# Patient Record
Sex: FEMALE | Race: WHITE | Employment: UNEMPLOYED | URBAN - METROPOLITAN AREA
[De-identification: names, ages, dates, MRNs, and addresses within clinical notes are randomized per-mention and may not be internally consistent; named-entity substitution may affect disease eponyms.]

---

## 2017-01-23 PROBLEM — M81.0 OSTEOPOROSIS: Status: ACTIVE | Noted: 2017-01-23

## 2017-02-15 ENCOUNTER — HOSPITAL ENCOUNTER (OUTPATIENT)
Dept: GENERAL RADIOLOGY | Age: 57
Discharge: HOME | End: 2017-02-15
Payer: COMMERCIAL

## 2017-02-15 DIAGNOSIS — S12.100A: ICD-10-CM

## 2017-02-15 PROCEDURE — 72040 X-RAY EXAM NECK SPINE 2-3 VW: CPT

## 2017-02-22 ENCOUNTER — HOSPITAL ENCOUNTER (OUTPATIENT)
Dept: GENERAL RADIOLOGY | Age: 57
Discharge: HOME | End: 2017-02-22
Payer: COMMERCIAL

## 2017-02-22 DIAGNOSIS — S12.110A ODONTOID FRACTURE (HCC): ICD-10-CM

## 2017-02-22 PROCEDURE — 72040 X-RAY EXAM NECK SPINE 2-3 VW: CPT

## 2017-03-15 ENCOUNTER — HOSPITAL ENCOUNTER (OUTPATIENT)
Dept: GENERAL RADIOLOGY | Age: 57
Discharge: HOME | End: 2017-03-15
Payer: COMMERCIAL

## 2017-03-15 DIAGNOSIS — R52 PAIN: ICD-10-CM

## 2017-03-15 PROCEDURE — 72040 X-RAY EXAM NECK SPINE 2-3 VW: CPT

## 2017-04-02 ENCOUNTER — APPOINTMENT (OUTPATIENT)
Dept: CT IMAGING | Age: 57
End: 2017-04-02
Attending: EMERGENCY MEDICINE
Payer: COMMERCIAL

## 2017-04-02 ENCOUNTER — HOSPITAL ENCOUNTER (EMERGENCY)
Age: 57
Discharge: HOME | End: 2017-04-02
Attending: EMERGENCY MEDICINE
Payer: COMMERCIAL

## 2017-04-02 VITALS
HEART RATE: 78 BPM | SYSTOLIC BLOOD PRESSURE: 105 MMHG | HEIGHT: 61 IN | TEMPERATURE: 97.7 F | RESPIRATION RATE: 20 BRPM | WEIGHT: 125 LBS | DIASTOLIC BLOOD PRESSURE: 57 MMHG | OXYGEN SATURATION: 96 % | BODY MASS INDEX: 23.6 KG/M2

## 2017-04-02 DIAGNOSIS — M54.50 ACUTE LEFT-SIDED LOW BACK PAIN WITHOUT SCIATICA: ICD-10-CM

## 2017-04-02 DIAGNOSIS — M25.552 PAIN OF LEFT HIP JOINT: Primary | ICD-10-CM

## 2017-04-02 DIAGNOSIS — M54.2 NECK PAIN: ICD-10-CM

## 2017-04-02 PROCEDURE — 73700 CT LOWER EXTREMITY W/O DYE: CPT

## 2017-04-02 PROCEDURE — 74011250637 HC RX REV CODE- 250/637: Performed by: EMERGENCY MEDICINE

## 2017-04-02 PROCEDURE — 99284 EMERGENCY DEPT VISIT MOD MDM: CPT | Performed by: EMERGENCY MEDICINE

## 2017-04-02 RX ORDER — DIAZEPAM 5 MG/1
5 TABLET ORAL
Qty: 15 TAB | Refills: 0 | Status: SHIPPED | OUTPATIENT
Start: 2017-04-02 | End: 2017-06-02

## 2017-04-02 RX ORDER — DIAZEPAM 5 MG/1
5 TABLET ORAL
Status: COMPLETED | OUTPATIENT
Start: 2017-04-02 | End: 2017-04-02

## 2017-04-02 RX ORDER — OXYCODONE AND ACETAMINOPHEN 10; 325 MG/1; MG/1
1 TABLET ORAL
Qty: 10 TAB | Refills: 0 | Status: SHIPPED | OUTPATIENT
Start: 2017-04-02 | End: 2017-06-02

## 2017-04-02 RX ORDER — OXYCODONE AND ACETAMINOPHEN 10; 325 MG/1; MG/1
1 TABLET ORAL
Status: COMPLETED | OUTPATIENT
Start: 2017-04-02 | End: 2017-04-02

## 2017-04-02 RX ADMIN — DIAZEPAM 5 MG: 5 TABLET ORAL at 13:09

## 2017-04-02 RX ADMIN — OXYCODONE HYDROCHLORIDE AND ACETAMINOPHEN 1 TABLET: 10; 325 TABLET ORAL at 13:09

## 2017-04-02 NOTE — ED PROVIDER NOTES
Patient is a 64 y.o. female presenting with fall. The history is provided by the spouse and the patient. Fall   The accident occurred more than 2 days ago. The fall occurred while walking. She fell from a height of ground level. She landed on hard floor. There was no blood loss. The point of impact was the left hip (lower back). The pain is present in the left hip (lower back). The pain is at a severity of 10/10. She was ambulatory at the scene. There was no entrapment after the fall. There was no drug use involved in the accident. There was no alcohol use involved in the accident. Pertinent negatives include no visual change, no fever, no numbness, no abdominal pain, no bowel incontinence, no nausea, no vomiting, no hematuria, no headaches, no extremity weakness, no hearing loss, no loss of consciousness, no tingling and no laceration. The risk factors include none. The symptoms are aggravated by standing and ambulation. She has tried rest (pain meds) for the symptoms. The treatment provided no relief. It is unknown when the patient last had a tetanus shot.         Past Medical History:   Diagnosis Date    3-part fracture of surgical neck of left humerus 10/27/2016    ADHD (attention deficit hyperactivity disorder)     Arthritis     Carpal tunnel syndrome 11/11/2015    Closed fracture of anatomical neck of humerus 6/16/2015    Degeneration of lumbar or lumbosacral intervertebral disc 2/27/2015    Depressive disorder, not elsewhere classified 2/27/2015    Dermatophytosis of the body 2/27/2015    Essential hypertension, benign 2/27/2015    GERD (gastroesophageal reflux disease) 2/27/2015    Heart murmur     pt reports since birth; echo dated 9- states trace aortic and mitral regurg    History of MRSA infection     Hypopotassemia 2/27/2015    Insomnia, unspecified 2/27/2015    Kidney stone     Migraine, unspecified, without mention of intractable migraine without mention of status migrainosus 2/27/2015    Odontoid fracture with type II morphology ([facility] [address]     Osteoporosis 1/23/2017    Other closed fractures of upper end of humerus 6/16/2015    Polycythemia, secondary 2/27/2015    Traumatic tear of left rotator cuff 10/27/2016    Type 2 superior 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 shoulder 10/27/2016    Unspecified viral hepatitis C without hepatic coma 02/27/2015    pt reports hx only; normal LFTs noted labs June, 2016       Past Surgical History:   Procedure Laterality Date    HX BLADDER SUSPENSION  2006    bladder tack    HX CARPAL TUNNEL RELEASE Right 2001    then left    HX LITHOTRIPSY  2005    HX ORTHOPAEDIC      Slap repair right    HX ORTHOPAEDIC  2011    hip repair, left    HX ORTHOPAEDIC Left     Hand surgery         Family History:   Problem Relation Age of Onset    Lung Disease Mother     Hypertension Mother     Heart Disease Mother     Stroke Mother     COPD Mother     Cancer Father     Heart Attack Father     Diabetes Other     Osteoporosis Other     Other Other      Arthritis    Malignant Hyperthermia Neg Hx     Pseudocholinesterase Deficiency Neg Hx     Delayed Awakening Neg Hx     Post-op Nausea/Vomiting Neg Hx     Emergence Delirium Neg Hx     Post-op Cognitive Dysfunction Neg Hx        Social History     Social History    Marital status:      Spouse name: N/A    Number of children: N/A    Years of education: N/A     Occupational History    housekeeping Self Employed     Social History Main Topics    Smoking status: Current Every Day Smoker     Packs/day: 1.00     Years: 10.00     Types: Cigarettes    Smokeless tobacco: Never Used    Alcohol use No    Drug use: No    Sexual activity: Not on file     Other Topics Concern    Exercise No     Social History Narrative         ALLERGIES: Benadryl [diphenhydramine hcl]; Dilaudid [hydromorphone]; Lortab [hydrocodone-acetaminophen];  Morphine; and Neurontin [gabapentin]    Review of Systems   Constitutional: Negative for chills and fever. Gastrointestinal: Negative for abdominal pain, bowel incontinence, nausea and vomiting. Genitourinary: Negative for hematuria. Musculoskeletal: Positive for arthralgias, back pain, neck pain (patient's currently in c-collar for dens fracture) and neck stiffness. Negative for extremity weakness and gait problem. Neurological: Negative for tingling, loss of consciousness, numbness and headaches. All other systems reviewed and are negative. Vitals:    04/02/17 1153   BP: 124/78   Pulse: 99   Resp: 18   Temp: 97.7 °F (36.5 °C)   SpO2: 99%   Weight: 56.7 kg (125 lb)   Height: 5' 1\" (1.549 m)            Physical Exam   Constitutional: She is oriented to person, place, and time. She appears well-developed and well-nourished. She appears distressed. HENT:   Head: Normocephalic and atraumatic. Right Ear: Tympanic membrane and external ear normal.   Left Ear: Tympanic membrane and external ear normal.   Mouth/Throat: Oropharynx is clear and moist.   Eyes: Conjunctivae and EOM are normal. Pupils are equal, round, and reactive to light. Neck: Normal range of motion. Neck supple. No tracheal deviation present. Cardiovascular: Normal rate, regular rhythm, normal heart sounds and intact distal pulses. Exam reveals no gallop and no friction rub. No murmur heard. Pulmonary/Chest: Effort normal and breath sounds normal. No respiratory distress. She has no wheezes. Abdominal: Soft. Bowel sounds are normal. She exhibits no distension and no mass. There is no hepatosplenomegaly. There is no tenderness. There is no rebound and no guarding. Genitourinary: Rectal exam shows anal tone normal.   Musculoskeletal: She exhibits no edema. Lumbar back: She exhibits decreased range of motion, tenderness and pain. She exhibits no edema, no deformity, no laceration, no spasm and normal pulse.         Back:    Lymphadenopathy: She has no cervical adenopathy. Neurological: She is alert and oriented to person, place, and time. She has normal strength. She displays normal reflexes. No cranial nerve deficit or sensory deficit. Coordination normal.   Skin: Skin is warm and dry. No laceration and no rash noted. She is not diaphoretic. No erythema. Psychiatric: She has a normal mood and affect. Nursing note and vitals reviewed. MDM  Number of Diagnoses or Management Options  Acute left-sided low back pain without sciatica: new and requires workup  Pain of left hip joint: new and requires workup     Amount and/or Complexity of Data Reviewed  Tests in the radiology section of CPT®: ordered and reviewed  Obtain history from someone other than the patient: yes  Review and summarize past medical records: yes    Risk of Complications, Morbidity, and/or Mortality  Presenting problems: high  Diagnostic procedures: high  Management options: moderate    Patient Progress  Patient progress: improved    ED Course       Procedures    The patient was observed in the ED. Results Reviewed:    CT HIP LT WO CONT   Final Result   IMPRESSION: No fracture identified. If there is persistent clinical concern for an occult nondisplaced fracture   consider followup MRI. I discussed the results of all labs, procedures, radiographs, and treatments with the patient and available family. Treatment plan is agreed upon and the patient is ready for discharge. All voiced understanding of the discharge plan and medication instructions or changes as appropriate. Questions about treatment in the ED were answered. All were encouraged to return should symptoms worsen or new problems develop.

## 2017-04-02 NOTE — ED TRIAGE NOTES
Reports has C2 fracture. States fell on Wednesday and has increasing back pain and had x-ray done on Friday of hip and back. States ordered MRI of pelvis. States has not had the MRI yet but the MD told them to come to ED for possible emergent MRI.

## 2017-04-02 NOTE — DISCHARGE INSTRUCTIONS
Learning About Relief for Back Pain  What is back tension and strain? Back strain happens when you overstretch, or pull, a muscle in your back. You may hurt your back in an accident or when you exercise or lift something. Most back pain will get better with rest and time. You can take care of yourself at home to help your back heal.  What can you do first to relieve back pain? When you first feel back pain, try these steps:  · Walk. Take a short walk (10 to 20 minutes) on a level surface (no slopes, hills, or stairs) every 2 to 3 hours. Walk only distances you can manage without pain, especially leg pain. · Relax. Find a comfortable position for rest. Some people are comfortable on the floor or a medium-firm bed with a small pillow under their head and another under their knees. Some people prefer to lie on their side with a pillow between their knees. Don't stay in one position for too long. · Try heat or ice. Try using a heating pad on a low or medium setting, or take a warm shower, for 15 to 20 minutes every 2 to 3 hours. Or you can buy single-use heat wraps that last up to 8 hours. You can also try an ice pack for 10 to 15 minutes every 2 to 3 hours. You can use an ice pack or a bag of frozen vegetables wrapped in a thin towel. There is not strong evidence that either heat or ice will help, but you can try them to see if they help. You may also want to try switching between heat and cold. · Take pain medicine exactly as directed. ¨ If the doctor gave you a prescription medicine for pain, take it as prescribed. ¨ If you are not taking a prescription pain medicine, ask your doctor if you can take an over-the-counter medicine. What else can you do? · Stretch and exercise. Exercises that increase flexibility may relieve your pain and make it easier for your muscles to keep your spine in a good, neutral position. And don't forget to keep walking. · Do self-massage.  You can use self-massage to unwind after work or school or to energize yourself in the morning. You can easily massage your feet, hands, or neck. Self-massage works best if you are in comfortable clothes and are sitting or lying in a comfortable position. Use oil or lotion to massage bare skin. · Reduce stress. Back pain can lead to a vicious Gambell: Distress about the pain tenses the muscles in your back, which in turn causes more pain. Learn how to relax your mind and your muscles to lower your stress. Where can you learn more? Go to http://catracho-ricardo.info/. Enter Z831 in the search box to learn more about \"Learning About Relief for Back Pain. \"  Current as of: May 23, 2016  Content Version: 11.2  © 6653-9175 [employer]. Care instructions adapted under license by [employer] (which disclaims liability or warranty for this information). If you have questions about a medical condition or this instruction, always ask your healthcare professional. [place] [address] any warranty or liability for your use of this information. Back Pain, Emergency or Urgent Symptoms: Care Instructions  Your Care Instructions  Many people have back pain at one time or another. In most cases, pain gets better with self-care that includes over-the-counter pain medicine, ice, heat, and exercises. Unless you have symptoms of a severe injury or heart attack, you may be able to give yourself a few days before you call a doctor. But some back problems are very serious. Do not ignore symptoms that need to be checked right away. Follow-up care is a key part of your treatment and safety. Be sure to make and go to all appointments, and call your doctor if you are having problems. It's also a good idea to know your test results and keep a list of the medicines you take. How can you care for yourself at home? · Sit or lie in positions that are most comfortable and that reduce your pain.  Try one of these positions when you lie down:  ¨ Lie on your back with your knees bent and supported by large pillows. ¨ Lie on the floor with your legs on the seat of a sofa or chair. Alejandra Eladia on your side with your knees and hips bent and a pillow between your legs. ¨ Lie on your stomach if it does not make pain worse. · Do not sit up in bed, and avoid soft couches and twisted positions. Bed rest can help relieve pain at first, but it delays healing. Avoid bed rest after the first day. · Change positions every 30 minutes. If you must sit for long periods of time, take breaks from sitting. Get up and walk around, or lie flat. · Try using a heating pad on a low or medium setting, for 15 to 20 minutes every 2 or 3 hours. Try a warm shower in place of one session with the heating pad. You can also buy single-use heat wraps that last up to 8 hours. You can also try ice or cold packs on your back for 10 to 20 minutes at a time, several times a day. (Put a thin cloth between the ice pack and your skin.) This reduces pain and makes it easier to be active and exercise. · Take pain medicines exactly as directed. ¨ If the doctor gave you a prescription medicine for pain, take it as prescribed. ¨ If you are not taking a prescription pain medicine, ask your doctor if you can take an over-the-counter medicine. When should you call for help? Call 911 anytime you think you may need emergency care. For example, call if:  · You are unable to move a leg at all. · You have back pain with severe belly pain. · You have symptoms of a heart attack. These may include:  ¨ Chest pain or pressure, or a strange feeling in the chest.  ¨ Sweating. ¨ Shortness of breath. ¨ Nausea or vomiting. ¨ Pain, pressure, or a strange feeling in the back, neck, jaw, or upper belly or in one or both shoulders or arms. ¨ Lightheadedness or sudden weakness. ¨ A fast or irregular heartbeat.   After you call 911, the  may tell you to chew 1 adult-strength or 2 to 4 low-dose aspirin. Wait for an ambulance. Do not try to drive yourself. Call your doctor now or seek immediate medical care if:  · You have new or worse symptoms in your arms, legs, chest, belly, or buttocks. Symptoms may include:  ¨ Numbness or tingling. ¨ Weakness. ¨ Pain. · You lose bladder or bowel control. · You have back pain and:  ¨ You have injured your back while lifting or doing some other activity. Call if the pain is severe, has not gone away after 1 or 2 days, and you cannot do your normal daily activities. ¨ You have had a back injury before that needed treatment. ¨ Your pain has lasted longer than 4 weeks. ¨ You have had weight loss you cannot explain. ¨ You are age 48 or older. ¨ You have cancer now or have had it before. Watch closely for changes in your health, and be sure to contact your doctor if you are not getting better as expected. Where can you learn more? Go to http://catracho-ricardo.info/. Enter O336 in the search box to learn more about \"Back Pain, Emergency or Urgent Symptoms: Care Instructions. \"  Current as of: May 27, 2016  Content Version: 11.2  © 5752-5834 [employer]. Care instructions adapted under license by [employer] (which disclaims liability or warranty for this information). If you have questions about a medical condition or this instruction, always ask your healthcare professional. [place] [address] any warranty or liability for your use of this information. Hip Pain: Care Instructions  Your Care Instructions  Hip pain may be caused by many things, including overuse, a fall, or a twisting movement. Another cause of hip pain is arthritis. Your pain may increase when you stand up, walk, or squat. The pain may come and go or may be constant. Home treatment can help relieve hip pain, swelling, and stiffness. If your pain is ongoing, you may need more tests and treatment.   Follow-up care is a key part of your treatment and safety. Be sure to make and go to all appointments, and call your doctor if you are having problems. Its also a good idea to know your test results and keep a list of the medicines you take. How can you care for yourself at home? · Take pain medicines exactly as directed. ¨ If the doctor gave you a prescription medicine for pain, take it as prescribed. ¨ If you are not taking a prescription pain medicine, ask your doctor if you can take an over-the-counter medicine. · Rest and protect your hip. Take a break from any activity, including standing or walking, that may cause pain. · Put ice or a cold pack against your hip for 10 to 20 minutes at a time. Try to do this every 1 to 2 hours for the next 3 days (when you are awake) or until the swelling goes down. Put a thin cloth between the ice and your skin. · Sleep on your healthy side with a pillow between your knees, or sleep on your back with pillows under your knees. · If there is no swelling, you can put moist heat, a heating pad, or a warm cloth on your hip. Do gentle stretching exercises to help keep your hip flexible. · Learn how to prevent falls. Have your vision and hearing checked regularly. Wear slippers or shoes with a nonskid sole. · Stay at a healthy weight. · Wear comfortable shoes. When should you call for help? Call 911 anytime you think you may need emergency care. For example, call if:  · You have sudden chest pain and shortness of breath, or you cough up blood. · You are not able to stand or walk or bear weight. · Your buttocks, legs, or feet feel numb or tingly. · Your leg or foot is cool or pale or changes color. · You have severe pain. Call your doctor now or seek immediate medical care if:  · You have signs of infection, such as:  ¨ Increased pain, swelling, warmth, or redness in the hip area. ¨ Red streaks leading from the hip area. ¨ Pus draining from the hip area. ¨ A fever.   · You have signs of a blood clot, such as:  ¨ Pain in your calf, back of the knee, thigh, or groin. ¨ Redness and swelling in your leg or groin. · You are not able to bend, straighten, or move your leg normally. · You have trouble urinating or having bowel movements. Watch closely for changes in your health, and be sure to contact your doctor if:  · You do not get better as expected. Where can you learn more? Go to http://catracho-ricardo.info/. Enter Y056 in the search box to learn more about \"Hip Pain: Care Instructions. \"  Current as of: May 27, 2016  Content Version: 11.2  © 1530-2225 [employer]. Care instructions adapted under license by [employer] (which disclaims liability or warranty for this information). If you have questions about a medical condition or this instruction, always ask your healthcare professional. [address] any warranty or liability for your use of this information.

## 2017-05-18 ENCOUNTER — HOSPITAL ENCOUNTER (OUTPATIENT)
Dept: CT IMAGING | Age: 57
Discharge: HOME | End: 2017-05-18
Attending: HOSPITALIST
Payer: COMMERCIAL

## 2017-05-18 DIAGNOSIS — S12.100A C2 CERVICAL FRACTURE (HCC): ICD-10-CM

## 2017-05-18 PROCEDURE — 72125 CT NECK SPINE W/O DYE: CPT

## 2017-07-03 PROBLEM — J18.9 BILATERAL PNEUMONIA: Status: ACTIVE | Noted: 2017-07-03

## 2017-07-06 ENCOUNTER — HOSPITAL ENCOUNTER (INPATIENT)
Age: 57
LOS: 2 days | Discharge: HOME | DRG: 190 | End: 2017-07-08
Attending: INTERNAL MEDICINE | Admitting: INTERNAL MEDICINE
Payer: COMMERCIAL

## 2017-07-06 ENCOUNTER — APPOINTMENT (OUTPATIENT)
Dept: GENERAL RADIOLOGY | Age: 57
DRG: 190 | End: 2017-07-06
Attending: PHYSICIAN ASSISTANT
Payer: COMMERCIAL

## 2017-07-06 DIAGNOSIS — Z87.891 HISTORY OF TOBACCO ABUSE: ICD-10-CM

## 2017-07-06 DIAGNOSIS — R07.9 CHEST PAIN, UNSPECIFIED TYPE: ICD-10-CM

## 2017-07-06 DIAGNOSIS — R91.8 BILATERAL PULMONARY INFILTRATES ON CHEST X-RAY: ICD-10-CM

## 2017-07-06 DIAGNOSIS — J18.9 PNEUMONIA OF BOTH LUNGS DUE TO INFECTIOUS ORGANISM, UNSPECIFIED PART OF LUNG: ICD-10-CM

## 2017-07-06 DIAGNOSIS — J18.9 CAP (COMMUNITY ACQUIRED PNEUMONIA): ICD-10-CM

## 2017-07-06 DIAGNOSIS — J96.01 ACUTE RESPIRATORY FAILURE WITH HYPOXIA (HCC): ICD-10-CM

## 2017-07-06 PROBLEM — I10 HYPERTENSION: Status: ACTIVE | Noted: 2017-07-06

## 2017-07-06 PROBLEM — R00.1 SINUS BRADYCARDIA: Status: ACTIVE | Noted: 2017-07-06

## 2017-07-06 PROBLEM — M54.9 BACK PAIN: Status: ACTIVE | Noted: 2017-07-06

## 2017-07-06 PROBLEM — J81.1 PULMONARY EDEMA: Status: ACTIVE | Noted: 2017-07-06

## 2017-07-06 LAB
ATRIAL RATE: 44 BPM
BNP SERPL-MCNC: 311 PG/ML
CALCULATED P AXIS, ECG09: 44 DEGREES
CALCULATED R AXIS, ECG10: 55 DEGREES
CALCULATED T AXIS, ECG11: 61 DEGREES
CRP SERPL-MCNC: 2.2 MG/DL (ref 0–0.9)
DIAGNOSIS, 93000: NORMAL
ERYTHROCYTE [SEDIMENTATION RATE] IN BLOOD: 87 MM/HR (ref 0–30)
P-R INTERVAL, ECG05: 182 MS
PROCALCITONIN SERPL-MCNC: <0.1 NG/ML
Q-T INTERVAL, ECG07: 496 MS
QRS DURATION, ECG06: 90 MS
QTC CALCULATION (BEZET), ECG08: 424 MS
TROPONIN I SERPL-MCNC: <0.02 NG/ML (ref 0.02–0.05)
VENTRICULAR RATE, ECG03: 44 BPM

## 2017-07-06 PROCEDURE — 74011250636 HC RX REV CODE- 250/636: Performed by: INTERNAL MEDICINE

## 2017-07-06 PROCEDURE — 85652 RBC SED RATE AUTOMATED: CPT | Performed by: PHYSICIAN ASSISTANT

## 2017-07-06 PROCEDURE — 74011250636 HC RX REV CODE- 250/636: Performed by: PHYSICIAN ASSISTANT

## 2017-07-06 PROCEDURE — 65660000000 HC RM CCU STEPDOWN

## 2017-07-06 PROCEDURE — 99254 IP/OBS CNSLTJ NEW/EST MOD 60: CPT | Performed by: INTERNAL MEDICINE

## 2017-07-06 PROCEDURE — 36415 COLL VENOUS BLD VENIPUNCTURE: CPT | Performed by: PHYSICIAN ASSISTANT

## 2017-07-06 PROCEDURE — 74011250637 HC RX REV CODE- 250/637: Performed by: PHYSICIAN ASSISTANT

## 2017-07-06 PROCEDURE — 93306 TTE W/DOPPLER COMPLETE: CPT

## 2017-07-06 PROCEDURE — 84145 PROCALCITONIN (PCT): CPT | Performed by: PHYSICIAN ASSISTANT

## 2017-07-06 PROCEDURE — 86140 C-REACTIVE PROTEIN: CPT | Performed by: PHYSICIAN ASSISTANT

## 2017-07-06 PROCEDURE — 74011000258 HC RX REV CODE- 258: Performed by: PHYSICIAN ASSISTANT

## 2017-07-06 PROCEDURE — 74011250637 HC RX REV CODE- 250/637: Performed by: INTERNAL MEDICINE

## 2017-07-06 PROCEDURE — 83880 ASSAY OF NATRIURETIC PEPTIDE: CPT | Performed by: PHYSICIAN ASSISTANT

## 2017-07-06 PROCEDURE — 71020 XR CHEST PA LAT: CPT

## 2017-07-06 PROCEDURE — 84484 ASSAY OF TROPONIN QUANT: CPT | Performed by: PHYSICIAN ASSISTANT

## 2017-07-06 PROCEDURE — 93005 ELECTROCARDIOGRAM TRACING: CPT | Performed by: INTERNAL MEDICINE

## 2017-07-06 RX ORDER — HYDROCODONE BITARTRATE AND HOMATROPINE METHYLBROMIDE ORAL SOLUTION 5; 1.5 MG/5ML; MG/5ML
5 LIQUID ORAL
Status: DISCONTINUED | OUTPATIENT
Start: 2017-07-06 | End: 2017-07-06 | Stop reason: CLARIF

## 2017-07-06 RX ORDER — PANTOPRAZOLE SODIUM 40 MG/1
40 TABLET, DELAYED RELEASE ORAL
Status: DISCONTINUED | OUTPATIENT
Start: 2017-07-07 | End: 2017-07-08 | Stop reason: HOSPADM

## 2017-07-06 RX ORDER — NITROGLYCERIN 0.4 MG/1
0.4 TABLET SUBLINGUAL
Status: DISCONTINUED | OUTPATIENT
Start: 2017-07-06 | End: 2017-07-08 | Stop reason: HOSPADM

## 2017-07-06 RX ORDER — ESCITALOPRAM OXALATE 10 MG/1
20 TABLET ORAL DAILY
Status: DISCONTINUED | OUTPATIENT
Start: 2017-07-07 | End: 2017-07-08 | Stop reason: HOSPADM

## 2017-07-06 RX ORDER — FUROSEMIDE 10 MG/ML
40 INJECTION INTRAMUSCULAR; INTRAVENOUS 2 TIMES DAILY
Status: DISCONTINUED | OUTPATIENT
Start: 2017-07-06 | End: 2017-07-07

## 2017-07-06 RX ORDER — METHOCARBAMOL 750 MG/1
750 TABLET, FILM COATED ORAL
Status: DISCONTINUED | OUTPATIENT
Start: 2017-07-06 | End: 2017-07-08 | Stop reason: HOSPADM

## 2017-07-06 RX ORDER — BUDESONIDE 0.5 MG/2ML
500 INHALANT ORAL
Status: DISCONTINUED | OUTPATIENT
Start: 2017-07-06 | End: 2017-07-08 | Stop reason: HOSPADM

## 2017-07-06 RX ORDER — GABAPENTIN 400 MG/1
800 CAPSULE ORAL 3 TIMES DAILY
Status: DISCONTINUED | OUTPATIENT
Start: 2017-07-06 | End: 2017-07-08 | Stop reason: HOSPADM

## 2017-07-06 RX ORDER — TEMAZEPAM 15 MG/1
30 CAPSULE ORAL
Status: DISCONTINUED | OUTPATIENT
Start: 2017-07-06 | End: 2017-07-08 | Stop reason: HOSPADM

## 2017-07-06 RX ORDER — ALBUTEROL SULFATE 0.83 MG/ML
2.5 SOLUTION RESPIRATORY (INHALATION)
Status: DISCONTINUED | OUTPATIENT
Start: 2017-07-06 | End: 2017-07-07

## 2017-07-06 RX ORDER — PREDNISONE 20 MG/1
20 TABLET ORAL 2 TIMES DAILY WITH MEALS
Status: DISCONTINUED | OUTPATIENT
Start: 2017-07-06 | End: 2017-07-06

## 2017-07-06 RX ORDER — GUAIFENESIN 100 MG/5ML
81 LIQUID (ML) ORAL DAILY
Status: DISCONTINUED | OUTPATIENT
Start: 2017-07-07 | End: 2017-07-08 | Stop reason: HOSPADM

## 2017-07-06 RX ORDER — ACETAMINOPHEN 325 MG/1
650 TABLET ORAL
Status: DISCONTINUED | OUTPATIENT
Start: 2017-07-06 | End: 2017-07-08 | Stop reason: HOSPADM

## 2017-07-06 RX ORDER — CARISOPRODOL 350 MG/1
350 TABLET ORAL
Status: DISCONTINUED | OUTPATIENT
Start: 2017-07-06 | End: 2017-07-08 | Stop reason: HOSPADM

## 2017-07-06 RX ORDER — PROMETHAZINE HYDROCHLORIDE 25 MG/1
25 TABLET ORAL
Status: DISCONTINUED | OUTPATIENT
Start: 2017-07-06 | End: 2017-07-08 | Stop reason: HOSPADM

## 2017-07-06 RX ORDER — SODIUM CHLORIDE 0.9 % (FLUSH) 0.9 %
5-10 SYRINGE (ML) INJECTION AS NEEDED
Status: DISCONTINUED | OUTPATIENT
Start: 2017-07-06 | End: 2017-07-08 | Stop reason: HOSPADM

## 2017-07-06 RX ORDER — ONDANSETRON 2 MG/ML
4 INJECTION INTRAMUSCULAR; INTRAVENOUS
Status: DISCONTINUED | OUTPATIENT
Start: 2017-07-06 | End: 2017-07-08 | Stop reason: HOSPADM

## 2017-07-06 RX ORDER — GABAPENTIN 800 MG/1
TABLET ORAL
COMMUNITY
End: 2018-10-22 | Stop reason: ALTCHOICE

## 2017-07-06 RX ORDER — HYDROCODONE BITARTRATE AND HOMATROPINE METHYLBROMIDE 1.5; 5 MG/5ML; MG/5ML
5 SYRUP ORAL
Status: DISCONTINUED | OUTPATIENT
Start: 2017-07-06 | End: 2017-07-08 | Stop reason: HOSPADM

## 2017-07-06 RX ADMIN — CEFTRIAXONE 1 G: 1 INJECTION, POWDER, FOR SOLUTION INTRAMUSCULAR; INTRAVENOUS at 19:08

## 2017-07-06 RX ADMIN — FUROSEMIDE 40 MG: 10 INJECTION, SOLUTION INTRAMUSCULAR; INTRAVENOUS at 17:53

## 2017-07-06 RX ADMIN — NITROGLYCERIN 1 INCH: 20 OINTMENT TOPICAL at 17:53

## 2017-07-06 RX ADMIN — AZITHROMYCIN MONOHYDRATE 500 MG: 500 INJECTION, POWDER, LYOPHILIZED, FOR SOLUTION INTRAVENOUS at 18:12

## 2017-07-06 RX ADMIN — TEMAZEPAM 30 MG: 15 CAPSULE ORAL at 22:28

## 2017-07-06 RX ADMIN — GABAPENTIN 800 MG: 400 CAPSULE ORAL at 20:43

## 2017-07-06 RX ADMIN — GABAPENTIN 800 MG: 400 CAPSULE ORAL at 17:52

## 2017-07-06 RX ADMIN — METHYLPREDNISOLONE SODIUM SUCCINATE 40 MG: 40 INJECTION, POWDER, FOR SOLUTION INTRAMUSCULAR; INTRAVENOUS at 20:43

## 2017-07-06 RX ADMIN — HYDROCODONE BITARTRATE AND HOMATROPINE METHYLBROMIDE 5 ML: 5; 1.5 SOLUTION ORAL at 20:43

## 2017-07-06 RX ADMIN — CARISOPRODOL 350 MG: 350 TABLET ORAL at 22:28

## 2017-07-06 RX ADMIN — NITROGLYCERIN 1 INCH: 20 OINTMENT TOPICAL at 23:04

## 2017-07-06 RX ADMIN — ONDANSETRON 4 MG: 2 INJECTION INTRAMUSCULAR; INTRAVENOUS at 19:08

## 2017-07-06 NOTE — PROGRESS NOTES
Bedside and Verbal shift change report given to Jason Wong RN (oncoming nurse) by self [facility] ). Report included the following information SBAR, Kardex, MAR and Recent Results.

## 2017-07-06 NOTE — PROGRESS NOTES
Spoke with Genie in pharmacy regarding zithromax that was suppose to be sent. She stated it would be sent now. Will continue to monitor.

## 2017-07-06 NOTE — PROGRESS NOTES
Spoke with [place] again regarding zithromax that has not been sent yet. Will continue to monitor.

## 2017-07-06 NOTE — IP AVS SNAPSHOT
[address] [id number] 
[phone] Patient: Modesto Castro MRN: [id number] You are allergic to the following Allergen Reactions Benadryl (Diphenhydramine Hcl) Other (comments)  
 hyperactivity Dilaudid (Hydromorphone) Other (comments) Hyperactivity Lortab (Hydrocodone-Acetaminophen) Other (comments) Hyperactivity Morphine Unknown (comments) Neurontin (Gabapentin) Nausea Only Takes this from pain   
  
Recent Documentation Weight BMI OB Status Smoking Status 59.4 kg 24.75 kg/m2 Postmenopausal Former Smoker Emergency Contacts Name Discharge Info Relation Home Work Mobile Aquilino Marroquin DISCHARGE CAREGIVER [3] Spouse [3] [phone] About your hospitalization You were admitted on:  July 6, 2017 You last received care in the:  [facility] 3 CLINICAL OBSERVATION You were discharged on:  July 8, 2017 Unit phone number:  [phone] Why you were hospitalized Your primary diagnosis was:  Chest Pain Your diagnoses also included:  Back Pain, History Of Tobacco Abuse, Pulmonary Edema, Hypertension, Rll Pneumonia, Sinus Bradycardia, Bilateral Pulmonary Infiltrates On Chest X-Ray, Acute Respiratory Failure With Hypoxia (Hcc), Cap (Community Acquired Pneumonia), Copd Exacerbation (Hcc) Providers Seen During Your Hospitalizations Provider Role Specialty Primary office phone Ravinder Catherine MD Attending Provider Cardiology [phone] Your Primary Care Physician (PCP) Primary Care Physician Office Phone Office Fax Joanne Reeves [address] [facility] Follow-up Information Follow up With Details Comments Contact Info Nadege Gilbert MD Schedule an appointment as soon as possible for a visit in 1 week  [address] [facility] [address] [phone] [facility]  The office will call you with a follow up appointment [address] [facility] [address] Dalila Esposito [phone] 
[phone] Your Appointments Monday July 17, 2017 10:30 AM EDT SPINE PREHAB with [facility] ASSESSMENT RM 02 [facility] Pre Assessment [facility] OR PRE ASSESSMENT) [address] [facility] [address] [facility]  
[phone] Tuesday July 25, 2017 SPINE POSTERIOR CERVICAL FUSION with Amanda Childress MD  
[facility] SURGERY ([address] [place] [address]) [address] [facility]  
[phone] Current Discharge Medication List  
  
START taking these medications Dose & Instructions Dispensing Information Comments Morning Noon Evening Bedtime  
 ipratropium-albuterol  mcg/actuation inhaler Commonly known as:  Poly Johnson Your last dose was: As needed Dose:  1 Puff Take 1 Puff by inhalation every six (6) hours as needed for Wheezing or Shortness of Breath. Quantity:  1 Inhaler Refills:  2  
     
   
   
   
  
 predniSONE 10 mg tablet Commonly known as:  Graydon Kenney Your last dose was:  Per MD instructions Take 4 tablets daily for 3 days then, take 3 tablets daily for 3 days then, take 2 tablets daily for 3 days then, take 1 tablet daily for 3 days then. Quantity:  30 Tab Refills:  0 CONTINUE these medications which have CHANGED Dose & Instructions Dispensing Information Comments Morning Noon Evening Bedtime  
 azithromycin 500 mg Tab Commonly known as:  Lebron Baker What changed:  See the new instructions. Your last dose was: This AM  
   
 Dose:  500 mg Take 1 Tab by mouth daily for 2 days. Quantity:  2 Tab Refills:  0 HYDROcodone-homatropine 5-1.5 mg/5 mL syrup Commonly known as:  HYCODAN What changed:   
- when to take this 
- reasons to take this Your last dose was: As needed Dose:  5 mL Take 5 mL by mouth three (3) times daily as needed for up to 7 days. Max Daily Amount: 15 mL. Quantity:  120 mL Refills:  0 CONTINUE these medications which have NOT CHANGED Dose & Instructions Dispensing Information Comments Morning Noon Evening Bedtime  
 amLODIPine-benazepril 5-40 mg per capsule Commonly known as:  Brandi Alexis Your last dose was: This AM  
   
 TAKE 1 CAPSULE BY MOUTH EVERY DAY Quantity:  30 Cap Refills:  11 CALCIUM PHOSPHATE PO Your last dose was:  Per your home schedule Take  by mouth. Refills:  0  
     
   
   
   
  
 carisoprodol 350 mg tablet Commonly known as:  SOMA Your last dose was: As needed at bedtime Dose:  350 mg Take 1 Tab by mouth every eight (8) hours as needed for Muscle Spasm(s). Max Daily Amount: 1,050 mg.  
 Quantity:  60 Tab Refills:  0  
     
   
   
   
  
 DENAVIR 1 % topical cream  
Generic drug:  penciclovir Your last dose was: As needed APPLY TOPICALLY TO THE AFFECTED AREA EVERY 2 HOURS Quantity:  5 g Refills:  PRN  
     
   
   
   
  
 escitalopram oxalate 20 mg tablet Commonly known as:  Alma Ying Your last dose was: This AM  
   
 TAKE 1 TABLET BY MOUTH EVERY DAY Quantity:  30 Tab Refills:  11  
     
   
   
   
  
 methocarbamol 750 mg tablet Commonly known as:  ROBAXIN Your last dose was: As needed TAKE 2 TABLETS BY MOUTH FOUR TIMES DAILY AS NEEDED Quantity:  250 Tab Refills:  1 NEURONTIN 800 mg tablet Generic drug:  gabapentin Your last dose was: This AM  
   
 Take  by mouth three (3) times daily. Refills:  0 PriLOSEC 20 mg capsule Generic drug:  omeprazole Your last dose was: This AM  
   
 Dose:  20 mg Take 20 mg by mouth daily. Patient instructed to take morning of surgery per anesthesia guidelines Refills:  0 PROLIA 60 mg/mL injection Generic drug:  denosumab Your last dose was:  Per your home schedule Dose:  60 mg  
60 mg by SubCUTAneous route. Refills:  0  
     
   
   
   
  
 promethazine 25 mg tablet Commonly known as:  PHENERGAN Your last dose was: As needed 1/2-1 po tid prn nausea Quantity:  30 Tab Refills:  0  
     
   
   
   
  
 temazepam 30 mg capsule Commonly known as:  RESTORIL Your last dose was: At bedtime TAKE 1 CAPSULE BY MOUTH EVERY DAY AT BEDTIME Quantity:  30 Cap Refills:  3 VITAMIN D3 1,000 unit tablet Generic drug:  cholecalciferol Your last dose was: Your home schedule Take  by mouth daily. Refills:  0 STOP taking these medications   
 ibuprofen 200 mg tablet Commonly known as:  MOTRIN Where to Get Your Medications Information on where to get these meds will be given to you by the nurse or doctor. ! Ask your nurse or doctor about these medications  
  azithromycin 500 mg Tab HYDROcodone-homatropine 5-1.5 mg/5 mL syrup  
 ipratropium-albuterol  mcg/actuation inhaler  
 predniSONE 10 mg tablet Discharge Instructions Pneumonia: Care Instructions Your Care Instructions Pneumonia is an infection of the lungs. Most cases are caused by infections from bacteria or viruses. Pneumonia may be mild or very severe. If it is caused by bacteria, you will be treated with antibiotics. It may take a few weeks to a few months to recover fully from pneumonia, depending on how sick you were and whether your overall health is good. Follow-up care is a key part of your treatment and safety. Be sure to make and go to all appointments, and call your doctor if you are having problems. Its also a good idea to know your test results and keep a list of the medicines you take. How can you care for yourself at home? · Take your antibiotics exactly as directed. Do not stop taking the medicine just because you are feeling better. You need to take the full course of antibiotics. · Take your medicines exactly as prescribed. Call your doctor if you think you are having a problem with your medicine. · Get plenty of rest and sleep. You may feel weak and tired for a while, but your energy level will improve with time. · To prevent dehydration, drink plenty of fluids, enough so that your urine is light yellow or clear like water. Choose water and other caffeine-free clear liquids until you feel better. If you have kidney, heart, or liver disease and have to limit fluids, talk with your doctor before you increase the amount of fluids you drink. · Take care of your cough so you can rest. A cough that brings up mucus from your lungs is common with pneumonia. It is one way your body gets rid of the infection. But if coughing keeps you from resting or causes severe fatigue and chest-wall pain, talk to your doctor. He or she may suggest that you take a medicine to reduce the cough. · Use a vaporizer or humidifier to add moisture to your bedroom. Follow the directions for cleaning the machine. · Do not smoke or allow others to smoke around you. Smoke will make your cough last longer. If you need help quitting, talk to your doctor about stop-smoking programs and medicines. These can increase your chances of quitting for good. · Take an over-the-counter pain medicine, such as acetaminophen (Tylenol), ibuprofen (Advil, Motrin), or naproxen (Aleve). Read and follow all instructions on the label. · Do not take two or more pain medicines at the same time unless the doctor told you to. Many pain medicines have acetaminophen, which is Tylenol. Too much acetaminophen (Tylenol) can be harmful.  
· If you were given a spirometer to measure how well your lungs are working, use it as instructed. This can help your doctor tell how your recovery is going. · To prevent pneumonia in the future, talk to your doctor about getting a flu vaccine (once a year) and a pneumococcal vaccine (one time only for most people). When should you call for help? Call 911 anytime you think you may need emergency care. For example, call if: 
· You have severe trouble breathing. Call your doctor now or seek immediate medical care if: 
· You cough up dark brown or bloody mucus (sputum). · You have new or worse trouble breathing. · You are dizzy or lightheaded, or you feel like you may faint. Watch closely for changes in your health, and be sure to contact your doctor if: 
· You have a new or higher fever. · You are coughing more deeply or more often. · You are not getting better after 2 days (48 hours). · You do not get better as expected. Where can you learn more? Go to http://catracho-ricardo.info/. Enter [id number] in the search box to learn more about \"Pneumonia: Care Instructions. \" Current as of: March 25, 2017 Content Version: 11.3 © 9374-0108 [employer]. Care instructions adapted under license by [employer] (which disclaims liability or warranty for this information). If you have questions about a medical condition or this instruction, always ask your healthcare professional. [address] [address] any warranty or liability for your use of this information. Discharge Orders None [facility] Announcement We are excited to announce that we are making your provider's discharge notes available to you in GetMeMedia. You will see these notes when they are completed and signed by the physician that discharged you from your recent hospital stay.   If you have any questions or concerns about any information you see in GetMeMedia, please call the [facility] Department where you were seen or reach out to your Primary Care Provider for more information about your plan of care. Introducing [facility]! Slick Estevez introduces [employer] patient portal. Now you can access parts of your medical record, email your doctor's office, and request medication refills online. 1. In your internet browser, go to [email or web address] 2. Click on the First Time User? Click Here link in the Sign In box. You will see the New Member Sign Up page. 3. Enter your [employer]visor Access Code exactly as it appears below. You will not need to use this code after youve completed the sign-up process. If you do not sign up before the expiration date, you must request a new code. · [employer] Access Code: [email or web address] Expires: 7/17/2017  9:51 PM 
 
4. Enter the last four digits of your Social Security Number (xxxx) and Date of Birth (mm/dd/yyyy) as indicated and click Submit. You will be taken to the next sign-up page. 5. Create a [employer]visor ID. This will be your [employer]visor login ID and cannot be changed, so think of one that is secure and easy to remember. 6. Create a [employer] password. You can change your password at any time. 7. Enter your Password Reset Question and Answer. This can be used at a later time if you forget your password. 8. Enter your e-mail address. You will receive e-mail notification when new information is available in [address] 9. Click Sign Up. You can now view and download portions of your medical record. 10. Click the Download Summary menu link to download a portable copy of your medical information. If you have questions, please visit the Frequently Asked Questions section of the [employer]visor website. Remember, [employer]visor is NOT to be used for urgent needs. For medical emergencies, dial 911. Now available from your iPhone and Android! General Information Please provide this summary of care documentation to your next provider. Patient Signature:  ____________________________________________________________ Date:  ____________________________________________________________  
  
Gearldine Moulds Provider Signature:  ____________________________________________________________ Date:  ____________________________________________________________

## 2017-07-06 NOTE — PROGRESS NOTES
TRANSFER - IN REPORT:    Verbal report received from Tari Arguello RN on [facility] being received from [facility] for routine progression of care      Report consisted of patients Situation, Background, Assessment and Recommendations(SBAR). Information from the following report(s) SBAR, Kardex, ED Summary and MAR was reviewed with the receiving nurse. Opportunity for questions and clarification was provided. Assessment completed upon patients arrival to unit and care assumed. Telemetry monitor applied. VS taken. Pt denies any SOB. Bed low and locked. Side rails x 2. Call light within reach. Pt verbalizes understanding to call for assistance.

## 2017-07-06 NOTE — PROGRESS NOTES
Problem: Falls - Risk of  Goal: *Absence of falls  Outcome: Progressing Towards Goal  Patient Alert, oriented x3,  proper use of call light, pages for assistance before getting OOB. Nonskid socks on feet prior to getting OOB. Staff compliant with keeping bed in low, locked position; with both left and right upper side rails raised/ elevated. Bedside table and personal items within reach.

## 2017-07-06 NOTE — CONSULTS
CONSULT NOTE    Rene Bear    7/6/2017    Date of Admission:  7/6/2017    The patient's chart is reviewed and the patient is discussed with the staff. Subjective:     Patient is a 62 y.o.  female seen and evaluated at the request of Dr. Robert Lucas. Pt has a history tobacco abuse smoking >1ppd, chronic pain, depression, and arthritis. Pt sprayed round up around her house on 6/26/17. A few days later she developed nausea, vomiting, and diarrhea. Then she developed a cough and wheezing. She contacted her PCP's office but couldn't be seen. She went to  and was diagnosed with PNA. She was given an injection of antibiotics and sent home with a prescription for abx. She didn't improve and went to see Dr. Nestor Pelletier on 7/3/17. She was admitted to [facility]. She developed bradycardia and chest pain. She had a CTA chest which was negative for PE but did reveal GGO, pulmonary edema, and small bilateral pleural effusions. We were consulted to assist with evaluation and treatment. Pt has a history of smoking > 1ppd x 16 + years. She has not smoked in 1 week. She is not on inhalers or O2 at home. She denies prior history of asthma or COPD. She reports that her mother had COPD and her father had lung cancer. She reports her cough is dry. Her cough has improved with steroids and antibiotics. She denies fever or chills. She has had sweats from coughing fits.        Review of Systems  Constitutional: positive for sweats and fatigue  Eyes: negative  Ears, nose, mouth, throat, and face: negative  Respiratory: positive for cough, wheezing or dyspnea on exertion  Cardiovascular: negative  Gastrointestinal: positive for nausea and diarrhea  Genitourinary:negative  Hematologic/lymphatic: negative  Musculoskeletal:positive for arthralgias, stiff joints, neck pain and back pain  Neurological: negative  Behavioral/Psych: positive for anxiety and depression  Endocrine: negative  Allergic/Immunologic: negative    Patient Active Problem List   Diagnosis Code    Migraine, unspecified, without mention of intractable migraine without mention of status migrainosus G43.909    Unspecified viral hepatitis C without hepatic coma B19.20    Polycythemia, secondary D75.1    Degeneration of lumbar or lumbosacral intervertebral disc M51.37    Essential hypertension, benign I10    GERD (gastroesophageal reflux disease) K21.9    Insomnia, unspecified G47.00    Depressive disorder, not elsewhere classified F32.9    Hypopotassemia E87.6    Heart murmur R01.1    ADHD (attention deficit hyperactivity disorder) F90.9    Osteoarthritis of left acromioclavicular joint M19.012    Type 2 superior 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 shoulder S43.432A    Osteoporosis M81.0    Arthritis M19.90    Bilateral pneumonia J18.9    Back pain M54.9    History of tobacco abuse Z87.891    Chest pain R07.9    Pulmonary edema J81.1    Hypertension I10    RLL pneumonia J18.1    Sinus bradycardia R00.1       [employer] company none. Prior to Admission Medications   Prescriptions Last Dose Informant Patient Reported? Taking? CALCIUM PHOSPHATE PO   Yes No   Sig: Take  by mouth. DENAVIR 1 % topical cream   No No   Sig: APPLY TOPICALLY TO THE AFFECTED AREA EVERY 2 HOURS   HYDROcodone-homatropine (HYCODAN) 5-1.5 mg/5 mL syrup   Yes No   Sig: Take 5 mL by mouth. amLODIPine-benazepril (LOTREL) 5-40 mg per capsule   No No   Sig: TAKE 1 CAPSULE BY MOUTH EVERY DAY   azithromycin (ZITHROMAX) 250 mg tablet   Yes No   Si tabs day one then 1 tab day 2-5   carisoprodol (SOMA) 350 mg tablet   No No   Sig: Take 1 Tab by mouth every eight (8) hours as needed for Muscle Spasm(s). Max Daily Amount: 1,050 mg.   cholecalciferol (VITAMIN D3) 1,000 unit tablet   Yes No   Sig: Take  by mouth daily. denosumab (PROLIA) 60 mg/mL injection   Yes No   Si mg by SubCUTAneous route. escitalopram oxalate (LEXAPRO) 20 mg tablet   No No   Sig: TAKE 1 TABLET BY MOUTH EVERY DAY   gabapentin (NEURONTIN) 800 mg tablet   Yes Yes   Sig: Take  by mouth three (3) times daily. ibuprofen (MOTRIN) 200 mg tablet   Yes No   Sig: Take 600 mg by mouth every six (6) hours as needed for Pain. Hold 5 days prior to surgery   methocarbamol (ROBAXIN) 750 mg tablet   No No   Sig: TAKE 2 TABLETS BY MOUTH FOUR TIMES DAILY AS NEEDED   omeprazole (PRILOSEC) 20 mg capsule   Yes No   Sig: Take 20 mg by mouth daily.  Patient instructed to take morning of surgery per anesthesia guidelines   promethazine (PHENERGAN) 25 mg tablet   No No   Si/2-1 po tid prn nausea   temazepam (RESTORIL) 30 mg capsule   No No   Sig: TAKE 1 CAPSULE BY MOUTH EVERY DAY AT BEDTIME      Facility-Administered Medications: None       Past Medical History:   Diagnosis Date    3-part fracture of surgical neck of left humerus 10/27/2016    ADHD (attention deficit hyperactivity disorder)     Arthritis     Carpal tunnel syndrome 2015    Closed fracture of anatomical neck of humerus 2015    Degeneration of lumbar or lumbosacral intervertebral disc 2015    Depressive disorder, not elsewhere classified 2015    Dermatophytosis of the body 2015    Essential hypertension, benign 2015    GERD (gastroesophageal reflux disease) 2015    Heart murmur     pt reports since birth; echo dated 2006 states trace aortic and mitral regurg    History of MRSA infection     Hypopotassemia 2015    Insomnia, unspecified 2015    Kidney stone     Migraine, unspecified, without mention of intractable migraine without mention of status migrainosus 2015    Odontoid fracture with type II morphology ([facility] [address]     Osteoporosis 2017    Other closed fractures of upper end of humerus 2015    Polycythemia, secondary 2015    Traumatic tear of left rotator cuff 10/27/2016    Type 2 superior 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 shoulder 10/27/2016    Unspecified viral hepatitis C without hepatic coma 02/27/2015    pt reports hx only; normal LFTs noted labs June, 2016     Past Surgical History:   Procedure Laterality Date    HX BLADDER SUSPENSION  2006    bladder tack    HX CARPAL TUNNEL RELEASE Right 2001    then left    HX LITHOTRIPSY  2005    HX ORTHOPAEDIC      Slap repair right    HX ORTHOPAEDIC  2011    hip repair, left    HX ORTHOPAEDIC Left     Hand surgery     Social History     Social History    Marital status:      Spouse name: N/A    Number of children: N/A    Years of education: N/A     Occupational History    housekeeping Self Employed     Social History Main Topics    Smoking status: Former Smoker     Packs/day: 1.00     Years: 10.00     Types: Cigarettes    Smokeless tobacco: Never Used    Alcohol use No    Drug use: No    Sexual activity: Not on file     Other Topics Concern    Exercise No     Social History Narrative     Family History   Problem Relation Age of Onset    Lung Disease Mother     Hypertension Mother     Heart Disease Mother     Stroke Mother     COPD Mother     Cancer Father     Heart Attack Father     Diabetes Other     Osteoporosis Other     Other Other      Arthritis    Malignant Hyperthermia Neg Hx     Pseudocholinesterase Deficiency Neg Hx     Delayed Awakening Neg Hx     Post-op Nausea/Vomiting Neg Hx     Emergence Delirium Neg Hx     Post-op Cognitive Dysfunction Neg Hx      Allergies   Allergen Reactions    Benadryl [Diphenhydramine Hcl] Other (comments)     hyperactivity    Dilaudid [Hydromorphone] Other (comments)     Hyperactivity    Lortab [Hydrocodone-Acetaminophen] Other (comments)     Hyperactivity    Morphine Unknown (comments)    Neurontin [Gabapentin] Nausea Only     Takes this from pain        Current Facility-Administered Medications   Medication Dose Route Frequency    carisoprodol (SOMA) tablet 350 mg  350 mg Oral Q6H PRN    [START ON 7/7/2017] escitalopram oxalate (LEXAPRO) tablet 20 mg  20 mg Oral DAILY    gabapentin (NEURONTIN) capsule 800 mg  800 mg Oral TID    HYDROcodone-homatropine (HYCODAN) 5-1.5 mg/5 mL syrup 5 mL  5 mL Oral Q4H PRN    methocarbamol (ROBAXIN) tablet 750 mg  750 mg Oral Q6H PRN    [START ON 7/7/2017] pantoprazole (PROTONIX) tablet 40 mg  40 mg Oral ACB    promethazine (PHENERGAN) tablet 25 mg  25 mg Oral Q4H PRN    temazepam (RESTORIL) capsule 30 mg  30 mg Oral QHS PRN    sodium chloride (NS) flush 5-10 mL  5-10 mL IntraVENous PRN    [START ON 7/7/2017] aspirin chewable tablet 81 mg  81 mg Oral DAILY    nitroglycerin (NITROBID) 2 % ointment 1 Inch  1 Inch Topical Q6H    nitroglycerin (NITROSTAT) tablet 0.4 mg  0.4 mg SubLINGual Q5MIN PRN    acetaminophen (TYLENOL) tablet 650 mg  650 mg Oral Q4H PRN    ondansetron (ZOFRAN) injection 4 mg  4 mg IntraVENous Q4H PRN    azithromycin (ZITHROMAX) 500 mg in 0.9% sodium chloride (MBP/ADV) 250 mL  500 mg IntraVENous Q24H    predniSONE (DELTASONE) tablet 20 mg  20 mg Oral BID WITH MEALS    furosemide (LASIX) injection 40 mg  40 mg IntraVENous BID    [START ON 7/7/2017] amLODIPine/benazepril (LOTREL) 5/40 mg   Oral DAILY         Objective:     Vitals:    07/06/17 1229 07/06/17 1234 07/06/17 1239   BP: 161/70 144/63 124/73   Pulse: (!) 43     Resp: 18     Temp: 98.3 °F (36.8 °C)     SpO2: 96%     Weight: 126 lb 14.4 oz (57.6 kg)         PHYSICAL EXAM     Constitutional:  the patient is well developed and in no acute distress, on 2L O2  EENMT:  Sclera clear, pupils equal, oral mucosa moist  Respiratory: crackles bilaterally, no wheezing  Cardiovascular:  RRR without M,G,R  Gastrointestinal: soft and non-tender; with positive bowel sounds. Musculoskeletal: warm without cyanosis. There is no lower leg edema.   Skin:  no jaundice or rashes   Neurologic: no gross neuro deficits     Psychiatric:  alert and oriented x 3    CXR:  7/6/17:             Chest CT:     No results for input(s): WBC, HGB, HCT, PLT, INR, HGBEXT, HCTEXT, PLTEXT in the last 72 hours. No lab exists for component: INREXT  No results for input(s): NA, K, CL, GLU, CO2, BUN, CREA, MG, PHOS, CA, TROIQ, ALB, TBIL, TBILI, GPT, ALT, SGOT, BNPP in the last 72 hours. No lab exists for component: TROIP  No results for input(s): PH, PCO2, PO2, HCO3 in the last 72 hours. No results for input(s): LCAD, LAC in the last 72 hours. Assessment:  (Medical Decision Making)     Hospital Problems  Date Reviewed: 7/6/2017          Codes Class Noted POA    Back pain-chronic issue ICD-10-CM: M54.9  ICD-9-CM: 724.5  7/6/2017 Yes        History of tobacco abuse-smoking cessation stressed ICD-10-CM: Z87.891  ICD-9-CM: V15.82  7/6/2017 Yes        * (Principal)Chest pain-CTA is negative ICD-10-CM: R07.9  ICD-9-CM: 786.50  7/6/2017 Yes        Pulmonary edema-on lasix ICD-10-CM: J81.1  ICD-9-CM: 528  7/6/2017 Yes        Hypertension-controlled ICD-10-CM: I10  ICD-9-CM: 401.9  7/6/2017 Yes        RLL pneumonia-on zithromax, resume rocephin ICD-10-CM: J18.1  ICD-9-CM: 666  7/6/2017 Yes        Sinus bradycardia-resolved ICD-10-CM: R00.1  ICD-9-CM: 427.89  7/6/2017 Yes        Bilateral pulmonary infiltrates on chest x-ray-check crp/sed rate/procalcitonin, continue abx ICD-10-CM: R91.8  ICD-9-CM: 793.19  7/6/2017 Unknown        Acute respiratory failure with hypoxia (HCC)-wean O2 as tolerated ICD-10-CM: J96.01  ICD-9-CM: 518.81  7/6/2017 Unknown              Plan:  (Medical Decision Making)     --wean O2 as tolerated  --continue zmax/rocephin  --add pulmicort/albuterol nebs  --solumedrol  --continue lasix    More than 50% of the time documented was spent in face-to-face contact with the patient and in the care of the patient on the floor/unit where the patient is located. Thank you very much for this referral.  We appreciate the opportunity to participate in this patient's care. Will follow along with above stated plan. GAVIN Perez  Lungs:  Some crackles in bases  Heart:  RRR with no Murmur/Rubs/Gallops    Additional Comments:  rx for CAP- appears multifocal  On ct    I have spoken with and examined the patient. I agree with the above assessment and plan as documented.     Logan Aviles MD

## 2017-07-06 NOTE — H&P
[facility] CARDIOLOGY History &Physical                 Primary Cardiologist: None    Primary Care Physician: Dr Kim Dodson    Admitting Physician: Dr Mihir Vargas:     Patient is a 62 y.o. female who presents with chest pain. She developed a cough and SOB last Friday, was seen at , started on antibiotics for pneumonia, f/u with her PCP on Monday 7-3 for continued SOB and cough and admitted to [facility] and started on solu medrol, zithromax and ceftriaxone. CXR showed RLL pneumonia. Her SOB and cough improved slightly. She has had chest soreness from coughing so much. However on 7-6 while at rest she began having different L chest pain in her L breast radiating to her LUE. Pain was severe, difficult to characterize, 8-9/10, did not improve w nitro paste and nebulizer. Pain continued through the night though it did ease off last evening. This morning pain was mild, d-dimer elevated CT chest showed B effusions, interstitial edema w ground glass appearance c/w CHF. She has a h/o heart murmur but no CAD, CHF, or arrhythmia. Troponin negative x3, EKG shows SB w rate 44 without ST/T wave changes. WBC 12.6, hgb 11.1, , K 3.8, BUN 13, cr .4.  /73. She was having CP on arrival to [address] [place] [address], mild, which resolved after nitro x 2.         Past Medical History:   Diagnosis Date    3-part fracture of surgical neck of left humerus 10/27/2016    ADHD (attention deficit hyperactivity disorder)     Arthritis     Carpal tunnel syndrome 11/11/2015    Closed fracture of anatomical neck of humerus 6/16/2015    Degeneration of lumbar or lumbosacral intervertebral disc 2/27/2015    Depressive disorder, not elsewhere classified 2/27/2015    Dermatophytosis of the body 2/27/2015    Essential hypertension, benign 2/27/2015    GERD (gastroesophageal reflux disease) 2/27/2015    Heart murmur     pt reports since birth; echo dated 9- states trace aortic and mitral regurg    History of MRSA infection     Hypopotassemia 2/27/2015    Insomnia, unspecified 2/27/2015    Kidney stone     Migraine, unspecified, without mention of intractable migraine without mention of status migrainosus 2/27/2015    Odontoid fracture with type II morphology ([facility] [address]     Osteoporosis 1/23/2017    Other closed fractures of upper end of humerus 6/16/2015    Polycythemia, secondary 2/27/2015    Traumatic tear of left rotator cuff 10/27/2016    Type 2 superior 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 shoulder 10/27/2016    Unspecified viral hepatitis C without hepatic coma 02/27/2015    pt reports hx only; normal LFTs noted labs June, 2016      Past Surgical History:   Procedure Laterality Date    HX BLADDER SUSPENSION  2006    bladder tack    HX CARPAL TUNNEL RELEASE Right 2001    then left    HX LITHOTRIPSY  2005    HX ORTHOPAEDIC      Slap repair right    HX ORTHOPAEDIC  2011    hip repair, left    HX ORTHOPAEDIC Left     Hand surgery      Allergies   Allergen Reactions    Benadryl [Diphenhydramine Hcl] Other (comments)     hyperactivity    Dilaudid [Hydromorphone] Other (comments)     Hyperactivity    Lortab [Hydrocodone-Acetaminophen] Other (comments)     Hyperactivity    Morphine Unknown (comments)    Neurontin [Gabapentin] Nausea Only     Takes this from pain      Social History   Substance Use Topics    Smoking status: Former Smoker     Packs/day: 1.00     Years: 10.00     Types: Cigarettes    Smokeless tobacco: Never Used    Alcohol use No      FH:   Family History   Problem Relation Age of Onset    Lung Disease Mother     Hypertension Mother     Heart Disease Mother     Stroke Mother     COPD Mother     Cancer Father     Heart Attack Father     Diabetes Other     Osteoporosis Other     Other Other      Arthritis    Malignant Hyperthermia Neg Hx     Pseudocholinesterase Deficiency Neg Hx     Delayed Awakening Neg Hx     Post-op Nausea/Vomiting Neg Hx     Emergence Delirium Neg Hx     Post-op Cognitive Dysfunction Neg Hx         Review of Systems  General: no weight loss, weakness, fever or chills  Skin: no rashes, lumps, or other skin changes  HEENT: no headache, dizziness  Neck: no swollen glands, goiter, pain or stiffness  Respiratory: + cough, sputum, no hemoptysis, + dyspnea, wheezing  Cardiovascular: + as per HPI  Gastrointestinal: no reflux, constipation, diarrhea, liver problems, GI bleeding  Urinary: no frequency, urgency , hematuria, burning/pain with urination, recent flank pain, polyuria, nocturia, or difficulty urinating  Peripheral Vascular: no claudication, leg cramps, prior DVTs, swelling of calves, legs, or feet, color change, or swelling with redness or tenderness  Musculoskeletal: + Back and neck pain, pending cervical surgery with Dr Madison Cartagena   Psychiatric: + depression   Neurological: no sensory or motor loss, seizures, syncope, tremors, numbness, tingling, no changes in mood, attention, or speech, no changes in orientation, memory, insight, or judgment. Hematologic: no anemia, easy bruising or bleeding  Endocrine: no thyroid problems, heat or cold intolerance, excessive sweating, polyuria, polydipsia, no diabetes.         Objective:       Visit Vitals    /73    Pulse (!) 43    Temp 98.3 °F (36.8 °C)    Resp 18    SpO2 96%               Physical Exam:  General: Well Developed, Well Nourished, No Acute Distress  HEENT: pupils equal and round, no abnormalities noted  Neck: supple, no JVD, no carotid bruits  Heart: S1S2 with RRR with 2/6 murmur   Lungs: Coarse, frequent cough, few crackles at bases   Abd: soft, nontender, nondistended, with good bowel sounds  Ext: warm, no edema, calves supple/nontender, pulses 2+ bilaterally  Skin: warm and dry  Psychiatric: Normal mood and affect  Neurologic: Alert and oriented X 3      ECG: SB w rate 44 without ST/T wave changes    Data Review:    See HPI      Assessment/Plan: Chest pain- Atypical with negative troponin x3, no ischemic EKG changes. Admit, cont ASA, nitro, ACE-I, check lipids in AM and assess response to diuresis. May need nuclear stress test at some point. Back pain - Cont home meds. History of tobacco abuse - Quit 7-1-17    Pulmonary edema - Check echo, IV lasix, monitor I&O, daily weight. Hypertension- Cont lotrel. RLL pneumonia- Cont azithromycin, hycodan, and prednisone. Albuterol prn. Check procalcitonin, sed rate- will consult pulmonary to eval for cardiac vs non cardiogenic edema and to recommend prednisone dose- was on prednisone 20 mg BID at [facility] so will continue that dose for now. Will give lasix 40 mg IV. Stefanie Sharp PA-C  7/6/2017  1:17 PM             [facility] CARDIOLOGY     7/6/2017     5:52 PM    I have personally seen and examined Rene Mccann with [facility] ALONSO. I agree and confirm findings in history, physical exam, and assessment/plan as outlined above with following pertinent additions/exceptions:   Patient presents as a transfer from [facility] with bilateral infiltrates, chest pain and cough. She notes last week she was using [place] in her yard. 2 days later she developed a cough with audible wheezing. She denies any fever or chills. She went to an acute care facility and was diagnosed with a bacterial pneumonia. She was started on antibiotics without improvement in her symptoms. She was then seen on Monday by her primary care physician and admitted to [facility].  She was started on antibiotics and steroids. With this treatment her cough has improved. She has developed discomfort in her chest radiating into her left upper extremity. It is persistent with negative cardiac biomarkers. Given her discomfort a d-dimer was checked and was elevated. CT scan showed bilateral interstitial edema and concerns of CHF or raised. Based on this she was transferred to our service.   She is also had episodes of sinus bradycardia. She notes her heart rate is typically in the 70-80's. She's had heart rates in the low 40s according to my conversation with her physician at the outside hospital.  PE:  CV: Bradycardic rate with regular rhythm. L: CTA bilaterally ASS/PLAN:  As above. Suspect primary pulmonary process. Will consult pulmonary. Check echo. Atypical chest pain. Check enzymes.   If negative would treat pulmonary issues and plan outpatient stress test.       Blaine Grace MD

## 2017-07-06 NOTE — PROGRESS NOTES
Skin assessment completed with secondary RN. Skin intact. Sacrum and heels intact with no breakdown noted.

## 2017-07-07 PROBLEM — J44.1 COPD EXACERBATION (HCC): Status: ACTIVE | Noted: 2017-07-07

## 2017-07-07 LAB
ANION GAP BLD CALC-SCNC: 11 MMOL/L (ref 7–16)
BASOPHILS # BLD AUTO: 0 K/UL (ref 0–0.2)
BASOPHILS # BLD: 0 % (ref 0–2)
BUN SERPL-MCNC: 17 MG/DL (ref 6–23)
CALCIUM SERPL-MCNC: 8 MG/DL (ref 8.3–10.4)
CHLORIDE SERPL-SCNC: 110 MMOL/L (ref 98–107)
CHOLEST SERPL-MCNC: 124 MG/DL
CO2 SERPL-SCNC: 24 MMOL/L (ref 21–32)
CREAT SERPL-MCNC: 0.64 MG/DL (ref 0.6–1)
DIFFERENTIAL METHOD BLD: ABNORMAL
EOSINOPHIL # BLD: 0 K/UL (ref 0–0.8)
EOSINOPHIL NFR BLD: 0 % (ref 0.5–7.8)
ERYTHROCYTE [DISTWIDTH] IN BLOOD BY AUTOMATED COUNT: 14.1 % (ref 11.9–14.6)
GLUCOSE SERPL-MCNC: 220 MG/DL (ref 65–100)
HCT VFR BLD AUTO: 33.6 % (ref 35.8–46.3)
HDLC SERPL-MCNC: 38 MG/DL (ref 40–60)
HDLC SERPL: 3.3 {RATIO}
HGB BLD-MCNC: 11.6 G/DL (ref 11.7–15.4)
IMM GRANULOCYTES # BLD: 0.1 K/UL (ref 0–0.5)
IMM GRANULOCYTES NFR BLD AUTO: 1.5 % (ref 0–5)
LDLC SERPL CALC-MCNC: 59.8 MG/DL
LIPID PROFILE,FLP: ABNORMAL
LYMPHOCYTES # BLD AUTO: 13 % (ref 13–44)
LYMPHOCYTES # BLD: 1.2 K/UL (ref 0.5–4.6)
MAGNESIUM SERPL-MCNC: 1.9 MG/DL (ref 1.8–2.4)
MCH RBC QN AUTO: 31.5 PG (ref 26.1–32.9)
MCHC RBC AUTO-ENTMCNC: 34.5 G/DL (ref 31.4–35)
MCV RBC AUTO: 91.3 FL (ref 79.6–97.8)
MONOCYTES # BLD: 0.3 K/UL (ref 0.1–1.3)
MONOCYTES NFR BLD AUTO: 4 % (ref 4–12)
NEUTS SEG # BLD: 7.5 K/UL (ref 1.7–8.2)
NEUTS SEG NFR BLD AUTO: 82 % (ref 43–78)
PLATELET # BLD AUTO: 296 K/UL (ref 150–450)
PMV BLD AUTO: 9.7 FL (ref 10.8–14.1)
POTASSIUM SERPL-SCNC: 3.7 MMOL/L (ref 3.5–5.1)
RBC # BLD AUTO: 3.68 M/UL (ref 4.05–5.25)
SODIUM SERPL-SCNC: 145 MMOL/L (ref 136–145)
TRIGL SERPL-MCNC: 131 MG/DL (ref 35–150)
VLDLC SERPL CALC-MCNC: 26.2 MG/DL (ref 6–23)
WBC # BLD AUTO: 9.2 K/UL (ref 4.3–11.1)

## 2017-07-07 PROCEDURE — 80061 LIPID PANEL: CPT | Performed by: PHYSICIAN ASSISTANT

## 2017-07-07 PROCEDURE — 94640 AIRWAY INHALATION TREATMENT: CPT

## 2017-07-07 PROCEDURE — 74011250636 HC RX REV CODE- 250/636: Performed by: PHYSICIAN ASSISTANT

## 2017-07-07 PROCEDURE — 77010033678 HC OXYGEN DAILY

## 2017-07-07 PROCEDURE — 80048 BASIC METABOLIC PNL TOTAL CA: CPT | Performed by: PHYSICIAN ASSISTANT

## 2017-07-07 PROCEDURE — 74011250637 HC RX REV CODE- 250/637: Performed by: INTERNAL MEDICINE

## 2017-07-07 PROCEDURE — 99232 SBSQ HOSP IP/OBS MODERATE 35: CPT | Performed by: INTERNAL MEDICINE

## 2017-07-07 PROCEDURE — 94760 N-INVAS EAR/PLS OXIMETRY 1: CPT

## 2017-07-07 PROCEDURE — 74011000250 HC RX REV CODE- 250: Performed by: INTERNAL MEDICINE

## 2017-07-07 PROCEDURE — 74011000258 HC RX REV CODE- 258: Performed by: PHYSICIAN ASSISTANT

## 2017-07-07 PROCEDURE — 85025 COMPLETE CBC W/AUTO DIFF WBC: CPT | Performed by: PHYSICIAN ASSISTANT

## 2017-07-07 PROCEDURE — 74011250637 HC RX REV CODE- 250/637: Performed by: PHYSICIAN ASSISTANT

## 2017-07-07 PROCEDURE — 83735 ASSAY OF MAGNESIUM: CPT | Performed by: PHYSICIAN ASSISTANT

## 2017-07-07 PROCEDURE — 74011250636 HC RX REV CODE- 250/636: Performed by: INTERNAL MEDICINE

## 2017-07-07 PROCEDURE — 65660000000 HC RM CCU STEPDOWN

## 2017-07-07 PROCEDURE — 36415 COLL VENOUS BLD VENIPUNCTURE: CPT | Performed by: PHYSICIAN ASSISTANT

## 2017-07-07 RX ORDER — LEVALBUTEROL INHALATION SOLUTION 0.63 MG/3ML
0.63 SOLUTION RESPIRATORY (INHALATION)
Status: DISCONTINUED | OUTPATIENT
Start: 2017-07-07 | End: 2017-07-08 | Stop reason: HOSPADM

## 2017-07-07 RX ADMIN — CARISOPRODOL 350 MG: 350 TABLET ORAL at 10:04

## 2017-07-07 RX ADMIN — TEMAZEPAM 30 MG: 15 CAPSULE ORAL at 21:17

## 2017-07-07 RX ADMIN — LEVALBUTEROL HYDROCHLORIDE 0.63 MG: 0.63 SOLUTION RESPIRATORY (INHALATION) at 20:50

## 2017-07-07 RX ADMIN — HYDROCODONE BITARTRATE AND HOMATROPINE METHYLBROMIDE 5 ML: 5; 1.5 SOLUTION ORAL at 21:17

## 2017-07-07 RX ADMIN — HYDROCODONE BITARTRATE AND HOMATROPINE METHYLBROMIDE 5 ML: 5; 1.5 SOLUTION ORAL at 10:04

## 2017-07-07 RX ADMIN — ESCITALOPRAM OXALATE 20 MG: 10 TABLET ORAL at 08:46

## 2017-07-07 RX ADMIN — PROMETHAZINE HYDROCHLORIDE 12.5 MG: 25 TABLET ORAL at 17:02

## 2017-07-07 RX ADMIN — BENAZEPRIL HYDROCHLORIDE: 10 TABLET, FILM COATED ORAL at 08:46

## 2017-07-07 RX ADMIN — NITROGLYCERIN 1 INCH: 20 OINTMENT TOPICAL at 05:13

## 2017-07-07 RX ADMIN — CARISOPRODOL 350 MG: 350 TABLET ORAL at 21:17

## 2017-07-07 RX ADMIN — AZITHROMYCIN MONOHYDRATE 500 MG: 500 INJECTION, POWDER, LYOPHILIZED, FOR SOLUTION INTRAVENOUS at 15:57

## 2017-07-07 RX ADMIN — ASPIRIN 81 MG 81 MG: 81 TABLET ORAL at 08:46

## 2017-07-07 RX ADMIN — CEFTRIAXONE 1 G: 1 INJECTION, POWDER, FOR SOLUTION INTRAMUSCULAR; INTRAVENOUS at 18:33

## 2017-07-07 RX ADMIN — LEVALBUTEROL HYDROCHLORIDE 0.63 MG: 0.63 SOLUTION RESPIRATORY (INHALATION) at 14:52

## 2017-07-07 RX ADMIN — PANTOPRAZOLE SODIUM 40 MG: 40 TABLET, DELAYED RELEASE ORAL at 08:46

## 2017-07-07 RX ADMIN — METHYLPREDNISOLONE SODIUM SUCCINATE 40 MG: 40 INJECTION, POWDER, FOR SOLUTION INTRAMUSCULAR; INTRAVENOUS at 08:46

## 2017-07-07 RX ADMIN — ACETAMINOPHEN 650 MG: 325 TABLET ORAL at 08:50

## 2017-07-07 RX ADMIN — Medication 5 ML: at 05:13

## 2017-07-07 RX ADMIN — GABAPENTIN 800 MG: 400 CAPSULE ORAL at 08:46

## 2017-07-07 RX ADMIN — Medication 5 ML: at 05:15

## 2017-07-07 RX ADMIN — METHYLPREDNISOLONE SODIUM SUCCINATE 40 MG: 40 INJECTION, POWDER, FOR SOLUTION INTRAMUSCULAR; INTRAVENOUS at 21:17

## 2017-07-07 RX ADMIN — GABAPENTIN 800 MG: 400 CAPSULE ORAL at 21:17

## 2017-07-07 RX ADMIN — GABAPENTIN 800 MG: 400 CAPSULE ORAL at 15:57

## 2017-07-07 RX ADMIN — ACETAMINOPHEN 650 MG: 325 TABLET ORAL at 16:05

## 2017-07-07 NOTE — PROGRESS NOTES
Bedside and Verbal shift change report received from [facility]. Report included the following information SBAR, Kardex, Procedure Summary, Intake/Output, MAR, Recent Results and Cardiac Rhythm SB/NSR.

## 2017-07-07 NOTE — PROGRESS NOTES
Bedside and Verbal shift change report given to Antonia Erwin RN (oncoming nurse) by self Paulette Childers nurse). Report included the following information SBAR, Kardex, MAR and Recent Results.

## 2017-07-07 NOTE — PROGRESS NOTES
Bedside and Verbal shift change report given to self (oncoming nurse) by Keena Aldrich RN (offgoing nurse). Report included the following information SBAR, Kardex, MAR and Recent Results.

## 2017-07-07 NOTE — PROGRESS NOTES
Vicky Motta  Admission Date: 7/6/2017             Daily Progress Note: 7/7/2017    The patient's chart is reviewed and the patient is discussed with the staff. Subjective:     63 y/o female transferred from [facility] with cap- multifocal on ct yesterday.   Smokes 1.5 ppd    Current Facility-Administered Medications   Medication Dose Route Frequency    levalbuterol (XOPENEX) nebulizer soln 0.63 mg/3 mL  0.63 mg Nebulization Q6H RT    carisoprodol (SOMA) tablet 350 mg  350 mg Oral Q6H PRN    escitalopram oxalate (LEXAPRO) tablet 20 mg  20 mg Oral DAILY    gabapentin (NEURONTIN) capsule 800 mg  800 mg Oral TID    methocarbamol (ROBAXIN) tablet 750 mg  750 mg Oral Q6H PRN    pantoprazole (PROTONIX) tablet 40 mg  40 mg Oral ACB    promethazine (PHENERGAN) tablet 25 mg  25 mg Oral Q4H PRN    temazepam (RESTORIL) capsule 30 mg  30 mg Oral QHS PRN    sodium chloride (NS) flush 5-10 mL  5-10 mL IntraVENous PRN    aspirin chewable tablet 81 mg  81 mg Oral DAILY    nitroglycerin (NITROBID) 2 % ointment 1 Inch  1 Inch Topical Q6H    nitroglycerin (NITROSTAT) tablet 0.4 mg  0.4 mg SubLINGual Q5MIN PRN    acetaminophen (TYLENOL) tablet 650 mg  650 mg Oral Q4H PRN    ondansetron (ZOFRAN) injection 4 mg  4 mg IntraVENous Q4H PRN    azithromycin (ZITHROMAX) 500 mg in 0.9% sodium chloride (MBP/ADV) 250 mL  500 mg IntraVENous Q24H    amLODIPine/benazepril (LOTREL) 5/40 mg   Oral DAILY    HYDROcodone-homatropine (HYCODAN) 5-1.5 mg/5 mL (5 mL) syrup 5 mL  5 mL Oral Q4H PRN    budesonide (PULMICORT) 500 mcg/2 ml nebulizer suspension  500 mcg Nebulization BID RT    methylPREDNISolone (PF) (SOLU-MEDROL) injection 40 mg  40 mg IntraVENous Q12H    cefTRIAXone (ROCEPHIN) 1 g in 0.9% sodium chloride (MBP/ADV) 50 mL MBP  1 g IntraVENous Q24H       Review of Systems    Constitutional: negative for fever, chills, sweats  Cardiovascular: negative for chest pain, palpitations, syncope, edema  Gastrointestinal:  negative for dysphagia, reflux, vomiting, diarrhea, abdominal pain, or melena  Neurologic:  negative for focal weakness, numbness, headache    Objective:     Vitals:    07/06/17 2148 07/07/17 0121 07/07/17 0612 07/07/17 0925   BP: 148/75 116/60 132/57 145/60   Pulse: (!) 45 (!) 54 (!) 40 (!) 44   Resp: 18 18 18 17   Temp: 98.4 °F (36.9 °C) 98.1 °F (36.7 °C) 98.6 °F (37 °C) 98.1 °F (36.7 °C)   SpO2: 98% 95% 97% 97%   Weight:   126 lb 1.6 oz (57.2 kg)      Intake and Output:   07/05 1901 - 07/07 0700  In: 720 [P.O.:720]  Out: 1100 [Urine:1100]       Physical Exam:   Constitution:  the patient is well developed and in no acute distress  EENMT:  Sclera clear, pupils equal, oral mucosa moist  Respiratory: basilar crackles  Cardiovascular:  RRR without M,G,R  Gastrointestinal: soft and non-tender; with positive bowel sounds. Musculoskeletal: warm without cyanosis. There is no lower leg edema. Skin:  no jaundice or rashes, no wounds   Neurologic: no gross neuro deficits     Psychiatric:  alert and oriented x 3    CXR:       LAB  No results for input(s): GLUCPOC in the last 72 hours. No lab exists for component: GLPOC   Recent Labs      07/07/17   0400   WBC  9.2   HGB  11.6*   HCT  33.6*   PLT  296     Recent Labs      07/07/17   0400  07/06/17   1645   NA  145   --    K  3.7   --    CL  110*   --    CO2  24   --    GLU  220*   --    BUN  17   --    CREA  0.64   --    MG  1.9   --    CA  8.0*   --    TROIQ   --   <0.02*     No results for input(s): PH, PCO2, PO2, HCO3 in the last 72 hours. No results for input(s): LCAD, LAC in the last 72 hours.       Assessment:  (Medical Decision Making)     Hospital Problems  Date Reviewed: 7/6/2017          Codes Class Noted POA    Back pain ICD-10-CM: M54.9  ICD-9-CM: 724.5  7/6/2017 Yes        History of tobacco abuse ICD-10-CM: Z87.891  ICD-9-CM: V15.82  7/6/2017 Yes        * (Principal)Chest pain ICD-10-CM: R07.9  ICD-9-CM: 786.50  7/6/2017 Yes Pulmonary edema ICD-10-CM: J81.1  ICD-9-CM: 069  7/6/2017 Yes    Not thought to have chf    Hypertension ICD-10-CM: I10  ICD-9-CM: 401.9  7/6/2017 Yes        Sinus bradycardia ICD-10-CM: R00.1  ICD-9-CM: 427.89  7/6/2017 Yes        Bilateral pulmonary infiltrates on chest x-ray ICD-10-CM: R91.8  ICD-9-CM: 793.19  7/6/2017 Unknown        Acute respiratory failure with hypoxia [facility]) ICD-10-CM: J96.01  ICD-9-CM: 518.81  7/6/2017 Unknown    On nc    CAP (community acquired pneumonia) ICD-10-CM: J18.9  ICD-9-CM: 124  7/6/2017 Unknown    multifocal          Plan:  (Medical Decision Making)   1   Smoking cessation  2   Iv antibx- rocephine and zithromax day 5 if started Monday  3   Iv steroids and bronchodilators  4   cxr tomorrow  --    More than 50% of the time documented was spent in face-to-face contact with the patient and in the care of the patient on the floor/unit where the patient is located.     Eva Souza MD

## 2017-07-07 NOTE — PROGRESS NOTES
7/7/2017 7:37 AM    Admit Date: 7/6/2017    Admit Diagnosis: chf;Chest pain      Subjective:    Patient with CAP. Echo is normal. There was thought of possible chf but she does not have this.  She does have a sinus steph that is likely vagal with her pneumonia    Objective:      Visit Vitals    /57 (BP 1 Location: Left arm, BP Patient Position: Head of bed elevated (Comment degrees))  Comment (BP Patient Position): 20 degrees    Pulse (!) 40    Temp 98.6 °F (37 °C)    Resp 18    Wt 57.2 kg (126 lb 1.6 oz)    SpO2 97%    BMI 23.83 kg/m2       ROS:  General ROS: negative for - chills  Hematological and Lymphatic ROS: negative for - blood clots or jaundice  Respiratory ROS: no cough, shortness of breath, or wheezing  Cardiovascular ROS: no chest pain or dyspnea on exertion  Gastrointestinal ROS: no abdominal pain, change in bowel habits, or black or bloody stools  Neurological ROS: no TIA or stroke symptoms    Physical Exam:    Physical Examination: General appearance - alert, well appearing, and in no distress  Mental status - alert, oriented to person, place, and time  Eyes - pupils equal and reactive, extraocular eye movements intact  Neck/lymph - supple, no significant adenopathy  Chest/CV - clear to auscultation, no wheezes, rales or rhonchi, symmetric air entry  Heart - normal rate, regular rhythm, normal S1, S2, no murmurs, rubs, clicks or gallops  Abdomen/GI - soft, nontender, nondistended, no masses or organomegaly  Musculoskeletal - no joint tenderness, deformity or swelling  Extremities - peripheral pulses normal, no pedal edema, no clubbing or cyanosis  Skin - normal coloration and turgor, no rashes, no suspicious skin lesions noted    Current Facility-Administered Medications   Medication Dose Route Frequency    carisoprodol (SOMA) tablet 350 mg  350 mg Oral Q6H PRN    escitalopram oxalate (LEXAPRO) tablet 20 mg  20 mg Oral DAILY    gabapentin (NEURONTIN) capsule 800 mg  800 mg Oral TID  methocarbamol (ROBAXIN) tablet 750 mg  750 mg Oral Q6H PRN    pantoprazole (PROTONIX) tablet 40 mg  40 mg Oral ACB    promethazine (PHENERGAN) tablet 25 mg  25 mg Oral Q4H PRN    temazepam (RESTORIL) capsule 30 mg  30 mg Oral QHS PRN    sodium chloride (NS) flush 5-10 mL  5-10 mL IntraVENous PRN    aspirin chewable tablet 81 mg  81 mg Oral DAILY    nitroglycerin (NITROBID) 2 % ointment 1 Inch  1 Inch Topical Q6H    nitroglycerin (NITROSTAT) tablet 0.4 mg  0.4 mg SubLINGual Q5MIN PRN    acetaminophen (TYLENOL) tablet 650 mg  650 mg Oral Q4H PRN    ondansetron (ZOFRAN) injection 4 mg  4 mg IntraVENous Q4H PRN    azithromycin (ZITHROMAX) 500 mg in 0.9% sodium chloride (MBP/ADV) 250 mL  500 mg IntraVENous Q24H    furosemide (LASIX) injection 40 mg  40 mg IntraVENous BID    amLODIPine/benazepril (LOTREL) 5/40 mg   Oral DAILY    HYDROcodone-homatropine (HYCODAN) 5-1.5 mg/5 mL (5 mL) syrup 5 mL  5 mL Oral Q4H PRN    budesonide (PULMICORT) 500 mcg/2 ml nebulizer suspension  500 mcg Nebulization BID RT    albuterol (PROVENTIL VENTOLIN) nebulizer solution 2.5 mg  2.5 mg Nebulization Q6H RT    methylPREDNISolone (PF) (SOLU-MEDROL) injection 40 mg  40 mg IntraVENous Q12H    cefTRIAXone (ROCEPHIN) 1 g in 0.9% sodium chloride (MBP/ADV) 50 mL MBP  1 g IntraVENous Q24H       Data Review:   @LABRCNT(Na,K,BUN,CREA,WBC,HGB,HCT,PLT,INR,TRP,TCHOL*,Triglyceride*,LDL*,LDLCPOC HDL*,HDL])@    TELEMETRY: SB    Assessment/Plan:     Principal Problem:    Chest pain (7/6/2017) non cardiac    Active Problems:    Back pain (7/6/2017)      History of tobacco abuse (7/6/2017)      Pulmonary edema (7/6/2017)      Hypertension (7/6/2017) The current medical regimen is effective;  continue present plan and medications. Sinus bradycardia (7/6/2017) monitor.  prob no intervention needed      Bilateral pulmonary infiltrates on chest x-ray (7/6/2017)      Acute respiratory failure with hypoxia ([facility] [address] (7/6/2017)      CAP (community acquired pneumonia) (7/6/2017)The current medical regimen is effective;  continue present plan and medications.             Didier Garcia MD

## 2017-07-08 ENCOUNTER — APPOINTMENT (OUTPATIENT)
Dept: GENERAL RADIOLOGY | Age: 57
DRG: 190 | End: 2017-07-08
Attending: INTERNAL MEDICINE
Payer: COMMERCIAL

## 2017-07-08 VITALS
DIASTOLIC BLOOD PRESSURE: 80 MMHG | BODY MASS INDEX: 24.75 KG/M2 | RESPIRATION RATE: 15 BRPM | HEART RATE: 41 BPM | TEMPERATURE: 96.6 F | OXYGEN SATURATION: 100 % | WEIGHT: 131 LBS | SYSTOLIC BLOOD PRESSURE: 161 MMHG

## 2017-07-08 LAB
ANION GAP BLD CALC-SCNC: 10 MMOL/L (ref 7–16)
BUN SERPL-MCNC: 14 MG/DL (ref 6–23)
CALCIUM SERPL-MCNC: 8.8 MG/DL (ref 8.3–10.4)
CHLORIDE SERPL-SCNC: 108 MMOL/L (ref 98–107)
CO2 SERPL-SCNC: 27 MMOL/L (ref 21–32)
CREAT SERPL-MCNC: 0.63 MG/DL (ref 0.6–1)
GLUCOSE SERPL-MCNC: 163 MG/DL (ref 65–100)
MAGNESIUM SERPL-MCNC: 1.8 MG/DL (ref 1.8–2.4)
POTASSIUM SERPL-SCNC: 3.6 MMOL/L (ref 3.5–5.1)
SODIUM SERPL-SCNC: 145 MMOL/L (ref 136–145)

## 2017-07-08 PROCEDURE — 74011250637 HC RX REV CODE- 250/637: Performed by: PHYSICIAN ASSISTANT

## 2017-07-08 PROCEDURE — 99232 SBSQ HOSP IP/OBS MODERATE 35: CPT | Performed by: INTERNAL MEDICINE

## 2017-07-08 PROCEDURE — 94640 AIRWAY INHALATION TREATMENT: CPT

## 2017-07-08 PROCEDURE — 71020 XR CHEST PA LAT: CPT

## 2017-07-08 PROCEDURE — 36415 COLL VENOUS BLD VENIPUNCTURE: CPT | Performed by: PHYSICIAN ASSISTANT

## 2017-07-08 PROCEDURE — 80048 BASIC METABOLIC PNL TOTAL CA: CPT | Performed by: PHYSICIAN ASSISTANT

## 2017-07-08 PROCEDURE — 74011250636 HC RX REV CODE- 250/636: Performed by: INTERNAL MEDICINE

## 2017-07-08 PROCEDURE — 74011250637 HC RX REV CODE- 250/637: Performed by: INTERNAL MEDICINE

## 2017-07-08 PROCEDURE — 74011000250 HC RX REV CODE- 250: Performed by: INTERNAL MEDICINE

## 2017-07-08 PROCEDURE — 83735 ASSAY OF MAGNESIUM: CPT | Performed by: PHYSICIAN ASSISTANT

## 2017-07-08 RX ORDER — HYDROCODONE BITARTRATE AND HOMATROPINE METHYLBROMIDE ORAL SOLUTION 5; 1.5 MG/5ML; MG/5ML
5 LIQUID ORAL
Qty: 120 ML | Refills: 0 | Status: SHIPPED | OUTPATIENT
Start: 2017-07-08 | End: 2017-07-15

## 2017-07-08 RX ORDER — AZITHROMYCIN 500 MG/1
500 TABLET, FILM COATED ORAL DAILY
Qty: 2 TAB | Refills: 0 | Status: SHIPPED | OUTPATIENT
Start: 2017-07-08 | End: 2017-07-10

## 2017-07-08 RX ORDER — PREDNISONE 10 MG/1
TABLET ORAL
Qty: 30 TAB | Refills: 0 | Status: SHIPPED | OUTPATIENT
Start: 2017-07-08 | End: 2017-07-17 | Stop reason: CLARIF

## 2017-07-08 RX ADMIN — CARISOPRODOL 350 MG: 350 TABLET ORAL at 11:38

## 2017-07-08 RX ADMIN — ASPIRIN 81 MG 81 MG: 81 TABLET ORAL at 08:43

## 2017-07-08 RX ADMIN — GABAPENTIN 800 MG: 400 CAPSULE ORAL at 08:42

## 2017-07-08 RX ADMIN — LEVALBUTEROL HYDROCHLORIDE 0.63 MG: 0.63 SOLUTION RESPIRATORY (INHALATION) at 08:59

## 2017-07-08 RX ADMIN — ESCITALOPRAM OXALATE 20 MG: 10 TABLET ORAL at 08:43

## 2017-07-08 RX ADMIN — Medication 10 ML: at 06:01

## 2017-07-08 RX ADMIN — METHYLPREDNISOLONE SODIUM SUCCINATE 40 MG: 40 INJECTION, POWDER, FOR SOLUTION INTRAMUSCULAR; INTRAVENOUS at 08:42

## 2017-07-08 RX ADMIN — PANTOPRAZOLE SODIUM 40 MG: 40 TABLET, DELAYED RELEASE ORAL at 06:01

## 2017-07-08 RX ADMIN — BENAZEPRIL HYDROCHLORIDE: 10 TABLET, FILM COATED ORAL at 08:42

## 2017-07-08 RX ADMIN — HYDROCODONE BITARTRATE AND HOMATROPINE METHYLBROMIDE 5 ML: 5; 1.5 SOLUTION ORAL at 10:28

## 2017-07-08 NOTE — PROGRESS NOTES
[facility] CARDIOLOGY PROGRESS NOTE           7/8/2017 9:32 AM    Admit Date: 7/6/2017      Subjective:   Feels better and wants to go home. ROS:  GEN:  No fever or chills  Cardiovascular:  As noted above:no CP or palpitations. Pulmonary:  As noted above:SOB improved. She is still coughing. Neuro:  No new focal motor or sensory loss    Objective:      Vitals:    07/07/17 2051 07/07/17 2201 07/08/17 0131 07/08/17 0616   BP:  139/81 147/69 161/71   Pulse:  60 (!) 42 (!) 44   Resp:  16 16 16   Temp:  98.4 °F (36.9 °C) 97.4 °F (36.3 °C) 98.9 °F (37.2 °C)   SpO2: 97% 96% 97% 94%   Weight:    59.4 kg (131 lb)       Physical Exam:  General-no distress  Neck- supple, no JVD  CV- regular rate and rhythm no MRG  Lung- coarse bs bilaterally  Abd- soft, nontender, nondistended  Ext- no edema bilaterally. Skin- warm and dry  Psychiatric:  Normal mood and affect. Neurologic:  Alert and oriented X 3      Data Review:   Recent Labs      07/08/17   0432  07/07/17   0400   NA  145  145   K  3.6  3.7   MG  1.8  1.9   BUN  14  17   CREA  0.63  0.64   GLU  163*  220*   WBC   --   9.2   HGB   --   11.6*   HCT   --   33.6*   PLT   --   296   TRIGL   --   131   HDL   --   38*       TELEMETRY:  Sinus bradycardia    Assessment/Plan:     Principal Problem:    Chest pain (7/6/2017): Noncardiac chest pain has resolved. Active Problems:    Back pain (7/6/2017)      History of tobacco abuse (7/6/2017):Encouraged to stop. Hypertension (7/6/2017):stable on Lotrel      Sinus bradycardia (7/6/2017)      Bilateral pulmonary infiltrates on chest x-ray (7/6/2017):c/w CAP. Patient is on iv steroids and antibiotic therapy. Treatment per pulmonary medicine. Acute respiratory failure with hypoxia (HCC) (7/6/2017):Remains on O2 ,steroids,and antibiotic therapy. Tobacco cessation is strongly recommended. CAP (community acquired pneumonia) (7/6/2017):per pulmonary medicine. Discharge when ok with Pulmonary Medicine. COPD exacerbation ([facility] (7/7/2017):per pulmonary medicine.                 Shakir Torres MD  7/8/2017 9:32 AM

## 2017-07-08 NOTE — PROGRESS NOTES
Patient's heart rate dropping as low as 39, sinus steph. RN checked on patient. Patient is sleeping soundly with respirations noted. Patient has been asymptomatic with bradycardic rhythm. Will closely monitor.

## 2017-07-08 NOTE — PROGRESS NOTES
Brandon Wiseman  Admission Date: 7/6/2017             Daily Progress Note: 7/8/2017    The patient's chart is reviewed and the patient is discussed with the staff. Subjective:     61 y/o female transferred from [facility] with cap- multifocal on ct yesterday.   Smokes 1.5 ppd    Current Facility-Administered Medications   Medication Dose Route Frequency    levalbuterol (XOPENEX) nebulizer soln 0.63 mg/3 mL  0.63 mg Nebulization Q6H RT    carisoprodol (SOMA) tablet 350 mg  350 mg Oral Q6H PRN    escitalopram oxalate (LEXAPRO) tablet 20 mg  20 mg Oral DAILY    gabapentin (NEURONTIN) capsule 800 mg  800 mg Oral TID    methocarbamol (ROBAXIN) tablet 750 mg  750 mg Oral Q6H PRN    pantoprazole (PROTONIX) tablet 40 mg  40 mg Oral ACB    promethazine (PHENERGAN) tablet 25 mg  25 mg Oral Q4H PRN    temazepam (RESTORIL) capsule 30 mg  30 mg Oral QHS PRN    sodium chloride (NS) flush 5-10 mL  5-10 mL IntraVENous PRN    aspirin chewable tablet 81 mg  81 mg Oral DAILY    nitroglycerin (NITROBID) 2 % ointment 1 Inch  1 Inch Topical Q6H    nitroglycerin (NITROSTAT) tablet 0.4 mg  0.4 mg SubLINGual Q5MIN PRN    acetaminophen (TYLENOL) tablet 650 mg  650 mg Oral Q4H PRN    ondansetron (ZOFRAN) injection 4 mg  4 mg IntraVENous Q4H PRN    azithromycin (ZITHROMAX) 500 mg in 0.9% sodium chloride (MBP/ADV) 250 mL  500 mg IntraVENous Q24H    amLODIPine/benazepril (LOTREL) 5/40 mg   Oral DAILY    HYDROcodone-homatropine (HYCODAN) 5-1.5 mg/5 mL (5 mL) syrup 5 mL  5 mL Oral Q4H PRN    budesonide (PULMICORT) 500 mcg/2 ml nebulizer suspension  500 mcg Nebulization BID RT    methylPREDNISolone (PF) (SOLU-MEDROL) injection 40 mg  40 mg IntraVENous Q12H    cefTRIAXone (ROCEPHIN) 1 g in 0.9% sodium chloride (MBP/ADV) 50 mL MBP  1 g IntraVENous Q24H       Review of Systems    Constitutional: negative for fever, chills, sweats  Cardiovascular: negative for chest pain, palpitations, syncope, edema  Gastrointestinal:  negative for dysphagia, reflux, vomiting, diarrhea, abdominal pain, or melena  Neurologic:  negative for focal weakness, numbness, headache    Objective:     Vitals:    07/07/17 2201 07/08/17 0131 07/08/17 0616 07/08/17 0900   BP: 139/81 147/69 161/71 161/80   Pulse: 60 (!) 42 (!) 44 (!) 41   Resp: 16 16 16 15   Temp: 98.4 °F (36.9 °C) 97.4 °F (36.3 °C) 98.9 °F (37.2 °C) 96.6 °F (35.9 °C)   SpO2: 96% 97% 94% 100%   Weight:   131 lb (59.4 kg)      Intake and Output:   07/06 1901 - 07/08 0700  In: 960 [P.O.:360; I.V.:600]  Out: 700 [Urine:700]       Physical Exam:   Constitution:  the patient is well developed and in no acute distress  EENMT:  Sclera clear, pupils equal, oral mucosa moist  Respiratory: basilar crackles  Cardiovascular:  RRR without M,G,R  Gastrointestinal: soft and non-tender; with positive bowel sounds. Musculoskeletal: warm without cyanosis. There is no lower leg edema. Skin:  no jaundice or rashes, no wounds   Neurologic: no gross neuro deficits     Psychiatric:  alert and oriented x 3    CXR:       Improved. LAB  No results for input(s): GLUCPOC in the last 72 hours. No lab exists for component: [place]   Recent Labs      07/07/17   0400   WBC  9.2   HGB  11.6*   HCT  33.6*   PLT  296     Recent Labs      07/08/17   0432  07/07/17   0400  07/06/17   1645   NA  145  145   --    K  3.6  3.7   --    CL  108*  110*   --    CO2  27  24   --    GLU  163*  220*   --    BUN  14  17   --    CREA  0.63  0.64   --    MG  1.8  1.9   --    CA  8.8  8.0*   --    TROIQ   --    --   <0.02*     SUMMARY:    -  Left ventricle: Systolic function was normal. Ejection fraction was  estimated in the range of 65 % to 70 %. There were no regional wall motion  abnormalities. -  Mitral valve: There was mild regurgitation. -  Tricuspid valve: There was mild to moderate regurgitation.     Assessment:  (Medical Decision Making)     Patient Active Problem List   Diagnosis Code    Migraine, unspecified, without mention of intractable migraine without mention of status migrainosus G43.909    Unspecified viral hepatitis C without hepatic coma B19.20    Polycythemia, secondary D75.1    Degeneration of lumbar or lumbosacral intervertebral disc M51.37    Essential hypertension, benign I10    GERD (gastroesophageal reflux disease) K21.9    Insomnia, unspecified G47.00    Depressive disorder, not elsewhere classified F32.9    Hypopotassemia E87.6    Heart murmur R01.1    ADHD (attention deficit hyperactivity disorder) F90.9    Osteoarthritis of left acromioclavicular joint M19.012    Type 2 superior 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 shoulder S43.432A    Osteoporosis M81.0    Arthritis M19.90    Bilateral pneumonia J18.9    Back pain M54.9    History of tobacco abuse Z87.891    Chest pain R07.9    Pulmonary edema J81.1    Hypertension I10    Sinus bradycardia R00.1    Bilateral pulmonary infiltrates on chest x-ray R91.8    Acute respiratory failure with hypoxia (HCC) J96.01    CAP (community acquired pneumonia) J18.9    COPD exacerbation ([address] J44.1         Plan:  (Medical Decision Making)     Hospital Problems  Date Reviewed: 7/6/2017          Codes Class Noted POA    History of tobacco abuse ICD-10-CM: Z87.891  ICD-9-CM: V15.82  7/6/2017 Yes    Likely COPD- cessation advised    * (Principal)Chest pain ICD-10-CM: R07.9  ICD-9-CM: 786.50  7/6/2017 Yes        Pulmonary edema ICD-10-CM: J81.1  ICD-9-CM: 132  7/6/2017 Yes    Not thought to have chf    Hypertension ICD-10-CM: I10  ICD-9-CM: 401.9  7/6/2017 Yes        Sinus bradycardia ICD-10-CM: R00.1  ICD-9-CM: 427.89  7/6/2017 Yes        Bilateral pulmonary infiltrates on chest x-ray ICD-10-CM: R91.8  ICD-9-CM: 793.19  7/6/2017 Unknown    Improving:  IV antibx- rocephine and zithromax day 6/8- l switch to PO zithromax 500 mg PO every day for 2 more days IV steroids and bronchodilators(solumedrol/Pulmicort/Xopenex)  Prescribe combivent respimat with discharge use 1 inh q 6h PRN  Prescribe prednisone 40 mg po q d for 2 days and decrease by 10 mg every other day till over  Advise not to smoke  Follow up with [facility] in 6 weeks with PA and Lat CXR and CPFT with DLCO at time of visit. OK to go home from Pulmonary stand point. Acute respiratory failure with hypoxia [facility]) ICD-10-CM: J96.01  ICD-9-CM: 518.81  7/6/2017 Unknown    On RA    CAP (community acquired pneumonia) ICD-10-CM: J18.9  ICD-9-CM: 433  7/6/2017 Unknown    multifocal   See above             More than 50% of the time documented was spent in face-to-face contact with the patient and in the care of the patient on the floor/unit where the patient is located.     Agusto Dior MD

## 2017-07-08 NOTE — PROGRESS NOTES
Discharge instructions reviewed with patient. Prescriptions given for see below and med info sheets provided for all new medications. Opportunity for questions provided. Patient voiced understanding of all discharge instructions. IVs removed and monitor off at discharge. START taking these medications     Details   ipratropium-albuterol (COMBIVENT RESPIMAT)  mcg/actuation inhaler Take 1 Puff by inhalation every six (6) hours as needed for Wheezing or Shortness of Breath. Qty: 1 Inhaler, Refills: 2       predniSONE (DELTASONE) 10 mg tablet Take 4 tablets daily for 3 days then, take 3 tablets daily for 3 days then, take 2 tablets daily for 3 days then, take 1 tablet daily for 3 days then. Qty: 30 Tab, Refills: 0                 CONTINUE these medications which have CHANGED     Details   HYDROcodone-homatropine (HYCODAN) 5-1.5 mg/5 mL syrup Take 5 mL by mouth three (3) times daily as needed for up to 7 days. Max Daily Amount: 15 mL.   Qty: 120 mL, Refills: 0       azithromycin (ZITHROMAX) 500 mg tab

## 2017-07-08 NOTE — PROGRESS NOTES
Verbal bedside report given to [facility], oncoming RN. Patient's situation, background, assessment and recommendations provided. Opportunity for questions provided. Oncoming RN assumed care of patient.

## 2017-07-08 NOTE — DISCHARGE SUMMARY
[facility] Cardiology Discharge Summary     Patient ID:  Ibeth Castro  [id number]  62 y.o.  1960    Admit date: 7/6/2017    Discharge date:  7/8/2017    Admitting Physician: Rachel Javed MD     Discharge Physician: GAVIN Porras/Dr. Caitie Caldwell    Admission Diagnoses: chf  Chest pain    Discharge Diagnoses:   Patient Active Problem List    Diagnosis Date Noted    COPD exacerbation ([facility] 07/07/2017    Back pain 07/06/2017    History of tobacco abuse 07/06/2017    Chest pain 07/06/2017    Pulmonary edema 07/06/2017    Hypertension 07/06/2017    Sinus bradycardia 07/06/2017    Bilateral pulmonary infiltrates on chest x-ray 07/06/2017    Acute respiratory failure with hypoxia ([facility] [address] 07/06/2017    CAP (community acquired pneumonia) 07/06/2017    Bilateral pneumonia 07/03/2017    Arthritis     Osteoporosis 01/23/2017    Osteoarthritis of left acromioclavicular joint 10/27/2016    Type 2 superior 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 shoulder 10/27/2016    Heart murmur     ADHD (attention deficit hyperactivity disorder)     Migraine, unspecified, without mention of intractable migraine without mention of status migrainosus 02/27/2015    Unspecified viral hepatitis C without hepatic coma 02/27/2015    Polycythemia, secondary 02/27/2015    Degeneration of lumbar or lumbosacral intervertebral disc 02/27/2015    Essential hypertension, benign 02/27/2015    GERD (gastroesophageal reflux disease) 02/27/2015    Insomnia, unspecified 02/27/2015    Depressive disorder, not elsewhere classified 02/27/2015    Hypopotassemia 02/27/2015       Cardiology Procedures this admission:  EchoCardiogram  Consults: Pulmonary/Intensive care    Hospital Course: Patient presented to the emergency department of [facility] with complaints of chest pain, continued dyspnea and cough. She was recently started on antibiotics for pneumonia at [address] [place] [address].  At [facility], she was started on Solumedrol, Zithromax and Rocephin. D-dimer was elevated. Chest CT showed bilateral effusions and interstitial edema. Her troponin was negative X 3. She was bradycardic with HR in the 40s. She was transferred to [facility] for further cardiac evaluation and treatment. She described her chest pain as soreness. She was given IV lasix. Pulmonary followed patient for continued antibiotics and steroids. Cardiac enzymes remained negative. She improved with treatment for pneumonia. She continued to have bradycardia with heart rate down to 38 at times. She was asymptomatic with no dizziness or lightheadedness. Heart rate was typically in the 40s-50s. The morning of 7/8/17, the patient was feeling much better. The patient was seen by cardiology and pulmonology services and was determined stable and ready for discharge home. The patient was instructed on the importance of medication compliance and outpatient follow up. The patient is instructed to follow up with PCP in 1 week. She will follow up with [facility] in approximately 6 weeks with repeat chest xray and PFTs. She is discharged on steroid taper, continued antibiotics and Combivent respimat. Smoking cessation was strongly encouraged. Her chest pain was felt atypical and due to her pneumonia. If she has recurrent chest pain, a nuclear stress test will be considered. She will follow up with [address] [place] [address] [address] [facility] as needed. DISPOSITION: The patient is being discharged home in stable condition on a low saturated fat, low cholesterol and low salt diet. The patient is instructed to advance activities as tolerated to the limit of fatigue or shortness of breath. The patient is instructed to call the office or return to the ER for immediate evaluation for any severe shortness of breath, chest pain, prolonged palpitations, near syncope or syncope.     Discharge Exam:   Visit Vitals    /80 (BP 1 Location: Left arm, BP Patient Position: At rest)    Pulse (!) 41    Temp 96.6 °F (35.9 °C)    Resp 15    Wt 59.4 kg (131 lb)    SpO2 100%  Comment: pt receiving breathing treatment    BMI 24.75 kg/m2       Patient has been seen by Dr. Shubham Noriega: see his progress note for exam details. Recent Results (from the past 24 hour(s))   METABOLIC PANEL, BASIC    Collection Time: 07/08/17  4:32 AM   Result Value Ref Range    Sodium 145 136 - 145 mmol/L    Potassium 3.6 3.5 - 5.1 mmol/L    Chloride 108 (H) 98 - 107 mmol/L    CO2 27 21 - 32 mmol/L    Anion gap 10 7 - 16 mmol/L    Glucose 163 (H) 65 - 100 mg/dL    BUN 14 6 - 23 MG/DL    Creatinine 0.63 0.6 - 1.0 MG/DL    GFR est AA >60 >60 ml/min/1.73m2    GFR est non-AA >60 >60 ml/min/1.73m2    Calcium 8.8 8.3 - 10.4 MG/DL   MAGNESIUM    Collection Time: 07/08/17  4:32 AM   Result Value Ref Range    Magnesium 1.8 1.8 - 2.4 mg/dL         Patient Instructions:   Current Discharge Medication List      START taking these medications    Details   ipratropium-albuterol (COMBIVENT RESPIMAT)  mcg/actuation inhaler Take 1 Puff by inhalation every six (6) hours as needed for Wheezing or Shortness of Breath. Qty: 1 Inhaler, Refills: 2      predniSONE (DELTASONE) 10 mg tablet Take 4 tablets daily for 3 days then, take 3 tablets daily for 3 days then, take 2 tablets daily for 3 days then, take 1 tablet daily for 3 days then. Qty: 30 Tab, Refills: 0         CONTINUE these medications which have CHANGED    Details   HYDROcodone-homatropine (HYCODAN) 5-1.5 mg/5 mL syrup Take 5 mL by mouth three (3) times daily as needed for up to 7 days. Max Daily Amount: 15 mL. Qty: 120 mL, Refills: 0      azithromycin (ZITHROMAX) 500 mg tab Take 1 Tab by mouth daily for 2 days. Qty: 2 Tab, Refills: 0         CONTINUE these medications which have NOT CHANGED    Details   gabapentin (NEURONTIN) 800 mg tablet Take  by mouth three (3) times daily.       DENAVIR 1 % topical cream APPLY TOPICALLY TO THE AFFECTED AREA EVERY 2 HOURS  Qty: 5 g, Refills: PRN      denosumab (PROLIA) 60 mg/mL injection 60 mg by SubCUTAneous route. methocarbamol (ROBAXIN) 750 mg tablet TAKE 2 TABLETS BY MOUTH FOUR TIMES DAILY AS NEEDED  Qty: 250 Tab, Refills: 1      carisoprodol (SOMA) 350 mg tablet Take 1 Tab by mouth every eight (8) hours as needed for Muscle Spasm(s). Max Daily Amount: 1,050 mg.  Qty: 60 Tab, Refills: 0      CALCIUM PHOSPHATE PO Take  by mouth. cholecalciferol (VITAMIN D3) 1,000 unit tablet Take  by mouth daily. temazepam (RESTORIL) 30 mg capsule TAKE 1 CAPSULE BY MOUTH EVERY DAY AT BEDTIME  Qty: 30 Cap, Refills: 3      escitalopram oxalate (LEXAPRO) 20 mg tablet TAKE 1 TABLET BY MOUTH EVERY DAY  Qty: 30 Tab, Refills: 11      amLODIPine-benazepril (LOTREL) 5-40 mg per capsule TAKE 1 CAPSULE BY MOUTH EVERY DAY  Qty: 30 Cap, Refills: 11      promethazine (PHENERGAN) 25 mg tablet 1/2-1 po tid prn nausea  Qty: 30 Tab, Refills: 0      omeprazole (PRILOSEC) 20 mg capsule Take 20 mg by mouth daily.  Patient instructed to take morning of surgery per anesthesia guidelines         STOP taking these medications       ibuprofen (MOTRIN) 200 mg tablet Comments:   Reason for Stopping:                 Signed:  Moraima Okeefe PA-C  7/8/2017  11:01 AM

## 2017-07-08 NOTE — DISCHARGE INSTRUCTIONS
Pneumonia: Care Instructions  Your Care Instructions    Pneumonia is an infection of the lungs. Most cases are caused by infections from bacteria or viruses. Pneumonia may be mild or very severe. If it is caused by bacteria, you will be treated with antibiotics. It may take a few weeks to a few months to recover fully from pneumonia, depending on how sick you were and whether your overall health is good. Follow-up care is a key part of your treatment and safety. Be sure to make and go to all appointments, and call your doctor if you are having problems. Its also a good idea to know your test results and keep a list of the medicines you take. How can you care for yourself at home? · Take your antibiotics exactly as directed. Do not stop taking the medicine just because you are feeling better. You need to take the full course of antibiotics. · Take your medicines exactly as prescribed. Call your doctor if you think you are having a problem with your medicine. · Get plenty of rest and sleep. You may feel weak and tired for a while, but your energy level will improve with time. · To prevent dehydration, drink plenty of fluids, enough so that your urine is light yellow or clear like water. Choose water and other caffeine-free clear liquids until you feel better. If you have kidney, heart, or liver disease and have to limit fluids, talk with your doctor before you increase the amount of fluids you drink. · Take care of your cough so you can rest. A cough that brings up mucus from your lungs is common with pneumonia. It is one way your body gets rid of the infection. But if coughing keeps you from resting or causes severe fatigue and chest-wall pain, talk to your doctor. He or she may suggest that you take a medicine to reduce the cough. · Use a vaporizer or humidifier to add moisture to your bedroom. Follow the directions for cleaning the machine. · Do not smoke or allow others to smoke around you.  Smoke will make your cough last longer. If you need help quitting, talk to your doctor about stop-smoking programs and medicines. These can increase your chances of quitting for good. · Take an over-the-counter pain medicine, such as acetaminophen (Tylenol), ibuprofen (Advil, Motrin), or naproxen (Aleve). Read and follow all instructions on the label. · Do not take two or more pain medicines at the same time unless the doctor told you to. Many pain medicines have acetaminophen, which is Tylenol. Too much acetaminophen (Tylenol) can be harmful. · If you were given a spirometer to measure how well your lungs are working, use it as instructed. This can help your doctor tell how your recovery is going. · To prevent pneumonia in the future, talk to your doctor about getting a flu vaccine (once a year) and a pneumococcal vaccine (one time only for most people). When should you call for help? Call 911 anytime you think you may need emergency care. For example, call if:  · You have severe trouble breathing. Call your doctor now or seek immediate medical care if:  · You cough up dark brown or bloody mucus (sputum). · You have new or worse trouble breathing. · You are dizzy or lightheaded, or you feel like you may faint. Watch closely for changes in your health, and be sure to contact your doctor if:  · You have a new or higher fever. · You are coughing more deeply or more often. · You are not getting better after 2 days (48 hours). · You do not get better as expected. Where can you learn more? Go to http://catracho-ricardo.info/. Enter [id number] in the search box to learn more about \"Pneumonia: Care Instructions. \"  Current as of: March 25, 2017  Content Version: 11.3  © 6529-9058 [employer]. Care instructions adapted under license by [employer] (which disclaims liability or warranty for this information).  If you have questions about a medical condition or this instruction, always ask your healthcare professional. [place] [address] any warranty or liability for your use of this information.

## 2017-07-17 ENCOUNTER — HOSPITAL ENCOUNTER (OUTPATIENT)
Dept: SURGERY | Age: 57
Discharge: HOME | End: 2017-07-17
Payer: COMMERCIAL

## 2017-07-17 VITALS
BODY MASS INDEX: 24.04 KG/M2 | TEMPERATURE: 98.3 F | OXYGEN SATURATION: 96 % | SYSTOLIC BLOOD PRESSURE: 113 MMHG | HEART RATE: 106 BPM | HEIGHT: 61 IN | RESPIRATION RATE: 18 BRPM | DIASTOLIC BLOOD PRESSURE: 69 MMHG | WEIGHT: 127.31 LBS

## 2017-07-17 LAB
ANION GAP BLD CALC-SCNC: 6 MMOL/L (ref 7–16)
APPEARANCE UR: ABNORMAL
BACTERIA SPEC CULT: NORMAL
BASOPHILS # BLD AUTO: 0.1 K/UL (ref 0–0.2)
BASOPHILS # BLD: 1 % (ref 0–2)
BILIRUB UR QL: NEGATIVE
BUN SERPL-MCNC: 11 MG/DL (ref 6–23)
CALCIUM SERPL-MCNC: 9.7 MG/DL (ref 8.3–10.4)
CHLORIDE SERPL-SCNC: 106 MMOL/L (ref 98–107)
CO2 SERPL-SCNC: 31 MMOL/L (ref 21–32)
COLOR UR: YELLOW
CREAT SERPL-MCNC: 0.62 MG/DL (ref 0.6–1)
DIFFERENTIAL METHOD BLD: ABNORMAL
EOSINOPHIL # BLD: 0.1 K/UL (ref 0–0.8)
EOSINOPHIL NFR BLD: 1 % (ref 0.5–7.8)
ERYTHROCYTE [DISTWIDTH] IN BLOOD BY AUTOMATED COUNT: 14.4 % (ref 11.9–14.6)
GLUCOSE SERPL-MCNC: 117 MG/DL (ref 65–100)
GLUCOSE UR STRIP.AUTO-MCNC: NEGATIVE MG/DL
HCT VFR BLD AUTO: 39.2 % (ref 35.8–46.3)
HGB BLD-MCNC: 13.1 G/DL (ref 11.7–15.4)
HGB UR QL STRIP: NEGATIVE
IMM GRANULOCYTES # BLD: 0.1 K/UL (ref 0–0.5)
IMM GRANULOCYTES NFR BLD AUTO: 1.7 % (ref 0–5)
KETONES UR QL STRIP.AUTO: ABNORMAL MG/DL
LEUKOCYTE ESTERASE UR QL STRIP.AUTO: NEGATIVE
LYMPHOCYTES # BLD AUTO: 40 % (ref 13–44)
LYMPHOCYTES # BLD: 2.8 K/UL (ref 0.5–4.6)
MCH RBC QN AUTO: 31.1 PG (ref 26.1–32.9)
MCHC RBC AUTO-ENTMCNC: 33.4 G/DL (ref 31.4–35)
MCV RBC AUTO: 93.1 FL (ref 79.6–97.8)
MONOCYTES # BLD: 0.7 K/UL (ref 0.1–1.3)
MONOCYTES NFR BLD AUTO: 9 % (ref 4–12)
NEUTS SEG # BLD: 3.3 K/UL (ref 1.7–8.2)
NEUTS SEG NFR BLD AUTO: 47 % (ref 43–78)
NITRITE UR QL STRIP.AUTO: NEGATIVE
PH UR STRIP: 5.5 [PH] (ref 5–9)
PLATELET # BLD AUTO: 473 K/UL (ref 150–450)
PMV BLD AUTO: 8.9 FL (ref 10.8–14.1)
POTASSIUM SERPL-SCNC: 3.6 MMOL/L (ref 3.5–5.1)
PROT UR STRIP-MCNC: NEGATIVE MG/DL
RBC # BLD AUTO: 4.21 M/UL (ref 4.05–5.25)
SERVICE CMNT-IMP: NORMAL
SODIUM SERPL-SCNC: 143 MMOL/L (ref 136–145)
SP GR UR REFRACTOMETRY: 1.03 (ref 1–1.02)
UROBILINOGEN UR QL STRIP.AUTO: 0.2 EU/DL (ref 0.2–1)
WBC # BLD AUTO: 7.1 K/UL (ref 4.3–11.1)

## 2017-07-17 PROCEDURE — 80048 BASIC METABOLIC PNL TOTAL CA: CPT | Performed by: NEUROLOGICAL SURGERY

## 2017-07-17 PROCEDURE — 81003 URINALYSIS AUTO W/O SCOPE: CPT | Performed by: NEUROLOGICAL SURGERY

## 2017-07-17 PROCEDURE — 87641 MR-STAPH DNA AMP PROBE: CPT | Performed by: NEUROLOGICAL SURGERY

## 2017-07-17 PROCEDURE — 77030027138 HC INCENT SPIROMETER -A

## 2017-07-17 PROCEDURE — 85025 COMPLETE CBC W/AUTO DIFF WBC: CPT | Performed by: NEUROLOGICAL SURGERY

## 2017-07-17 RX ORDER — CHOLECALCIFEROL TAB 125 MCG (5000 UNIT) 125 MCG
5000 TAB ORAL DAILY
COMMUNITY
End: 2017-12-11

## 2017-07-17 RX ORDER — DEXTROAMPHETAMINE SACCHARATE, AMPHETAMINE ASPARTATE, DEXTROAMPHETAMINE SULFATE AND AMPHETAMINE SULFATE 7.5; 7.5; 7.5; 7.5 MG/1; MG/1; MG/1; MG/1
30 TABLET ORAL DAILY
COMMUNITY
End: 2019-02-06 | Stop reason: ALTCHOICE

## 2017-07-17 NOTE — PERIOP NOTES
Patient verified name, , and surgery as listed in [facility]. TYPE  CASE:111  Orders per surgeonwerereceived  Labs per surgeon: cbc w/ diff,bmp ua collected and results are in connect care, Type & Screen to be done on day of surgery  Labs per anesthesia protocol: no additional  EKG  :  EKG from 2017 placed on chart ,echo and last cardiac note from 2017 also placed on chart  MRSA/MSSA:collected and sent to lab  Pt instructed that they will be notified of positive results and will use mupirocin ointment as directed. Patient provided with handouts including guide to surgery , transfusions, pain management and hand hygiene for the family and community. Pt verbalizes understanding of all pre-op instructions . Instructed that family must be present in building at all times. Hibiclens and instructions given per hospital policy. Instructed patient to continue  previous medications as prescribed prior to surgery and  to take the following medications the day of surgery according to anesthesia guidelines : adderall,lexapro, omeprazole, phenergan if needed, lotrel and gabapentin       Continue all previous medications unless otherwise directed. Original medication prescription bottles  Were not  visualized during patient appointment. Instructed patient to hold  the following medications prior to surgery: vitamin D and calcium      Patient verbalized understanding of all instructions and provided all medical/health information to the best of their ability. Road to Recovery Spine surgery patient guide given. Instructed on incentive spirometry with return demonstration. Long handled prehab sponge given with instructions for use. Patient viewed spine prehab video.

## 2017-07-24 ENCOUNTER — ANESTHESIA EVENT (OUTPATIENT)
Dept: SURGERY | Age: 57
DRG: 473 | End: 2017-07-24
Payer: COMMERCIAL

## 2017-07-24 RX ORDER — HYDROMORPHONE HYDROCHLORIDE 2 MG/ML
0.5 INJECTION, SOLUTION INTRAMUSCULAR; INTRAVENOUS; SUBCUTANEOUS
Status: CANCELLED | OUTPATIENT
Start: 2017-07-24

## 2017-07-24 RX ORDER — HYDROCODONE BITARTRATE AND ACETAMINOPHEN 5; 325 MG/1; MG/1
2 TABLET ORAL AS NEEDED
Status: CANCELLED | OUTPATIENT
Start: 2017-07-24

## 2017-07-24 RX ORDER — OXYCODONE HYDROCHLORIDE 5 MG/1
5 TABLET ORAL
Status: CANCELLED | OUTPATIENT
Start: 2017-07-24

## 2017-07-24 RX ORDER — SODIUM CHLORIDE, SODIUM LACTATE, POTASSIUM CHLORIDE, CALCIUM CHLORIDE 600; 310; 30; 20 MG/100ML; MG/100ML; MG/100ML; MG/100ML
75 INJECTION, SOLUTION INTRAVENOUS CONTINUOUS
Status: CANCELLED | OUTPATIENT
Start: 2017-07-24

## 2017-07-25 ENCOUNTER — APPOINTMENT (OUTPATIENT)
Dept: GENERAL RADIOLOGY | Age: 57
DRG: 473 | End: 2017-07-25
Attending: NEUROLOGICAL SURGERY
Payer: COMMERCIAL

## 2017-07-25 ENCOUNTER — HOSPITAL ENCOUNTER (INPATIENT)
Age: 57
LOS: 3 days | Discharge: HOME | DRG: 473 | End: 2017-07-28
Attending: NEUROLOGICAL SURGERY | Admitting: NEUROLOGICAL SURGERY
Payer: COMMERCIAL

## 2017-07-25 ENCOUNTER — ANESTHESIA (OUTPATIENT)
Dept: SURGERY | Age: 57
DRG: 473 | End: 2017-07-25
Payer: COMMERCIAL

## 2017-07-25 LAB
ABO + RH BLD: NORMAL
BLOOD GROUP ANTIBODIES SERPL: NORMAL
SPECIMEN EXP DATE BLD: NORMAL

## 2017-07-25 PROCEDURE — 74011250636 HC RX REV CODE- 250/636

## 2017-07-25 PROCEDURE — 77030034849: Performed by: NEUROLOGICAL SURGERY

## 2017-07-25 PROCEDURE — 74011250636 HC RX REV CODE- 250/636: Performed by: ANESTHESIOLOGY

## 2017-07-25 PROCEDURE — 77030011640 HC PAD GRND REM COVD -A: Performed by: NEUROLOGICAL SURGERY

## 2017-07-25 PROCEDURE — 74011000250 HC RX REV CODE- 250: Performed by: NEUROLOGICAL SURGERY

## 2017-07-25 PROCEDURE — 74011000250 HC RX REV CODE- 250

## 2017-07-25 PROCEDURE — 77030008703 HC TU ET UNCUF COVD -A: Performed by: ANESTHESIOLOGY

## 2017-07-25 PROCEDURE — 0RG1071 FUSION OF CERVICAL VERTEBRAL JOINT WITH AUTOLOGOUS TISSUE SUBSTITUTE, POSTERIOR APPROACH, POSTERIOR COLUMN, OPEN APPROACH: ICD-10-PCS | Performed by: NEUROLOGICAL SURGERY

## 2017-07-25 PROCEDURE — 74011250637 HC RX REV CODE- 250/637: Performed by: ANESTHESIOLOGY

## 2017-07-25 PROCEDURE — C1713 ANCHOR/SCREW BN/BN,TIS/BN: HCPCS | Performed by: NEUROLOGICAL SURGERY

## 2017-07-25 PROCEDURE — 77030008468 HC STPLR SKN VISIS TELE -A: Performed by: NEUROLOGICAL SURGERY

## 2017-07-25 PROCEDURE — 74011000272 HC RX REV CODE- 272: Performed by: NEUROLOGICAL SURGERY

## 2017-07-25 PROCEDURE — 77030008477 HC STYL SATN SLP COVD -A: Performed by: ANESTHESIOLOGY

## 2017-07-25 PROCEDURE — 77030034475 HC MISC IMPL SPN: Performed by: NEUROLOGICAL SURGERY

## 2017-07-25 PROCEDURE — 77030030722 HC PIN SKULL MAYFLD INLC -B: Performed by: NEUROLOGICAL SURGERY

## 2017-07-25 PROCEDURE — 77030014007 HC SPNG HEMSTAT J&J -B: Performed by: NEUROLOGICAL SURGERY

## 2017-07-25 PROCEDURE — 77030019908 HC STETH ESOPH SIMS -A: Performed by: ANESTHESIOLOGY

## 2017-07-25 PROCEDURE — 77030003029 HC SUT VCRL J&J -B: Performed by: NEUROLOGICAL SURGERY

## 2017-07-25 PROCEDURE — 77030004391 HC BUR FLUT MEDT -C: Performed by: NEUROLOGICAL SURGERY

## 2017-07-25 PROCEDURE — 77030003028 HC SUT VCRL J&J -A: Performed by: NEUROLOGICAL SURGERY

## 2017-07-25 PROCEDURE — 74011250636 HC RX REV CODE- 250/636: Performed by: NEUROLOGICAL SURGERY

## 2017-07-25 PROCEDURE — 76210000006 HC OR PH I REC 0.5 TO 1 HR: Performed by: NEUROLOGICAL SURGERY

## 2017-07-25 PROCEDURE — 86900 BLOOD TYPING SEROLOGIC ABO: CPT | Performed by: ANESTHESIOLOGY

## 2017-07-25 PROCEDURE — 77030032490 HC SLV COMPR SCD KNE COVD -B: Performed by: NEUROLOGICAL SURGERY

## 2017-07-25 PROCEDURE — 77030003666 HC NDL SPINAL BD -A: Performed by: NEUROLOGICAL SURGERY

## 2017-07-25 PROCEDURE — 77030020407 HC IV BLD WRMR ST 3M -A: Performed by: ANESTHESIOLOGY

## 2017-07-25 PROCEDURE — 76010000173 HC OR TIME 3 TO 3.5 HR INTENSV-TIER 1: Performed by: NEUROLOGICAL SURGERY

## 2017-07-25 PROCEDURE — 76060000037 HC ANESTHESIA 3 TO 3.5 HR: Performed by: NEUROLOGICAL SURGERY

## 2017-07-25 PROCEDURE — 77030016570 HC BLNKT BAIR HGGR 3M -B: Performed by: ANESTHESIOLOGY

## 2017-07-25 PROCEDURE — 65270000029 HC RM PRIVATE

## 2017-07-25 PROCEDURE — 77030018836 HC SOL IRR NACL ICUM -A: Performed by: NEUROLOGICAL SURGERY

## 2017-07-25 DEVICE — CABLE 826-213  SGL CBL WINTGRL CRMP TI
Type: IMPLANTABLE DEVICE | Site: SPINE CERVICAL | Status: FUNCTIONAL
Brand: ATLAS® CABLE SYSTEM

## 2017-07-25 DEVICE — SET SCREW 6950315 M6 SET SCREW
Type: IMPLANTABLE DEVICE | Site: SPINE CERVICAL | Status: FUNCTIONAL
Brand: VERTEX® RECONSTRUCTION SYSTEM

## 2017-07-25 RX ORDER — SODIUM CHLORIDE, SODIUM LACTATE, POTASSIUM CHLORIDE, CALCIUM CHLORIDE 600; 310; 30; 20 MG/100ML; MG/100ML; MG/100ML; MG/100ML
75 INJECTION, SOLUTION INTRAVENOUS CONTINUOUS
Status: DISCONTINUED | OUTPATIENT
Start: 2017-07-25 | End: 2017-07-26 | Stop reason: HOSPADM

## 2017-07-25 RX ORDER — MIDAZOLAM HYDROCHLORIDE 1 MG/ML
2 INJECTION, SOLUTION INTRAMUSCULAR; INTRAVENOUS
Status: DISCONTINUED | OUTPATIENT
Start: 2017-07-25 | End: 2017-07-26 | Stop reason: HOSPADM

## 2017-07-25 RX ORDER — LIDOCAINE HYDROCHLORIDE 10 MG/ML
0.1 INJECTION INFILTRATION; PERINEURAL AS NEEDED
Status: DISCONTINUED | OUTPATIENT
Start: 2017-07-25 | End: 2017-07-26 | Stop reason: HOSPADM

## 2017-07-25 RX ORDER — DEXAMETHASONE SODIUM PHOSPHATE 4 MG/ML
INJECTION, SOLUTION INTRA-ARTICULAR; INTRALESIONAL; INTRAMUSCULAR; INTRAVENOUS; SOFT TISSUE AS NEEDED
Status: DISCONTINUED | OUTPATIENT
Start: 2017-07-25 | End: 2017-07-26 | Stop reason: HOSPADM

## 2017-07-25 RX ORDER — LIDOCAINE HYDROCHLORIDE AND EPINEPHRINE 10; 10 MG/ML; UG/ML
INJECTION, SOLUTION INFILTRATION; PERINEURAL AS NEEDED
Status: DISCONTINUED | OUTPATIENT
Start: 2017-07-25 | End: 2017-07-26 | Stop reason: HOSPADM

## 2017-07-25 RX ORDER — ACETAMINOPHEN 10 MG/ML
INJECTION, SOLUTION INTRAVENOUS AS NEEDED
Status: DISCONTINUED | OUTPATIENT
Start: 2017-07-25 | End: 2017-07-26 | Stop reason: HOSPADM

## 2017-07-25 RX ORDER — LIDOCAINE HYDROCHLORIDE 20 MG/ML
INJECTION, SOLUTION EPIDURAL; INFILTRATION; INTRACAUDAL; PERINEURAL AS NEEDED
Status: DISCONTINUED | OUTPATIENT
Start: 2017-07-25 | End: 2017-07-26 | Stop reason: HOSPADM

## 2017-07-25 RX ORDER — PROPOFOL 10 MG/ML
INJECTION, EMULSION INTRAVENOUS AS NEEDED
Status: DISCONTINUED | OUTPATIENT
Start: 2017-07-25 | End: 2017-07-26 | Stop reason: HOSPADM

## 2017-07-25 RX ORDER — ROCURONIUM BROMIDE 10 MG/ML
INJECTION, SOLUTION INTRAVENOUS AS NEEDED
Status: DISCONTINUED | OUTPATIENT
Start: 2017-07-25 | End: 2017-07-26 | Stop reason: HOSPADM

## 2017-07-25 RX ORDER — ONDANSETRON 2 MG/ML
INJECTION INTRAMUSCULAR; INTRAVENOUS AS NEEDED
Status: DISCONTINUED | OUTPATIENT
Start: 2017-07-25 | End: 2017-07-26 | Stop reason: HOSPADM

## 2017-07-25 RX ORDER — FENTANYL CITRATE 50 UG/ML
100 INJECTION, SOLUTION INTRAMUSCULAR; INTRAVENOUS ONCE
Status: COMPLETED | OUTPATIENT
Start: 2017-07-25 | End: 2017-07-25

## 2017-07-25 RX ORDER — FAMOTIDINE 20 MG/1
20 TABLET, FILM COATED ORAL ONCE
Status: COMPLETED | OUTPATIENT
Start: 2017-07-25 | End: 2017-07-25

## 2017-07-25 RX ORDER — FENTANYL CITRATE 50 UG/ML
INJECTION, SOLUTION INTRAMUSCULAR; INTRAVENOUS AS NEEDED
Status: DISCONTINUED | OUTPATIENT
Start: 2017-07-25 | End: 2017-07-26 | Stop reason: HOSPADM

## 2017-07-25 RX ORDER — CEFAZOLIN SODIUM IN 0.9 % NACL 2 G/50 ML
2 INTRAVENOUS SOLUTION, PIGGYBACK (ML) INTRAVENOUS ONCE
Status: COMPLETED | OUTPATIENT
Start: 2017-07-25 | End: 2017-07-25

## 2017-07-25 RX ORDER — MIDAZOLAM HYDROCHLORIDE 1 MG/ML
5 INJECTION, SOLUTION INTRAMUSCULAR; INTRAVENOUS ONCE
Status: DISCONTINUED | OUTPATIENT
Start: 2017-07-25 | End: 2017-07-26 | Stop reason: HOSPADM

## 2017-07-25 RX ADMIN — MIDAZOLAM HYDROCHLORIDE 2 MG: 1 INJECTION, SOLUTION INTRAMUSCULAR; INTRAVENOUS at 18:18

## 2017-07-25 RX ADMIN — FENTANYL CITRATE 50 MCG: 50 INJECTION, SOLUTION INTRAMUSCULAR; INTRAVENOUS at 19:23

## 2017-07-25 RX ADMIN — FENTANYL CITRATE 50 MCG: 50 INJECTION, SOLUTION INTRAMUSCULAR; INTRAVENOUS at 22:00

## 2017-07-25 RX ADMIN — LIDOCAINE HYDROCHLORIDE 60 MG: 20 INJECTION, SOLUTION EPIDURAL; INFILTRATION; INTRACAUDAL; PERINEURAL at 21:04

## 2017-07-25 RX ADMIN — ACETAMINOPHEN 1000 MG: 10 INJECTION, SOLUTION INTRAVENOUS at 23:42

## 2017-07-25 RX ADMIN — SODIUM CHLORIDE, SODIUM LACTATE, POTASSIUM CHLORIDE, AND CALCIUM CHLORIDE: 600; 310; 30; 20 INJECTION, SOLUTION INTRAVENOUS at 22:45

## 2017-07-25 RX ADMIN — FAMOTIDINE 20 MG: 20 TABLET ORAL at 15:51

## 2017-07-25 RX ADMIN — ROCURONIUM BROMIDE 10 MG: 10 INJECTION, SOLUTION INTRAVENOUS at 22:28

## 2017-07-25 RX ADMIN — FENTANYL CITRATE 100 MCG: 50 INJECTION, SOLUTION INTRAMUSCULAR; INTRAVENOUS at 21:04

## 2017-07-25 RX ADMIN — CEFAZOLIN 2 G: 1 INJECTION, POWDER, FOR SOLUTION INTRAMUSCULAR; INTRAVENOUS; PARENTERAL at 21:40

## 2017-07-25 RX ADMIN — FENTANYL CITRATE 50 MCG: 50 INJECTION, SOLUTION INTRAMUSCULAR; INTRAVENOUS at 22:23

## 2017-07-25 RX ADMIN — SODIUM CHLORIDE, SODIUM LACTATE, POTASSIUM CHLORIDE, AND CALCIUM CHLORIDE 75 ML/HR: 600; 310; 30; 20 INJECTION, SOLUTION INTRAVENOUS at 15:51

## 2017-07-25 RX ADMIN — PROPOFOL 200 MG: 10 INJECTION, EMULSION INTRAVENOUS at 21:04

## 2017-07-25 RX ADMIN — ONDANSETRON 4 MG: 2 INJECTION INTRAMUSCULAR; INTRAVENOUS at 23:13

## 2017-07-25 RX ADMIN — ROCURONIUM BROMIDE 10 MG: 10 INJECTION, SOLUTION INTRAVENOUS at 21:57

## 2017-07-25 RX ADMIN — DEXAMETHASONE SODIUM PHOSPHATE 4 MG: 4 INJECTION, SOLUTION INTRA-ARTICULAR; INTRALESIONAL; INTRAMUSCULAR; INTRAVENOUS; SOFT TISSUE at 22:05

## 2017-07-25 RX ADMIN — ROCURONIUM BROMIDE 50 MG: 10 INJECTION, SOLUTION INTRAVENOUS at 21:04

## 2017-07-25 RX ADMIN — ROCURONIUM BROMIDE 10 MG: 10 INJECTION, SOLUTION INTRAVENOUS at 22:52

## 2017-07-25 NOTE — ANESTHESIA PREPROCEDURE EVALUATION
Anesthetic History   No history of anesthetic complications            Review of Systems / Medical History  Patient summary reviewed and pertinent labs reviewed    Pulmonary          Smoker         Neuro/Psych         Headaches and psychiatric history     Cardiovascular    Hypertension: well controlled              Exercise tolerance: >4 METS     GI/Hepatic/Renal     GERD: well controlled  Hepatitis: type C  Renal disease: stones  Liver disease     Endo/Other        Arthritis     Other Findings              Physical Exam    Airway  Mallampati: II  TM Distance: 4 - 6 cm  Neck ROM: normal range of motion   Mouth opening: Normal     Cardiovascular  Regular rate and rhythm,  S1 and S2 normal,  no murmur, click, rub, or gallop             Dental  No notable dental hx       Pulmonary  Breath sounds clear to auscultation               Abdominal  GI exam deferred       Other Findings            Anesthetic Plan    ASA: 3  Anesthesia type: general - interscalene block          Induction: Intravenous  Anesthetic plan and risks discussed with: Patient and Spouse

## 2017-07-25 NOTE — IP AVS SNAPSHOT
[facility] 
 
 
 [address] [place] [address] 
[phone] Patient: Tiffanie Tidwell MRN: [id number] ZTC:[birth year] You are allergic to the following Allergen Reactions Benadryl (Diphenhydramine Hcl) Other (comments)  
 hyperactivity Demerol (Meperidine) Other (comments) Hyperactivity Dilaudid (Hydromorphone) Other (comments) Hyperactivity Lortab (Hydrocodone-Acetaminophen) Other (comments) Hyperactivity Morphine Other (comments) Hallucinations Recent Documentation Height Weight BMI OB Status Smoking Status 1.549 m 56.7 kg 23.62 kg/m2 Postmenopausal Former Smoker Emergency Contacts Name Discharge Info Relation Home Work Mobile Aquilino Marroquin DISCHARGE CAREGIVER [3] Spouse [3] [phone] About your hospitalization You were admitted on:  July 25, 2017 You last received care in the:  [facility] 2 CV STEPDOWN You were discharged on:  July 28, 2017 Unit phone number:  [phone] Why you were hospitalized 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 
[phone] Kailee Courtney MD On 8/3/2017 1 week wound check Thursday, August 3 @ 9:30 [place] [address] [facility] 
 [facility] [address] 
[phone] Your Appointments Thursday August 03, 2017  9:30 AM EDT  
WOUND CHECK with [facility] NURSE  
[facility] ([facility]) [address] [address] [place], [place] [address] [facility]  
[phone] Thursday August 17, 2017  3:20 PM EDT  
(Arrive by 2:20 PM) HOSPITAL with Mykel Ignacio MD  
[facility] ([facility]) [address]  
[phone] Current Discharge Medication List  
  
START taking these medications Dose & Instructions Dispensing Information Comments Morning Noon Evening Bedtime  
 oxyCODONE-acetaminophen  mg per tablet Commonly known as:  PERCOCET 10 Your last dose was:  9:00  
   
 1-2 tabs po q4-6h prn pain Quantity:  120 Tab Refills:  0  
     
   
   
   
  
 tamsulosin 0.4 mg capsule Commonly known as:  FLOMAX Your next dose is:  Tomorrow Dose:  0.4 mg Take 1 Cap by mouth daily. Quantity:  14 Cap Refills:  0 CONTINUE these medications which have CHANGED Dose & Instructions Dispensing Information Comments Morning Noon Evening Bedtime  
 carisoprodol 350 mg tablet Commonly known as:  SOMA What changed:  when to take this Your last dose was:  9:00 Dose:  350 mg Take 1 Tab by mouth every eight (8) hours as needed for Muscle Spasm(s). Max Daily Amount: 1,050 mg.  
 Quantity:  60 Tab Refills:  0  
     
   
   
   
  
 methocarbamol 750 mg tablet Commonly known as:  ROBAXIN What changed:   
- when to take this 
- additional instructions TAKE 2 TABLETS BY MOUTH FOUR TIMES DAILY AS NEEDED Quantity:  250 Tab Refills:  1 CONTINUE these medications which have NOT CHANGED Dose & Instructions Dispensing Information Comments Morning Noon Evening Bedtime ADDERALL 30 mg tablet Generic drug:  dextroamphetamine-amphetamine Dose:  30 mg Take 30 mg by mouth dailyIndications: ATTENTION-DEFICIT HYPERACTIVITY DISORDER. Refills:  0  
     
   
   
   
  
 amLODIPine-benazepril 5-40 mg per capsule Commonly known as:  LOTREL  
   
 TAKE 1 CAPSULE BY MOUTH EVERY DAY Quantity:  30 Cap Refills:  11  
     
   
   
   
  
 calcium-cholecalciferol (d3) 600-125 mg-unit Tab Take  by mouth. Refills:  0  
     
   
   
   
  
 cholecalciferol (VITAMIN D3) 5,000 unit Tab tablet Commonly known as:  VITAMIN D3 Dose:  5000 Units Take 5,000 Units by mouth daily. Refills:  0  
     
   
   
   
  
 DENAVIR 1 % topical cream  
Generic drug:  penciclovir APPLY TOPICALLY TO THE AFFECTED AREA EVERY 2 HOURS Quantity:  5 g Refills:  PRN  
     
   
   
   
  
 escitalopram oxalate 20 mg tablet Commonly known as:  Joshua Schwartz Your next dose is:  Tomorrow TAKE 1 TABLET BY MOUTH EVERY DAY Quantity:  30 Tab Refills:  11  
     
  
   
   
   
  
 NEURONTIN 800 mg tablet Generic drug:  gabapentin Your next dose is: Today Take  by mouth three (3) times daily. Refills:  0 PriLOSEC 20 mg capsule Generic drug:  omeprazole Dose:  20 mg Take 20 mg by mouth daily. Patient instructed to take morning of surgery per anesthesia guidelines Refills:  0 PROLIA 60 mg/mL injection Generic drug:  denosumab Dose:  60 mg  
60 mg by SubCUTAneous route. Twice a year Refills:  0  
     
   
   
   
  
 promethazine 25 mg tablet Commonly known as:  PHENERGAN  
   
 1/2-1 po tid prn nausea Quantity:  30 Tab Refills:  0  
     
   
   
   
  
 temazepam 30 mg capsule Commonly known as:  RESTORIL Your next dose is: Tonight TAKE 1 CAPSULE BY MOUTH EVERY DAY AT BEDTIME Quantity:  30 Cap Refills:  3 Where to Get Your Medications Information on where to get these meds will be given to you by the nurse or doctor. ! Ask your nurse or doctor about these medications  
  oxyCODONE-acetaminophen  mg per tablet  
 tamsulosin 0.4 mg capsule Discharge Instructions DISCHARGE SUMMARY from Nurse The following personal items are in your possession at time of discharge: 
 
Dental Appliances: None (crowns) Visual Aid: Glasses Home Medications: None Jewelry: None Clothing: Shirt, Footwear, Shorts, Undergarments, Socks Other Valuables: Cell Phone PATIENT INSTRUCTIONS: 
 
 
F-face looks uneven A-arms unable to move or move unevenly S-speech slurred or non-existent T-time-call 911 as soon as signs and symptoms begin-DO NOT go Back to bed or wait to see if you get better-TIME IS BRAIN. Warning Signs of HEART ATTACK Call 911 if you have these symptoms: 
? Chest discomfort. Most heart attacks involve discomfort in the center of the chest that lasts more than a few minutes, or that goes away and comes back. It can feel like uncomfortable pressure, squeezing, fullness, or pain. ? Discomfort in other areas of the upper body. Symptoms can include pain or discomfort in one or both arms, the back, neck, jaw, or stomach. ? Shortness of breath with or without chest discomfort. ? Other signs may include breaking out in a cold sweat, nausea, or lightheadedness. Don't wait more than five minutes to call [address]! Fast action can save your life. Calling 911 is almost always the fastest way to get lifesaving treatment. Emergency Medical Services staff can begin treatment when they arrive  up to an hour sooner than if someone gets to the hospital by car. The discharge information has been reviewed with the patient and spouse. The patient and spouse verbalized understanding. Discharge medications reviewed with the patient and spouse and appropriate educational materials and side effects teaching were provided. Discharge Instructions Attachments/References HALO BRACE: POST-OP (ENGLISH) CERVICAL SPINAL FUSION: POST-OP (ENGLISH) NECK FRACTURE (ENGLISH) TAMSULOSIN (BY MOUTH) (ENGLISH) MEFS - OXYCODONE/ACETAMINOPHEN (PERCOCET, ROXICET) - (BY MOUTH) (ENGLISH) OXYCODONE/ACETAMINOPHEN (BY MOUTH) (ENGLISH) MEFS - GABAPENTIN ENACARBIL (HORIZANT) - (BY MOUTH) (ENGLISH) Discharge Orders None [facility] [facility] [place] Announcement We are excited to announce that we are making your provider's discharge notes available to you in [employer]. You will see these notes when they are completed and signed by the physician that discharged you from your recent hospital stay. If you have any questions or concerns about any information you see in [employer], please call the Health Information Department where you were seen or reach out to your Primary Care Provider for more information about your plan of care. Introducing [address]! Sonya Galicia introduces [employer] patient portal. Now you can access parts of your medical record, email your doctor's office, and request medication refills online. 1. In your internet browser, go to [email or web address] 2. Click on the First Time User? Click Here link in the Sign In box. You will see the New Member Sign Up page. 3. Enter your [employer] Access Code exactly as it appears below. You will not need to use this code after youve completed the sign-up process. If you do not sign up before the expiration date, you must request a new code. · [employer] Access Code: [email or web address] Expires: 10/20/2017  9:00 AM 
 
4. Enter the last four digits of your Social Security Number (xxxx) and Date of Birth (mm/dd/yyyy) as indicated and click Submit. You will be taken to the next sign-up page. 5. Create a [employer] ID. This will be your [employer] login ID and cannot be changed, so think of one that is secure and easy to remember. 6. Create a [employer] password. You can change your password at any time. 7. Enter your Password Reset Question and Answer. This can be used at a later time if you forget your password. 8. Enter your e-mail address. You will receive e-mail notification when new information is available in [address] 9. Click Sign Up. You can now view and download portions of your medical record. 10. Click the Download Summary menu link to download a portable copy of your medical information. If you have questions, please visit the Frequently Asked Questions section of the [employer] website. Remember, [employer] is NOT to be used for urgent needs. For medical emergencies, dial 911. Now available from your iPhone and Android! General Information Please provide this summary of care documentation to your next provider. Patient Signature:  ____________________________________________________________ Date:  ____________________________________________________________  
  
[facility] Provider Signature:  ____________________________________________________________ Date:  ____________________________________________________________ More Information Halo Brace: What to Expect at Home Your Recovery Your halo brace is keeping your neck and spine from moving while your spine heals. You will wear the halo brace all the time. The ring (or halo) around your head is held in place by four screws (or pins) in your skull. The ring is attached by four bars to a stiff, lightweight vest that fits around your chest. This keeps your neck and spine moving as one with your body so the spinal injury can heal. Most people wear a halo for 6 to 12 weeks.  
You can expect your neck and back to feel stiff or sore at first. This should improve as you get used to wearing the halo. You may have some pain at the pin sites in the first few days. It may be hard to sit or  one position for very long, and you may need help getting into different positions. Your doctor may advise you to work with a physical therapist during your recovery. You will need to learn how to lift, twist, and bend so that you do not put too much strain on your neck and back. During the weeks you wear a halo, you will see your doctor at regular appointments. He or she will check the position of the halo brace and tighten the pins or vest as needed. X-rays may be taken at these appointments to make sure your neck and spine are in line. This care sheet gives you a general idea about how long it will take for you to recover. But each person recovers at a different pace. Follow the steps below to get better as quickly as possible. How can you care for yourself at home? Activity · Rest when you feel tired. Getting enough sleep will help you recover. · Try to walk each day. Start by walking a little more than you did the day before. Bit by bit, increase the amount you walk. Walking is a gentle exercise and helps prevent pneumonia and constipation. Walking may also decrease your muscle soreness. · If advised by your doctor, you may need to avoid lifting anything that would cause too much strain on your neck. This may include a child, heavy grocery bags and milk containers, a heavy briefcase or backpack, cat litter or dog food bags, or a vacuum . · You will not be able to do any strenuous activities until your spine heals. · You will not be able to drive while you are wearing the halo brace. · Avoid riding in a car for long periods of time. If you must ride in a car for a long distance, stop often to get out and walk. You may need to learn how to safely get in and out of cars.  
· Try to change your position about every 30 minutes while sitting or standing. This will help decrease your neck and back pain while you are healing. You will need to roll your body and head as a unit when moving from a lying position to a sitting or standing position. You may need help walking until you get used to the weight of the brace. · Your time off from work depends on how quickly you feel better and on the type of work you do. If you work in an office, you likely can go back to work sooner than if you have a job where you are very active. Talk with your doctor about your work needs. · You can have sex as soon as you feel able or as your doctor tells you. Your doctor will tell you which positions to avoid so you do not put stress on your neck or back or cause pain. Diet · You can eat your normal diet. If your stomach is upset, try bland, low-fat foods like plain rice, broiled chicken, toast, and yogurt. · Drink plenty of fluids (unless your doctor tells you not to). · You may notice that your bowel movements are not regular right after your surgery. This is common. Try to avoid constipation and straining with bowel movements. You may want to take a fiber supplement every day. If you have not had a bowel movement after a couple of days, take a mild laxative. Medicines · Take pain medicines exactly as directed. ¨ If the doctor gave you a prescription medicine for pain, take it as prescribed. ¨ If you are not taking a prescription pain medicine, ask your doctor if you can take an over-the-counter medicine. · If your doctor prescribed antibiotics, take them as directed. Do not stop taking them just because you feel better. You need to take the full course of antibiotics. · If you think your pain medicine is making you sick to your stomach: 
¨ Take your medicine after meals (unless your doctor has told you not to). ¨ Ask your doctor for a different pain medicine. Pin site care · Gently rinse the pin sites daily with warm, soapy water and pat them dry. Make sure you understand how to care for your halo brace and the pin sites before you leave the hospital. 
· Keep the pin sites clean and dry. You may cover them with a gauze bandage if they weep. Change the bandages every day. · Your vest will also need to be checked to make sure you do not have any skin problems under it. Make sure you know how to care for your vest before you leave the hospital. 
Exercise · Do exercises as instructed by your doctor. · Your doctor may recommend that you work with a physical therapist to improve the strength and flexibility of your neck and back. Follow-up care is a key part of your treatment and safety. Be sure to make and go to all appointments, and call your doctor if you are having problems. It's also a good idea to know your test results and keep a list of the medicines you take. When should you call for help? Call 911 anytime you think you may need emergency care. For example, call if: 
· You passed out (lost consciousness). · You have sudden chest pain and shortness of breath, or you cough up blood. · You are unable to move an arm or a leg at all. Call your doctor now or seek immediate medical care if: 
· You have pain that does not get better after you take pain medicine. · You have a headache that does not get better after you take medicine for it. · You have new or worse symptoms in your arms, legs, chest, belly, or buttocks. Symptoms may include: ¨ Numbness or tingling. ¨ Weakness. ¨ Pain. · You lose bladder or bowel control. · Your pins feel like they are loose. · You have blood or fluid draining from the pin sites. · You have signs of infection, such as: 
¨ Increased pain, swelling, warmth, or redness. ¨ Red streaks leading from the pin sites. ¨ Pus draining from the pin sites. ¨ A fever. Watch closely for changes in your health, and be sure to contact your doctor if: 
· You are not getting better as expected. Where can you learn more? Go to http://catracho-ricardo.info/. Enter S835 in the search box to learn more about \"Halo Brace: What to Expect at Home. \" Current as of: October 14, 2016 Content Version: 11.3 © 4688-4779 [employer]. Care instructions adapted under license by [employer] for [employer] (which disclaims liability or warranty for this information). If you have questions about a medical condition or this instruction, always ask your healthcare professional. [address] any warranty or liability for your use of this information. Cervical Spinal Fusion: What to Expect at [facility] Your Recovery After surgery, you can expect your neck to feel stiff and sore. This should improve in the weeks after surgery. You may have trouble sitting or standing in one position for very long and may need pain medicine in the weeks after your surgery. You may need to wear a neck brace for a while. It may take 4 to 6 weeks to get back to your usual activities, but it may depend on what kind of surgery you had. Your doctor may advise you to work with a physical therapist to strengthen the muscles around your neck and back. This care sheet gives you a general idea about how long it will take for you to recover. But each person recovers at a different pace. Follow the steps below to get better as quickly as possible. How can you care for yourself at home? Activity · Rest when you feel tired. Getting enough sleep will help you recover. · Try to walk each day. Start by walking a little more than you did the day before. Bit by bit, increase the amount you walk. Walking boosts blood flow and helps prevent pneumonia and constipation. Walking may also decrease your muscle soreness after surgery. · Follow your doctor's directions about not lifting anything that would strain your neck and back.  This may include a child, heavy grocery bags and milk containers, a heavy briefcase or backpack, cat litter or dog food bags, or a vacuum . · Avoid strenuous activities, such as bicycle riding, jogging, weightlifting, or aerobic exercise, until your doctor says it is okay. · Do not drive for 2 to 4 weeks after your surgery or until your doctor says it is okay. · Avoid taking long car trips for 2 to 4 weeks after surgery. Your neck may become tired and painful from sitting too long in one position. · You will probably need to take 4 to 6 weeks off from work. It depends on the type of work you do and how you feel. · You may have sex as soon as you feel able, but avoid positions that put stress on your neck or cause pain. Diet · You can eat your normal diet. If your stomach is upset, try bland, low-fat foods like plain rice, broiled chicken, toast, and yogurt. · Drink plenty of fluids. If you have kidney, heart, or liver disease and have to limit fluids, talk with your doctor before you increase the amount of fluids you drink. · You may notice that your bowel movements are not regular right after your surgery. This is common. Try to avoid constipation and straining with bowel movements. You may want to take a fiber supplement every day. If you have not had a bowel movement after a couple of days, ask your doctor about taking a mild laxative. Medicines · Your doctor will tell you if and when you can restart your medicines. He or she will also give you instructions about taking any new medicines. · If you take blood thinners, such as warfarin (Coumadin), clopidogrel (Plavix), or aspirin, be sure to talk to your doctor. He or she will tell you if and when to start taking those medicines again. Make sure that you understand exactly what your doctor wants you to do. · Be safe with medicines. Take pain medicines exactly as directed. ¨ If the doctor gave you a prescription medicine for pain, take it as prescribed. ¨ If you are not taking a prescription pain medicine, ask your doctor if you can take an over-the-counter medicine. · If your doctor prescribed antibiotics, take them as directed. Do not stop taking them just because you feel better. You need to take the full course of antibiotics. · If you think your pain pill is making you sick to your stomach: 
¨ Take your pills after meals (unless your doctor has told you not to). ¨ Ask your doctor for a different pain pill. Incision care · You will be given specific instructions about how to care for the cut (incision) the doctor made. The instructions will depend on the type of materials used to close the cut. Exercise · Do exercises as instructed by your doctor or physical therapist to improve your strength and flexibility. Other instructions · To reduce stiffness and help sore muscles, use a warm water bottle, a heating pad set on low, or a warm cloth on your neck. Do not put heat right over the incision. Do not go to sleep with a heating pad on your skin. Follow-up care is a key part of your treatment and safety. Be sure to make and go to all appointments, and call your doctor if you are having problems. It's also a good idea to know your test results and keep a list of the medicines you take. When should you call for help? Call 911 anytime you think you may need emergency care. For example, call if: 
· You passed out (lost consciousness). · You have sudden chest pain and shortness of breath, or you cough up blood. · You cannot swallow. · You have severe pain in your neck or back. · You are unable to move an arm or a leg at all. Call your doctor now or seek immediate medical care if: 
· You have pain that does not get better after you take pain pills. · You have signs of infection, such as: 
¨ Increased pain, swelling, warmth, or redness. ¨ Red streaks leading from the site. ¨ Pus draining from the site. ¨ A fever. · You have new or worse symptoms in your arms, legs, chest, belly, or buttocks. Symptoms may include: ¨ Numbness or tingling. ¨ Weakness. ¨ Pain. · You lose bladder or bowel control. · You have loose stitches, or your incision comes open. · You have blood or fluid draining from the incision. Watch closely for changes in your health, and be sure to contact your doctor if: 
· You do not have a bowel movement after taking a laxative. · You are not getting better as expected. Where can you learn more? Go to http://catracho-ricardo.info/. Enter T135 in the search box to learn more about \"Cervical Spinal Fusion: What to Expect at Home. \" Current as of: May 23, 2016 Content Version: 11.3 © 9809-1739 [employer]. Care instructions adapted under license by [employer] (which disclaims liability or warranty for this information). If you have questions about a medical condition or this instruction, always ask your healthcare professional. [place] [address] any warranty or liability for your use of this information. Broken Neck: Care Instructions Your Care Instructions A broken neck can range from a small, hairline crack, to a bone or bones breaking into two or more pieces. Treatment for a broken neck depends on how bad the break is and which bones are involved. You may be sent home with a neck brace or collar. You can help your neck heal with care at home. You heal best when you take good care of yourself. Eat a variety of healthy foods, and don't smoke. Follow-up care is a key part of your treatment and safety. Be sure to make and go to all appointments, and call your doctor if you are having problems. It's also a good idea to know your test results and keep a list of the medicines you take. How can you care for yourself at home?  
· If you were fitted for a neck brace, wear it exactly as directed by your doctor. Do not take it off until your doctor tells you to. · Be safe with medicines. Take pain medicines exactly as directed. ¨ If the doctor gave you a prescription medicine for pain, take it as prescribed. ¨ If you are not taking a prescription pain medicine, ask your doctor if you can take an over-the-counter medicine. · Follow your doctor's directions for returning to your normal activities. · Do any exercises that you are given to keep your muscles strong and reduce stiffness. · You can try using heat or ice to see if it helps. ¨ Try using a heating pad on a low or medium setting for 15 to 20 minutes every 2 to 3 hours. Try a warm shower in place of one session with the heating pad. You can also buy single-use heat wraps that last up to 8 hours. ¨ You can also try an ice pack for 10 to 15 minutes every 2 to 3 hours. · Make sure that paths in your home are clear so that you do not fall. Also make sure that lighting is good and that carpets are tacked down to prevent tripping. · Do not drive unless your doctor says that it is okay. If you are allowed to drive, always wear a seat belt. · Talk to your doctor about medicines or changes in your diet that can help make your bones stronger. When should you call for help? Call 911 anytime you think you may need emergency care. For example, call if: 
· You are unable to move an arm or a leg at all. Call your doctor now or seek immediate medical care if: 
· You have new or worse symptoms in your arms, legs, chest, belly, or buttocks. Symptoms may include: ¨ Numbness or tingling. ¨ Weakness. ¨ Pain. · You lose bladder or bowel control. Watch closely for changes in your health, and be sure to contact your doctor if: 
· You are not getting better as expected. Where can you learn more? Go to http://catracho-ricardo.info/. Enter U000 in the search box to learn more about \"Broken Neck: Care Instructions. \" Current as of: March 21, 2017 Content Version: 11.3 © 8344-1642 [employer]. Care instructions adapted under license by [employer] (which disclaims liability or warranty for this information). If you have questions about a medical condition or this instruction, always ask your healthcare professional. [address] any warranty or liability for your use of this information. Tamsulosin (By mouth) Tamsulosin Hydrochloride (too-PQO-zai-sin deepti-droe-KLOR-lina) Treats benign prostatic hyperplasia (enlarged prostate). Brand Name(s): Flomax There may be other brand names for this medicine. When This Medicine Should Not Be Used: This medicine is not right for everyone. Do not use it if you had an allergic reaction to tamsulosin. How to Use This Medicine:  
Capsule · Take your medicine as directed. Your dose may need to be changed several times to find what works best for you. · Take this medicine 30 minutes after the same meal each day. Swallow the capsule whole. Do not crush, chew, or open it. · Read and follow the patient instructions that come with this medicine. Talk to your doctor or pharmacist if you have any questions. · Missed dose: Take a dose as soon as you remember. If it is almost time for your next dose, wait until then and take a regular dose. Do not take extra medicine to make up for a missed dose. If you forget to take this medicine for several days in a row, talk to your doctor before you start taking it again. · Store the medicine in a closed container at room temperature, away from heat, moisture, and direct light. Drugs and Foods to Avoid: Ask your doctor or pharmacist before using any other medicine, including over-the-counter medicines, vitamins, and herbal products. · Some medicines can affect how tamsulosin works.  Tell your doctor if you are using another alpha blocker medicine, cimetidine, erythromycin, ketoconazole, paroxetine, terbinafine, warfarin, or medicine for erectile dysfunction. Warnings While Using This Medicine: · Tell your doctor if you have kidney disease, liver disease, low blood pressure, prostate cancer, or an allergy to sulfa drugs. · Tell your doctor if you plan to have cataract or glaucoma surgery. This medicine may cause eye problems during surgery. · This medicine may make you dizzy or lightheaded. Do not drive or do anything else that could be dangerous until you know how this medicine affects you. Stand or sit up slowly if you feel dizzy. · Your doctor will check your progress and the effects of this medicine at regular visits. Keep all appointments. · Keep all medicine out of the reach of children. Never share your medicine with anyone. Possible Side Effects While Using This Medicine:  
Call your doctor right away if you notice any of these side effects: · Allergic reaction: Itching or hives, swelling in your face or hands, swelling or tingling in your mouth or throat, chest tightness, trouble breathing · Blistering, peeling, red skin rash · Lightheadedness, dizziness, fainting · Painful, prolonged erection of your penis If you notice these less serious side effects, talk with your doctor:  
· Headache · Problems with ejaculation · Runny or stuffy nose If you notice other side effects that you think are caused by this medicine, tell your doctor. Call your doctor for medical advice about side effects. You may report side effects to FDA at 5-308-FDA-7802 © 2017 [address] Information is for End User's use only and may not be sold, redistributed or otherwise used for commercial purposes. The above information is an  only. It is not intended as medical advice for individual conditions or treatments. Talk to your doctor, nurse or pharmacist before following any medical regimen to see if it is safe and effective for you. Oxycodone/Acetaminophen (Percocet, Roxicet) - (By mouth) Why this medicine is used:  
Treats pain. This medicine contains a narcotic pain reliever. Contact a nurse or doctor right away if you have: 
· Extreme weakness, shallow breathing, slow heartbeat · Sweating or cold, clammy skin · Skin blisters, rash, or peeling Common side effects: 
· Constipation · Nausea, vomiting · Tiredness © 2017 [address]  Information is for End User's use only and may not be sold, redistributed or otherwise used for commercial purposes. Oxycodone/Acetaminophen (By mouth) Acetaminophen (u-eeee-h-MIN-oh-fen), Oxycodone Hydrochloride (bb-x-TVN-done deepti-droe-KLOR-lina) Treats moderate to moderately severe pain. This medicine is a narcotic pain reliever. Brand Name(s): Endocet, Percocet, Primlev, Roxicet, Xartemis XR There may be other brand names for this medicine. When This Medicine Should Not Be Used: This medicine is not right for everyone. Do not use it if you had an allergic reaction to acetaminophen or oxycodone, or if you have serious breathing problems or paralytic ileus. How to Use This Medicine:  
Capsule, Liquid, Tablet, Long Acting Tablet · Your doctor will tell you how much medicine to use. Do not use more than directed. · An overdose can be dangerous. Follow directions carefully so you do not get too much medicine at one time. · Oral liquid: Measure the oral liquid medicine with a marked measuring spoon, oral syringe, or medicine cup. · Swallow the extended-release tablet whole. Do not crush, break, or chew it. Do not lick or wet the tablet before placing it in your mouth. Do not give this medicine through a feeding tube. · This medicine should come with a Medication Guide. Ask your pharmacist for a copy if you do not have one. · Missed dose: If you miss a dose of this medicine, skip the missed dose and go back to your regular dosing schedule. Do not double doses. · Store the medicine in a closed container at room temperature, away from heat, moisture, and direct light. Ask your pharmacist about the best way to dispose of medicine you do not use. Drugs and Foods to Avoid: Ask your doctor or pharmacist before using any other medicine, including over-the-counter medicines, vitamins, and herbal products. · Do not use Xartemis XR if you are using or have used an MAO inhibitor in the past 14 days. · Some medicines can affect how this medicine works. Tell your doctor if you are using any of the following: ¨ Carbamazepine, erythromycin, ketoconazole, lamotrigine, mirtazapine, naltrexone, phenytoin, propranolol, rifampin, ritonavir, tramadol, trazodone, or zidovudine ¨ Birth control pills ¨ Diuretic (water pill) ¨ Medicine to treat depression ¨ Phenothiazine medicine ¨ Triptan medicine to treat migraine headaches · Do not drink alcohol while you are using this medicine. Acetaminophen can damage your liver, and alcohol can increase this risk. Do not take acetaminophen without asking your doctor if you have 3 or more drinks of alcohol every day. · Tell your doctor if you use anything else that makes you sleepy. Some examples are allergy medicine, narcotic pain medicine, and alcohol. Tell your doctor if you are using buprenorphine, butorphanol, nalbuphine, pentazocine, a benzodiazepine, or a muscle relaxer. Warnings While Using This Medicine: · Tell your doctor if you are pregnant or breastfeeding, or if you have kidney disease, liver disease, heart disease, low blood pressure, breathing problems or lung disease (such as asthma, COPD), thyroid problems, Exira disease, pancreas or gallbladder problems, prostate problems, trouble urinating, or a stomach problems, or a history of head injury or brain damage, seizures, or alcohol or drug abuse. Tell your doctor if you are allergic to codeine. · This medicine may cause the following problems: ¨ High risk of overdose, which can lead to death ¨ Respiratory depression (serious breathing problem that can be life-threatening) ¨ Liver problems ¨ Serious skin reactions ¨ Serotonin syndrome (when used with certain medicines) · This medicine may make you dizzy or drowsy. Do not drive or do anything that could be dangerous until you know how this medicine affects you. Sit or lie down if you feel dizzy. Stand up carefully. · This medicine contains acetaminophen. Read the labels of all other medicines you are using to see if they also contain acetaminophen, or ask your doctor or pharmacist. [place] not use more than 4 grams (4,000 milligrams) total of acetaminophen in one day. · This medicine can be habit-forming. Do not use more than your prescribed dose. Call your doctor if you think your medicine is not working. · Do not stop using this medicine suddenly. Your doctor will need to slowly decrease your dose before you stop it completely. · This medicine could cause infertility. Talk with your doctor before using this medicine if you plan to have children. · This medicine may cause constipation, especially with long-term use. Ask your doctor if you should use a laxative to prevent and treat constipation. · Keep all medicine out of the reach of children. Never share your medicine with anyone. Possible Side Effects While Using This Medicine:  
Call your doctor right away if you notice any of these side effects: · Allergic reaction: Itching or hives, swelling in your face or hands, swelling or tingling in your mouth or throat, chest tightness, trouble breathing · Anxiety, restlessness, fast heartbeat, fever, muscle spasms, twitching, diarrhea, seeing or hearing things that are not there · Blistering, peeling, red skin rash · Blue lips, fingernails, or skin · Dark urine or pale stools, loss of appetite, stomach pain, yellow skin or eyes · Extreme weakness, shallow breathing, uneven heartbeat, seizures, sweating, or cold or clammy skin · Severe confusion, lightheadedness, dizziness, or fainting · Severe constipation, nausea, or vomiting · Trouble breathing or slow breathing If you notice these less serious side effects, talk with your doctor:  
· Headache · Mild constipation, nausea, or vomiting · Mild sleepiness or drowsiness If you notice other side effects that you think are caused by this medicine, tell your doctor. Call your doctor for medical advice about side effects. You may report side effects to FDA at 1-422-KEH-7861 © 2017 [address] Information is for End User's use only and may not be sold, redistributed or otherwise used for commercial purposes. The above information is an  only. It is not intended as medical advice for individual conditions or treatments. Talk to your doctor, nurse or pharmacist before following any medical regimen to see if it is safe and effective for you. Gabapentin Enacarbil (Horizant) - (By mouth) Why this medicine is used:  
Treats restless leg syndrome. Also treats pain caused by shingles. Contact a nurse or doctor right away if you have: · Unusual changes in mood or behavior, thoughts of hurting yourself · Feeling depressed, agitated, nervous, or irritable, trouble sleeping · Fever, rash, swollen or tender glands in the neck, armpit, or groin Common side effects: 
· Dizziness, drowsiness, sleepiness, tiredness © 2017 [address]  Information is for End User's use only and may not be sold, redistributed or otherwise used for commercial purposes.

## 2017-07-25 NOTE — IP AVS SNAPSHOT
[address] 
[phone] Patient: Fifi Mayes MRN: [id number]:[id number] Current Discharge Medication List  
  
START taking these medications Dose & Instructions Dispensing Information Comments Morning Noon Evening Bedtime  
 oxyCODONE-acetaminophen  mg per tablet Commonly known as:  PERCOCET 10 Your last dose was:  9:00  
   
 1-2 tabs po q4-6h prn pain Quantity:  120 Tab Refills:  0  
     
   
   
   
  
 tamsulosin 0.4 mg capsule Commonly known as:  FLOMAX Your next dose is:  Tomorrow Dose:  0.4 mg Take 1 Cap by mouth daily. Quantity:  14 Cap Refills:  0 CONTINUE these medications which have CHANGED Dose & Instructions Dispensing Information Comments Morning Noon Evening Bedtime  
 carisoprodol 350 mg tablet Commonly known as:  SOMA What changed:  when to take this Your last dose was:  9:00 Dose:  350 mg Take 1 Tab by mouth every eight (8) hours as needed for Muscle Spasm(s). Max Daily Amount: 1,050 mg.  
 Quantity:  60 Tab Refills:  0  
     
   
   
   
  
 methocarbamol 750 mg tablet Commonly known as:  ROBAXIN What changed:   
- when to take this 
- additional instructions TAKE 2 TABLETS BY MOUTH FOUR TIMES DAILY AS NEEDED Quantity:  250 Tab Refills:  1 CONTINUE these medications which have NOT CHANGED Dose & Instructions Dispensing Information Comments Morning Noon Evening Bedtime ADDERALL 30 mg tablet Generic drug:  dextroamphetamine-amphetamine Dose:  30 mg Take 30 mg by mouth dailyIndications: ATTENTION-DEFICIT HYPERACTIVITY DISORDER. Refills:  0  
     
   
   
   
  
 amLODIPine-benazepril 5-40 mg per capsule Commonly known as:  LOTREL  
   
 TAKE 1 CAPSULE BY MOUTH EVERY DAY Quantity:  30 Cap Refills:  11  
     
   
   
   
  
 calcium-cholecalciferol (d3) 600-125 mg-unit Tab Take  by mouth. Refills:  0  
     
   
   
   
  
 cholecalciferol (VITAMIN D3) 5,000 unit Tab tablet Commonly known as:  VITAMIN D3 Dose:  5000 Units Take 5,000 Units by mouth daily. Refills:  0  
     
   
   
   
  
 DENAVIR 1 % topical cream  
Generic drug:  penciclovir APPLY TOPICALLY TO THE AFFECTED AREA EVERY 2 HOURS Quantity:  5 g Refills:  PRN  
     
   
   
   
  
 escitalopram oxalate 20 mg tablet Commonly known as:  [place] Ying Your next dose is:  Tomorrow TAKE 1 TABLET BY MOUTH EVERY DAY Quantity:  30 Tab Refills:  11  
     
  
   
   
   
  
 NEURONTIN 800 mg tablet Generic drug:  gabapentin Your next dose is: Today Take  by mouth three (3) times daily. Refills:  0 PriLOSEC 20 mg capsule Generic drug:  omeprazole Dose:  20 mg Take 20 mg by mouth daily. Patient instructed to take morning of surgery per anesthesia guidelines Refills:  0 PROLIA 60 mg/mL injection Generic drug:  denosumab Dose:  60 mg  
60 mg by SubCUTAneous route. Twice a year Refills:  0  
     
   
   
   
  
 promethazine 25 mg tablet Commonly known as:  PHENERGAN  
   
 1/2-1 po tid prn nausea Quantity:  30 Tab Refills:  0  
     
   
   
   
  
 temazepam 30 mg capsule Commonly known as:  RESTORIL Your next dose is: Tonight TAKE 1 CAPSULE BY MOUTH EVERY DAY AT BEDTIME Quantity:  30 Cap Refills:  3 Where to Get Your Medications Information on where to get these meds will be given to you by the nurse or doctor. ! Ask your nurse or doctor about these medications  
  oxyCODONE-acetaminophen  mg per tablet  
 tamsulosin 0.4 mg capsule

## 2017-07-26 PROBLEM — S12.110G: Status: ACTIVE | Noted: 2017-07-26

## 2017-07-26 PROCEDURE — 74011250636 HC RX REV CODE- 250/636: Performed by: NEUROLOGICAL SURGERY

## 2017-07-26 PROCEDURE — 65270000029 HC RM PRIVATE

## 2017-07-26 PROCEDURE — 74011250637 HC RX REV CODE- 250/637: Performed by: NEUROLOGICAL SURGERY

## 2017-07-26 PROCEDURE — 97162 PT EVAL MOD COMPLEX 30 MIN: CPT

## 2017-07-26 PROCEDURE — 74011000250 HC RX REV CODE- 250: Performed by: ANESTHESIOLOGY

## 2017-07-26 PROCEDURE — 97530 THERAPEUTIC ACTIVITIES: CPT

## 2017-07-26 PROCEDURE — 74011250636 HC RX REV CODE- 250/636

## 2017-07-26 PROCEDURE — 74011250636 HC RX REV CODE- 250/636: Performed by: ANESTHESIOLOGY

## 2017-07-26 RX ORDER — PROMETHAZINE HYDROCHLORIDE 25 MG/ML
INJECTION, SOLUTION INTRAMUSCULAR; INTRAVENOUS
Status: ACTIVE
Start: 2017-07-26 | End: 2017-07-26

## 2017-07-26 RX ORDER — CLONIDINE HYDROCHLORIDE 0.1 MG/1
0.1 TABLET ORAL
Status: DISCONTINUED | OUTPATIENT
Start: 2017-07-26 | End: 2017-07-28 | Stop reason: HOSPADM

## 2017-07-26 RX ORDER — METHOCARBAMOL 750 MG/1
1500 TABLET, FILM COATED ORAL
Status: DISCONTINUED | OUTPATIENT
Start: 2017-07-26 | End: 2017-07-28 | Stop reason: HOSPADM

## 2017-07-26 RX ORDER — OXYCODONE AND ACETAMINOPHEN 10; 325 MG/1; MG/1
2 TABLET ORAL
Status: DISCONTINUED | OUTPATIENT
Start: 2017-07-26 | End: 2017-07-26 | Stop reason: ALTCHOICE

## 2017-07-26 RX ORDER — ESCITALOPRAM OXALATE 10 MG/1
20 TABLET ORAL DAILY
Status: DISCONTINUED | OUTPATIENT
Start: 2017-07-26 | End: 2017-07-28 | Stop reason: HOSPADM

## 2017-07-26 RX ORDER — FENTANYL CITRATE 50 UG/ML
50 INJECTION, SOLUTION INTRAMUSCULAR; INTRAVENOUS AS NEEDED
Status: DISCONTINUED | OUTPATIENT
Start: 2017-07-26 | End: 2017-07-26

## 2017-07-26 RX ORDER — CARISOPRODOL 350 MG/1
350 TABLET ORAL 3 TIMES DAILY
Status: DISCONTINUED | OUTPATIENT
Start: 2017-07-26 | End: 2017-07-28 | Stop reason: HOSPADM

## 2017-07-26 RX ORDER — ACETAMINOPHEN 325 MG/1
650 TABLET ORAL
Status: DISCONTINUED | OUTPATIENT
Start: 2017-07-26 | End: 2017-07-28 | Stop reason: HOSPADM

## 2017-07-26 RX ORDER — DEXTROSE, SODIUM CHLORIDE, AND POTASSIUM CHLORIDE 5; .45; .15 G/100ML; G/100ML; G/100ML
75 INJECTION INTRAVENOUS CONTINUOUS
Status: DISCONTINUED | OUTPATIENT
Start: 2017-07-26 | End: 2017-07-28 | Stop reason: HOSPADM

## 2017-07-26 RX ORDER — ONDANSETRON 2 MG/ML
4 INJECTION INTRAMUSCULAR; INTRAVENOUS
Status: DISCONTINUED | OUTPATIENT
Start: 2017-07-26 | End: 2017-07-28 | Stop reason: HOSPADM

## 2017-07-26 RX ORDER — AMOXICILLIN 250 MG
2 CAPSULE ORAL DAILY
Status: DISCONTINUED | OUTPATIENT
Start: 2017-07-26 | End: 2017-07-28 | Stop reason: HOSPADM

## 2017-07-26 RX ORDER — SODIUM CHLORIDE 0.9 % (FLUSH) 0.9 %
5-10 SYRINGE (ML) INJECTION AS NEEDED
Status: DISCONTINUED | OUTPATIENT
Start: 2017-07-26 | End: 2017-07-28 | Stop reason: HOSPADM

## 2017-07-26 RX ORDER — PANTOPRAZOLE SODIUM 40 MG/1
40 TABLET, DELAYED RELEASE ORAL
Status: DISCONTINUED | OUTPATIENT
Start: 2017-07-26 | End: 2017-07-28 | Stop reason: HOSPADM

## 2017-07-26 RX ORDER — SODIUM CHLORIDE 0.9 % (FLUSH) 0.9 %
5-10 SYRINGE (ML) INJECTION EVERY 8 HOURS
Status: DISCONTINUED | OUTPATIENT
Start: 2017-07-26 | End: 2017-07-28 | Stop reason: HOSPADM

## 2017-07-26 RX ORDER — OXYCODONE AND ACETAMINOPHEN 5; 325 MG/1; MG/1
2 TABLET ORAL
Status: DISCONTINUED | OUTPATIENT
Start: 2017-07-26 | End: 2017-07-26

## 2017-07-26 RX ORDER — HEPARIN SODIUM 5000 [USP'U]/ML
5000 INJECTION, SOLUTION INTRAVENOUS; SUBCUTANEOUS EVERY 12 HOURS
Status: DISCONTINUED | OUTPATIENT
Start: 2017-07-26 | End: 2017-07-28 | Stop reason: HOSPADM

## 2017-07-26 RX ORDER — DEXTROAMPHETAMINE SACCHARATE, AMPHETAMINE ASPARTATE, DEXTROAMPHETAMINE SULFATE AND AMPHETAMINE SULFATE 3.75; 3.75; 3.75; 3.75 MG/1; MG/1; MG/1; MG/1
30 TABLET ORAL DAILY
Status: DISCONTINUED | OUTPATIENT
Start: 2017-07-26 | End: 2017-07-28 | Stop reason: HOSPADM

## 2017-07-26 RX ORDER — PROMETHAZINE HYDROCHLORIDE 25 MG/1
25 TABLET ORAL
Status: DISCONTINUED | OUTPATIENT
Start: 2017-07-26 | End: 2017-07-28 | Stop reason: HOSPADM

## 2017-07-26 RX ORDER — TEMAZEPAM 15 MG/1
30 CAPSULE ORAL
Status: DISCONTINUED | OUTPATIENT
Start: 2017-07-26 | End: 2017-07-28 | Stop reason: HOSPADM

## 2017-07-26 RX ORDER — GABAPENTIN 400 MG/1
800 CAPSULE ORAL 3 TIMES DAILY
Status: DISCONTINUED | OUTPATIENT
Start: 2017-07-26 | End: 2017-07-28 | Stop reason: HOSPADM

## 2017-07-26 RX ORDER — NALOXONE HYDROCHLORIDE 0.4 MG/ML
0.4 INJECTION, SOLUTION INTRAMUSCULAR; INTRAVENOUS; SUBCUTANEOUS AS NEEDED
Status: DISCONTINUED | OUTPATIENT
Start: 2017-07-26 | End: 2017-07-28 | Stop reason: HOSPADM

## 2017-07-26 RX ORDER — CARISOPRODOL 350 MG/1
350 TABLET ORAL 3 TIMES DAILY
Status: DISCONTINUED | OUTPATIENT
Start: 2017-07-26 | End: 2017-07-26

## 2017-07-26 RX ADMIN — METHOCARBAMOL 1500 MG: 750 TABLET ORAL at 22:55

## 2017-07-26 RX ADMIN — FENTANYL CITRATE 50 MCG: 50 INJECTION, SOLUTION INTRAMUSCULAR; INTRAVENOUS at 00:17

## 2017-07-26 RX ADMIN — PANTOPRAZOLE SODIUM 40 MG: 40 TABLET, DELAYED RELEASE ORAL at 07:50

## 2017-07-26 RX ADMIN — OXYCODONE HYDROCHLORIDE AND ACETAMINOPHEN 1 TABLET: 10; 325 TABLET ORAL at 11:01

## 2017-07-26 RX ADMIN — BENAZEPRIL HYDROCHLORIDE: 10 TABLET, FILM COATED ORAL at 10:02

## 2017-07-26 RX ADMIN — DEXTROSE MONOHYDRATE, SODIUM CHLORIDE, AND POTASSIUM CHLORIDE 75 ML/HR: 50; 4.5; 1.49 INJECTION, SOLUTION INTRAVENOUS at 15:42

## 2017-07-26 RX ADMIN — GABAPENTIN 800 MG: 400 CAPSULE ORAL at 21:07

## 2017-07-26 RX ADMIN — OXYCODONE HYDROCHLORIDE AND ACETAMINOPHEN 2 TABLET: 5; 325 TABLET ORAL at 01:49

## 2017-07-26 RX ADMIN — FENTANYL CITRATE 50 MCG: 50 INJECTION, SOLUTION INTRAMUSCULAR; INTRAVENOUS at 00:42

## 2017-07-26 RX ADMIN — CARISOPRODOL 350 MG: 350 TABLET ORAL at 21:07

## 2017-07-26 RX ADMIN — HEPARIN SODIUM 5000 UNITS: 5000 INJECTION, SOLUTION INTRAVENOUS; SUBCUTANEOUS at 21:06

## 2017-07-26 RX ADMIN — OXYCODONE HYDROCHLORIDE AND ACETAMINOPHEN 1 TABLET: 10; 325 TABLET ORAL at 10:03

## 2017-07-26 RX ADMIN — DEXTROSE MONOHYDRATE, SODIUM CHLORIDE, AND POTASSIUM CHLORIDE 75 ML/HR: 50; 4.5; 1.49 INJECTION, SOLUTION INTRAVENOUS at 02:15

## 2017-07-26 RX ADMIN — TEMAZEPAM 30 MG: 15 CAPSULE ORAL at 02:03

## 2017-07-26 RX ADMIN — CARISOPRODOL 350 MG: 350 TABLET ORAL at 16:34

## 2017-07-26 RX ADMIN — ESCITALOPRAM OXALATE 20 MG: 10 TABLET ORAL at 10:03

## 2017-07-26 RX ADMIN — STANDARDIZED SENNA CONCENTRATE AND DOCUSATE SODIUM 2 TABLET: 8.6; 5 TABLET, FILM COATED ORAL at 10:02

## 2017-07-26 RX ADMIN — GABAPENTIN 800 MG: 400 CAPSULE ORAL at 10:01

## 2017-07-26 RX ADMIN — HEPARIN SODIUM 5000 UNITS: 5000 INJECTION, SOLUTION INTRAVENOUS; SUBCUTANEOUS at 10:02

## 2017-07-26 RX ADMIN — CARISOPRODOL 350 MG: 350 TABLET ORAL at 10:03

## 2017-07-26 RX ADMIN — GABAPENTIN 800 MG: 400 CAPSULE ORAL at 16:34

## 2017-07-26 RX ADMIN — OXYCODONE HYDROCHLORIDE 20 MG: 15 TABLET ORAL at 22:40

## 2017-07-26 RX ADMIN — OXYCODONE HYDROCHLORIDE 20 MG: 15 TABLET ORAL at 19:25

## 2017-07-26 RX ADMIN — Medication 10 ML: at 02:15

## 2017-07-26 RX ADMIN — TEMAZEPAM 30 MG: 15 CAPSULE ORAL at 21:07

## 2017-07-26 RX ADMIN — PROMETHAZINE HYDROCHLORIDE 6.25 MG: 25 INJECTION INTRAMUSCULAR; INTRAVENOUS at 00:25

## 2017-07-26 RX ADMIN — Medication 10 ML: at 05:52

## 2017-07-26 RX ADMIN — OXYCODONE HYDROCHLORIDE AND ACETAMINOPHEN 2 TABLET: 5; 325 TABLET ORAL at 06:04

## 2017-07-26 RX ADMIN — OXYCODONE HYDROCHLORIDE AND ACETAMINOPHEN 2 TABLET: 10; 325 TABLET ORAL at 14:23

## 2017-07-26 RX ADMIN — Medication 10 ML: at 21:18

## 2017-07-26 NOTE — ACP (ADVANCE CARE PLANNING)
Patient was provided with healthcare power of  paperwork  Patient will discuss healthcare power of  paperwork with spouse  Patient will request for a  to return when/if she desires to sign/complete the paperwork    Maximiliano Robles III, PhD  Fortunato Schmidt  [phone]

## 2017-07-26 NOTE — PROGRESS NOTES
Bedside and Verbal shift change report given to Darrel Snell (oncoming nurse) by Teddy Mccabe (offgoing nurse). Report included the following information Kardex, OR Summary, Intake/Output, MAR and Recent Results.

## 2017-07-26 NOTE — PROGRESS NOTES
Bedside and Verbal shift change report given to Robin Chaney (oncoming nurse) by Gus Gonzalez (offgoing nurse). Report included the following information Kardex, OR Summary, Intake/Output, MAR and Recent Results.

## 2017-07-26 NOTE — OP NOTES
Mariam Hanna [id number]  [id number]    1960  62 y.o.  female       Operative Report    Date of Surgery: 7/25/2017     Preoperative Diagnosis: Closed nondisplaced odontoid fracture with type II morphology, initial encounter ([facility] [address] [S12.112A]     Postoperative Diagnosis: Closed nondisplaced odontoid fracture with type II morphology, initial encounter ([facility] [address] [H26.158F     Surgeon(s) and Role:     * Karlie Nascimento MD - Primary    Anesthesia: General     Procedure:   1. C1-2 posterior fusion with iliac crest autograft. 2. [place] of structural tricortical autograft from right iliac crest.  3. C1-2 posterior nonsegmental instrumentation with right sided Vertex ([employer]) instrumentation. 4. C1-2 posterior nonsegmental instrumentation with left sided sublaminar cable. 5. Intraoperative fluoroscopic guidance. Procedure in Detail:   After appropriate informed consent was obtained, the patient was taken to the operating suite where satisfactory general endotracheal anesthesia was induced. The patient was then placed in the three pin Solis head rest. The patient was turned into the prone position on the operating room table on chest rolls. Care was taken to keep the head and the neck in the neutral position at all times. The head and neck were then flexed slightly and fixed firmly to the operating table using the Solis. All pressure points were carefully padded. Perioperative antibiotics were given. The posterior cervical and occipital region was shaved, prepped and draped in the usual sterile fashion. Intraoperative fluoroscopy was used to localize the C1 and C2 levels. The skin was infiltrated with 1% lidocaine with epinephrine. A vertical linear incision was made in the midline extending from the occiput to C3. Dissection was carried down through the subcutaneous tissue. The fascia was incised longitudinally. The spinous processes were identified.   The paraspinous muscles were swept laterally away from the bony elements of the spine on both sides. Dissection was carried out to the lateral edges of C1 and C2 on both sides. Meticulous hemostasis was obtained. A self-retaining retractor was placed in the wound. The wound was irrigated copiously with antibiotic solution. Next, the Midas-Maxim drill was used to place a divot at an entry point for a lateral mass screw at C1 on the right side. A  hole was drilled in the C1 lateral mass using an appropriate trajectory and intraoperative fluoroscopic guidance. A partially threaded Vertex screw of appropriate size was then driven down the lateral mass. Good bony purchase was achieved. Next, a divot was placed in the C2 spinous process with the Midas Maxim on the right side. A  hole was then drilled down the left C2 lamina. A Vertex fully threaded screw was driven down this  hole under fluoroscopic guidance. Good bony purchase was achieved. No CSF was seen throughout the procedure. Next, a single [place] cable was brought up to the field. Dissection was carried out beneath the arch of C1 and the C2 lamina on the left side. The cable was then passed through the epidural space at C1 and C2. The wound was once more irrigated copiously with antibiotic solution. Next, the skin over the right posterior superior iliac spine (PSIS) was infiltrated with 1% lidocaine with epinephrine. A vertical linear incision was made over the PSIS. Dissection was carried down into the subcutaneous tissue. The PSIS was exposed. The space between C1 and C2 was measured, and a piece of tricortical iliac crest autograft of the appropriate size was removed from the PSIS using osteotomes. Meticulous hemostasis was obtained in the iliac crest wound. The wound was irrigated copiously with antibiotic solution. Next, the Midas-Maxim drill was used to decorticate the inferior aspect of the ring of C1 and the superior aspect of the C2 spinous process.   This created an excellent fusion bed for the iliac crest autograft. The piece of autograft was wedged into this fusion bed. The sublaminar cable was then wrapped around C1, C2, and the autograft. It was tensioned appropriately and was then crimped and cut. This secured the autograft nicely and snugly in place. Next, a Vertex josie of appropriate size was placed in an angled offset connector. The josie and connector were then settled into the Vertex screw heads at C1 and C2. The set screws were then tightened down appropriately. The entire construct was then inspected and was found to be extremely satisfactory. The wound was once more irrigated copiously with antibiotic solution. The self-retaining retractor was then removed from the wound. Meticulous hemostasis was once more obtained. A 10 [place] round fluted Jerry drain was placed in the wound and was brought out through a separate stab incision. The fascia was closed using interrupted 0 Vicryl sutures. The subcutaneous tissue of both wounds was closed in a multi-layered fashion. The skin edges at the hip were approximated using Dermabond. The skin edges in the cervical wound were approximated with staples. The drain was hooked to bulb suction and was secured to the skin using a suture. Sterile dressings were applied over all. The patient was then removed from the Thornton headrest and was turned back into the supine position on the stretcher where satisfactory general anesthesia was reversed. The patient was placed in an Aspen cervical collar. The patient was then taken to the recovery room in satisfactory condition. Estimated Blood Loss:   50cc    Implants:   Implant Name Type Inv.  Item Serial No.  Lot No. LRB No. Used Action   CABLE SPNE INTEGR CRMP ATLS --  - [id number]  CABLE SPNE INTEGR CRMP ATLS --   [employer] [address] [id number] N/A 1 Implanted   SCR SPNE MULT AX 3.5X32MM --  - [id number]  SCR SPNE MULT AX 3.5X32MM --   [employer] Yamila Pollack [id number] N/A 1 Implanted   SET SCR SPNE LCK VERTEX MAX 6 --  - [id number]  SET SCR SPNE LCK VERTEX MAX 6 --   [employer] [facility] [place] [id number] N/A 2 Implanted   3.5x32 PT screw     [id number] N/A 1 Implanted   angled connector   [id number]   N/A 1 Implanted   josie 3.2 x 240mm         [id number] N/A 1 Implanted               Specimens: * No specimens in log *        Drains: NIR x 1 to posterior neck                Complications: None    Counts: Sponge and needle counts were correct times two.     Signed By:  Katia Gilbert MD     July 26, 2017

## 2017-07-26 NOTE — PROGRESS NOTES
Problem: Mobility Impaired (Adult and Pediatric)  Goal: *Acute Goals and Plan of Care (Insert Text)  Long Term Goals:  1. Ms. Rafa Albright will move from supine to sit and sit to supine in flat bed via log roll with INDEPENDENT within 7 day(s). 2. Ms. Rfaa Albright will transfer from sit to stand and stand to sit with MODIFIED INDEPENDENCE within 7 days. 3. Ms. Rafa Albright will ambulate with INDEPENDENT for 250+ feet with the least restrictive/no device within 7 day(s). 4. Ms. Rafa Albright will verbalize 3/3 spinal precautions to ensure neck safety during functional activities within 7 days. ________________________________________________________________________      PHYSICAL THERAPY: Daily Note, Treatment Day: Day of Assessment and PM 7/26/2017  INPATIENT: Hospital Day: 2  Payor: Cady Borja / Plan: [address] [place] [address] / Product Type: PPO /   Zula Banana Collar  Spine Precautions  Straight chair   NAME/AGE/GENDER: Nichelle Montana is a 62 y.o. female        PRIMARY DIAGNOSIS: Cervical compression fracture, initial encounter ([facility] [N85.25XA] <principal problem not specified> <principal problem not specified>  Procedure(s) (LRB):  C 1-2 POSTERIOR FUSION WITH VERTEX INSTRUMENTATION  (N/A)  1 Day Post-Op  ICD-10: Treatment Diagnosis:       · Other abnormalities of gait and mobility (R26.89)   Precaution/Allergies:  Benadryl [diphenhydramine hcl]; Demerol [meperidine]; Dilaudid [hydromorphone]; Lortab [hydrocodone-acetaminophen]; and Morphine       ASSESSMENT:      Ms. Rafa Albright presents sitting up in bedside chair with spouse present. She had been up 25 minutes and was requesting to go back to bed. She stood with min assist and was able to ambulate with a slow short steps around the bed. Her movements are guarded secondary to increased pain. She then got into bed and needed min assist to get supine. Educated patient on pursed lip breathing throughout treatment. Will continue PT efforts.  Patient was able to tolerate sitting up for 25 minutes this afternoon which is improvement from this morning. Fair progress noted with activity tolerance. This section established at most recent assessment   PROBLEM LIST (Impairments causing functional limitations):  1. Decreased ADL/Functional Activities  2. Decreased Transfer Abilities  3. Decreased Ambulation Ability/Technique  4. Increased Pain  5. Decreased Activity Tolerance  6. Decreased Knowledge of Precautions    INTERVENTIONS PLANNED: (Benefits and precautions of physical therapy have been discussed with the patient.)  1. Balance Exercise  2. Bed Mobility  3. Gait Training  4. Home Exercise Program (HEP)  5. Therapeutic Activites  6. Therapeutic Exercise/Strengthening  7. Transfer Training  8. education  9. Group Therapy      TREATMENT PLAN: Frequency/Duration: twice daily for duration of hospital stay  Rehabilitation Potential For Stated Goals: GOOD      RECOMMENDED REHABILITATION/EQUIPMENT: (at time of discharge pending progress): Continue Skilled Therapy and maybe outpatient PT? ?? when okay with NS.                   HISTORY:   History of Present Injury/Illness (Reason for Referral):  Patient states that she fell out of bed in Feb '17 suffering odontoid fracture. It did not heal, so underwent neurosurgery yesterday. Past Medical History/Comorbidities:   Ms. Sarai Florentino  has a past medical history of 3-part fracture of surgical neck of left humerus (10/27/2016); ADHD (attention deficit hyperactivity disorder); Arthritis; Carpal tunnel syndrome (11/11/2015); Closed fracture of anatomical neck of humerus (6/16/2015); Degeneration of lumbar or lumbosacral intervertebral disc (2/27/2015); Depressive disorder, not elsewhere classified (2/27/2015); Dermatophytosis of the body (2/27/2015); Essential hypertension, benign (2/27/2015); GERD (gastroesophageal reflux disease) (2/27/2015); Heart murmur; History of MRSA infection; Hypopotassemia (2/27/2015); Insomnia, unspecified (2/27/2015); Kidney stone; Kidney stones;  Liver disease; Migraine, unspecified, without mention of intractable migraine without mention of status migrainosus (2/27/2015); Odontoid fracture with type II morphology ([facility] Osteoporosis (1/23/2017); Other closed fractures of upper end of humerus (6/16/2015); Polycythemia, secondary (2/27/2015); Traumatic tear of left rotator cuff (10/27/2016); Type 2 superior 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 shoulder (10/27/2016); and Unspecified viral hepatitis C without hepatic coma (02/27/2015). She also has no past medical history of Difficult intubation; Malignant hyperthermia due to anesthesia; Nausea & vomiting; or Pseudocholinesterase deficiency. Ms. Christal Bryant  has a past surgical history that includes lithotripsy (2005); bladder suspension (2006); carpal tunnel release (Right, 2001); cardiac surg procedure unlist; orthopaedic; orthopaedic (2011); orthopaedic (Left); and rotator cuff repair (Bilateral). Social History/Living Environment:   Home Environment: Private residence  # Steps to Enter: 4  One/Two Story Residence: Two story  Living Alone: No  Support Systems: Spouse/Significant Other/Partner, Friends \ neighbors  Patient Expects to be Discharged to[de-identified] Private residence  Current DME Used/Available at Home: None  Prior Level of Function/Work/Activity:  Lives at home with spouse, 8year old, and a roommate. Independent in home and community. Number of Personal Factors/Comorbidities that affect the Plan of Care: 1-2: MODERATE COMPLEXITY   EXAMINATION:   Most Recent Physical Functioning:   Gross Assessment:                  Posture:  Posture (WDL): Exceptions to WDL  Posture Assessment:  Forward head, Rounded shoulders  Balance:  Sitting: Intact  Standing: Impaired  Standing - Static: Fair  Standing - Dynamic : Fair Bed Mobility:  Rolling: Minimum assistance  Sit to Supine: Minimum assistance  Wheelchair Mobility:     Transfers:  Sit to Stand: Contact guard assistance;Minimum assistance  Stand to Sit: Contact guard assistance  Bed to Chair: Contact guard assistance  Gait:     Base of Support: Center of gravity altered  Speed/Emily: Slow  Step Length: Left shortened;Right shortened  Distance (ft): 10 Feet (ft)  Assistive Device:  (handheld assistance)  Interventions: Safety awareness training;Verbal cues       Body Structures Involved:  1. Nerves  2. Bones  3. Joints Body Functions Affected:  1. Sensory/Pain  2. Neuromusculoskeletal  3. Movement Related Activities and Participation Affected:  1. Mobility  2. Self Care  3. Domestic Life   Number of elements that affect the Plan of Care: 4+: HIGH COMPLEXITY   CLINICAL PRESENTATION:   Presentation: Evolving clinical presentation with changing clinical characteristics: MODERATE COMPLEXITY   CLINICAL DECISION MAKIN [facility] Inpatient Short Form  How much difficulty does the patient currently have. .. Unable A Lot A Little None   1. Turning over in bed (including adjusting bedclothes, sheets and blankets)? [ ] 1   [ ] 2   [X] 3   [ ] 4   2. Sitting down on and standing up from a chair with arms ( e.g., wheelchair, bedside commode, etc.)   [ ] 1   [ ] 2   [X] 3   [ ] 4   3. Moving from lying on back to sitting on the side of the bed? [ ] 1   [ ] 2   [X] 3   [ ] 4   How much help from another person does the patient currently need. .. Total A Lot A Little None   4. Moving to and from a bed to a chair (including a wheelchair)? [ ] 1   [ ] 2   [X] 3   [ ] 4   5. Need to walk in hospital room? [ ] 1   [ ] 2   [X] 3   [ ] 4   6. Climbing 3-5 steps with a railing? [ ] 1   [ ] 2   [X] 3   [ ] 4   © , Trustees of [address] [place] [address] [address] [address], under license to [employer]. All rights reserved    Score:  Initial: 18 Most Recent: X (Date: -- )     Interpretation of Tool:  Represents activities that are increasingly more difficult (i.e. Bed mobility, Transfers, Gait).        Score 24 23 22-20 19-15 14-10 9-7 6       Modifier CH CI CJ CK CL CM CN         · Mobility - Walking and Moving Around:               - CURRENT STATUS:    CK - 40%-59% impaired, limited or restricted               - GOAL STATUS:           CJ - 20%-39% impaired, limited or restricted               - D/C STATUS:                       ---------------To be determined---------------  Payor: AGUILAR / Plan: Marta Arms / Product Type: PPO /       Medical Necessity:     · Patient is expected to demonstrate progress in balance and functional technique to increase independence with   and improve safety during all functional mobility. Reason for Services/Other Comments:  · Patient continues to require skilled intervention due to medical complications and patient unable to attend/participate in therapy as expected. Use of outcome tool(s) and clinical judgement create a POC that gives a: Questionable prediction of patient's progress: MODERATE COMPLEXITY                 TREATMENT:   (In addition to Assessment/Re-Assessment sessions the following treatments were rendered)   Pre-treatment Symptoms/Complaints:  \"I can't do it. \"  Pain: Initial:   Pain Intensity 1:  (Some pain noted withmovement but not rated)  Post Session:  Pain noted with movement but not rated during treatment. Therapeutic Activity: (    10 minutes): Therapeutic activities including Bed transfers, chair transfers, Ambulation on level ground and instruction in correct body mechanics with bed mobility to improve mobility, strength and balance. Required minimal Safety awareness training;Verbal cues to promote safe technique during mobility. Braces/Orthotics/Lines/Etc:   · IV  · monitor  · O2 Device: Room air  Treatment/Session Assessment:    · Response to Treatment:  Increased pain with movement.   · Interdisciplinary Collaboration:  · Physical Therapist  · Physical Therapy Assistant  · Registered Nurse  · After treatment position/precautions:  · Supine in bed  · Bed/Chair-wheels locked  · Call light within reach  · RN notified  · Compliance with Program/Exercises: compliant all of the time. · Recommendations/Intent for next treatment session: \"Next visit will focus on advancements to more challenging activities and reduction in assistance provided\".   Total Treatment Duration:  PT Patient Time In/Time Out  Time In: 1355  Time Out: Parviz Parmar PTA

## 2017-07-26 NOTE — PERIOP NOTES
TRANSFER - OUT REPORT:    Verbal report given to Jessenia Stewart  on Rene Campos  being transferred to [facility] for routine progression of care       Report consisted of patients Situation, Background, Assessment and   Recommendations(SBAR). Information from the following report(s) SBAR, OR Summary and MAR was reviewed with the receiving nurse. Lines:   Peripheral IV 07/25/17 Right Hand (Active)   Site Assessment Clean, dry, & intact 7/26/2017 12:59 AM   Phlebitis Assessment 0 7/26/2017 12:59 AM   Infiltration Assessment 0 7/26/2017 12:59 AM   Dressing Status Clean, dry, & intact 7/26/2017 12:59 AM   Dressing Type Tape;Transparent 7/26/2017 12:20 AM   Hub Color/Line Status Infusing 7/26/2017 12:59 AM        Opportunity for questions and clarification was provided. Patient transported with:   Registered Nurse    VTE prophylaxis orders have been written for Rene Campos. Patient and family given floor number and nurses name. Family updated re: pt status after security code verified.

## 2017-07-26 NOTE — PROGRESS NOTES
Patient was provided with healthcare power of  paperwork  Patient will discuss healthcare power of  paperwork with spouse  Patient will request for a  to return when/if she desires to sign/complete the paperwork    Shira Russell III, PhD  Nuria Eldridge  [phone]

## 2017-07-26 NOTE — PROGRESS NOTES
Pt doing well, pain control a bit marginal.  Dupont in, not yet mobilizing. AFVSS  Neuro intact  Wounds OK    Stable, cont NIR drainage. Remove Dupont and mobilize. Adjust pain meds.

## 2017-07-26 NOTE — PROGRESS NOTES
Problem: Mobility Impaired (Adult and Pediatric)  Goal: *Acute Goals and Plan of Care (Insert Text)  Long Term Goals:  1. Ms. Jose C Gonzáles will move from supine to sit and sit to supine in flat bed via log roll with INDEPENDENT within 7 day(s). 2. Ms. Jose C Gonzáles will transfer from sit to stand and stand to sit with MODIFIED INDEPENDENCE within 7 days. 3. Ms. Jose C Gonzáles will ambulate with INDEPENDENT for 250+ feet with the least restrictive/no device within 7 day(s). 4. Ms. Jose C Gonzáles will verbalize 3/3 spinal precautions to ensure neck safety during functional activities within 7 days. ________________________________________________________________________      PHYSICAL THERAPY: INITIAL ASSESSMENT, TREATMENT DAY: DAY OF ASSESSMENT, AM 7/26/2017  INPATIENT: Hospital Day: 2  Payor: Leonard Carey / Plan: [address] [place] [address] / Product Type: PPO /   Poornima Harvey  Spine Precautions  Straight chair   NAME/AGE/GENDER: Marie Elizabeth is a 62 y.o. female        PRIMARY DIAGNOSIS: Cervical compression fracture, initial encounter ([facility] [R10.95FN] <principal problem not specified> <principal problem not specified>  Procedure(s) (LRB):  C 1-2 POSTERIOR FUSION WITH VERTEX INSTRUMENTATION  (N/A)  1 Day Post-Op  ICD-10: Treatment Diagnosis:       · Other abnormalities of gait and mobility (R26.89)   Precaution/Allergies:  Benadryl [diphenhydramine hcl]; Demerol [meperidine]; Dilaudid [hydromorphone]; Lortab [hydrocodone-acetaminophen]; and Morphine       ASSESSMENT:      Ms. Jose C Gonzáles presents supine in bed with Aspen collar on. She endorsed 10/10 pain. Per RN she received only part of her pain med dose due to bradycardia. Patient states HR always low especially at rest and cardiac workup was negative. HR ranging from 50's to 70's during PT. Instructed patient in log roll technique and she performed supine to sidelying to sit with min assist.  Upon sitting, patient began crying and stated her pain was much worse.   Stood with CGA to min assist and ambulated 12' into bathroom. Sat with CGA. Patient has experienced a decline in functional mobility. Ms. Ramón London would benefit from skilled physical therapy (medically necessary) to address her deficits and maximize her function. After evaluation, worked on sit to stand, ambulation in room and bed mobility. Instructed patient to sit up for lunch later if pain better with nursing assistance and to only sit in straight chair. No progress due to severe pain. This section established at most recent assessment   PROBLEM LIST (Impairments causing functional limitations):  1. Decreased ADL/Functional Activities  2. Decreased Transfer Abilities  3. Decreased Ambulation Ability/Technique  4. Increased Pain  5. Decreased Activity Tolerance  6. Decreased Knowledge of Precautions    INTERVENTIONS PLANNED: (Benefits and precautions of physical therapy have been discussed with the patient.)  1. Balance Exercise  2. Bed Mobility  3. Gait Training  4. Home Exercise Program (HEP)  5. Therapeutic Activites  6. Therapeutic Exercise/Strengthening  7. Transfer Training  8. education  9. Group Therapy      TREATMENT PLAN: Frequency/Duration: twice daily for duration of hospital stay  Rehabilitation Potential For Stated Goals: GOOD      RECOMMENDED REHABILITATION/EQUIPMENT: (at time of discharge pending progress): Continue Skilled Therapy and maybe outpatient PT? ?? when okay with NS.                   HISTORY:   History of Present Injury/Illness (Reason for Referral):  Patient states that she fell out of bed in Feb '17 suffering odontoid fracture. It did not heal, so underwent neurosurgery yesterday. Past Medical History/Comorbidities:   Ms. Ramón London  has a past medical history of 3-part fracture of surgical neck of left humerus (10/27/2016); ADHD (attention deficit hyperactivity disorder); Arthritis; Carpal tunnel syndrome (11/11/2015); Closed fracture of anatomical neck of humerus (6/16/2015);  Degeneration of lumbar or lumbosacral intervertebral disc (2/27/2015); Depressive disorder, not elsewhere classified (2/27/2015); Dermatophytosis of the body (2/27/2015); Essential hypertension, benign (2/27/2015); GERD (gastroesophageal reflux disease) (2/27/2015); Heart murmur; History of MRSA infection; Hypopotassemia (2/27/2015); Insomnia, unspecified (2/27/2015); Kidney stone; Kidney stones; Liver disease; Migraine, unspecified, without mention of intractable migraine without mention of status migrainosus (2/27/2015); Odontoid fracture with type II morphology ([facility] Osteoporosis (1/23/2017); Other closed fractures of upper end of humerus (6/16/2015); Polycythemia, secondary (2/27/2015); Traumatic tear of left rotator cuff (10/27/2016); Type 2 superior 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 shoulder (10/27/2016); and Unspecified viral hepatitis C without hepatic coma (02/27/2015). She also has no past medical history of Difficult intubation; Malignant hyperthermia due to anesthesia; Nausea & vomiting; or Pseudocholinesterase deficiency. Ms. Rylee Jeronimo  has a past surgical history that includes lithotripsy (2005); bladder suspension (2006); carpal tunnel release (Right, 2001); cardiac surg procedure unlist; orthopaedic; orthopaedic (2011); orthopaedic (Left); and rotator cuff repair (Bilateral). Social History/Living Environment:   Home Environment: Private residence  # Steps to Enter: 4  One/Two Story Residence: Two story  Living Alone: No  Support Systems: Spouse/Significant Other/Partner, Friends \ neighbors  Patient Expects to be Discharged to[de-identified] Private residence  Current DME Used/Available at Home: None  Prior Level of Function/Work/Activity:  Lives at home with spouse, 8year old, and a roommate. Independent in home and community.       Number of Personal Factors/Comorbidities that affect the Plan of Care: 1-2: MODERATE COMPLEXITY   EXAMINATION:   Most Recent Physical Functioning:   Gross Assessment:  AROM: Generally decreased, functional  Strength: Generally decreased, functional  Sensation: Impaired (LT sensation diminished distal UE's due to meds per pt)               Posture:  Posture (WDL): Exceptions to WDL  Posture Assessment: Forward head, Rounded shoulders  Balance:  Sitting: Intact  Standing: Impaired  Standing - Static: Constant support Bed Mobility:  Rolling: Minimum assistance  Supine to Sit: Minimum assistance  Sit to Supine: Moderate assistance  Scooting: Contact guard assistance  Wheelchair Mobility:     Transfers:  Sit to Stand: Contact guard assistance;Minimum assistance  Stand to Sit: Contact guard assistance  Bed to Chair: Contact guard assistance  Gait:     Base of Support: Center of gravity altered  Speed/Emily: Slow  Step Length: Left shortened;Right shortened  Distance (ft): 12 Feet (ft) (x2)  Assistive Device: Walker, rolling  Interventions: Safety awareness training;Verbal cues       Body Structures Involved:  1. Nerves  2. Bones  3. Joints Body Functions Affected:  1. Sensory/Pain  2. Neuromusculoskeletal  3. Movement Related Activities and Participation Affected:  1. Mobility  2. Self Care  3. Domestic Life   Number of elements that affect the Plan of Care: 4+: HIGH COMPLEXITY   CLINICAL PRESENTATION:   Presentation: Evolving clinical presentation with changing clinical characteristics: MODERATE COMPLEXITY   CLINICAL DECISION MAKIN [facility] Mobility Inpatient Short Form  How much difficulty does the patient currently have. .. Unable A Lot A Little None   1. Turning over in bed (including adjusting bedclothes, sheets and blankets)? [ ] 1   [ ] 2   [X] 3   [ ] 4   2. Sitting down on and standing up from a chair with arms ( e.g., wheelchair, bedside commode, etc.)   [ ] 1   [ ] 2   [X] 3   [ ] 4   3. Moving from lying on back to sitting on the side of the bed?    [ ] 1   [ ] 2   [X] 3   [ ] 4   How much help from another person does the patient currently need. .. Total A Lot A Little None   4. Moving to and from a bed to a chair (including a wheelchair)? [ ] 1   [ ] 2   [X] 3   [ ] 4   5. Need to walk in hospital room? [ ] 1   [ ] 2   [X] 3   [ ] 4   6. Climbing 3-5 steps with a railing? [ ] 1   [ ] 2   [X] 3   [ ] 4   © 2007, Trustees of [address] [place] [address] [address] [address], under license to [employer]. All rights reserved    Score:  Initial: 18 Most Recent: X (Date: -- )     Interpretation of Tool:  Represents activities that are increasingly more difficult (i.e. Bed mobility, Transfers, Gait). Score 24 23 22-20 19-15 14-10 9-7 6       Modifier CH CI CJ CK CL CM CN         · Mobility - Walking and Moving Around:               - CURRENT STATUS:    CK - 40%-59% impaired, limited or restricted               - GOAL STATUS:           CJ - 20%-39% impaired, limited or restricted               - D/C STATUS:                       ---------------To be determined---------------  Payor: AGUILAR / Plan: Mayur Medina / Product Type: PPO /       Medical Necessity:     · Patient is expected to demonstrate progress in balance and functional technique to increase independence with   and improve safety during all functional mobility. Reason for Services/Other Comments:  · Patient continues to require skilled intervention due to medical complications and patient unable to attend/participate in therapy as expected. Use of outcome tool(s) and clinical judgement create a POC that gives a: Questionable prediction of patient's progress: MODERATE COMPLEXITY                 TREATMENT:   (In addition to Assessment/Re-Assessment sessions the following treatments were rendered)   Pre-treatment Symptoms/Complaints:  Severe pain, worse with movement. Pain: Initial:   Pain Intensity 1: 9  Pain Location 1: Neck  Pain Intervention(s) 1: Ambulation/Increased Activity, Repositioned, Nurse notified  Post Session:  9 at rest, 10 while moving.       Therapeutic Activity: (    10 minutes): Therapeutic activities including Bed transfers, Toilet transfers, Ambulation on level ground and instruction in correct body mechanics with bed mobility to improve mobility, strength and balance. Required minimal Safety awareness training;Verbal cues to promote safe technique during mobility. Braces/Orthotics/Lines/Etc:   · IV  · monitor  · O2 Device: Room air  Treatment/Session Assessment:    · Response to Treatment:  Increased pain with movement. · Interdisciplinary Collaboration:  · Physical Therapist  · Physical Therapy Assistant  · Registered Nurse  · After treatment position/precautions:  · Supine in bed  · Bed/Chair-wheels locked  · Call light within reach  · RN notified  · Compliance with Program/Exercises: compliant all of the time. · Recommendations/Intent for next treatment session: \"Next visit will focus on advancements to more challenging activities and reduction in assistance provided\".   Total Treatment Duration:  PT Patient Time In/Time Out  Time In: 1033  Time Out: 800 Leah Dietrich, PT

## 2017-07-26 NOTE — ANESTHESIA POSTPROCEDURE EVALUATION
Post-Anesthesia Evaluation and Assessment    Patient: Edmundo Vaz MRN: [id number]  SSN: [id number]    YOB: 1960  Age: 62 y.o. Sex: female       Cardiovascular Function/Vital Signs  Visit Vitals    /73    Pulse (!) 56    Temp 36.6 °C (97.8 °F)    Resp (!) 43    Ht 5' 1\" (1.549 m)    Wt 56.7 kg (125 lb)    SpO2 100%    BMI 23.62 kg/m2       Patient is status post general anesthesia for Procedure(s):  C 1-2 POSTERIOR FUSION WITH VERTEX INSTRUMENTATION . Nausea/Vomiting: None    Postoperative hydration reviewed and adequate. Pain:  Pain Scale 1: Visual (07/26/17 0020)  Pain Intensity 1: 8 (07/26/17 0020)   Managed    Neurological Status:   Neuro (WDL): Exceptions to WDL (07/26/17 0020)  Neuro  Neurologic State: Drowsy (07/26/17 0020)   At baseline    Mental Status and Level of Consciousness: Arousable    Pulmonary Status:   O2 Device: Nasal cannula (07/26/17 0020)   Adequate oxygenation and airway patent    Complications related to anesthesia: None    Post-anesthesia assessment completed.  No concerns    Signed By: Inna Tucker MD     July 26, 2017

## 2017-07-26 NOTE — PROGRESS NOTES
Call placed to Dr. Aron Burks to update on bradycardia in the 40's. Patient is asymptomatic and BP stable. Per Dr. Aron Burks, will monitor and notify her if patient becomes symptomatic or if rhythm changes.

## 2017-07-26 NOTE — PROGRESS NOTES
Bedside shift report given to Leoncio Tejeda RN (oncoming nurse) by Caleb De Los Santos RN (offgoing nurse). Bedside shift report included the following information: SBAR, Kardex, ED Summary, MAR, and Recent Results.

## 2017-07-26 NOTE — PROGRESS NOTES
TRANSFER - IN REPORT:    Verbal report received from Mari Sethi RN(name) on Jan W Eric Bonds  being received from [facility]) for routine progression of care      Report consisted of patients Situation, Background, Assessment and   Recommendations(SBAR). Information from the following report(s) SBAR, Procedure Summary, MAR and Cardiac Rhythm Sinus Viry Lei was reviewed with the receiving nurse. Opportunity for questions and clarification was provided. Assessment completed upon patients arrival to unit and care assumed. Dual nurse skin assessment completed. Skin warm, dry and intact. Sacrum assessed, skin intact with no signs of skin breakdown. No other skin issues noted. Patient has incision on back of neck covered with dermabond and a telfa dressing. Also has incision on right lower hip covered with telfa dressing. Both incisions are clean, dry, and intact with no signs of bleeding or hematoma.

## 2017-07-26 NOTE — PROGRESS NOTES
Patient's /88 @ 0130. Patient c/o 12/10 aching pain in back and neck. Patient states that she is very uncomfortable displays moaning and guarding. Patient repositioned for comfort. Percocet given per orders. Will monitor and reassess.

## 2017-07-26 NOTE — PROGRESS NOTES
Call placed to Dr. Louellen Schwab, patient exceeding acetaminophine for a 24 hour period. Order received for 20 oxycodone q4 hours for pain, read back for clarification.

## 2017-07-27 LAB
APPEARANCE UR: CLEAR
BACTERIA URNS QL MICRO: 0 /HPF
BILIRUB UR QL: NEGATIVE
CASTS URNS QL MICRO: 0 /LPF
COLOR UR: YELLOW
EPI CELLS #/AREA URNS HPF: 0 /HPF
GLUCOSE UR STRIP.AUTO-MCNC: NEGATIVE MG/DL
HGB UR QL STRIP: ABNORMAL
KETONES UR QL STRIP.AUTO: NEGATIVE MG/DL
LEUKOCYTE ESTERASE UR QL STRIP.AUTO: NEGATIVE
NITRITE UR QL STRIP.AUTO: NEGATIVE
PH UR STRIP: 6 [PH] (ref 5–9)
PROT UR STRIP-MCNC: NEGATIVE MG/DL
RBC #/AREA URNS HPF: ABNORMAL /HPF
SP GR UR REFRACTOMETRY: 1.01 (ref 1–1.02)
UROBILINOGEN UR QL STRIP.AUTO: 0.2 EU/DL (ref 0.2–1)
WBC URNS QL MICRO: ABNORMAL /HPF

## 2017-07-27 PROCEDURE — 65270000029 HC RM PRIVATE

## 2017-07-27 PROCEDURE — 74011250637 HC RX REV CODE- 250/637: Performed by: NEUROLOGICAL SURGERY

## 2017-07-27 PROCEDURE — 97110 THERAPEUTIC EXERCISES: CPT

## 2017-07-27 PROCEDURE — 97530 THERAPEUTIC ACTIVITIES: CPT

## 2017-07-27 PROCEDURE — 51798 US URINE CAPACITY MEASURE: CPT

## 2017-07-27 PROCEDURE — 74011250636 HC RX REV CODE- 250/636: Performed by: NEUROLOGICAL SURGERY

## 2017-07-27 PROCEDURE — 81001 URINALYSIS AUTO W/SCOPE: CPT | Performed by: NEUROLOGICAL SURGERY

## 2017-07-27 RX ORDER — OXYCODONE AND ACETAMINOPHEN 10; 325 MG/1; MG/1
2 TABLET ORAL
Status: DISCONTINUED | OUTPATIENT
Start: 2017-07-27 | End: 2017-07-28 | Stop reason: HOSPADM

## 2017-07-27 RX ORDER — LORAZEPAM 1 MG/1
1 TABLET ORAL
Status: COMPLETED | OUTPATIENT
Start: 2017-07-27 | End: 2017-07-27

## 2017-07-27 RX ORDER — TAMSULOSIN HYDROCHLORIDE 0.4 MG/1
0.4 CAPSULE ORAL DAILY
Status: DISCONTINUED | OUTPATIENT
Start: 2017-07-27 | End: 2017-07-28 | Stop reason: HOSPADM

## 2017-07-27 RX ORDER — DIAZEPAM 5 MG/1
5 TABLET ORAL
Status: DISCONTINUED | OUTPATIENT
Start: 2017-07-27 | End: 2017-07-28 | Stop reason: HOSPADM

## 2017-07-27 RX ORDER — OXYCODONE AND ACETAMINOPHEN 10; 325 MG/1; MG/1
1 TABLET ORAL
Status: DISCONTINUED | OUTPATIENT
Start: 2017-07-27 | End: 2017-07-28 | Stop reason: HOSPADM

## 2017-07-27 RX ADMIN — TAMSULOSIN HYDROCHLORIDE 0.4 MG: 0.4 CAPSULE ORAL at 15:23

## 2017-07-27 RX ADMIN — HEPARIN SODIUM 5000 UNITS: 5000 INJECTION, SOLUTION INTRAVENOUS; SUBCUTANEOUS at 20:00

## 2017-07-27 RX ADMIN — CARISOPRODOL 350 MG: 350 TABLET ORAL at 10:23

## 2017-07-27 RX ADMIN — METHOCARBAMOL 1500 MG: 750 TABLET ORAL at 19:56

## 2017-07-27 RX ADMIN — OXYCODONE HYDROCHLORIDE 20 MG: 15 TABLET ORAL at 14:06

## 2017-07-27 RX ADMIN — OXYCODONE HYDROCHLORIDE AND ACETAMINOPHEN 2 TABLET: 10; 325 TABLET ORAL at 21:39

## 2017-07-27 RX ADMIN — Medication 10 ML: at 15:24

## 2017-07-27 RX ADMIN — METHOCARBAMOL 1500 MG: 750 TABLET ORAL at 05:57

## 2017-07-27 RX ADMIN — CARISOPRODOL 350 MG: 350 TABLET ORAL at 15:23

## 2017-07-27 RX ADMIN — OXYCODONE HYDROCHLORIDE 20 MG: 15 TABLET ORAL at 02:08

## 2017-07-27 RX ADMIN — PANTOPRAZOLE SODIUM 40 MG: 40 TABLET, DELAYED RELEASE ORAL at 02:09

## 2017-07-27 RX ADMIN — DIAZEPAM 5 MG: 5 TABLET ORAL at 19:56

## 2017-07-27 RX ADMIN — BENAZEPRIL HYDROCHLORIDE: 10 TABLET, FILM COATED ORAL at 10:21

## 2017-07-27 RX ADMIN — GABAPENTIN 800 MG: 400 CAPSULE ORAL at 05:57

## 2017-07-27 RX ADMIN — HEPARIN SODIUM 5000 UNITS: 5000 INJECTION, SOLUTION INTRAVENOUS; SUBCUTANEOUS at 10:19

## 2017-07-27 RX ADMIN — ESCITALOPRAM OXALATE 20 MG: 10 TABLET ORAL at 10:23

## 2017-07-27 RX ADMIN — CARISOPRODOL 350 MG: 350 TABLET ORAL at 20:05

## 2017-07-27 RX ADMIN — GABAPENTIN 800 MG: 400 CAPSULE ORAL at 20:05

## 2017-07-27 RX ADMIN — STANDARDIZED SENNA CONCENTRATE AND DOCUSATE SODIUM 2 TABLET: 8.6; 5 TABLET, FILM COATED ORAL at 10:23

## 2017-07-27 RX ADMIN — DIAZEPAM 5 MG: 5 TABLET ORAL at 06:16

## 2017-07-27 RX ADMIN — GABAPENTIN 800 MG: 400 CAPSULE ORAL at 15:23

## 2017-07-27 RX ADMIN — TEMAZEPAM 30 MG: 15 CAPSULE ORAL at 21:31

## 2017-07-27 RX ADMIN — OXYCODONE HYDROCHLORIDE 20 MG: 15 TABLET ORAL at 10:20

## 2017-07-27 RX ADMIN — PROMETHAZINE HYDROCHLORIDE 25 MG: 25 TABLET ORAL at 20:06

## 2017-07-27 RX ADMIN — LORAZEPAM 1 MG: 1 TABLET ORAL at 16:28

## 2017-07-27 RX ADMIN — DEXTROSE MONOHYDRATE, SODIUM CHLORIDE, AND POTASSIUM CHLORIDE 75 ML/HR: 50; 4.5; 1.49 INJECTION, SOLUTION INTRAVENOUS at 02:09

## 2017-07-27 RX ADMIN — OXYCODONE HYDROCHLORIDE AND ACETAMINOPHEN 1 TABLET: 10; 325 TABLET ORAL at 19:56

## 2017-07-27 RX ADMIN — OXYCODONE HYDROCHLORIDE AND ACETAMINOPHEN 1 TABLET: 10; 325 TABLET ORAL at 18:19

## 2017-07-27 RX ADMIN — OXYCODONE HYDROCHLORIDE 20 MG: 15 TABLET ORAL at 05:23

## 2017-07-27 RX ADMIN — ACETAMINOPHEN 650 MG: 325 TABLET ORAL at 05:57

## 2017-07-27 NOTE — PROGRESS NOTES
Received bedside report from Bryant Brush, [facility]. Opportunity for questions, concerns. Patient involved.

## 2017-07-27 NOTE — PROGRESS NOTES
Pt woke up in pain and stiff after ambulating out of bed to bathroom. Pt tearful and appears anxious. Dr. Irene Quinn notified. New orders received for valium po.

## 2017-07-27 NOTE — PROGRESS NOTES
Problem: Mobility Impaired (Adult and Pediatric)  Goal: *Acute Goals and Plan of Care (Insert Text)  Long Term Goals:  1. Ms. Silva Heart will move from supine to sit and sit to supine in flat bed via log roll with INDEPENDENT within 7 day(s). 2. Ms. Silva Heart will transfer from sit to stand and stand to sit with MODIFIED INDEPENDENCE within 7 days. 3. Ms. Silva Heart will ambulate with INDEPENDENT for 250+ feet with the least restrictive/no device within 7 day(s). 4. Ms. Silva Heart will verbalize 3/3 spinal precautions to ensure neck safety during functional activities within 7 days. ________________________________________________________________________      PHYSICAL THERAPY: Daily Note, Treatment Day: Day of Assessment and PM 7/27/2017  INPATIENT: Hospital Day: 3  Payor: Myles Hernandez / Plan: [address] [place] [address] / Product Type: PPO /   [place] Ma  Spine Precautions  Straight chair   NAME/AGE/GENDER: Shayy Rodriguez is a 62 y.o. female        PRIMARY DIAGNOSIS: Cervical compression fracture, [facility] [Y88.74XE] <principal problem not specified> <principal problem not specified>  Procedure(s) (LRB):  C 1-2 POSTERIOR FUSION WITH VERTEX INSTRUMENTATION  (N/A)  2 Days Post-Op  ICD-10: Treatment Diagnosis:       · Other abnormalities of gait and mobility (R26.89)   Precaution/Allergies:  Benadryl [diphenhydramine hcl]; Demerol [meperidine]; Dilaudid [hydromorphone]; Lortab [hydrocodone-acetaminophen]; and Morphine       ASSESSMENT:      Ms. Silva Heart presents supine in bed. Today she required CGA to go from supine to sidelying to sit. Ambulated 120' with RW and CGA with slow mireya. Performed sitting exercises. Encouraged patient to sit for 30 minutes then reposition and try to sit longer if able. Hands swollen, encouraged patient to perform fist pumps throughout day. Patient progressing well with all mobility. Recommend OT consult. Will continue PT efforts.       This section established at most recent assessment PROBLEM LIST (Impairments causing functional limitations):  1. Decreased ADL/Functional Activities  2. Decreased Transfer Abilities  3. Decreased Ambulation Ability/Technique  4. Increased Pain  5. Decreased Activity Tolerance  6. Decreased Knowledge of Precautions    INTERVENTIONS PLANNED: (Benefits and precautions of physical therapy have been discussed with the patient.)  1. Balance Exercise  2. Bed Mobility  3. Gait Training  4. Home Exercise Program (HEP)  5. Therapeutic Activites  6. Therapeutic Exercise/Strengthening  7. Transfer Training  8. education  9. Group Therapy      TREATMENT PLAN: Frequency/Duration: twice daily for duration of hospital stay  Rehabilitation Potential For Stated Goals: GOOD      RECOMMENDED REHABILITATION/EQUIPMENT: (at time of discharge pending progress): Continue Skilled Therapy and maybe outpatient PT? ?? when okay with NS.  OT consult. HISTORY:   History of Present Injury/Illness (Reason for Referral):  Patient states that she fell out of bed in Feb '17 suffering odontoid fracture. It did not heal, so underwent neurosurgery yesterday. Past Medical History/Comorbidities:   Ms. Rylee Jeronimo  has a past medical history of 3-part fracture of surgical neck of left humerus (10/27/2016); ADHD (attention deficit hyperactivity disorder); Arthritis; Carpal tunnel syndrome (11/11/2015); Closed fracture of anatomical neck of humerus (6/16/2015); Degeneration of lumbar or lumbosacral intervertebral disc (2/27/2015); Depressive disorder, not elsewhere classified (2/27/2015); Dermatophytosis of the body (2/27/2015); Essential hypertension, benign (2/27/2015); GERD (gastroesophageal reflux disease) (2/27/2015); Heart murmur; History of MRSA infection; Hypopotassemia (2/27/2015); Insomnia, unspecified (2/27/2015); Kidney stone; Kidney stones; Liver disease; Migraine, unspecified, without mention of intractable migraine without mention of status migrainosus (2/27/2015);  Odontoid fracture with type II morphology ([facility] Osteoporosis (1/23/2017); Other closed fractures of upper end of humerus (6/16/2015); Polycythemia, secondary (2/27/2015); Traumatic tear of left rotator cuff (10/27/2016); Type 2 superior 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 shoulder (10/27/2016); and Unspecified viral hepatitis C without hepatic coma (02/27/2015). She also has no past medical history of Difficult intubation; Malignant hyperthermia due to anesthesia; Nausea & vomiting; or Pseudocholinesterase deficiency. Ms. Katie Landis  has a past surgical history that includes lithotripsy (2005); bladder suspension (2006); carpal tunnel release (Right, 2001); cardiac surg procedure unlist; orthopaedic; orthopaedic (2011); orthopaedic (Left); and rotator cuff repair (Bilateral). Social History/Living Environment:   Home Environment: Private residence  # Steps to Enter: 4  One/Two Story Residence: Two story  Living Alone: No  Support Systems: Spouse/Significant Other/Partner, Friends \ neighbors  Patient Expects to be Discharged to[de-identified] Private residence  Current DME Used/Available at Home: None  Prior Level of Function/Work/Activity:  Lives at home with spouse, 8year old, and a roommate. Independent in home and community. Number of Personal Factors/Comorbidities that affect the Plan of Care: 1-2: MODERATE COMPLEXITY   EXAMINATION:   Most Recent Physical Functioning:   Gross Assessment:  AROM: Generally decreased, functional  Strength: Generally decreased, functional  Sensation: Impaired (LT sensation diminished distal UE's due to meds per pt)               Posture:  Posture (WDL): Exceptions to WDL  Posture Assessment:  Forward head, Rounded shoulders  Balance:  Sitting: Intact  Standing: Impaired  Standing - Static: Good  Standing - Dynamic : Fair Bed Mobility:  Rolling: Contact guard assistance  Supine to Sit: Contact guard assistance  Scooting: Contact guard assistance  Duration: 15 Minutes  Wheelchair Mobility:     Transfers:  Sit to Stand: Contact guard assistance  Stand to Sit: Contact guard assistance  Gait:     Base of Support: Center of gravity altered  Speed/Emily: Slow  Step Length: Left shortened;Right shortened  Distance (ft): 120 Feet (ft)  Assistive Device: Walker, rolling;Brace/Splint  Interventions: Verbal cues; Safety awareness training       Body Structures Involved:  1. Nerves  2. Bones  3. Joints Body Functions Affected:  1. Sensory/Pain  2. Neuromusculoskeletal  3. Movement Related Activities and Participation Affected:  1. Mobility  2. Self Care  3. Domestic Life   Number of elements that affect the Plan of Care: 4+: HIGH COMPLEXITY   CLINICAL PRESENTATION:   Presentation: Evolving clinical presentation with changing clinical characteristics: MODERATE COMPLEXITY   CLINICAL DECISION MAKIN [facility] Inpatient Short Form  How much difficulty does the patient currently have. .. Unable A Lot A Little None   1. Turning over in bed (including adjusting bedclothes, sheets and blankets)? [ ] 1   [ ] 2   [X] 3   [ ] 4   2. Sitting down on and standing up from a chair with arms ( e.g., wheelchair, bedside commode, etc.)   [ ] 1   [ ] 2   [X] 3   [ ] 4   3. Moving from lying on back to sitting on the side of the bed? [ ] 1   [ ] 2   [X] 3   [ ] 4   How much help from another person does the patient currently need. .. Total A Lot A Little None   4. Moving to and from a bed to a chair (including a wheelchair)? [ ] 1   [ ] 2   [X] 3   [ ] 4   5. Need to walk in hospital room? [ ] 1   [ ] 2   [X] 3   [ ] 4   6. Climbing 3-5 steps with a railing? [ ] 1   [ ] 2   [X] 3   [ ] 4   © , Trustees of [address] [place] [address] [address] [address], under license to [employer].  All rights reserved    Score:  Initial: 18 Most Recent: X (Date: -- )     Interpretation of Tool:  Represents activities that are increasingly more difficult (i.e. Bed mobility, Transfers, Gait). Score 24 23 22-20 19-15 14-10 9-7 6       Modifier CH CI CJ CK CL CM CN         · Mobility - Walking and Moving Around:               - CURRENT STATUS:    CK - 40%-59% impaired, limited or restricted               - GOAL STATUS:           CJ - 20%-39% impaired, limited or restricted               - D/C STATUS:                       ---------------To be determined---------------  Payor: AGUILAR / Plan: Watson Jaimes / Product Type: PPO /       Medical Necessity:     · Patient is expected to demonstrate progress in balance and functional technique to increase independence with   and improve safety during all functional mobility. Reason for Services/Other Comments:  · Patient continues to require skilled intervention due to medical complications and patient unable to attend/participate in therapy as expected. Use of outcome tool(s) and clinical judgement create a POC that gives a: Questionable prediction of patient's progress: MODERATE COMPLEXITY                 TREATMENT:   (In addition to Assessment/Re-Assessment sessions the following treatments were rendered)   Pre-treatment Symptoms/Complaints:  \"I can't do it. \"  Pain: Initial:   Pain Intensity 1: 8  Pain Location 1: Neck  Pain Intervention(s) 1: Ambulation/Increased Activity, Repositioned, Nurse notified  Post Session:  Pain noted with movement but not rated during treatment. Therapeutic Activity: (15 Minutes   ):  Therapeutic activities including Bed transfers, chair transfers, Ambulation on level ground and instruction in correct body mechanics with bed mobility to improve mobility, strength and balance. Required minimal Verbal cues; Safety awareness training to promote safe technique during mobility. Therapeutic Exercise: (  10 minutes):  Exercises per grid below to improve mobility, strength and balance. Required minimal visual and verbal cues to promote proper body alignment.   Progressed complexity of movement as indicated. DATE: 7/27/17       Ambulation        Hip Flexion x20 AB       Long Arc Quads x20 AB       Knee Squeezes        Ankle DF/PF        Fist pumps x20 AB                                Key:  A=active, AA=active assisted, P=passive, B=bilaterally, R=right, L=left   DF=dorsiflexion, PF=plantarflexion           Braces/Orthotics/Lines/Etc:   · IV  · monitor  · O2 Device: Room air  Treatment/Session Assessment:    · Response to Treatment:  Increased pain with movement. · Interdisciplinary Collaboration:  · Physical Therapist and Registered Nurse  · After treatment position/precautions:  · Up in chair, Bed/Chair-wheels locked, Call light within reach, RN notified and Family at bedside  · Compliance with Program/Exercises: compliant all of the time. · Recommendations/Intent for next treatment session: \"Next visit will focus on advancements to more challenging activities and reduction in assistance provided\".   Total Treatment Duration:PT Patient Time In/Time Out  Time In: 1100  Time Out: [address], PT

## 2017-07-27 NOTE — PROGRESS NOTES
PT Note:  Attempted PT. Per RN patient crying and with severe pain-just received medication. Will hold and attempt another time/day as schedule permits.   A Erasmo Heart, PT

## 2017-07-27 NOTE — PROGRESS NOTES
Straight cath performed sterile technique, 750 ml clear yellow urine drained.  Urine specimen sent to lab for U/A.

## 2017-07-27 NOTE — PROGRESS NOTES
Patient refusing shower, is in pain and tearful. Called Dr. Logan Bence concerning patient request to start back percocet as previous ordered. Orders received to discontinue Roxicodone and restart Percocet 10 mg 1-2 tabs every 4 PRN.

## 2017-07-27 NOTE — PROGRESS NOTES
NIR drain removed. Incision and drain site covered with 4x4 and tape. Cervical collar reapplied. Patient tolerated.

## 2017-07-27 NOTE — PROGRESS NOTES
Pt doing well but feels her bladder is not emptying well. Eating, beginning to mobilize. AFVSS  Neuro intact  Wounds OK  NIR 60cc/24 hours    Stable, remove NIR and check post void residual.  Cont to mobilize.

## 2017-07-27 NOTE — PROGRESS NOTES
Patient in extreme pain 10-10 crying. Paged Dr. Juarez Ladd , Order received for Ativan 1 mg po x 1 dose.

## 2017-07-27 NOTE — PROGRESS NOTES
Verbal & Bedside shift change report given to Merritt Mays (oncoming nurse) by William Moore (offgoing nurse). Report given with SBAR, Kardex, Intake/Output, MAR and Recent Results.

## 2017-07-28 VITALS
TEMPERATURE: 97.8 F | BODY MASS INDEX: 23.6 KG/M2 | OXYGEN SATURATION: 96 % | SYSTOLIC BLOOD PRESSURE: 135 MMHG | DIASTOLIC BLOOD PRESSURE: 86 MMHG | WEIGHT: 125 LBS | HEART RATE: 82 BPM | RESPIRATION RATE: 14 BRPM | HEIGHT: 61 IN

## 2017-07-28 PROBLEM — R33.8 POSTOPERATIVE URINARY RETENTION: Status: ACTIVE | Noted: 2017-07-28

## 2017-07-28 PROBLEM — N99.89 POSTOPERATIVE URINARY RETENTION: Status: ACTIVE | Noted: 2017-07-28

## 2017-07-28 LAB
CREAT SERPL-MCNC: 0.46 MG/DL (ref 0.6–1)
PLATELET # BLD AUTO: 225 K/UL (ref 150–450)

## 2017-07-28 PROCEDURE — 74011250637 HC RX REV CODE- 250/637: Performed by: NEUROLOGICAL SURGERY

## 2017-07-28 PROCEDURE — 36415 COLL VENOUS BLD VENIPUNCTURE: CPT | Performed by: NEUROLOGICAL SURGERY

## 2017-07-28 PROCEDURE — 97530 THERAPEUTIC ACTIVITIES: CPT

## 2017-07-28 PROCEDURE — 82565 ASSAY OF CREATININE: CPT | Performed by: NEUROLOGICAL SURGERY

## 2017-07-28 PROCEDURE — 85049 AUTOMATED PLATELET COUNT: CPT | Performed by: NEUROLOGICAL SURGERY

## 2017-07-28 PROCEDURE — 74011250636 HC RX REV CODE- 250/636: Performed by: NEUROLOGICAL SURGERY

## 2017-07-28 RX ORDER — OXYCODONE AND ACETAMINOPHEN 10; 325 MG/1; MG/1
TABLET ORAL
Qty: 120 TAB | Refills: 0 | Status: SHIPPED | OUTPATIENT
Start: 2017-07-28 | End: 2017-12-11

## 2017-07-28 RX ORDER — TAMSULOSIN HYDROCHLORIDE 0.4 MG/1
0.4 CAPSULE ORAL DAILY
Qty: 14 CAP | Refills: 0 | Status: SHIPPED | OUTPATIENT
Start: 2017-07-28 | End: 2017-12-11

## 2017-07-28 RX ADMIN — STANDARDIZED SENNA CONCENTRATE AND DOCUSATE SODIUM 2 TABLET: 8.6; 5 TABLET, FILM COATED ORAL at 08:55

## 2017-07-28 RX ADMIN — METHOCARBAMOL 1500 MG: 750 TABLET ORAL at 04:35

## 2017-07-28 RX ADMIN — DIAZEPAM 5 MG: 5 TABLET ORAL at 04:35

## 2017-07-28 RX ADMIN — TAMSULOSIN HYDROCHLORIDE 0.4 MG: 0.4 CAPSULE ORAL at 08:55

## 2017-07-28 RX ADMIN — OXYCODONE HYDROCHLORIDE AND ACETAMINOPHEN 2 TABLET: 10; 325 TABLET ORAL at 04:35

## 2017-07-28 RX ADMIN — BENAZEPRIL HYDROCHLORIDE: 10 TABLET, FILM COATED ORAL at 08:55

## 2017-07-28 RX ADMIN — PANTOPRAZOLE SODIUM 40 MG: 40 TABLET, DELAYED RELEASE ORAL at 04:35

## 2017-07-28 RX ADMIN — HEPARIN SODIUM 5000 UNITS: 5000 INJECTION, SOLUTION INTRAVENOUS; SUBCUTANEOUS at 08:57

## 2017-07-28 RX ADMIN — CARISOPRODOL 350 MG: 350 TABLET ORAL at 08:56

## 2017-07-28 RX ADMIN — GABAPENTIN 800 MG: 400 CAPSULE ORAL at 04:35

## 2017-07-28 RX ADMIN — ESCITALOPRAM OXALATE 20 MG: 10 TABLET ORAL at 08:55

## 2017-07-28 RX ADMIN — OXYCODONE HYDROCHLORIDE AND ACETAMINOPHEN 2 TABLET: 10; 325 TABLET ORAL at 08:56

## 2017-07-28 RX ADMIN — OXYCODONE HYDROCHLORIDE AND ACETAMINOPHEN 2 TABLET: 10; 325 TABLET ORAL at 01:02

## 2017-07-28 NOTE — PROGRESS NOTES
Patient's  approached nursing station to request Dr. Juarez Ladd provide a RX for [employer] stating, Hyacinth Killian is out and we don't have time to get her in to see her other doctor. \" Explained to him that it would be best to get medication from original prescriber. Spouse and patient persistent that Dr. Juarez Ladd be contacted to request prescription. Dr. Juarez Ladd paged through office.

## 2017-07-28 NOTE — DISCHARGE SUMMARY
Discharge Summary     Patient ID:  Oz Lanza  [id number]   62 y.o.  1960    Admit date: 7/25/2017    Discharge Date: 7/28/2017      Admitting Physician: Audra Grigsby MD     Discharge Physician: Audra Grigsby MD    Admission Diagnoses: Type II odontoid fracture with delayed healing    Last Procedure: Procedure(s):  C1-2 POSTERIOR FUSION WITH VERTEX INSTRUMENTATION AND ILIAC CREST AUTOGRAFT    Discharge Diagnoses: Principal Problem:    Odontoid fracture with type II morphology and delayed healing (7/26/2017)    Active Problems:    Essential hypertension, benign (2/27/2015)      GERD (gastroesophageal reflux disease) (2/27/2015)     Postop urinary retention    Consults: None    Significant Diagnostic Studies: None     Hospital Course:   Hospital course was complicated by postop urinary retention, which resolved with Flomax and I&O cath. Disposition: Home    Patient Instructions:   Current Discharge Medication List      START taking these medications    Details   oxyCODONE-acetaminophen (PERCOCET 10)  mg per tablet 1-2 tabs po q4-6h prn pain  Qty: 120 Tab, Refills: 0      tamsulosin (FLOMAX) 0.4 mg capsule Take 1 Cap by mouth daily. Qty: 14 Cap, Refills: 0         CONTINUE these medications which have NOT CHANGED    Details   calcium-cholecalciferol, d3, 600-125 mg-unit tab Take  by mouth. cholecalciferol, VITAMIN D3, (VITAMIN D3) 5,000 unit tab tablet Take 5,000 Units by mouth daily. dextroamphetamine-amphetamine (ADDERALL) 30 mg tablet Take 30 mg by mouth dailyIndications: ATTENTION-DEFICIT HYPERACTIVITY DISORDER.      gabapentin (NEURONTIN) 800 mg tablet Take  by mouth three (3) times daily. DENAVIR 1 % topical cream APPLY TOPICALLY TO THE AFFECTED AREA EVERY 2 HOURS  Qty: 5 g, Refills: PRN      denosumab (PROLIA) 60 mg/mL injection 60 mg by SubCUTAneous route.  Twice a year      methocarbamol (ROBAXIN) 750 mg tablet TAKE 2 TABLETS BY MOUTH FOUR TIMES DAILY AS NEEDED  Qty: 250 Tab, Refills: 1      carisoprodol (SOMA) 350 mg tablet Take 1 Tab by mouth every eight (8) hours as needed for Muscle Spasm(s). Max Daily Amount: 1,050 mg.  Qty: 60 Tab, Refills: 0      temazepam (RESTORIL) 30 mg capsule TAKE 1 CAPSULE BY MOUTH EVERY DAY AT BEDTIME  Qty: 30 Cap, Refills: 3      escitalopram oxalate (LEXAPRO) 20 mg tablet TAKE 1 TABLET BY MOUTH EVERY DAY  Qty: 30 Tab, Refills: 11      amLODIPine-benazepril (LOTREL) 5-40 mg per capsule TAKE 1 CAPSULE BY MOUTH EVERY DAY  Qty: 30 Cap, Refills: 11      omeprazole (PRILOSEC) 20 mg capsule Take 20 mg by mouth daily. Patient instructed to take morning of surgery per anesthesia guidelines      promethazine (PHENERGAN) 25 mg tablet 1/2-1 po tid prn nausea  Qty: 30 Tab, Refills: 0             Diet: Reference my discharge instructions. Activity: Reference my discharge instructions. Follow-up Appointments   Procedures    FOLLOW UP VISIT Appointment in: One Week My office for wound check     My office for wound check     Standing Status:   Standing     Number of Occurrences:   1     Order Specific Question:   Appointment in     Answer: One Week        Total time discharging patient took greater than 30 minutes.     Signed:  lEina Patel MD  July 28, 2017  8:38 AM

## 2017-07-28 NOTE — PROGRESS NOTES
Left voice message for Dr. Kimberlyn Engle regarding patient's request for [facility] prescription.

## 2017-07-28 NOTE — DISCHARGE INSTRUCTIONS
DISCHARGE SUMMARY from Nurse    The following personal items are in your possession at time of discharge:    Dental Appliances: None (crowns)  Visual Aid: Glasses     Home Medications: None  Jewelry: None  Clothing: Shirt, Footwear, Shorts, Undergarments, Socks  Other Valuables: Cell Phone             PATIENT INSTRUCTIONS:    After general anesthesia or intravenous sedation, for 24 hours or while taking prescription Narcotics:  · Limit your activities  · Do not drive and operate hazardous machinery  · Do not make important personal or business decisions  · Do  not drink alcoholic beverages  · If you have not urinated within 8 hours after discharge, please contact your surgeon on call. Report the following to your surgeon:  · Excessive pain, swelling, redness or odor of or around the surgical area  · Temperature over 100.5  · Nausea and vomiting lasting longer than 4 hours or if unable to take medications  · Any signs of decreased circulation or nerve impairment to extremity: change in color, persistent  numbness, tingling, coldness or increase pain  · Any questions    *  Please give a list of your current medications to your Primary Care Provider. *  Please update this list whenever your medications are discontinued, doses are      changed, or new medications (including over-the-counter products) are added. *  Please carry medication information at all times in case of emergency situations. These are general instructions for a healthy lifestyle:    No smoking/ No tobacco products/ Avoid exposure to second hand smoke    Surgeon General's Warning:  Quitting smoking now greatly reduces serious risk to your health.     Obesity, smoking, and sedentary lifestyle greatly increases your risk for illness    A healthy diet, regular physical exercise & weight monitoring are important for maintaining a healthy lifestyle    You may be retaining fluid if you have a history of heart failure or if you experience any of the following symptoms:  Weight gain of 3 pounds or more overnight or 5 pounds in a week, increased swelling in our hands or feet or shortness of breath while lying flat in bed. Please call your doctor as soon as you notice any of these symptoms; do not wait until your next office visit. Recognize signs and symptoms of STROKE:    F-face looks uneven    A-arms unable to move or move unevenly    S-speech slurred or non-existent    T-time-call 911 as soon as signs and symptoms begin-DO NOT go       Back to bed or wait to see if you get better-TIME IS BRAIN. Warning Signs of HEART ATTACK     Call 911 if you have these symptoms:   Chest discomfort. Most heart attacks involve discomfort in the center of the chest that lasts more than a few minutes, or that goes away and comes back. It can feel like uncomfortable pressure, squeezing, fullness, or pain.  Discomfort in other areas of the upper body. Symptoms can include pain or discomfort in one or both arms, the back, neck, jaw, or stomach.  Shortness of breath with or without chest discomfort.  Other signs may include breaking out in a cold sweat, nausea, or lightheadedness. Don't wait more than five minutes to call 911 - MINUTES MATTER! Fast action can save your life. Calling 911 is almost always the fastest way to get lifesaving treatment. Emergency Medical Services staff can begin treatment when they arrive -- up to an hour sooner than if someone gets to the hospital by car. The discharge information has been reviewed with the patient and spouse. The patient and spouse verbalized understanding. Discharge medications reviewed with the patient and spouse and appropriate educational materials and side effects teaching were provided.

## 2017-07-28 NOTE — PROGRESS NOTES
Pt doing well, no complaints. Eating, now voiding well. Ambulating.     AFVSS  Neuro intact  Wounds OK    Stable, instructed for D/C

## 2017-07-28 NOTE — PROGRESS NOTES
Problem: Mobility Impaired (Adult and Pediatric)  Goal: *Acute Goals and Plan of Care (Insert Text)  Long Term Goals:  1. Ms. Salina Gallegos will move from supine to sit and sit to supine in flat bed via log roll with INDEPENDENT within 7 day(s). 2. Ms. Salina Gallegos will transfer from sit to stand and stand to sit with MODIFIED INDEPENDENCE within 7 days. 3. Ms. Salina Gallegos will ambulate with INDEPENDENT for 250+ feet with the least restrictive/no device within 7 day(s). 4. Ms. Salina Gallegos will verbalize 3/3 spinal precautions to ensure neck safety during functional activities within 7 days. ________________________________________________________________________      PHYSICAL THERAPY: Daily Note, Treatment Day: 1st and AM 7/28/2017  INPATIENT: Hospital Day: 4  Payor: Brenda Max / Plan: [address] [place] [address] / Product Type: PPO /   Eward Hinton Collar  Spine Precautions  Straight chair   NAME/AGE/GENDER: Edmundo Vaz is a 62 y.o. female        PRIMARY DIAGNOSIS: Cervical compression fracture, [facility] [P36.09ST] Odontoid fracture with type II morphology and delayed healing Odontoid fracture with type II morphology and delayed healing  Procedure(s) (LRB):  C 1-2 POSTERIOR FUSION WITH VERTEX INSTRUMENTATION  (N/A)  3 Days Post-Op  ICD-10: Treatment Diagnosis:       · Other abnormalities of gait and mobility (R26.89)   Precaution/Allergies:  Benadryl [diphenhydramine hcl]; Demerol [meperidine]; Dilaudid [hydromorphone]; Lortab [hydrocodone-acetaminophen]; and Morphine       ASSESSMENT:      Ms. Salina Gallegos presents supine in bed. She is agreeable to treatment today. She got to edge of bed with log rolling with Supervision. She increased her ambulation distance this treatment with no assistive device. She ascended/descended 3 steps with SBA. Overall good progress with ambulation and mobility this treatment. Will continue PT efforts.         This section established at most recent assessment   PROBLEM LIST (Impairments causing functional limitations):  1. Decreased ADL/Functional Activities  2. Decreased Transfer Abilities  3. Decreased Ambulation Ability/Technique  4. Increased Pain  5. Decreased Activity Tolerance  6. Decreased Knowledge of Precautions    INTERVENTIONS PLANNED: (Benefits and precautions of physical therapy have been discussed with the patient.)  1. Balance Exercise  2. Bed Mobility  3. Gait Training  4. Home Exercise Program (HEP)  5. Therapeutic Activites  6. Therapeutic Exercise/Strengthening  7. Transfer Training  8. education  9. Group Therapy      TREATMENT PLAN: Frequency/Duration: twice daily for duration of hospital stay  Rehabilitation Potential For Stated Goals: GOOD      RECOMMENDED REHABILITATION/EQUIPMENT: (at time of discharge pending progress): Continue Skilled Therapy and maybe outpatient PT? ?? when okay with NS.  OT consult. HISTORY:   History of Present Injury/Illness (Reason for Referral):  Patient states that she fell out of bed in Feb '17 suffering odontoid fracture. It did not heal, so underwent neurosurgery yesterday. Past Medical History/Comorbidities:   Ms. Katie Landis  has a past medical history of 3-part fracture of surgical neck of left humerus (10/27/2016); ADHD (attention deficit hyperactivity disorder); Arthritis; Carpal tunnel syndrome (11/11/2015); Closed fracture of anatomical neck of humerus (6/16/2015); Degeneration of lumbar or lumbosacral intervertebral disc (2/27/2015); Depressive disorder, not elsewhere classified (2/27/2015); Dermatophytosis of the body (2/27/2015); Essential hypertension, benign (2/27/2015); GERD (gastroesophageal reflux disease) (2/27/2015); Heart murmur; History of MRSA infection; Hypopotassemia (2/27/2015); Insomnia, unspecified (2/27/2015); Kidney stone; Kidney stones; Liver disease; Migraine, unspecified, without mention of intractable migraine without mention of status migrainosus (2/27/2015);  Odontoid fracture with type II morphology ([facility] Osteoporosis (1/23/2017); Other closed fractures of upper end of humerus (6/16/2015); Polycythemia, secondary (2/27/2015); Traumatic tear of left rotator cuff (10/27/2016); Type 2 superior 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 shoulder (10/27/2016); and Unspecified viral hepatitis C without hepatic coma (02/27/2015). She also has no past medical history of Difficult intubation; Malignant hyperthermia due to anesthesia; Nausea & vomiting; or Pseudocholinesterase deficiency. Ms. Alanna Shell  has a past surgical history that includes lithotripsy (2005); bladder suspension (2006); carpal tunnel release (Right, 2001); cardiac surg procedure unlist; orthopaedic; orthopaedic (2011); orthopaedic (Left); and rotator cuff repair (Bilateral). Social History/Living Environment:   Home Environment: Private residence  # Steps to Enter: 4  One/Two Story Residence: Two story  Living Alone: No  Support Systems: Spouse/Significant Other/Partner, Friends \ neighbors  Patient Expects to be Discharged to[de-identified] Private residence  Current DME Used/Available at Home: None  Prior Level of Function/Work/Activity:  Lives at home with spouse, 8year old, and a roommate. Independent in home and community. Number of Personal Factors/Comorbidities that affect the Plan of Care: 1-2: MODERATE COMPLEXITY   EXAMINATION:   Most Recent Physical Functioning:   Gross Assessment:                  Posture:  Posture (WDL): Exceptions to WDL  Posture Assessment:  Forward head, Rounded shoulders  Balance:  Sitting: Intact  Standing: Impaired  Standing - Static: Good  Standing - Dynamic : Fair Bed Mobility:  Rolling: Supervision  Supine to Sit: Supervision  Scooting: Supervision  Wheelchair Mobility:     Transfers:  Sit to Stand: Contact guard assistance  Stand to Sit: Supervision  Gait:     Base of Support: Narrowed  Speed/Emily: Pace decreased (<100 feet/min)  Step Length: Left shortened;Right shortened  Distance (ft): 250 Feet (ft)  Assistive Device: Brace/Splint  Interventions: Safety awareness training;Verbal cues       Body Structures Involved:  1. Nerves  2. Bones  3. Joints Body Functions Affected:  1. Sensory/Pain  2. Neuromusculoskeletal  3. Movement Related Activities and Participation Affected:  1. Mobility  2. Self Care  3. Domestic Life   Number of elements that affect the Plan of Care: 4+: HIGH COMPLEXITY   CLINICAL PRESENTATION:   Presentation: Evolving clinical presentation with changing clinical characteristics: MODERATE COMPLEXITY   CLINICAL DECISION MAKIN [facility] Inpatient Short Form  How much difficulty does the patient currently have. .. Unable A Lot A Little None   1. Turning over in bed (including adjusting bedclothes, sheets and blankets)? [ ] 1   [ ] 2   [X] 3   [ ] 4   2. Sitting down on and standing up from a chair with arms ( e.g., wheelchair, bedside commode, etc.)   [ ] 1   [ ] 2   [X] 3   [ ] 4   3. Moving from lying on back to sitting on the side of the bed? [ ] 1   [ ] 2   [X] 3   [ ] 4   How much help from another person does the patient currently need. .. Total A Lot A Little None   4. Moving to and from a bed to a chair (including a wheelchair)? [ ] 1   [ ] 2   [X] 3   [ ] 4   5. Need to walk in hospital room? [ ] 1   [ ] 2   [X] 3   [ ] 4   6. Climbing 3-5 steps with a railing? [ ] 1   [ ] 2   [X] 3   [ ] 4   © , Trustees of [address] [place], [place] [address], under license to [employer]. All rights reserved    Score:  Initial: 18 Most Recent: X (Date: -- )     Interpretation of Tool:  Represents activities that are increasingly more difficult (i.e. Bed mobility, Transfers, Gait).        Score 24 23 22-20 19-15 14-10 9-7 6       Modifier CH CI CJ CK CL CM CN         · Mobility - Walking and Moving Around:               - CURRENT STATUS:    CK - 40%-59% impaired, limited or restricted               - GOAL STATUS:           CJ - 20%-39% impaired, limited or restricted               - D/C STATUS:                       ---------------To be determined---------------  Payor: AGUILAR / Plan: Carlota Tom / Product Type: PPO /       Medical Necessity:     · Patient is expected to demonstrate progress in balance and functional technique to increase independence with   and improve safety during all functional mobility. Reason for Services/Other Comments:  · Patient continues to require skilled intervention due to medical complications and patient unable to attend/participate in therapy as expected. Use of outcome tool(s) and clinical judgement create a POC that gives a: Questionable prediction of patient's progress: MODERATE COMPLEXITY                 TREATMENT:   (In addition to Assessment/Re-Assessment sessions the following treatments were rendered)   Pre-treatment Symptoms/Complaints:  \"I am ready to go home. \"  Pain: Initial:   Pain Intensity 1:  (some pain noted with movement but not rated)  Post Session:  Pain noted with movement but not rated during treatment. Therapeutic Activity: (    15 Minutes): Therapeutic activities including Bed transfers, Ambulation on level ground and instruction in correct body mechanics with bed mobility to improve mobility, strength and balance. Required minimal Safety awareness training;Verbal cues to promote safe technique during mobility. Therapeutic Exercise: (   minutes):  Exercises per grid below to improve mobility, strength and balance. Required minimal visual and verbal cues to promote proper body alignment. Progressed complexity of movement as indicated.     DATE: 7/27/17       Ambulation        Hip Flexion x20 AB       Long Arc Quads x20 AB       Knee Squeezes        Ankle DF/PF        Fist pumps x20 AB                                Key:  A=active, AA=active assisted, P=passive, B=bilaterally, R=right, L=left   DF=dorsiflexion, PF=plantarflexion Braces/Orthotics/Lines/Etc:   · None  Treatment/Session Assessment:    · Response to Treatment:  Increased pain with movement. · Interdisciplinary Collaboration:  · Physical Therapy Assistant and Registered Nurse  · After treatment position/precautions:  · Bed/Chair-wheels locked, Call light within reach, RN notified, Family at bedside and Sitting at edge of bed  · Compliance with Program/Exercises: compliant all of the time. · Recommendations/Intent for next treatment session: \"Next visit will focus on advancements to more challenging activities and reduction in assistance provided\".   Total Treatment Duration:PT Patient Time In/Time Out  Time In: 1005  Time Out: 1001 [facility]

## 2017-07-28 NOTE — PROGRESS NOTES
Pt woke up crying after ambulating to bathroom and back to bed. Meds given for pain and anxiety prn. Neurontin given early. Emotional support provided and pt repositioned for comfort in bed. HOB elevated. Call light in reach. Will monitor.

## 2017-07-28 NOTE — PROGRESS NOTES
Patient needs to get [facility] prescription from prescribing doctor per Dr. Haley Singh. Patient and  informed. Discharge instructions, prescriptions, and follow up appointments reviewed and given to patient. All personal belongings taken with patient. Patient escorted to discharge area via wheelchair. Family member will drive patient home. Patient is stable at discharge. Patient voices understanding of discharge teaching.

## 2017-08-09 ENCOUNTER — HOSPITAL ENCOUNTER (EMERGENCY)
Age: 57
Discharge: HOME | End: 2017-08-09
Attending: STUDENT IN AN ORGANIZED HEALTH CARE EDUCATION/TRAINING PROGRAM
Payer: COMMERCIAL

## 2017-08-09 ENCOUNTER — APPOINTMENT (OUTPATIENT)
Dept: GENERAL RADIOLOGY | Age: 57
End: 2017-08-09
Attending: STUDENT IN AN ORGANIZED HEALTH CARE EDUCATION/TRAINING PROGRAM
Payer: COMMERCIAL

## 2017-08-09 ENCOUNTER — APPOINTMENT (OUTPATIENT)
Dept: CT IMAGING | Age: 57
End: 2017-08-09
Attending: STUDENT IN AN ORGANIZED HEALTH CARE EDUCATION/TRAINING PROGRAM
Payer: COMMERCIAL

## 2017-08-09 VITALS
DIASTOLIC BLOOD PRESSURE: 64 MMHG | TEMPERATURE: 98.2 F | BODY MASS INDEX: 23.79 KG/M2 | HEART RATE: 65 BPM | HEIGHT: 61 IN | WEIGHT: 126 LBS | SYSTOLIC BLOOD PRESSURE: 172 MMHG | RESPIRATION RATE: 17 BRPM | OXYGEN SATURATION: 98 %

## 2017-08-09 DIAGNOSIS — T50.901A DRUG OVERDOSE, ACCIDENTAL OR UNINTENTIONAL, INITIAL ENCOUNTER: ICD-10-CM

## 2017-08-09 DIAGNOSIS — R41.82 ALTERED MENTAL STATUS, UNSPECIFIED ALTERED MENTAL STATUS TYPE: Primary | ICD-10-CM

## 2017-08-09 DIAGNOSIS — F11.20 NARCOTIC DEPENDENCE (HCC): ICD-10-CM

## 2017-08-09 DIAGNOSIS — S09.90XA CHI (CLOSED HEAD INJURY), INITIAL ENCOUNTER: ICD-10-CM

## 2017-08-09 LAB
ALBUMIN SERPL BCP-MCNC: 3.2 G/DL (ref 3.5–5)
ALBUMIN/GLOB SERPL: 0.8 {RATIO} (ref 1.2–3.5)
ALP SERPL-CCNC: 103 U/L (ref 50–136)
ALT SERPL-CCNC: 17 U/L (ref 12–65)
AMPHET UR QL SCN: POSITIVE
ANION GAP BLD CALC-SCNC: 10 MMOL/L (ref 7–16)
APAP SERPL-MCNC: <10 UG/ML (ref 10–30)
APPEARANCE UR: CLEAR
AST SERPL W P-5'-P-CCNC: 26 U/L (ref 15–37)
ATRIAL RATE: 65 BPM
BARBITURATES UR QL SCN: NEGATIVE
BASOPHILS # BLD AUTO: 0 K/UL (ref 0–0.2)
BASOPHILS # BLD: 0 % (ref 0–2)
BENZODIAZ UR QL: NEGATIVE
BILIRUB SERPL-MCNC: 1.1 MG/DL (ref 0.2–1.1)
BILIRUB UR QL: NEGATIVE
BUN SERPL-MCNC: 9 MG/DL (ref 6–23)
CALCIUM SERPL-MCNC: 8.7 MG/DL (ref 8.3–10.4)
CALCULATED P AXIS, ECG09: 57 DEGREES
CALCULATED R AXIS, ECG10: 47 DEGREES
CALCULATED T AXIS, ECG11: 71 DEGREES
CANNABINOIDS UR QL SCN: NEGATIVE
CHLORIDE SERPL-SCNC: 109 MMOL/L (ref 98–107)
CO2 SERPL-SCNC: 22 MMOL/L (ref 21–32)
COCAINE UR QL SCN: NEGATIVE
COLOR UR: YELLOW
CREAT SERPL-MCNC: 0.52 MG/DL (ref 0.6–1)
DIAGNOSIS, 93000: NORMAL
DIFFERENTIAL METHOD BLD: ABNORMAL
EOSINOPHIL # BLD: 0.1 K/UL (ref 0–0.8)
EOSINOPHIL NFR BLD: 1 % (ref 0.5–7.8)
ERYTHROCYTE [DISTWIDTH] IN BLOOD BY AUTOMATED COUNT: 14.5 % (ref 11.9–14.6)
ETHANOL SERPL-MCNC: <3 MG/DL
GLOBULIN SER CALC-MCNC: 4.1 G/DL (ref 2.3–3.5)
GLUCOSE SERPL-MCNC: 152 MG/DL (ref 65–100)
GLUCOSE UR STRIP.AUTO-MCNC: NEGATIVE MG/DL
HCT VFR BLD AUTO: 37.5 % (ref 35.8–46.3)
HGB BLD-MCNC: 12.7 G/DL (ref 11.7–15.4)
HGB UR QL STRIP: NEGATIVE
IMM GRANULOCYTES # BLD: 0.1 K/UL (ref 0–0.5)
IMM GRANULOCYTES NFR BLD AUTO: 0.5 % (ref 0–5)
KETONES UR QL STRIP.AUTO: NEGATIVE MG/DL
LACTATE BLD-SCNC: 1.5 MMOL/L (ref 0.5–1.9)
LACTATE BLD-SCNC: 1.6 MMOL/L (ref 0.5–1.9)
LEUKOCYTE ESTERASE UR QL STRIP.AUTO: NEGATIVE
LYMPHOCYTES # BLD AUTO: 24 % (ref 13–44)
LYMPHOCYTES # BLD: 2.3 K/UL (ref 0.5–4.6)
MAGNESIUM SERPL-MCNC: 1.7 MG/DL (ref 1.8–2.4)
MCH RBC QN AUTO: 30.9 PG (ref 26.1–32.9)
MCHC RBC AUTO-ENTMCNC: 33.9 G/DL (ref 31.4–35)
MCV RBC AUTO: 91.2 FL (ref 79.6–97.8)
METHADONE UR QL: NEGATIVE
MONOCYTES # BLD: 0.7 K/UL (ref 0.1–1.3)
MONOCYTES NFR BLD AUTO: 7 % (ref 4–12)
NEUTS SEG # BLD: 6.6 K/UL (ref 1.7–8.2)
NEUTS SEG NFR BLD AUTO: 68 % (ref 43–78)
NITRITE UR QL STRIP.AUTO: NEGATIVE
OPIATES UR QL: NEGATIVE
P-R INTERVAL, ECG05: 214 MS
PCP UR QL: NEGATIVE
PH UR STRIP: 5.5 [PH] (ref 5–9)
PLATELET # BLD AUTO: 382 K/UL (ref 150–450)
PMV BLD AUTO: 8.8 FL (ref 10.8–14.1)
POTASSIUM SERPL-SCNC: 3.7 MMOL/L (ref 3.5–5.1)
PROT SERPL-MCNC: 7.3 G/DL (ref 6.3–8.2)
PROT UR STRIP-MCNC: NEGATIVE MG/DL
Q-T INTERVAL, ECG07: 414 MS
QRS DURATION, ECG06: 96 MS
QTC CALCULATION (BEZET), ECG08: 430 MS
RBC # BLD AUTO: 4.11 M/UL (ref 4.05–5.25)
SALICYLATES SERPL-MCNC: <1.7 MG/DL (ref 2.8–20)
SODIUM SERPL-SCNC: 141 MMOL/L (ref 136–145)
SP GR UR REFRACTOMETRY: 1.02 (ref 1–1.02)
UROBILINOGEN UR QL STRIP.AUTO: 0.2 EU/DL (ref 0.2–1)
VENTRICULAR RATE, ECG03: 65 BPM
WBC # BLD AUTO: 9.8 K/UL (ref 4.3–11.1)

## 2017-08-09 PROCEDURE — 70450 CT HEAD/BRAIN W/O DYE: CPT

## 2017-08-09 PROCEDURE — 80307 DRUG TEST PRSMV CHEM ANLYZR: CPT | Performed by: STUDENT IN AN ORGANIZED HEALTH CARE EDUCATION/TRAINING PROGRAM

## 2017-08-09 PROCEDURE — 99285 EMERGENCY DEPT VISIT HI MDM: CPT | Performed by: STUDENT IN AN ORGANIZED HEALTH CARE EDUCATION/TRAINING PROGRAM

## 2017-08-09 PROCEDURE — 83605 ASSAY OF LACTIC ACID: CPT

## 2017-08-09 PROCEDURE — 93005 ELECTROCARDIOGRAM TRACING: CPT | Performed by: STUDENT IN AN ORGANIZED HEALTH CARE EDUCATION/TRAINING PROGRAM

## 2017-08-09 PROCEDURE — 85025 COMPLETE CBC W/AUTO DIFF WBC: CPT | Performed by: STUDENT IN AN ORGANIZED HEALTH CARE EDUCATION/TRAINING PROGRAM

## 2017-08-09 PROCEDURE — 81003 URINALYSIS AUTO W/O SCOPE: CPT | Performed by: STUDENT IN AN ORGANIZED HEALTH CARE EDUCATION/TRAINING PROGRAM

## 2017-08-09 PROCEDURE — 72125 CT NECK SPINE W/O DYE: CPT

## 2017-08-09 PROCEDURE — 71010 XR CHEST PORT: CPT

## 2017-08-09 PROCEDURE — 80053 COMPREHEN METABOLIC PANEL: CPT | Performed by: STUDENT IN AN ORGANIZED HEALTH CARE EDUCATION/TRAINING PROGRAM

## 2017-08-09 PROCEDURE — 83735 ASSAY OF MAGNESIUM: CPT | Performed by: STUDENT IN AN ORGANIZED HEALTH CARE EDUCATION/TRAINING PROGRAM

## 2017-08-09 NOTE — ED TRIAGE NOTES
EMS states \"Patient has had some recent surgeries and family states she started acting altered today.   She has done some over medication per the family before\"

## 2017-08-09 NOTE — ED PROVIDER NOTES
HPI Comments: 59-year-old female patient brought in by EMS with reports of altered mentation. Patient arrives with her spouse who reports recent cervical spine surgery for which patient is taking numerous medications for pain including Soma, lorazepam, Percocet. Significant other states patient was acting normally earlier this evening when she asked for a pain pill. Patient apparently took one Percocet per spouse at bedside prior to the onset of her altered mentation. Spouse states patient attempted to get out of bed and fell from a standing position. Unsure of head strike but denies loss of consciousness. History of seen similar effects from medication use in the past per significant other report. Patient arrives visibly altered and unable to provide any meaningful response to questioning. Patient is a 62 y.o. female presenting with altered mental status. The history is provided by the spouse and the patient. The history is limited by the condition of the patient. No  was used. Altered mental status    This is a new problem. The current episode started 3 to 5 hours ago. The problem has not changed since onset. Associated symptoms include confusion, somnolence and weakness. Pertinent negatives include no seizures, no unresponsiveness, no agitation, no delusions, no hallucinations, no self-injury, no violence, no tingling and no numbness. Mental status baseline is normal.  Risk factors include the patient not taking meds correctly.         Past Medical History:   Diagnosis Date    3-part fracture of surgical neck of left humerus 10/27/2016    ADHD (attention deficit hyperactivity disorder)     Arthritis     Carpal tunnel syndrome 11/11/2015    Closed fracture of anatomical neck of humerus 6/16/2015    Degeneration of lumbar or lumbosacral intervertebral disc 2/27/2015    Depressive disorder, not elsewhere classified 2/27/2015    Dermatophytosis of the body 2/27/2015    Essential hypertension, benign 2/27/2015    managed with med    GERD (gastroesophageal reflux disease) 2/27/2015    managed with med    Heart murmur     pt reports since birth; echo dated 9- states trace aortic and mitral regurg    History of MRSA infection     Hypopotassemia 2/27/2015    Insomnia, unspecified 2/27/2015    Kidney stone     Kidney stones     Liver disease     Hepatits C    Migraine, unspecified, without mention of intractable migraine without mention of status migrainosus 2/27/2015    Odontoid fracture with type II morphology ([facility]     Osteoporosis 1/23/2017    Other closed fractures of upper end of humerus 6/16/2015    Polycythemia, secondary 2/27/2015    Traumatic tear of left rotator cuff 10/27/2016    Type 2 superior 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 shoulder 10/27/2016    Unspecified viral hepatitis C without hepatic coma 02/27/2015    pt reports hx only; normal LFTs noted labs June, 2016       Past Surgical History:   Procedure Laterality Date    CARDIAC SURG PROCEDURE UNLIST      heart cath wnl, no stent placed    HX BLADDER SUSPENSION  2006    bladder tack    HX CARPAL TUNNEL RELEASE Right 2001    then left    HX LITHOTRIPSY  2005    HX ORTHOPAEDIC      Slap repair right    HX ORTHOPAEDIC  2011    hip repair, left    HX ORTHOPAEDIC Left     Hand surgery    HX ROTATOR CUFF REPAIR Bilateral          Family History:   Problem Relation Age of Onset    Lung Disease Mother     Hypertension Mother     Heart Disease Mother     Stroke Mother     COPD Mother     Cancer Father      lung    Heart Attack Father     Diabetes Other     Osteoporosis Other     Other Other      Arthritis    Malignant Hyperthermia Neg Hx     Pseudocholinesterase Deficiency Neg Hx     Delayed Awakening Neg Hx     Post-op Nausea/Vomiting Neg Hx     Emergence Delirium Neg Hx     Post-op Cognitive Dysfunction Neg Hx        Social History     Social History    Marital status:      Spouse name: N/A    Number of children: N/A    Years of education: N/A     Occupational History    housekeeping Self Employed     Social History Main Topics    Smoking status: Former Smoker     Packs/day: 1.00     Years: 10.00     Types: Cigarettes     Quit date: 6/30/2017    Smokeless tobacco: Never Used    Alcohol use No    Drug use: No    Sexual activity: Not on file     Other Topics Concern    Exercise No     Social History Narrative         ALLERGIES: Benadryl [diphenhydramine hcl]; Demerol [meperidine]; Dilaudid [hydromorphone]; Lortab [hydrocodone-acetaminophen]; and Morphine    Review of Systems   Unable to perform ROS: Mental status change   Neurological: Positive for weakness. Negative for tingling, seizures and numbness. Psychiatric/Behavioral: Positive for confusion. Negative for agitation, hallucinations and self-injury. Vitals:    08/09/17 0412   BP: 129/61   Pulse: 69   Resp: 16   Temp: 96 °F (35.6 °C)   SpO2: 96%   Weight: 57.2 kg (126 lb)   Height: 5' 1\" (1.549 m)            Physical Exam   Constitutional: She is oriented to person, place, and time. She appears well-developed and well-nourished. No distress. Slightly disheveled-appearing female patient with aspirin collar in place. Alert and oriented to person only. No acute distress. Speaks in slurred responses      HENT:   Head: Normocephalic and atraumatic. Right Ear: External ear normal.   Left Ear: External ear normal.   Nose: Nose normal.   no focal signs of trauma. No hemotympanum or Salomon sign noted. Eyes: Conjunctivae and EOM are normal. Pupils are equal, round, and reactive to light. Bilateral pupillary dilation. Neck: Normal range of motion. Neck supple. No JVD present. No tracheal deviation present. Aspirin collar in place. Cardiovascular: Normal rate, regular rhythm, S1 normal, S2 normal, normal heart sounds and intact distal pulses.   Exam reveals no gallop, no distant heart sounds and no friction rub. No murmur heard. Pulmonary/Chest: Effort normal and breath sounds normal. No accessory muscle usage or stridor. No tachypnea and no bradypnea. No respiratory distress. She has no decreased breath sounds. She has no wheezes. She has no rhonchi. She has no rales. She exhibits no tenderness. Abdominal: Soft. Normal appearance. She exhibits no distension and no mass. There is no hepatosplenomegaly, splenomegaly or hepatomegaly. There is no tenderness. There is no rigidity, no rebound, no guarding, no CVA tenderness, no tenderness at McBurney's point and negative Cummins's sign. Musculoskeletal: Normal range of motion. She exhibits no edema, tenderness or deformity. Neurological: She is alert and oriented to person, place, and time. No cranial nerve deficit. Skin: Skin is warm and dry. No rash noted. She is not diaphoretic. Psychiatric: Her speech is delayed and slurred. Cognition and memory are impaired. She exhibits a depressed mood. Nursing note and vitals reviewed. MDM  Number of Diagnoses or Management Options  Altered mental status, unspecified altered mental status type: new and requires workup  Diagnosis management comments: Patient has numerous medication bottles provided by spouse on arrival.  These bottles include Soma, lorazepam, Percocet 10/325. Spouse unsure of exactly what patient may have ingested this evening. No reports of illicit drug use or alcohol use. EKG reveals a normal sinus rhythm with rate of 65 beats a minute. First-degree AV block present. CT imaging of the brain unremarkable. CT cervical spine imaging shows chronic type III odontoid fracture with corrective instrumentation present no new fractures visible. Patient is much more alert at this time and at baseline mental status per significant other at bedside. She is alert and oriented ×4 and states she took [place] prior to her fall this evening.   Patient is trying to avoid using her Percocet medications she's had issues with narcotic dependence in the past.  I had a prolonged discussion with patient and her significant other at bedside about my concerns for her medication use and recommendation that she follow instructions for these medications put in place by her neurosurgeon and pain management specialist.  Patient voices understanding and agreement.          Amount and/or Complexity of Data Reviewed  Clinical lab tests: ordered and reviewed  Tests in the radiology section of CPT®: ordered and reviewed  Tests in the medicine section of CPT®: ordered and reviewed  Independent visualization of images, tracings, or specimens: yes    Risk of Complications, Morbidity, and/or Mortality  Presenting problems: moderate  Diagnostic procedures: low  Management options: moderate    Patient Progress  Patient progress: stable    ED Course       Procedures

## 2017-08-09 NOTE — ED TRIAGE NOTES
\"She just had surgery and she ask me around 9 oclock if she could take  apain pill and at 11 she was fine. I woke up at 2 and this was her.   Some of her meds are almost empty\"

## 2017-08-11 PROBLEM — Z87.891 HISTORY OF TOBACCO ABUSE: Status: RESOLVED | Noted: 2017-07-06 | Resolved: 2017-08-11

## 2017-09-27 ENCOUNTER — HOSPITAL ENCOUNTER (OUTPATIENT)
Dept: GENERAL RADIOLOGY | Age: 57
Discharge: HOME | End: 2017-09-27
Payer: COMMERCIAL

## 2017-09-27 DIAGNOSIS — S12.100A: ICD-10-CM

## 2017-09-27 PROCEDURE — 72040 X-RAY EXAM NECK SPINE 2-3 VW: CPT

## 2017-10-23 ENCOUNTER — HOSPITAL ENCOUNTER (OUTPATIENT)
Dept: CT IMAGING | Age: 57
Discharge: HOME | End: 2017-10-23
Attending: NEUROLOGICAL SURGERY
Payer: COMMERCIAL

## 2017-10-23 DIAGNOSIS — S12.110G: ICD-10-CM

## 2017-10-23 PROCEDURE — 72125 CT NECK SPINE W/O DYE: CPT

## 2017-12-18 ENCOUNTER — HOSPITAL ENCOUNTER (OUTPATIENT)
Dept: MRI IMAGING | Age: 57
Discharge: HOME | End: 2017-12-18
Attending: INTERNAL MEDICINE
Payer: COMMERCIAL

## 2017-12-18 DIAGNOSIS — G89.29 CHRONIC BACK PAIN, UNSPECIFIED BACK LOCATION, UNSPECIFIED BACK PAIN LATERALITY: ICD-10-CM

## 2017-12-18 DIAGNOSIS — M54.9 CHRONIC BACK PAIN, UNSPECIFIED BACK LOCATION, UNSPECIFIED BACK PAIN LATERALITY: ICD-10-CM

## 2017-12-18 DIAGNOSIS — M54.16 RADICULOPATHY OF LUMBAR REGION: ICD-10-CM

## 2017-12-18 PROCEDURE — 72148 MRI LUMBAR SPINE W/O DYE: CPT

## 2017-12-19 NOTE — PROGRESS NOTES
You have mild nerve impingement bilaterally at the L3-4 level and mild impingement at the levels above and below this. Are you feeling any better? Thanks.   Sepideh Pierce

## 2018-01-31 ENCOUNTER — HOSPITAL ENCOUNTER (OUTPATIENT)
Dept: GENERAL RADIOLOGY | Age: 58
Discharge: HOME | End: 2018-01-31
Payer: COMMERCIAL

## 2018-01-31 DIAGNOSIS — J44.1 COPD EXACERBATION (HCC): ICD-10-CM

## 2018-01-31 DIAGNOSIS — M54.2 NECK PAIN: ICD-10-CM

## 2018-01-31 PROCEDURE — 72040 X-RAY EXAM NECK SPINE 2-3 VW: CPT

## 2018-02-01 PROBLEM — Z79.899 DRUG THERAPY: Status: ACTIVE | Noted: 2018-02-01

## 2018-06-04 ENCOUNTER — HOSPITAL ENCOUNTER (OUTPATIENT)
Dept: GENERAL RADIOLOGY | Age: 58
Discharge: HOME | End: 2018-06-04
Payer: COMMERCIAL

## 2018-06-04 DIAGNOSIS — S61.219A FINGER LACERATION INVOLVING TENDON, INITIAL ENCOUNTER: ICD-10-CM

## 2018-06-04 PROCEDURE — 73140 X-RAY EXAM OF FINGER(S): CPT

## 2018-06-26 ENCOUNTER — HOSPITAL ENCOUNTER (OUTPATIENT)
Dept: GENERAL RADIOLOGY | Age: 58
Discharge: HOME | End: 2018-06-26
Payer: COMMERCIAL

## 2018-06-26 DIAGNOSIS — R14.0 ABDOMINAL BLOATING: ICD-10-CM

## 2018-06-26 PROCEDURE — 74022 RADEX COMPL AQT ABD SERIES: CPT

## 2018-06-28 NOTE — PROGRESS NOTES
Your abdominal xray reveals nonspecific type bowel gas pattern. Are you feeling any better? Thanks.   Sepideh Pierce

## 2018-08-20 ENCOUNTER — HOSPITAL ENCOUNTER (OUTPATIENT)
Dept: GENERAL RADIOLOGY | Age: 58
Discharge: HOME | End: 2018-08-20
Attending: INTERNAL MEDICINE
Payer: COMMERCIAL

## 2018-08-20 ENCOUNTER — HOSPITAL ENCOUNTER (OUTPATIENT)
Dept: ULTRASOUND IMAGING | Age: 58
Discharge: HOME | End: 2018-08-20
Attending: INTERNAL MEDICINE
Payer: COMMERCIAL

## 2018-08-20 DIAGNOSIS — R11.2 NAUSEA AND VOMITING, INTRACTABILITY OF VOMITING NOT SPECIFIED, UNSPECIFIED VOMITING TYPE: ICD-10-CM

## 2018-08-20 DIAGNOSIS — R05.9 COUGH: ICD-10-CM

## 2018-08-20 PROCEDURE — 71046 X-RAY EXAM CHEST 2 VIEWS: CPT

## 2018-08-20 PROCEDURE — 76705 ECHO EXAM OF ABDOMEN: CPT

## 2018-08-20 NOTE — PROGRESS NOTES
You have a normal appearing gallbladder. If you are still having symptoms we'll order a HIDA scan to assess your gallbladder function. Thanks.   Sepideh Pierce

## 2018-08-24 ENCOUNTER — HOSPITAL ENCOUNTER (OUTPATIENT)
Dept: GENERAL RADIOLOGY | Age: 58
Discharge: HOME | End: 2018-08-24
Payer: COMMERCIAL

## 2018-08-24 DIAGNOSIS — M25.551 PAIN OF RIGHT HIP JOINT: ICD-10-CM

## 2018-08-24 PROCEDURE — 73502 X-RAY EXAM HIP UNI 2-3 VIEWS: CPT

## 2018-08-30 ENCOUNTER — HOSPITAL ENCOUNTER (OUTPATIENT)
Dept: MRI IMAGING | Age: 58
Discharge: HOME | End: 2018-08-30
Attending: INTERNAL MEDICINE
Payer: COMMERCIAL

## 2018-08-30 DIAGNOSIS — M25.551 RIGHT HIP PAIN: ICD-10-CM

## 2018-08-30 DIAGNOSIS — M79.604 PAIN OF RIGHT LOWER EXTREMITY: ICD-10-CM

## 2018-08-30 PROCEDURE — 73721 MRI JNT OF LWR EXTRE W/O DYE: CPT

## 2018-08-31 NOTE — PROGRESS NOTES
You have tendinosis in your hip joint and degenerative disc disease in your lumbar spine. Are you feeling any better? Thanks.   Sepideh Pierce

## 2018-09-10 ENCOUNTER — HOSPITAL ENCOUNTER (OUTPATIENT)
Dept: MRI IMAGING | Age: 58
Discharge: HOME | End: 2018-09-10
Attending: INTERNAL MEDICINE
Payer: COMMERCIAL

## 2018-09-10 DIAGNOSIS — M51.36 DDD (DEGENERATIVE DISC DISEASE), LUMBAR: ICD-10-CM

## 2018-09-10 DIAGNOSIS — M54.10 RADICULOPATHY, UNSPECIFIED SPINAL REGION: ICD-10-CM

## 2018-09-10 PROCEDURE — 72148 MRI LUMBAR SPINE W/O DYE: CPT

## 2018-09-11 NOTE — PROGRESS NOTES
You have Multilevel disc herniations. No central canal stenosis is seen at any level although neural foraminal stenosis is seen at multiple levels which appears worst at L4-5. I recommend referral back to [facility]. Are you doing any better? Thanks.   Sepideh Pierce

## 2018-10-17 ENCOUNTER — HOSPITAL ENCOUNTER (OUTPATIENT)
Dept: CT IMAGING | Age: 58
Discharge: HOME | End: 2018-10-17
Attending: INTERNAL MEDICINE
Payer: COMMERCIAL

## 2018-10-17 ENCOUNTER — HOSPITAL ENCOUNTER (OUTPATIENT)
Dept: INTERVENTIONAL RADIOLOGY/VASCULAR | Age: 58
Discharge: HOME | End: 2018-10-17
Attending: INTERNAL MEDICINE
Payer: COMMERCIAL

## 2018-10-17 VITALS
HEART RATE: 76 BPM | DIASTOLIC BLOOD PRESSURE: 81 MMHG | SYSTOLIC BLOOD PRESSURE: 168 MMHG | OXYGEN SATURATION: 99 % | RESPIRATION RATE: 18 BRPM

## 2018-10-17 DIAGNOSIS — M54.16 RIGHT LUMBAR RADICULOPATHY: ICD-10-CM

## 2018-10-17 DIAGNOSIS — M51.36 DDD (DEGENERATIVE DISC DISEASE), LUMBAR: ICD-10-CM

## 2018-10-17 PROCEDURE — 72132 CT LUMBAR SPINE W/DYE: CPT

## 2018-10-17 PROCEDURE — 77030014143 HC TY PUNC LUMBR BD -A

## 2018-10-17 PROCEDURE — 62304 MYELOGRAPHY LUMBAR INJECTION: CPT

## 2018-10-17 PROCEDURE — 74011636320 HC RX REV CODE- 636/320: Performed by: PHYSICIAN ASSISTANT

## 2018-10-17 PROCEDURE — 77030003666 HC NDL SPINAL BD -A

## 2018-10-17 PROCEDURE — 74011250636 HC RX REV CODE- 250/636: Performed by: PHYSICIAN ASSISTANT

## 2018-10-17 RX ORDER — LIDOCAINE HYDROCHLORIDE 20 MG/ML
20-200 INJECTION, SOLUTION INFILTRATION; PERINEURAL ONCE
Status: COMPLETED | OUTPATIENT
Start: 2018-10-17 | End: 2018-10-17

## 2018-10-17 RX ADMIN — IOPAMIDOL 14 ML: 408 INJECTION, SOLUTION INTRATHECAL at 10:44

## 2018-10-17 RX ADMIN — LIDOCAINE HYDROCHLORIDE 160 MG: 20 INJECTION, SOLUTION INFILTRATION; PERINEURAL at 10:43

## 2018-10-17 NOTE — PROGRESS NOTES
Imaging does not reveal a source for your pain. Uniuqe Pollard said you were having GI symptoms prior to your test today. It may be possible that there is a relationship between your gut symptoms and your pain. If you are not improving, please schedule follow up with me this Friday. Sorry this did not show any source for your symptoms. Dana Muir in there. Thanks.   Sepideh Bautistaijlatoya 33

## 2018-10-17 NOTE — PROGRESS NOTES
Second part of your report is back and you do have L5 spondylosis on the right side. You also have some mild narrowing of the nerve root canal on that side. This is usually addressed with PT if you are willing. I think you might also benefit from a localized injection if you'd like to give that a try. Didn't realize that the results would come in two parts. Sorry for any confusion. Thanks.   Sepideh Pacheco 33

## 2018-10-30 ENCOUNTER — HOSPITAL ENCOUNTER (OUTPATIENT)
Dept: GENERAL RADIOLOGY | Age: 58
Discharge: HOME | End: 2018-10-30
Payer: COMMERCIAL

## 2018-10-30 DIAGNOSIS — M25.571 CHRONIC PAIN OF RIGHT ANKLE: ICD-10-CM

## 2018-10-30 DIAGNOSIS — G89.29 CHRONIC PAIN OF RIGHT ANKLE: ICD-10-CM

## 2018-10-30 PROBLEM — F11.21 HISTORY OF NARCOTIC ADDICTION (HCC): Status: ACTIVE | Noted: 2018-10-30

## 2018-10-30 PROCEDURE — 73610 X-RAY EXAM OF ANKLE: CPT

## 2018-11-12 ENCOUNTER — HOSPITAL ENCOUNTER (EMERGENCY)
Age: 58
Discharge: HOME | End: 2018-11-12
Attending: EMERGENCY MEDICINE
Payer: COMMERCIAL

## 2018-11-12 VITALS
HEART RATE: 89 BPM | DIASTOLIC BLOOD PRESSURE: 87 MMHG | SYSTOLIC BLOOD PRESSURE: 138 MMHG | TEMPERATURE: 98 F | OXYGEN SATURATION: 97 % | BODY MASS INDEX: 25.68 KG/M2 | HEIGHT: 61 IN | RESPIRATION RATE: 18 BRPM | WEIGHT: 136 LBS

## 2018-11-12 DIAGNOSIS — M25.471 RIGHT ANKLE SWELLING: Primary | ICD-10-CM

## 2018-11-12 DIAGNOSIS — G89.29 OTHER CHRONIC PAIN: ICD-10-CM

## 2018-11-12 PROCEDURE — 75810000053 HC SPLINT APPLICATION: Performed by: EMERGENCY MEDICINE

## 2018-11-12 PROCEDURE — 74011250637 HC RX REV CODE- 250/637: Performed by: EMERGENCY MEDICINE

## 2018-11-12 PROCEDURE — L4350 ANKLE CONTROL ORTHO PRE OTS: HCPCS

## 2018-11-12 PROCEDURE — 99283 EMERGENCY DEPT VISIT LOW MDM: CPT | Performed by: EMERGENCY MEDICINE

## 2018-11-12 RX ORDER — BACLOFEN 10 MG/1
10 TABLET ORAL 3 TIMES DAILY
Refills: 5 | COMMUNITY
Start: 2018-10-29 | End: 2019-02-06 | Stop reason: ALTCHOICE

## 2018-11-12 RX ORDER — LORAZEPAM 2 MG/1
2 TABLET ORAL
Refills: 3 | COMMUNITY
Start: 2018-11-07 | End: 2019-02-06 | Stop reason: ALTCHOICE

## 2018-11-12 RX ORDER — DICLOFENAC SODIUM 10 MG/G
2 GEL TOPICAL 4 TIMES DAILY
Qty: 100 G | Refills: 0 | Status: SHIPPED | OUTPATIENT
Start: 2018-11-12 | End: 2019-02-06 | Stop reason: ALTCHOICE

## 2018-11-12 RX ORDER — OXYCODONE AND ACETAMINOPHEN 5; 325 MG/1; MG/1
1 TABLET ORAL
Status: COMPLETED | OUTPATIENT
Start: 2018-11-12 | End: 2018-11-12

## 2018-11-12 RX ADMIN — OXYCODONE HYDROCHLORIDE AND ACETAMINOPHEN 1 TABLET: 5; 325 TABLET ORAL at 22:48

## 2018-11-13 NOTE — DISCHARGE INSTRUCTIONS
Talk to your doctor about possible MRI of your right ankle. Return for worsening or concerning symptoms.

## 2018-11-13 NOTE — ED PROVIDER NOTES
14-year-old female with history of chronic right back and leg pain presents with worsening pain in her right ankle for the past 3 months. She states it is excruciating and she has unable to bear any weight on her foot. She has been on crutches for 3 months. She has had multiple imaging tests including MRI of hip and lumbar spine, CT myelogram, X-ray of ankle. She is followed by pain management, and primary care physician, and just saw and ankle orthopedic surgeon, Dr. Burke Pradhan, 3 days ago. He told her that her right ankle pain was due to her back problems. X-ray showed small Ankle effusion with osteopenia. She takes Lyrica, Robaxin, and baclofen and this is not controlling her pain. She denies any fevers or new injury. The history is provided by the patient. Leg Pain Associated symptoms include back pain. Pertinent negatives include no numbness. Past Medical History:  
Diagnosis Date  3-part fracture of surgical neck of left humerus 10/27/2016  ADHD (attention deficit hyperactivity disorder)  Arthritis  Carpal tunnel syndrome 11/11/2015  Closed fracture of anatomical neck of humerus 6/16/2015  Degeneration of lumbar or lumbosacral intervertebral disc 2/27/2015  Depressive disorder, not elsewhere classified 2/27/2015  Dermatophytosis of the body 2/27/2015  Essential hypertension, benign 2/27/2015  
 managed with med  GERD (gastroesophageal reflux disease) 2/27/2015  
 managed with med  Heart murmur   
 pt reports since birth; echo dated 9- states trace aortic and mitral regurg  History of MRSA infection  Hypopotassemia 2/27/2015  Insomnia, unspecified 2/27/2015  Kidney stone  Kidney stones  Liver disease Hepatits C  
 Migraine, unspecified, without mention of intractable migraine without mention of status migrainosus 2/27/2015  Odontoid fracture with type II morphology ([facility] [address]  Osteoporosis 1/23/2017  Other closed fractures of upper end of humerus 2015  Polycythemia, secondary 2015  Traumatic tear of left rotator cuff 10/27/2016  Type 2 superior 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 shoulder 10/27/2016  Unspecified viral hepatitis C without hepatic coma 2015  
 pt reports hx only; normal LFTs noted labs  Past Surgical History:  
Procedure Laterality Date  CARDIAC SURG PROCEDURE UNLIST    
 heart cath wnl, no stent placed  HX BLADDER SUSPENSION  2006  
 bladder tack  HX CARPAL TUNNEL RELEASE Right   
 then left  HX LITHOTRIPSY  2005  HX ORTHOPAEDIC Slap repair right  HX ORTHOPAEDIC  2011  
 hip repair, left  HX ORTHOPAEDIC Left Hand surgery  HX ROTATOR CUFF REPAIR Bilateral   
 
   
Family History:  
Problem Relation Age of Onset  Lung Disease Mother  Hypertension Mother  Heart Disease Mother  Stroke Mother  COPD Mother  Cancer Father   
     lung  Heart Attack Father  Diabetes Other  Osteoporosis Other  Other Other Arthritis Social History Socioeconomic History  Marital status:  Spouse name: Not on file  Number of children: Not on file  Years of education: Not on file  Highest education level: Not on file Social Needs  Financial resource strain: Not on file  Food insecurity - worry: Not on file  Food insecurity - inability: Not on file  Transportation needs - medical: Not on file  Transportation needs - non-medical: Not on file Occupational History  Occupation: housekeeping Employer: [employer]EL[employer] EMPLOYED Tobacco Use  Smoking status: Former Smoker Packs/day: 1.00 Years: 10.00 Pack years: 10.00 Types: Cigarettes Last attempt to quit: 2017 Years since quittin.3  Smokeless tobacco: Never Used  Tobacco comment: 18- smoked a pack past 3-4 days Substance and Sexual Activity  Alcohol use: No  
  Alcohol/week: 0.0 oz  Drug use: No  
 Sexual activity: Not on file Other Topics Concern [address] Service Not Asked  Blood Transfusions Not Asked  Caffeine Concern Not Asked  Occupational Exposure Not Asked Sonja Martinez Hazards Not Asked  Sleep Concern Not Asked  Stress Concern Not Asked  Weight Concern Not Asked  Special Diet Not Asked  Back Care Not Asked  Exercise No  
 Bike Helmet Not Asked  Seat Belt Not Asked  Self-Exams Not Asked Social History Narrative  Not on file ALLERGIES: Benadryl [diphenhydramine hcl]; Demerol [meperidine]; Dilaudid [hydromorphone]; Lortab [hydrocodone-acetaminophen]; Macrodantin [nitrofurantoin macrocrystal]; and Morphine Review of Systems Constitutional: Negative for fever. Musculoskeletal: Positive for arthralgias, back pain and joint swelling. Skin: Negative for wound. Neurological: Negative for weakness and numbness. Psychiatric/Behavioral: Negative for confusion. Vitals:  
 11/12/18 2101 BP: (!) 149/91 Pulse: 92 Resp: 17 Temp: 98 °F (36.7 °C) SpO2: 96% Weight: 61.7 kg (136 lb) Height: 5' 1\" (1.549 m) Physical Exam  
Constitutional: She appears well-developed and well-nourished. HENT:  
Head: Normocephalic and atraumatic. Eyes: EOM are normal. Pupils are equal, round, and reactive to light. Musculoskeletal:  
     Right ankle: She exhibits swelling. She exhibits normal range of motion, no ecchymosis and normal pulse. Tenderness. Lateral malleolus tenderness found. No medial malleolus tenderness found. Feet: 
 
Neurological: She is alert. Skin: Skin is dry. Nursing note and vitals reviewed. MDM Number of Diagnoses or Management Options Other chronic pain:  
Right ankle swelling:  
Diagnosis management comments: Parts of this document were created using [employer] voice recognition software. The chart has been reviewed but errors may still be present. Patient moving ankle without any discomfort. She has mild swelling to the lateral malleolus. Discussed possible MRI of the ankle to evaluate tendinopathy and etiology of effusion. Given Percocet in ED and rx for Voltaren gel. Offered air splint for comfort. Understands to remove to prevent stiffness daily. I discussed the results of all labs, procedures, radiographs, and treatments with the patient and available family. Treatment plan is agreed upon and the patient is ready for discharge. Questions about treatment in the ED and differential diagnosis of presenting condition were answered. Patient was given verbal discharge instructions including, but not limited to, importance of returning to the emergency department for any concern of worsening or continued symptoms. Instructions were given to follow up with a primary care provider or specialist within 1-2 days. Adverse effects of medications, if prescribed, were discussed and patient was advised to refrain from significant physical activity until followed up by primary care physician and to not drive or operate heavy machinery after taking any sedating substances. Procedures

## 2018-11-13 NOTE — ED TRIAGE NOTES
Patient here with c/o right leg pain x4 months. Reports has had MRI, CT and other studies done on leg and hip with no results of cause of pain. No change in status or new injury since last seen by ortho on Thursday. States she just is tired of hurting.

## 2018-11-13 NOTE — ED NOTES
I have reviewed discharge instructions with the patient. The patient verbalized understanding. Patient left ED via Discharge Method: ambulatory to Home with friend Opportunity for questions and clarification provided. Patient given 1 scripts. Medication explained to pt and pt v\u about medication. Pt in no acute distress on discharge. To continue your aftercare when you leave the hospital, you may receive an automated call from our care team to check in on how you are doing. This is a free service and part of our promise to provide the best care and service to meet your aftercare needs.  If you have questions, or wish to unsubscribe from this service please call [phone]. Thank you for Choosing our [facility] Emergency Department.

## 2018-12-12 ENCOUNTER — HOSPITAL ENCOUNTER (OUTPATIENT)
Dept: MRI IMAGING | Age: 58
Discharge: HOME | End: 2018-12-12
Attending: INTERNAL MEDICINE
Payer: COMMERCIAL

## 2018-12-12 DIAGNOSIS — M79.604 PAIN OF RIGHT LOWER EXTREMITY: ICD-10-CM

## 2018-12-12 DIAGNOSIS — M25.471 RIGHT ANKLE SWELLING: ICD-10-CM

## 2018-12-12 PROCEDURE — 73721 MRI JNT OF LWR EXTRE W/O DYE: CPT

## 2018-12-13 NOTE — PROGRESS NOTES
You have a torn ligament in your ankle. We can refer you to whomever you prefer to have this addressed. So sorry.  Thanjs

## 2019-02-06 PROBLEM — J96.01 ACUTE RESPIRATORY FAILURE WITH HYPOXIA (HCC): Status: RESOLVED | Noted: 2017-07-06 | Resolved: 2019-02-06

## 2019-02-06 PROBLEM — J44.1 COPD EXACERBATION (HCC): Status: RESOLVED | Noted: 2017-07-07 | Resolved: 2019-02-06

## 2019-03-23 ENCOUNTER — HOSPITAL ENCOUNTER (OUTPATIENT)
Dept: MAMMOGRAPHY | Age: 59
Discharge: HOME | End: 2019-03-23
Attending: INTERNAL MEDICINE
Payer: COMMERCIAL

## 2019-03-23 PROCEDURE — 77067 SCR MAMMO BI INCL CAD: CPT

## 2019-06-27 ENCOUNTER — HOSPITAL ENCOUNTER (OUTPATIENT)
Age: 59
Setting detail: OBSERVATION
Discharge: HOME | End: 2019-06-28
Attending: EMERGENCY MEDICINE | Admitting: HOSPITALIST
Payer: COMMERCIAL

## 2019-06-27 ENCOUNTER — APPOINTMENT (OUTPATIENT)
Dept: CT IMAGING | Age: 59
End: 2019-06-27
Attending: PSYCHIATRY & NEUROLOGY
Payer: COMMERCIAL

## 2019-06-27 ENCOUNTER — APPOINTMENT (OUTPATIENT)
Dept: MRI IMAGING | Age: 59
End: 2019-06-27
Attending: HOSPITALIST
Payer: COMMERCIAL

## 2019-06-27 ENCOUNTER — APPOINTMENT (OUTPATIENT)
Dept: CT IMAGING | Age: 59
End: 2019-06-27
Attending: EMERGENCY MEDICINE
Payer: COMMERCIAL

## 2019-06-27 DIAGNOSIS — I63.9 CEREBROVASCULAR ACCIDENT (CVA), UNSPECIFIED MECHANISM (HCC): Primary | ICD-10-CM

## 2019-06-27 DIAGNOSIS — R90.89 ABNORMAL FINDING ON MRI OF BRAIN: ICD-10-CM

## 2019-06-27 PROBLEM — G45.9 TIA (TRANSIENT ISCHEMIC ATTACK): Status: ACTIVE | Noted: 2019-06-27

## 2019-06-27 LAB
ANION GAP SERPL CALC-SCNC: 6 MMOL/L (ref 7–16)
ATRIAL RATE: 61 BPM
BASOPHILS # BLD: 0 K/UL (ref 0–0.2)
BASOPHILS NFR BLD: 1 % (ref 0–2)
BUN SERPL-MCNC: 13 MG/DL (ref 6–23)
CALCIUM SERPL-MCNC: 9.5 MG/DL (ref 8.3–10.4)
CALCULATED P AXIS, ECG09: 59 DEGREES
CALCULATED R AXIS, ECG10: 39 DEGREES
CALCULATED T AXIS, ECG11: 66 DEGREES
CHLORIDE SERPL-SCNC: 108 MMOL/L (ref 98–107)
CO2 SERPL-SCNC: 26 MMOL/L (ref 21–32)
CREAT SERPL-MCNC: 0.77 MG/DL (ref 0.6–1)
DIAGNOSIS, 93000: NORMAL
DIFFERENTIAL METHOD BLD: NORMAL
EOSINOPHIL # BLD: 0 K/UL (ref 0–0.8)
EOSINOPHIL NFR BLD: 1 % (ref 0.5–7.8)
ERYTHROCYTE [DISTWIDTH] IN BLOOD BY AUTOMATED COUNT: 12.3 % (ref 11.9–14.6)
GLUCOSE BLD STRIP.AUTO-MCNC: 111 MG/DL (ref 65–100)
GLUCOSE SERPL-MCNC: 110 MG/DL (ref 65–100)
HCT VFR BLD AUTO: 38.1 % (ref 35.8–46.3)
HGB BLD-MCNC: 13 G/DL (ref 11.7–15.4)
IMM GRANULOCYTES # BLD AUTO: 0.1 K/UL (ref 0–0.5)
IMM GRANULOCYTES NFR BLD AUTO: 1 % (ref 0–5)
INR PPP: 1
LYMPHOCYTES # BLD: 2.1 K/UL (ref 0.5–4.6)
LYMPHOCYTES NFR BLD: 30 % (ref 13–44)
MCH RBC QN AUTO: 31.8 PG (ref 26.1–32.9)
MCHC RBC AUTO-ENTMCNC: 34.1 G/DL (ref 31.4–35)
MCV RBC AUTO: 93.2 FL (ref 79.6–97.8)
MONOCYTES # BLD: 0.5 K/UL (ref 0.1–1.3)
MONOCYTES NFR BLD: 7 % (ref 4–12)
NEUTS SEG # BLD: 4.2 K/UL (ref 1.7–8.2)
NEUTS SEG NFR BLD: 61 % (ref 43–78)
NRBC # BLD: 0 K/UL (ref 0–0.2)
P-R INTERVAL, ECG05: 226 MS
PLATELET # BLD AUTO: 341 K/UL (ref 150–450)
PMV BLD AUTO: 9.8 FL (ref 9.4–12.3)
POTASSIUM SERPL-SCNC: 3.4 MMOL/L (ref 3.5–5.1)
PROTHROMBIN TIME: 12.8 SEC (ref 11.7–14.5)
Q-T INTERVAL, ECG07: 424 MS
QRS DURATION, ECG06: 90 MS
QTC CALCULATION (BEZET), ECG08: 426 MS
RBC # BLD AUTO: 4.09 M/UL (ref 4.05–5.2)
SODIUM SERPL-SCNC: 140 MMOL/L (ref 136–145)
TROPONIN I SERPL-MCNC: <0.02 NG/ML (ref 0.02–0.05)
VENTRICULAR RATE, ECG03: 61 BPM
WBC # BLD AUTO: 6.9 K/UL (ref 4.3–11.1)

## 2019-06-27 PROCEDURE — 0042T CT PERF W CBF: CPT

## 2019-06-27 PROCEDURE — 97165 OT EVAL LOW COMPLEX 30 MIN: CPT

## 2019-06-27 PROCEDURE — 74011250636 HC RX REV CODE- 250/636: Performed by: HOSPITALIST

## 2019-06-27 PROCEDURE — 70450 CT HEAD/BRAIN W/O DYE: CPT

## 2019-06-27 PROCEDURE — 99291 CRITICAL CARE FIRST HOUR: CPT | Performed by: PSYCHIATRY & NEUROLOGY

## 2019-06-27 PROCEDURE — 93306 TTE W/DOPPLER COMPLETE: CPT

## 2019-06-27 PROCEDURE — 74011000250 HC RX REV CODE- 250: Performed by: FAMILY MEDICINE

## 2019-06-27 PROCEDURE — 85610 PROTHROMBIN TIME: CPT

## 2019-06-27 PROCEDURE — 70551 MRI BRAIN STEM W/O DYE: CPT

## 2019-06-27 PROCEDURE — 80048 BASIC METABOLIC PNL TOTAL CA: CPT

## 2019-06-27 PROCEDURE — 99285 EMERGENCY DEPT VISIT HI MDM: CPT | Performed by: EMERGENCY MEDICINE

## 2019-06-27 PROCEDURE — 96372 THER/PROPH/DIAG INJ SC/IM: CPT

## 2019-06-27 PROCEDURE — 85025 COMPLETE CBC W/AUTO DIFF WBC: CPT

## 2019-06-27 PROCEDURE — 74011250637 HC RX REV CODE- 250/637: Performed by: HOSPITALIST

## 2019-06-27 PROCEDURE — 82962 GLUCOSE BLOOD TEST: CPT

## 2019-06-27 PROCEDURE — 93005 ELECTROCARDIOGRAM TRACING: CPT | Performed by: EMERGENCY MEDICINE

## 2019-06-27 PROCEDURE — 99218 HC RM OBSERVATION: CPT

## 2019-06-27 PROCEDURE — 70496 CT ANGIOGRAPHY HEAD: CPT

## 2019-06-27 PROCEDURE — 74011636320 HC RX REV CODE- 636/320: Performed by: EMERGENCY MEDICINE

## 2019-06-27 PROCEDURE — 77030011943

## 2019-06-27 PROCEDURE — 74011000258 HC RX REV CODE- 258: Performed by: EMERGENCY MEDICINE

## 2019-06-27 PROCEDURE — 97161 PT EVAL LOW COMPLEX 20 MIN: CPT

## 2019-06-27 PROCEDURE — 92610 EVALUATE SWALLOWING FUNCTION: CPT

## 2019-06-27 PROCEDURE — 84484 ASSAY OF TROPONIN QUANT: CPT

## 2019-06-27 RX ORDER — PANTOPRAZOLE SODIUM 40 MG/1
40 TABLET, DELAYED RELEASE ORAL
Status: DISCONTINUED | OUTPATIENT
Start: 2019-06-28 | End: 2019-06-28 | Stop reason: HOSPADM

## 2019-06-27 RX ORDER — SODIUM CHLORIDE 0.9 % (FLUSH) 0.9 %
5-40 SYRINGE (ML) INJECTION AS NEEDED
Status: DISCONTINUED | OUTPATIENT
Start: 2019-06-27 | End: 2019-06-28 | Stop reason: HOSPADM

## 2019-06-27 RX ORDER — ESCITALOPRAM OXALATE 10 MG/1
20 TABLET ORAL EVERY EVENING
Status: DISCONTINUED | OUTPATIENT
Start: 2019-06-27 | End: 2019-06-27

## 2019-06-27 RX ORDER — ASPIRIN 325 MG
325 TABLET ORAL DAILY
Status: DISCONTINUED | OUTPATIENT
Start: 2019-06-27 | End: 2019-06-28

## 2019-06-27 RX ORDER — LORAZEPAM 0.5 MG/1
0.5 TABLET ORAL ONCE
Status: COMPLETED | OUTPATIENT
Start: 2019-06-27 | End: 2019-06-27

## 2019-06-27 RX ORDER — ENOXAPARIN SODIUM 100 MG/ML
40 INJECTION SUBCUTANEOUS EVERY 24 HOURS
Status: DISCONTINUED | OUTPATIENT
Start: 2019-06-27 | End: 2019-06-28 | Stop reason: HOSPADM

## 2019-06-27 RX ORDER — LORAZEPAM 0.5 MG/1
0.5 TABLET ORAL
Status: DISCONTINUED | OUTPATIENT
Start: 2019-06-27 | End: 2019-06-28 | Stop reason: HOSPADM

## 2019-06-27 RX ORDER — LATANOPROST 50 UG/ML
1 SOLUTION/ DROPS OPHTHALMIC EVERY EVENING
Status: DISCONTINUED | OUTPATIENT
Start: 2019-06-27 | End: 2019-06-28 | Stop reason: HOSPADM

## 2019-06-27 RX ORDER — METHOCARBAMOL 750 MG/1
750 TABLET, FILM COATED ORAL 4 TIMES DAILY
Status: DISCONTINUED | OUTPATIENT
Start: 2019-06-27 | End: 2019-06-28 | Stop reason: HOSPADM

## 2019-06-27 RX ORDER — SODIUM CHLORIDE 0.9 % (FLUSH) 0.9 %
5-40 SYRINGE (ML) INJECTION EVERY 8 HOURS
Status: DISCONTINUED | OUTPATIENT
Start: 2019-06-27 | End: 2019-06-28 | Stop reason: HOSPADM

## 2019-06-27 RX ORDER — BACLOFEN 10 MG/1
10 TABLET ORAL
Status: DISCONTINUED | OUTPATIENT
Start: 2019-06-27 | End: 2019-06-27

## 2019-06-27 RX ORDER — AMOXICILLIN 250 MG
2 CAPSULE ORAL
Status: DISCONTINUED | OUTPATIENT
Start: 2019-06-27 | End: 2019-06-28 | Stop reason: HOSPADM

## 2019-06-27 RX ORDER — METHOCARBAMOL 750 MG/1
750 TABLET, FILM COATED ORAL 4 TIMES DAILY
Status: ON HOLD | COMMUNITY
End: 2019-07-01 | Stop reason: SDUPTHER

## 2019-06-27 RX ORDER — PRAVASTATIN SODIUM 80 MG/1
80 TABLET ORAL
Status: DISCONTINUED | OUTPATIENT
Start: 2019-06-27 | End: 2019-06-28 | Stop reason: HOSPADM

## 2019-06-27 RX ORDER — DULOXETIN HYDROCHLORIDE 20 MG/1
20 CAPSULE, DELAYED RELEASE ORAL 2 TIMES DAILY
Status: DISCONTINUED | OUTPATIENT
Start: 2019-06-27 | End: 2019-06-28 | Stop reason: HOSPADM

## 2019-06-27 RX ORDER — OXYCODONE AND ACETAMINOPHEN 5; 325 MG/1; MG/1
1 TABLET ORAL
Status: DISCONTINUED | OUTPATIENT
Start: 2019-06-27 | End: 2019-06-28

## 2019-06-27 RX ORDER — ACETAMINOPHEN 325 MG/1
650 TABLET ORAL
Status: DISCONTINUED | OUTPATIENT
Start: 2019-06-27 | End: 2019-06-28 | Stop reason: HOSPADM

## 2019-06-27 RX ORDER — SODIUM CHLORIDE 0.9 % (FLUSH) 0.9 %
10 SYRINGE (ML) INJECTION
Status: COMPLETED | OUTPATIENT
Start: 2019-06-27 | End: 2019-06-27

## 2019-06-27 RX ADMIN — METHOCARBAMOL 750 MG: 750 TABLET ORAL at 21:20

## 2019-06-27 RX ADMIN — Medication 5 ML: at 21:20

## 2019-06-27 RX ADMIN — LORAZEPAM 0.5 MG: 0.5 TABLET ORAL at 13:09

## 2019-06-27 RX ADMIN — ASPIRIN 325 MG: 325 TABLET ORAL at 13:09

## 2019-06-27 RX ADMIN — ACETAMINOPHEN 650 MG: 325 TABLET, FILM COATED ORAL at 18:08

## 2019-06-27 RX ADMIN — LATANOPROST 1 DROP: 50 SOLUTION OPHTHALMIC at 22:22

## 2019-06-27 RX ADMIN — OXYCODONE HYDROCHLORIDE AND ACETAMINOPHEN 1 TABLET: 5; 325 TABLET ORAL at 20:02

## 2019-06-27 RX ADMIN — SENNOSIDES, DOCUSATE SODIUM 2 TABLET: 50; 8.6 TABLET, FILM COATED ORAL at 21:20

## 2019-06-27 RX ADMIN — SODIUM CHLORIDE 100 ML: 900 INJECTION, SOLUTION INTRAVENOUS at 09:54

## 2019-06-27 RX ADMIN — METHOCARBAMOL 750 MG: 750 TABLET ORAL at 19:37

## 2019-06-27 RX ADMIN — Medication 5 ML: at 13:10

## 2019-06-27 RX ADMIN — ENOXAPARIN SODIUM 40 MG: 40 INJECTION SUBCUTANEOUS at 13:09

## 2019-06-27 RX ADMIN — Medication 10 ML: at 09:54

## 2019-06-27 RX ADMIN — PRAVASTATIN SODIUM 80 MG: 80 TABLET ORAL at 21:20

## 2019-06-27 RX ADMIN — BACLOFEN 10 MG: 10 TABLET ORAL at 17:12

## 2019-06-27 RX ADMIN — IOPAMIDOL 110 ML: 755 INJECTION, SOLUTION INTRAVENOUS at 09:54

## 2019-06-27 RX ADMIN — Medication 1 AMPULE: at 21:20

## 2019-06-27 NOTE — ED TRIAGE NOTES
Pt c/o numbness in left sided. Pt states she feels confused and having difficulty getting out thoughts. Pt states she went to bed at 0100 normal and woke up at 0500 with above complaints.

## 2019-06-27 NOTE — PROGRESS NOTES
STG: patient will participate in full cognitive linguistic evaluation if deemed appropriate pending imaging   OBSERVATION STATUS  Speech language pathology: bedside swallow note: Initial Assessment    NAME/AGE/GENDER: Rene Garcia is a 61 y.o. female  DATE: 6/27/2019  PRIMARY DIAGNOSIS: TIA (transient ischemic attack) [G45.9]  Hypertension [I10]      ICD-10: Treatment Diagnosis: oropharyngeal dysphagia R13.12  INTERDISCIPLINARY COLLABORATION: Registered Nurse  PRECAUTIONS/ALLERGIES: Benadryl [diphenhydramine hcl]; Demerol [meperidine]; Dilaudid [hydromorphone]; Lortab [hydrocodone-acetaminophen]; Macrodantin [nitrofurantoin macrocrystal]; Morphine; and Nucynta [tapentadol] ASSESSMENT:Based on the objective data described below, Ms. Jan Garcia presents with normal swallow function. Patient presented with thin liquid via cup and straw, puree, mixed, and solid consistencies. Appropriate oral prep with all textures. Timely swallow initiation, and single swallows upon palpation. Adequate oral clearing. No overt signs or symptoms of airway compromise observed with liquid or solid textures. Recommend regular diet/thin liquids. Medications 1 at a time with liquid wash. No dysphagia treatment indicated. Reports word finding deficits have resolved. Reporting \"I just feel weird\". Speech 100% intelligible. Will follow up with full cognitive linguistic evaluation if appropriate pending imaging. ?????? ? ? This section established at most recent assessment??????????  PROBLEM LIST (Impairments causing functional limitations):  1. Oropharyngeal dysphagia- No symptoms identified    REHABILITATION POTENTIAL FOR STATED GOALS: Excellent  PLAN OF CARE:   Patient will benefit from skilled intervention to address the following impairments.   RECOMMENDATIONS AND PLANNED INTERVENTIONS (Benefits and precautions of therapy have been discussed with the patient.):  · PO:  Regular  · Liquids:  regular thin  MEDICATIONS:  · With liquid  ASPIRATION PRECAUTIONS:  · Slow rate of intake  · Small bites/sips  · Upright at 90 degrees during meal  COMPENSATORY STRATEGIES/MODIFICATIONS INCLUDING:  · None  OTHER RECOMMENDATIONS (including follow up treatment recommendations):   · Patient education  RECOMMENDED DIET MODIFICATIONS DISCUSSED WITH:  · Family  · Patient  FREQUENCY/DURATION: Speech therapy to follow up for assessment of cognitive-linguistic function. RECOMMENDED REHABILITATION/EQUIPMENT: (at time of discharge pending progress): Due to the probability of continued deficits (see above) this patient will not likely need continued skilled speech therapy after discharge. SUBJECTIVE:   Oriented x4,  at bedside. History of Present Injury/Illness: Ms. Jan Garcia  has a past medical history of 3-part fracture of surgical neck of left humerus (10/27/2016), ADHD (attention deficit hyperactivity disorder), Arthritis, Carpal tunnel syndrome (11/11/2015), Closed fracture of anatomical neck of humerus (6/16/2015), Degeneration of lumbar or lumbosacral intervertebral disc (2/27/2015), Depressive disorder, not elsewhere classified (2/27/2015), Dermatophytosis of the body (2/27/2015), Essential hypertension, benign (2/27/2015), GERD (gastroesophageal reflux disease) (2/27/2015), Heart murmur, History of MRSA infection, Hypopotassemia (2/27/2015), Insomnia, unspecified (2/27/2015), Kidney stone, Kidney stones, Liver disease, Migraine, unspecified, without mention of intractable migraine without mention of status migrainosus (2/27/2015), Odontoid fracture with type II morphology ([facility] [address] Osteoporosis (1/23/2017), Other closed fractures of upper end of humerus (6/16/2015), Polycythemia, secondary (2/27/2015), Traumatic tear of left rotator cuff (10/27/2016), Type 2 superior 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 shoulder (10/27/2016), and Unspecified viral hepatitis C without hepatic coma (02/27/2015). .  She also  has a past surgical history that includes hx lithotripsy (2005); hx bladder suspension (2006); hx carpal tunnel release (Right, 2001); pr cardiac surg procedure unlist; hx orthopaedic; hx orthopaedic (2011); hx orthopaedic (Left); and hx rotator cuff repair (Bilateral). Present Symptoms:    Pain Scale 1: Numeric (0 - 10)  Pain Intensity 1: 0  Current Medications:   No current facility-administered medications on file prior to encounter. Current Outpatient Medications on File Prior to Encounter   Medication Sig Dispense Refill    diclofenac (FLECTOR) 1.3 % pt12 1 Patch by TransDERmal route every twelve (12) hours every twelve (12) hours. 60 Patch prn    penciclovir (DENAVIR) 1 % topical cream Apply  to affected area every two (2) hours. 5 g PRN    itraconazole (SPORONAX) 100 mg capsule TK ONE C PO  QD WITH DINNER  0    latanoprost (XALATAN) 0.005 % ophthalmic solution Administer 1 Drop to both eyes nightly.  oxyCODONE-acetaminophen (PERCOCET) 5-325 mg per tablet Take  by mouth every four (4) hours as needed for Pain.  escitalopram oxalate (LEXAPRO) 20 mg tablet TAKE 1 TABLET BY MOUTH EVERY DAY 30 Tab 11    amLODIPine-benazepril (LOTREL) 5-40 mg per capsule TAKE 1 CAPSULE BY MOUTH EVERY DAY 30 Cap 11    levalbuterol tartrate (XOPENEX) 45 mcg/actuation inhaler Take 2 Puffs by inhalation every six (6) hours as needed for Wheezing. Indications: Bronchospastic Pulmonary Disease 1 Inhaler 2    denosumab (PROLIA) 60 mg/mL injection 60 mg by SubCUTAneous route. Twice a year      promethazine (PHENERGAN) 25 mg tablet 1/2-1 po tid prn nausea 30 Tab 0    omeprazole (PRILOSEC) 20 mg capsule Take 20 mg by mouth daily.  Patient instructed to take morning of surgery per anesthesia guidelines       Current Dietary Status:     Regular/thin    Social History/Home Situation:    Home Environment: Private residence  One/Two Story Residence: Two story  # of Interior Steps: [phone]: Right  Lift Chair Available: No  Living Alone: No  Support Systems: Spouse/Significant Other/Partner, Child(alex), Family member(s)  Patient Expects to be Discharged to[de-identified] Private residence  Current DME Used/Available at Home: None  OBJECTIVE:   Respiratory Status:  Room air     MRI: pending   CT: No acute intracranial hemorrhage. White matter findings compatible  with chronic small vessel ischemic disease. Oral Motor Structure/Speech:  Oral-Motor Structure/Motor Speech  Labial: No impairment  Dentition: Intact  Oral Hygiene: adequate  Lingual: No impairment    Cognitive and Communication Status:  Neurologic State: Alert  Orientation Level: Oriented X4  Cognition: Follows commands  Perception: Appears intact  Perseveration: No perseveration noted       BEDSIDE SWALLOW EVALUATION  Oral Assessment:  Oral Assessment  Labial: No impairment  Dentition: Intact  Oral Hygiene: adequate  Lingual: No impairment  P.O. Trials:  Patient Position: upright in bed    The patient was given  amounts of the following:   Consistency Presented: Puree;Mixed consistency; Solid; Thin liquid  How Presented: Self-fed/presented;Spoon;Straw;Successive swallows;Cup/gulp;Cup/sip    ORAL PHASE:  Bolus Acceptance: No impairment  Bolus Formation/Control: No impairment  Propulsion: No impairment     Oral Residue: None    PHARYNGEAL PHASE:  Initiation of Swallow: No impairment  Laryngeal Elevation: Functional  Aspiration Signs/Symptoms: None  Vocal Quality: No impairment           Pharyngeal Phase Characteristics: No impairment, issues, or problems     OTHER OBSERVATIONS:  Rate/bite size: WNL   Endurance: WNL   Comments:       Tool Used: Dysphagia Outcome and Severity Scale (MICHAEL)    Score Comments   Normal Diet  [x] 7 With no strategies or extra time needed   Functional Swallow  [] 6 May have mild oral or pharyngeal delay       Mild Dysphagia    [] 5 Which may require one diet consistency restricted (those who demonstrate penetration which is entirely cleared on MBS would be included) Mild-Moderate Dysphagia  [] 4 With 1-2 diet consistencies restricted       Moderate Dysphagia  [] 3 With 2 or more diet consistencies restricted       Moderately Severe Dysphagia  [] 2 With partial PO strategies (trials with ST only)       Severe Dysphagia  [] 1 With inability to tolerate any PO safely          Score:  Initial: 7 Most Recent: X (Date: --)   Interpretation of Tool: The Dysphagia Outcome and Severity Scale (MICHAEL) is a simple, easy-to-use, 7-point scale developed to systematically rate the functional severity of dysphagia based on objective assessment and make recommendations for diet level, independence level, and type of nutrition. Payor: Ron Neff / Plan: [facility] / Product Type: PPO /     TREATMENT:    (In addition to Assessment/Re-Assessment sessions the following treatments were rendered)  Assessment/Reassessment only, no treatment provided today  MODALITIES:                                                                    ORAL MOTOR  EXERCISES:                                                                                                                                                                      LARYNGEAL / PHARYNGEAL EXERCISES:                                                                                                                                     __________________________________________________________________________________________________  Safety:   After treatment position/precautions:  · Call light within reach  · Family at bedside  · Upright in Bed  Treatment Assessment:  Participated in dysphagia evaluation without complications. Progression/Medical Necessity:   No dysphagia treatment indicated at this time. Patient may benefit from full cognitive linguistic evaluation pending imaging.    Total Treatment Duration:  Time In: 1446  Time Out: 425  [facility], [facility] [employer] [facility] [employer], [facility] CARY THANG SHANNON, CF-KANE

## 2019-06-27 NOTE — H&P
HOSPITALIST H&P/CONSULT  NAME:  Rene Guzman   Age:  61 y.o.  :   1960   MRN:   [id number]  PCP: Fercho Jacobs MD  Consulting MD:  Treatment Team: Attending Provider: Mimi Merlos MD; Primary Nurse: Leidy Yanez; Nurse Extern: Purnima Yang  HPI:   Patient is a 61years old female with hx of HTN, TIA, GERD, anxiety d/o, nephrolithiasis, ADHD presented with left sided weakness/numbness/tingling which she noticed this morning on waking up. Pt had Code S called in ER, pt did not qualify for t-pA. Neurology has evaluated the pt in ER. CT perfusion and CTA ruled out major vessel occlusion, some atherosclerotic changes were noticed. Pt currently reports of numbness, tingling on left side of her face, weakness in LUE/LLE. She denies vision changes, headache, nausea/vomiting, chest pain, abdominal pain, palpitations, urinary symptoms, fever, chills. Pt's initial NIH score was 3-4. She is being admitted for TIA/CVA work up. Pt does report that symptoms are resolving.         Complete ROS done and is as stated in HPI or otherwise negative  Past Medical History:   Diagnosis Date    3-part fracture of surgical neck of left humerus 10/27/2016    ADHD (attention deficit hyperactivity disorder)     Arthritis     Carpal tunnel syndrome 2015    Closed fracture of anatomical neck of humerus 2015    Degeneration of lumbar or lumbosacral intervertebral disc 2015    Depressive disorder, not elsewhere classified 2015    Dermatophytosis of the body 2015    Essential hypertension, benign 2015    managed with med    GERD (gastroesophageal reflux disease) 2015    managed with med    Heart murmur     pt reports since birth; echo dated 2006 states trace aortic and mitral regurg    History of MRSA infection     Hypopotassemia 2015    Insomnia, unspecified 2015    Kidney stone     Kidney stones     Liver disease     Hepatits C    Migraine, unspecified, without mention of intractable migraine without mention of status migrainosus 2015    Odontoid fracture with type II morphology ([facility] [address]     Osteoporosis 2017    Other closed fractures of upper end of humerus 2015    Polycythemia, secondary 2015    Traumatic tear of left rotator cuff 10/27/2016    Type 2 superior 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 shoulder 10/27/2016    Unspecified viral hepatitis C without hepatic coma 2015    pt reports hx only; normal LFTs noted labs       Past Surgical History:   Procedure Laterality Date   [address] UNLIST      heart cath wnl, no stent placed    HX BLADDER SUSPENSION  2006    bladder tack    HX CARPAL TUNNEL RELEASE Right     then left    HX LITHOTRIPSY      HX ORTHOPAEDIC      Slap repair right    HX ORTHOPAEDIC  2011    hip repair, left    HX ORTHOPAEDIC Left     Hand surgery    HX ROTATOR CUFF REPAIR Bilateral       Prior to Admission Medications   Prescriptions Last Dose Informant Patient Reported? Taking? amLODIPine-benazepril (LOTREL) 5-40 mg per capsule   No No   Sig: TAKE 1 CAPSULE BY MOUTH EVERY DAY   denosumab (PROLIA) 60 mg/mL injection   Yes No   Si mg by SubCUTAneous route. Twice a year   diclofenac (FLECTOR) 1.3 % pt12   No No   Si Patch by TransDERmal route every twelve (12) hours every twelve (12) hours. escitalopram oxalate (LEXAPRO) 20 mg tablet   No No   Sig: TAKE 1 TABLET BY MOUTH EVERY DAY   itraconazole (SPORONAX) 100 mg capsule   Yes No   Sig: TK ONE C PO  QD WITH DINNER   latanoprost (XALATAN) 0.005 % ophthalmic solution   Yes No   Sig: Administer 1 Drop to both eyes nightly. levalbuterol tartrate (XOPENEX) 45 mcg/actuation inhaler   No No   Sig: Take 2 Puffs by inhalation every six (6) hours as needed for Wheezing.  Indications: Bronchospastic Pulmonary Disease   omeprazole (PRILOSEC) 20 mg capsule   Yes No   Sig: Take 20 mg by mouth daily. Patient instructed to take morning of surgery per anesthesia guidelines   oxyCODONE-acetaminophen (PERCOCET) 5-325 mg per tablet   Yes No   Sig: Take  by mouth every four (4) hours as needed for Pain.   penciclovir (DENAVIR) 1 % topical cream   No No   Sig: Apply  to affected area every two (2) hours.    promethazine (PHENERGAN) 25 mg tablet   No No   Si/2-1 po tid prn nausea      Facility-Administered Medications: None     Allergies   Allergen Reactions    Benadryl [Diphenhydramine Hcl] Other (comments)     hyperactivity    Demerol [Meperidine] Other (comments)     Hyperactivity    Dilaudid [Hydromorphone] Other (comments)     Hyperactivity    Lortab [Hydrocodone-Acetaminophen] Other (comments)     Hyperactivity    Macrodantin [Nitrofurantoin Macrocrystal] Nausea and Vomiting    Morphine Other (comments)     Hallucinations      Nucynta [Tapentadol] Other (comments)     Bad dreams      Social History     Tobacco Use    Smoking status: Former Smoker     Packs/day: 1.00     Years: 10.00     Pack years: 10.00     Types: Cigarettes     Last attempt to quit: 2017     Years since quittin.9    Smokeless tobacco: Never Used    Tobacco comment: 18- smoked a pack past 3-4 days   Substance Use Topics    Alcohol use: No     Alcohol/week: 0.0 oz      Family History   Problem Relation Age of Onset    Lung Disease Mother     Hypertension Mother     Heart Disease Mother     Stroke Mother     COPD Mother     Cancer Father         lung    Heart Attack Father     Diabetes Other     Osteoporosis Other     Other Other         Arthritis    Breast Cancer Maternal Aunt 76    Breast Cancer Paternal Aunt 75      Objective:     Visit Vitals  /71 (BP 1 Location: Left arm, BP Patient Position: At rest)   Pulse (!) 54   Temp 97.8 °F (36.6 °C)   Resp 16   Ht 5' (1.524 m)   Wt 54.4 kg (120 lb)   SpO2 97%   BMI 23.44 kg/m²      Temp (24hrs), Av.8 °F (36.6 °C), Min:97.8 °F (36.6 °C), Max:97.8 °F (36.6 °C)    Oxygen Therapy  O2 Sat (%): 97 % (06/27/19 1026)  O2 Device: Room air (06/27/19 0920)  Physical Exam:  General:    Alert, cooperative, no distress, appears stated age. Head:   Normocephalic, without obvious abnormality, atraumatic. Nose:  Nares normal. No drainage or sinus tenderness. Lungs:   Clear to auscultation bilaterally. No Wheezing or Rhonchi. No rales. Heart:   Regular rate and rhythm,  no murmur, rub or gallop. Abdomen:   Soft, non-tender. Not distended. Bowel sounds normal.   Extremities: No cyanosis. No edema. No clubbing  Skin:     Texture, turgor normal. No rashes or lesions.   Not Jaundiced  Neurologic: GCS 15, motor 4+ in LUE/LLE, mild sensory deficits on left, 5/5 in RUE/RLE, reflexes 2+, cerebellar functions WNL, speech WNL, vision normal  Psych:             AO x3, anxious  Data Review:   Recent Results (from the past 24 hour(s))   METABOLIC PANEL, BASIC    Collection Time: 06/27/19  9:25 AM   Result Value Ref Range    Sodium 140 136 - 145 mmol/L    Potassium 3.4 (L) 3.5 - 5.1 mmol/L    Chloride 108 (H) 98 - 107 mmol/L    CO2 26 21 - 32 mmol/L    Anion gap 6 (L) 7 - 16 mmol/L    Glucose 110 (H) 65 - 100 mg/dL    BUN 13 6 - 23 MG/DL    Creatinine 0.77 0.6 - 1.0 MG/DL    GFR est AA >60 >60 ml/min/1.73m2    GFR est non-AA >60 >60 ml/min/1.73m2    Calcium 9.5 8.3 - 10.4 MG/DL   CBC WITH AUTOMATED DIFF    Collection Time: 06/27/19  9:25 AM   Result Value Ref Range    WBC 6.9 4.3 - 11.1 K/uL    RBC 4.09 4.05 - 5.2 M/uL    HGB 13.0 11.7 - 15.4 g/dL    HCT 38.1 35.8 - 46.3 %    MCV 93.2 79.6 - 97.8 FL    MCH 31.8 26.1 - 32.9 PG    MCHC 34.1 31.4 - 35.0 g/dL    RDW 12.3 11.9 - 14.6 %    PLATELET 718 227 - 355 K/uL    MPV 9.8 9.4 - 12.3 FL    ABSOLUTE NRBC 0.00 0.0 - 0.2 K/uL    DF AUTOMATED      NEUTROPHILS 61 43 - 78 %    LYMPHOCYTES 30 13 - 44 %    MONOCYTES 7 4.0 - 12.0 %    EOSINOPHILS 1 0.5 - 7.8 %    BASOPHILS 1 0.0 - 2.0 %    IMMATURE GRANULOCYTES 1 0.0 - 5.0 % ABS. NEUTROPHILS 4.2 1.7 - 8.2 K/UL    ABS. LYMPHOCYTES 2.1 0.5 - 4.6 K/UL    ABS. MONOCYTES 0.5 0.1 - 1.3 K/UL    ABS. EOSINOPHILS 0.0 0.0 - 0.8 K/UL    ABS. BASOPHILS 0.0 0.0 - 0.2 K/UL    ABS. IMM. GRANS. 0.1 0.0 - 0.5 K/UL   PROTHROMBIN TIME + INR    Collection Time: 06/27/19  9:25 AM   Result Value Ref Range    Prothrombin time 12.8 11.7 - 14.5 sec    INR 1.0     TROPONIN I    Collection Time: 06/27/19  9:25 AM   Result Value Ref Range    Troponin-I, Qt. <0.02 (L) 0.02 - 0.05 NG/ML   GLUCOSE, POC    Collection Time: 06/27/19  9:25 AM   Result Value Ref Range    Glucose (POC) 111 (H) 65 - 100 mg/dL     Imaging /Procedures /Studies   All diagnostic images personally reviewed by me. History: Code stroke. Unsteady gait     Comments: CT ANGIOGRAM OF THE NECK AND CT ANGIOGRAM OF THE [place] was  obtained following the administration of IV contrast. IV contrast was  administered to evaluate the arterial vasculature. Reformatted images in the  coronal and sagittal planes as well as 3-D imaging was obtained and reviewed on  a dedicated PACS and [address] Radiation reduction dose techniques  were used for the study. Our CT scanner use one or all of the following-  Automated exposure control, adjustment of the mA and/or KV according the patient  size, iterative reconstruction. All measurements are based upon NASCET criteria  if appropriate.     Findings:     CT ANGIOGRAM OF THE NECK:     The arch and proximal great vessels are patent. The right left carotid bulbs are  patent with eccentric calcified plaque disease. Degree of stenosis is less than  50%.      The vertebral arteries are patent     The lung apices are clear.     There are subcentimeter nodules as within the right thyroid gland.     CT ANGIOGRAM OF [place]:     The petrous, cavernous, and supraclinoid internal carotid arteries are patent.     The anterior and middle cerebral arteries are patent.     The distal vertebral arteries, basilar artery, posterior cerebral arteries are  patent.     IMPRESSION  IMPRESSION:  1. Mild atherosclerotic changes without evidence of large vessel occlusive  disease or high-grade stenosis. EXAMINATION: CT PERFUSION     DATE: 6/27/2019 9:53 AM     INDICATION: : code S posterior circulation     COMPARISON: Head CT from the same day     TECHNIQUE: CT perfusion of the brain was obtained after the administration of  intravenous contrast. Perfusion maps and perfusion analysis output were  generated using the RAPID perfusion processing software algorithm. Radiation  dose reduction techniques were used for this study: All CT scans performed at  this facility use one or all of the following: Automated exposure control,  adjustment of the mA and/or kVp according to patient's size, iterative  reconstruction.     FINDINGS:    Color perfusion maps demonstrate no acute large artery territorial  perfusion defect.        RAPID Output Values:      CBF < 30% volume (best correlation with core infarct volume without overcalls):  0 ml (core infarction volume greater than 50 cc associated with poor outcomes)     Tmax > 6 seconds: 0 ml     Tmax/CBF Mismatch Volume: 0 ml     Tmax/CBF Mismatch Ratio: None        Tmax > 10 seconds Volume: 0 ml (volume greater than 100 mL is associated with  poor outcome)     IMPRESSION  IMPRESSION: Unremarkable CT perfusion.     Please note that the determination of patient treatment is not based solely upon  imaging factors or calculation values. Management of ischemia is at the  discretion of the primary physician and is based upon a combination of clinical  and imaging data, along other factors. Assessment and Plan: Active Hospital Problems    Diagnosis Date Noted    TIA (transient ischemic attack) 06/27/2019    Hypertension 07/06/2017       PLAN  · Admit as observation for TIA/CVA work up  · CTA head/neck showed mild atherosclerotic changes but no major vessel occlusion.  MRI brain is pending  · F/u with 2d-echo  · Continue aspirin and statin  · F/u with lipid panel and A1c  · Pt appears anxious, will start on cymbalta  · Prn lorazepam  · Permissive HTN for first 24 hours  · PT/OT eval  · PPD ordered  · Physiatrist eval  · Continue other home meds as reconciled in [facility]    Code Status: full  High risk    Anticipated discharge: 1-2 days    Signed By: Marina Guadarrama MD     June 27, 2019

## 2019-06-27 NOTE — PROGRESS NOTES
Problem: Mobility Impaired (Adult and Pediatric)  Goal: *Acute Goals and Plan of Care (Insert Text)  Description  LTG:  (1.)Ms. Rekha Cody will move from supine to sit and sit to supine, scoot up and down and roll side to side INDEPENDENTLY with bed flat within 7 treatment day(s). (2.)Ms. Rekha Cody will transfer from bed to chair and chair to bed INDEPENDENTLY within 7 treatment day(s). (3.)Ms. Rekha Cody will ambulate with INDEPENDENT for 750+ feet demonstrating intact dynamic balance within 7 days. (4.)Ms. Rekha Cody will ascend and descend 10 steps with MODIFIED INDEPENDENCE within 7 days. ________________________________________________________________________________________________   Outcome: Progressing Towards Goal     PHYSICAL THERAPY: Initial Assessment and PM 6/27/2019  OBSERVATION:    Payor: Kristal Ventura / Plan: [facility] / Product Type: PPO /       NAME/AGE/GENDER: Scott Dorado is a 61 y.o. female   PRIMARY DIAGNOSIS: TIA (transient ischemic attack) [G45.9]  Hypertension [I10] <principal problem not specified>   <principal problem not specified>          ICD-10: Treatment Diagnosis:    Other abnormalities of gait and mobility (R26.89)   Precaution/Allergies:  Benadryl [diphenhydramine hcl]; Demerol [meperidine]; Dilaudid [hydromorphone]; Lortab [hydrocodone-acetaminophen]; Macrodantin [nitrofurantoin macrocrystal]; Morphine; and Nucynta [tapentadol]      ASSESSMENT:     Ms. Rekha Cody is a 61year old female admitted from home for neuro workup r/t possible TIA/CVA. At baseline she is completely independent and active; no DME use. Lives with  and two sons (15 and 12). Performs bed mobility independently. She appears very anxious, shaky, and restless. Moves quickly with poor safety awareness. LE assessment shows AROM WFL bilaterally with trace weakness/shakiness noted in left LE compared to right. Sensation present to light touch bilaterally but decreased on left LE compared to right. Transfers and ambulation performed with supervision. Pt is shaky and slightly unsteady in standing. Ambulates 500 ft in room and hallway with CGA/SBA for safety due to accelerated pace and balance deficits. No major loss of balance noted however she appears somewhat impulsive and unsafe. Navigates 10 steps using right handrail and with CGA/SBA. Returned to room and immediately back to bed/covered herself up due to wanting to nap. Left with needs in reach, bed alarm on, and family present. Daniel Sexton is functioning slightly below baseline with balance, activity tolerance, and overall safety during mobility. May see 1-2 additional sessions to maximize independence with mobility. MRI pending. This section established at most recent assessment   PROBLEM LIST (Impairments causing functional limitations):  Decreased Strength  Decreased Transfer Abilities  Decreased Ambulation Ability/Technique  Decreased Balance  Decreased Activity Tolerance   INTERVENTIONS PLANNED: (Benefits and precautions of physical therapy have been discussed with the patient.)  Balance Exercise  Bed Mobility  Gait Training  Therapeutic Activites  Therapeutic Exercise/Strengthening  Transfer Training     TREATMENT PLAN: Frequency/Duration: 3 times a week for 1 week  Rehabilitation Potential For Stated Goals: Excellent     REHAB RECOMMENDATIONS (at time of discharge pending progress):    Placement: It is my opinion, based on this patient's performance to date, that Ms. Ariadna Jeff may benefit from participating in 1-2 additional therapy sessions in order to continue to assess for rehab potential and then make recommendation for disposition at discharge. Equipment:   none               HISTORY:   History of Present Injury/Illness (Reason for Referral):  Per H&P, \"Patient seen as a code S with complaints of left-sided numbness slurred speech difficulty with vision. Noted to have imbalance on transfer from chair to stretcher. For.    Initial NIHSS was 4. CT of the head was obtained and reviewed by me at 940. Showed no evidence of hemorrhage or early ischemic change or intravascular thrombus. CTA of head neck was obtained reviewed by me with radiology at 10 AM.  No proximal large vessel occlusion. The patient was not a candidate for alteplase because Last known well outside of 4.5-hour window. The patient was not a candidate for mechanical thrombectomy, as no large vessel occlusion was identified on CTA. Patient has a history of palpitations and states that there has been some concern about atrial fibrillation in the past she should be monitored carefully for A. Fib undergo echocardiography.   She may be a candidate for advanced cardiac evaluation including ZOHAIB or loop recorder depending on preliminary results\"    Past Medical History/Comorbidities:   Ms. Shabnam Cee  has a past medical history of 3-part fracture of surgical neck of left humerus (10/27/2016), ADHD (attention deficit hyperactivity disorder), Arthritis, Carpal tunnel syndrome (11/11/2015), Closed fracture of anatomical neck of humerus (6/16/2015), Degeneration of lumbar or lumbosacral intervertebral disc (2/27/2015), Depressive disorder, not elsewhere classified (2/27/2015), Dermatophytosis of the body (2/27/2015), Essential hypertension, benign (2/27/2015), GERD (gastroesophageal reflux disease) (2/27/2015), Heart murmur, History of MRSA infection, Hypopotassemia (2/27/2015), Insomnia, unspecified (2/27/2015), Kidney stone, Kidney stones, Liver disease, Migraine, unspecified, without mention of intractable migraine without mention of status migrainosus (2/27/2015), Odontoid fracture with type II morphology ([facility] [address] Osteoporosis (1/23/2017), Other closed fractures of upper end of humerus (6/16/2015), Polycythemia, secondary (2/27/2015), Traumatic tear of left rotator cuff (10/27/2016), Type 2 superior 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 shoulder (10/27/2016), and Unspecified viral hepatitis C without hepatic coma (02/27/2015). Ms. Chaz Gilliland  has a past surgical history that includes hx lithotripsy (2005); hx bladder suspension (2006); hx carpal tunnel release (Right, 2001); pr cardiac surg procedure unlist; hx orthopaedic; hx orthopaedic (2011); hx orthopaedic (Left); and hx rotator cuff repair (Bilateral). Social History/Living Environment:   Home Environment: Private residence  One/Two Story Residence: Two story  # of Interior Steps: 10  Interior Rails: Right  Lift Chair Available: No  Living Alone: No  Support Systems: Spouse/Significant Other/Partner, Child(alex), Family member(s)  Patient Expects to be Discharged to[de-identified] Private residence  Current DME Used/Available at Home: None  Prior Level of Function/Work/Activity:  Lives with  and two children in two story home. Fully independent and active at baseline; no DME. Number of Personal Factors/Comorbidities that affect the Plan of Care: 0: LOW COMPLEXITY   EXAMINATION:   Most Recent Physical Functioning:   Gross Assessment:  AROM: Within functional limits(B LEs)  Strength: Generally decreased, functional(R LE 5/5, L LE 4+/5)  Coordination: Within functional limits  Sensation: Impaired(dec L LE compared to R LE)               Posture:  Posture (WDL): Within defined limits  Balance:  Sitting: Intact  Standing: Impaired  Standing - Static: Fair(+)  Standing - Dynamic : Fair(+) Bed Mobility:  Rolling: Independent  Supine to Sit: Independent  Sit to Supine: Independent  Scooting: Independent  Wheelchair Mobility:     Transfers:  Sit to Stand: Supervision  Stand to Sit: Supervision  Bed to Chair: Supervision  Gait:     Base of Support: Narrowed  Speed/Emiyl: Accelerated  Gait Abnormalities: Trunk sway increased; Path deviations  Distance (ft): 500 Feet (ft)  Ambulation - Level of Assistance: Contact guard assistance;Stand-by assistance  Number of Stairs Trained: 10  Stairs - Level of Assistance: Contact guard assistance;Stand-by assistance  Rail Use: Right   Interventions: Verbal cues; Safety awareness training      Body Structures Involved:  Nerves  Muscles Body Functions Affected: Movement Related Activities and Participation Affected:  General Tasks and Demands  Mobility  Community, Social and Civic Life   Number of elements that affect the Plan of Care: 4+: HIGH COMPLEXITY   CLINICAL PRESENTATION:   Presentation: Stable and uncomplicated: LOW COMPLEXITY   CLINICAL DECISION MAKIN [address]? ?6 Clicks? Basic Mobility Inpatient Short Form  How much difficulty does the patient currently have. .. Unable A Lot A Little None   1. Turning over in bed (including adjusting bedclothes, sheets and blankets)? ? 1   ? 2   ? 3   ? 4   2. Sitting down on and standing up from a chair with arms ( e.g., wheelchair, bedside commode, etc.)   ? 1   ? 2   ? 3   ? 4   3. Moving from lying on back to sitting on the side of the bed?   ? 1   ? 2   ? 3   ? 4   How much help from another person does the patient currently need. .. Total A Lot A Little None   4. Moving to and from a bed to a chair (including a wheelchair)? ? 1   ? 2   ? 3   ? 4   5. Need to walk in hospital room? ? 1   ? 2   ? 3   ? 4   6. Climbing 3-5 steps with a railing? ? 1   ? 2   ? 3   ? 4   © , Trustees of [address] [place] [address] [address] [id number], under license to [employer]. All rights reserved      Score:  Initial: 22 Most Recent: X (Date: -- )    Interpretation of Tool:  Represents activities that are increasingly more difficult (i.e. Bed mobility, Transfers, Gait). Medical Necessity:     Patient demonstrates good   rehab potential due to higher previous functional level. Reason for Services/Other Comments:  Patient continues to demonstrate capacity to improve mobility, balance, activity tolerance which will increase independence and increase safety  .    Use of outcome tool(s) and clinical judgement create a POC that gives a: Clear prediction of patient's progress: LOW COMPLEXITY            TREATMENT:   (In addition to Assessment/Re-Assessment sessions the following treatments were rendered)   Pre-treatment Symptoms/Complaints:  \"I want to take a nap\"  Pain: Initial:   Pain Intensity 1: 0  Post Session:  0/10     Assessment/Reassessment only, no treatment provided today    Braces/Orthotics/Lines/Etc:   O2 Device: Room air  Treatment/Session Assessment:    Response to Treatment:  pt performs mobility with CGA/supervision  Interdisciplinary Collaboration:   Physical Therapist  Registered Nurse  After treatment position/precautions:   Supine in bed  Bed alarm/tab alert on  Bed/Chair-wheels locked  Bed in low position  Call light within reach  Family at bedside   Compliance with Program/Exercises: Will assess as treatment progresses  Recommendations/Intent for next treatment session: \"Next visit will focus on advancements to more challenging activities and reduction in assistance provided\".   Total Treatment Duration:  PT Patient Time In/Time Out  Time In: 1342  Time Out: 5586 Buddy Snell DPT

## 2019-06-27 NOTE — ED NOTES
TRANSFER - OUT REPORT:    Verbal report given to Yoly NELSON (name) on Rene W Vignesh Guardian  being transferred to  (unit) for routine progression of care       Report consisted of patients Situation, Background, Assessment and   Recommendations(SBAR). Information from the following report(s) SBAR was reveiwed with the receiving nurse. Opportunity for questions and clarification was provided.       Ischemic Stroke without Activase/TIA    BP Parameters: Less Than 220/120 for 24 hours, then consult MD for parameters    Controlled With: None    Dysphagia Screen Completed: Yes: Pass  Dysphagia Screening  Vocal Quality/Secretions: Normal  History of Dysphagia: No  O2 Saturation: Normal  Alertness: Normal  Pre-Swallow Assessment Score: 0     NIH Stroke Scale Complete: Yes: 2    Frequency of Vital Signs: Every 2 hours    Frequency of Neuro Checks: Every 2 hours

## 2019-06-27 NOTE — PROGRESS NOTES
Problem: Patient Education: Go to Patient Education Activity  Goal: Patient/Family Education  Outcome: Progressing Towards Goal       OCCUPATIONAL THERAPY: Initial Assessment, Discharge and PM 6/27/2019  OBSERVATION:    Payor: [place] / Plan: [place] [address] / Product Type: PPO /      NAME/AGE/GENDER: Rafaela Knapp is a 61 y.o. female   PRIMARY DIAGNOSIS:  TIA (transient ischemic attack) [G45.9]  Hypertension [I10] <principal problem not specified>   <principal problem not specified>          ICD-10: Treatment Diagnosis:    Generalized Muscle Weakness (M62.81)   Precautions/Allergies:     Benadryl [diphenhydramine hcl]; Demerol [meperidine]; Dilaudid [hydromorphone]; Lortab [hydrocodone-acetaminophen]; Macrodantin [nitrofurantoin macrocrystal]; Morphine; and Nucynta [tapentadol]      ASSESSMENT:     Ms. Jeanne Kirk presents for TIA. Upon arrival, pt supine in bed and agreeable to OT evaluation. Pt is alert and oriented x 4. Pt reports living in a 2-story home with 10 steps to 2nd floor and living accommodations on the 2nd floor. At baseline, pt reports independence with all ADls and functional mobility. Today, pt completed all functional mobility and transfers with independence. BUE AROM is WFL; LUE strength (3/5) < RUE strength (4/5); LUE sensation slight diminished compared RUE. Pt returned back to supine in bed with needs met, call light within reach, and family at bedside. Pt denies requiring any further OT needs. Pt seems to be functioning at baseline for ADLs; will defer to PT for functional mobility. Pt to be discharged from further OT services.      This section established at most recent assessment   PROBLEM LIST (Impairments causing functional limitations):  Decreased Strength  Decreased Activity Tolerance   INTERVENTIONS PLANNED: (Benefits and precautions of occupational therapy have been discussed with the patient.)  discharge     TREATMENT PLAN: Frequency/Duration: Follow patient 3x/week to address above goals. Rehabilitation Potential For Stated Goals: Good     REHAB RECOMMENDATIONS (at time of discharge pending progress):    Placement: It is my opinion, based on this patient's performance to date, that Ms. Rekha Cody may benefit from being discharged with NO further skilled therapy due to a proven ability to function at baseline. Equipment:   none               OCCUPATIONAL PROFILE AND HISTORY:   History of Present Injury/Illness (Reason for Referral):  See H&P  Past Medical History/Comorbidities:   Ms. Rekha Cody  has a past medical history of 3-part fracture of surgical neck of left humerus (10/27/2016), ADHD (attention deficit hyperactivity disorder), Arthritis, Carpal tunnel syndrome (11/11/2015), Closed fracture of anatomical neck of humerus (6/16/2015), Degeneration of lumbar or lumbosacral intervertebral disc (2/27/2015), Depressive disorder, not elsewhere classified (2/27/2015), Dermatophytosis of the body (2/27/2015), Essential hypertension, benign (2/27/2015), GERD (gastroesophageal reflux disease) (2/27/2015), Heart murmur, History of MRSA infection, Hypopotassemia (2/27/2015), Insomnia, unspecified (2/27/2015), Kidney stone, Kidney stones, Liver disease, Migraine, unspecified, without mention of intractable migraine without mention of status migrainosus (2/27/2015), Odontoid fracture with type II morphology ([facility] [address] Osteoporosis (1/23/2017), Other closed fractures of upper end of humerus (6/16/2015), Polycythemia, secondary (2/27/2015), Traumatic tear of left rotator cuff (10/27/2016), Type 2 superior 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 shoulder (10/27/2016), and Unspecified viral hepatitis C without hepatic coma (02/27/2015).   Ms. Rekha Cody  has a past surgical history that includes hx lithotripsy (2005); hx bladder suspension (2006); hx carpal tunnel release (Right, 2001); pr cardiac surg procedure unlist; hx orthopaedic; hx orthopaedic (2011); hx orthopaedic (Left); and hx rotator cuff repair (Bilateral). Social History/Living Environment:   Home Environment: Private residence  One/Two Story Residence: Two story  # of Interior Steps: 10  Interior Rails: Right  Lift Chair Available: No  Living Alone: No  Support Systems: Spouse/Significant Other/Partner  Patient Expects to be Discharged to[de-identified] Private residence  Current DME Used/Available at Home: None  Prior Level of Function/Work/Activity:  Independent with ADLs and functional mobility. Personal Factors:          Sex:  female        Age:  61 y.o.         Other factors that influence how disability is experienced by the patient:  multiple co-morbidities     Number of Personal Factors/Comorbidities that affect the Plan of Care: Brief history (0):  LOW COMPLEXITY   ASSESSMENT OF OCCUPATIONAL PERFORMANCE[de-identified]   Activities of Daily Living:   Basic ADLs (From Assessment) Complex ADLs (From Assessment)   Feeding: Independent  Oral Facial Hygiene/Grooming: Independent  Bathing: Independent  Upper Body Dressing: Independent  Lower Body Dressing: Independent  Toileting: Independent Instrumental ADL  Meal Preparation: Independent  Homemaking: Independent   Grooming/Bathing/Dressing Activities of Daily Living     Cognitive Retraining  Safety/Judgement: Awareness of environment                       Bed/Mat Mobility  Rolling: Independent  Supine to Sit: Independent  Sit to Supine: Independent  Sit to Stand: Supervision  Stand to Sit: Supervision  Bed to Chair: Supervision  Scooting: Independent     Most Recent Physical Functioning:   Gross Assessment:  AROM: Within functional limits  Strength: Generally decreased, functional  Coordination: Within functional limits  Sensation: Impaired(RUE > LUE)               Posture:  Posture (WDL): Within defined limits  Balance:  Sitting: Intact  Standing: Impaired  Standing - Static: Fair(+)  Standing - Dynamic : Fair(+) Bed Mobility:  Rolling: Independent  Supine to Sit: Independent  Sit to Supine: Independent  Scooting: Independent  Wheelchair Mobility:     Transfers:  Sit to Stand: Supervision  Stand to Sit: Supervision  Bed to Chair: Supervision            Patient Vitals for the past 6 hrs:   BP BP Patient Position SpO2 Pulse   19 0945 149/66 At rest -- 63   19 0948 155/85 At rest -- 86   19 0950 172/69 At rest -- (!) 59   19 1000 133/54 At rest -- 64   19 1010 145/67 At rest -- 63   19 1018 133/54 At rest -- 64   19 1026 165/71 At rest 97 % (!) 54   19 1032 152/61 -- 97 % (!) 59   19 1200 152/75 At rest 97 % 61       Mental Status  Neurologic State: Alert  Orientation Level: Oriented X4  Cognition: Follows commands  Perception: Appears intact  Perseveration: No perseveration noted  Safety/Judgement: Awareness of environment                          Physical Skills Involved:  Strength  Activity Tolerance Cognitive Skills Affected (resulting in the inability to perform in a timely and safe manner):  none  Psychosocial Skills Affected:  Habits/Routines  Environmental Adaptation   Number of elements that affect the Plan of Care: 3-5:  MODERATE COMPLEXITY   CLINICAL DECISION MAKIN [address]? ?6 Clicks? Daily Activity Inpatient Short Form  How much help from another person does the patient currently need. .. Total A Lot A Little None   1. Putting on and taking off regular lower body clothing? ? 1   ? 2   ? 3   ? 4   2. Bathing (including washing, rinsing, drying)? ? 1   ? 2   ? 3   ? 4   3. Toileting, which includes using toilet, bedpan or urinal?   ? 1   ? 2   ? 3   ? 4   4. Putting on and taking off regular upper body clothing? ? 1   ? 2   ? 3   ? 4   5. Taking care of personal grooming such as brushing teeth? ? 1   ? 2   ? 3   ? 4   6. Eating meals? ? 1   ? 2   ? 3   ? 4   © , Trustees of [facility], under license to [employer].  All rights reserved      Score:  Initial: 24 Most Recent: X (Date: -- ) Interpretation of Tool:  Represents activities that are increasingly more difficult (i.e. Bed mobility, Transfers, Gait). Medical Necessity:     discharge. Reason for Services/Other Comments:  discharge   Use of outcome tool(s) and clinical judgement create a POC that gives a: LOW COMPLEXITY         TREATMENT:   (In addition to Assessment/Re-Assessment sessions the following treatments were rendered)     Pre-treatment Symptoms/Complaints:    Pain: Initial:   Pain Intensity 1: 0 /10 Post Session:  same     Assessment/Reassessment only, no treatment provided today    Braces/Orthotics/Lines/Etc:   O2 Device: Room air  Treatment/Session Assessment:    Response to Treatment:  eval only. Interdisciplinary Collaboration:   Occupational Therapist  Registered Nurse  After treatment position/precautions:   Supine in bed  Bed/Chair-wheels locked  Call light within reach  Family at bedside   Compliance with Program/Exercises: Will assess as treatment progresses. Recommendations/Intent for next treatment session: \"Next visit will focus on advancements to more challenging activities and reduction in assistance provided\".   Total Treatment Duration:  OT Patient Time In/Time Out  Time In: 1428  Time Out: [id number] [facility] Dillard

## 2019-06-27 NOTE — PROGRESS NOTES
TRANSFER - IN REPORT:    Verbal report received from [facility] on Rene W Nathan Sanchez  being received from ED for routine progression of care      Report consisted of patients Situation, Background, Assessment and   Recommendations(SBAR). Information from the following report(s) SBAR, Kardex, ED Summary, Intake/Output, MAR and Recent Results was reviewed with the receiving nurse. Opportunity for questions and clarification was provided. Assessment completed upon patients arrival to unit and care assumed.

## 2019-06-27 NOTE — PROGRESS NOTES
Verbal orders from Dr. Shayy Valdez for Percocet 5-325 gm PO Q4hrs PRN and Methocarbamol 750 mg PO every 4 hrs.

## 2019-06-27 NOTE — CONSULTS
Consult    Patient: Ese Gregory MRN: [id number]     YOB: 1960  Age: 61 y.o. Sex: female      Subjective:      Ese Gregory is a 61 y.o. female who is being seen for code S. . The patient was last known normal at 0100. .     The patient presented to [facility] ED. A code S was called at 920. Neurology arrived to the bedside at 925. Initial NIHSS was 4. A CT of the head was obtained and 940.      Past Medical History:   Diagnosis Date    3-part fracture of surgical neck of left humerus 10/27/2016    ADHD (attention deficit hyperactivity disorder)     Arthritis     Carpal tunnel syndrome 11/11/2015    Closed fracture of anatomical neck of humerus 6/16/2015    Degeneration of lumbar or lumbosacral intervertebral disc 2/27/2015    Depressive disorder, not elsewhere classified 2/27/2015    Dermatophytosis of the body 2/27/2015    Essential hypertension, benign 2/27/2015    managed with med    GERD (gastroesophageal reflux disease) 2/27/2015    managed with med    Heart murmur     pt reports since birth; echo dated 9- states trace aortic and mitral regurg    History of MRSA infection     Hypopotassemia 2/27/2015    Insomnia, unspecified 2/27/2015    Kidney stone     Kidney stones     Liver disease     Hepatits C    Migraine, unspecified, without mention of intractable migraine without mention of status migrainosus 2/27/2015    Odontoid fracture with type II morphology ([facility] [address]     Osteoporosis 1/23/2017    Other closed fractures of upper end of humerus 6/16/2015    Polycythemia, secondary 2/27/2015    Traumatic tear of left rotator cuff 10/27/2016    Type 2 superior 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 shoulder 10/27/2016    Unspecified viral hepatitis C without hepatic coma 02/27/2015    pt reports hx only; normal LFTs noted labs June, 2016     Past Surgical History:   Procedure Laterality Date    CARDIAC SURG PROCEDURE UNLIST      heart cath wnl, no stent placed    HX BLADDER SUSPENSION  2006    bladder tack    HX CARPAL TUNNEL RELEASE Right 2001    then left    HX LITHOTRIPSY  2005    HX ORTHOPAEDIC      Slap repair right    HX ORTHOPAEDIC  2011    hip repair, left    HX ORTHOPAEDIC Left     Hand surgery    HX ROTATOR CUFF REPAIR Bilateral       Family History   Problem Relation Age of Onset    Lung Disease Mother     Hypertension Mother     Heart Disease Mother     Stroke Mother     COPD Mother     Cancer Father         lung    Heart Attack Father     Diabetes Other     Osteoporosis Other     Other Other         Arthritis    Breast Cancer Maternal Aunt 76    Breast Cancer Paternal Aunt 76     Social History     Tobacco Use    Smoking status: Former Smoker     Packs/day: 1.00     Years: 10.00     Pack years: 10.00     Types: Cigarettes     Last attempt to quit: 2017     Years since quittin.9    Smokeless tobacco: Never Used    Tobacco comment: 18- smoked a pack past 3-4 days   Substance Use Topics    Alcohol use: No     Alcohol/week: 0.0 oz      Current Outpatient Medications   Medication Sig Dispense Refill    diclofenac (FLECTOR) 1.3 % pt12 1 Patch by TransDERmal route every twelve (12) hours every twelve (12) hours. 60 Patch prn    penciclovir (DENAVIR) 1 % topical cream Apply  to affected area every two (2) hours. 5 g PRN    itraconazole (SPORONAX) 100 mg capsule TK ONE C PO  QD WITH DINNER  0    latanoprost (XALATAN) 0.005 % ophthalmic solution Administer 1 Drop to both eyes nightly.  oxyCODONE-acetaminophen (PERCOCET) 5-325 mg per tablet Take  by mouth every four (4) hours as needed for Pain.       escitalopram oxalate (LEXAPRO) 20 mg tablet TAKE 1 TABLET BY MOUTH EVERY DAY 30 Tab 11    amLODIPine-benazepril (LOTREL) 5-40 mg per capsule TAKE 1 CAPSULE BY MOUTH EVERY DAY 30 Cap 11    levalbuterol tartrate (XOPENEX) 45 mcg/actuation inhaler Take 2 Puffs by inhalation every six (6) hours as needed for Wheezing. Indications: Bronchospastic Pulmonary Disease 1 Inhaler 2    denosumab (PROLIA) 60 mg/mL injection 60 mg by SubCUTAneous route. Twice a year      promethazine (PHENERGAN) 25 mg tablet 1/2-1 po tid prn nausea 30 Tab 0    omeprazole (PRILOSEC) 20 mg capsule Take 20 mg by mouth daily. Patient instructed to take morning of surgery per anesthesia guidelines          Allergies   Allergen Reactions    Benadryl [Diphenhydramine Hcl] Other (comments)     hyperactivity    Demerol [Meperidine] Other (comments)     Hyperactivity    Dilaudid [Hydromorphone] Other (comments)     Hyperactivity    Lortab [Hydrocodone-Acetaminophen] Other (comments)     Hyperactivity    Macrodantin [Nitrofurantoin Macrocrystal] Nausea and Vomiting    Morphine Other (comments)     Hallucinations      Nucynta [Tapentadol] Other (comments)     Bad dreams       Review of Systems:  : Reports palpitations and tachycardia    Objective:     Vitals:    06/27/19 0950 06/27/19 1000 06/27/19 1010 06/27/19 1018   BP: 172/69 133/54 145/67 133/54   Pulse: (!) 59 64 63 64   Resp: 16 18 16 14   Temp:       SpO2:       Weight:       Height:          NIHSS   STAT NIH Stroke Scale  ([email or web address] mt=8)  Created: 2019-06-27 0930    930    1a Level of Conscious. : 0  1b LOC Questions: 0  1c LOC Commands: 0  2 Best Gaze: 0  3 Visual: 0  4 Facial Palsy: 0  5a Motor Arm - left: 0  5b Motor Arm - Right: 0  6a Motor Leg - Left: 0  6b Motor Leg - Right: 0  7 Limb Ataxia: 1  8 Sensory: 1  9 Best Language: 1  10 Dysarthria: 1  11 Extinct.  and Inatten.: 0    Total Score: 4  Lab Results   Component Value Date/Time    Cholesterol, total 159 02/06/2019 10:44 AM    HDL Cholesterol 54 02/06/2019 10:44 AM    LDL, calculated 78 02/06/2019 10:44 AM    VLDL, calculated 27 02/06/2019 10:44 AM    Triglyceride 134 02/06/2019 10:44 AM    CHOL/HDL Ratio 3.3 07/07/2017 04:00 AM        Lab Results   Component Value Date/Time Hemoglobin A1c 5.8 10/27/2016 06:47 AM        Images personally reviewed by me at 1000. CTP Results (most recent):  Results from Hospital Encounter encounter on 06/27/19   CT PERF W CBF    Narrative EXAMINATION: CT PERFUSION    DATE: 6/27/2019 9:53 AM    INDICATION: : code S posterior circulation    COMPARISON: Head CT from the same day    TECHNIQUE: CT perfusion of the brain was obtained after the administration of  intravenous contrast. Perfusion maps and perfusion analysis output were  generated using the RAPID perfusion processing software algorithm. Radiation  dose reduction techniques were used for this study: All CT scans performed at  this facility use one or all of the following: Automated exposure control,  adjustment of the mA and/or kVp according to patient's size, iterative  reconstruction. FINDINGS:    Color perfusion maps demonstrate no acute large artery territorial  perfusion defect. RAPID Output Values:     CBF < 30% volume (best correlation with core infarct volume without overcalls):  0 ml (core infarction volume greater than 50 cc associated with poor outcomes)    Tmax > 6 seconds: 0 ml    Tmax/CBF Mismatch Volume: 0 ml    Tmax/CBF Mismatch Ratio: None      Tmax > 10 seconds Volume: 0 ml (volume greater than 100 mL is associated with  poor outcome)      Impression IMPRESSION: Unremarkable CT perfusion. Please note that the determination of patient treatment is not based solely upon  imaging factors or calculation values. Management of ischemia is at the  discretion of the primary physician and is based upon a combination of clinical  and imaging data, along other factors. Results for orders placed or performed in visit on 07/26/18   AMB POC EKG ROUTINE W/ 12 LEADS, INTER & REP    Impression    Normal rate. Normal rhythm. No ectopy. Normal CT axis. Normal QRS.  Mo STEMI   Results for orders placed or performed during the hospital encounter of 08/09/17   EKG, 12 LEAD, INITIAL   Result Value Ref Range    Ventricular Rate 65 BPM    Atrial Rate 65 BPM    P-R Interval 214 ms    QRS Duration 96 ms    Q-T Interval 414 ms    QTC Calculation (Bezet) 430 ms    Calculated P Axis 57 degrees    Calculated R Axis 47 degrees    Calculated T Axis 71 degrees    Diagnosis       !! AGE AND GENDER SPECIFIC ECG ANALYSIS !! Sinus rhythm with 1st degree A-V block  Cannot rule out Anterior infarct , age undetermined  Abnormal ECG  When compared with ECG of 06-JUL-2017 12:49,  ME interval has increased  Vent. rate has increased BY  21 BPM  Confirmed by Ginger Lynn MD (), Elder Iha ([id number]) on 8/9/2017 3:07:38 PM          MRI Results (most recent): Most recent CTA: CTA reviewed by me with radiology at 1010. No evidence of proximal large vessel occlusion. Minimal atherosclerotic changes    Most recent MRA:  Results from Hospital Encounter encounter on 12/12/18   MRI ANKLE RT WO CONT    Narrative HISTORY: Swollen painful right ankle. EXAM: MRI Right Ankle    TECHNIQUE: Multiplanar multisequence imaging is performed as per institution  protocol. COMPARISON: No comparison. FINDINGS:    Medial compartment: The posterior tibial tendon, flexor digitorum, and flexor  hallucis longus are intact. The deltoid complex is intact. There is no abnormal  distention of the tendon sheaths. Lateral compartment: The peroneus longus and brevis tendons are intact. There is  no abnormal fluid distending the tendon sheaths. There is a tear of the ATFL. The PTFL, and calcaneofibular ligaments are intact. In addition, the syndesmotic  ligaments are intact. No secondary findings to suggest rupture of the superior  or inferior peroneal retinacula. Posterior compartment: The Achilles tendon is normal. There is no abnormal fluid  accumulation within the retrocalcaneal bursa. Signal intensity within Kager's  fat pad is normal.    Anterior compartment: The extensor tendons are intact. Plantar compartment:  The plantar fascia aponeurosis is normal.    Additional Findings: There is mild hindfoot osteoarthritic change with  osteophyte formation involving the posterior margin of the tibiotalar  articulation. There is a small joint effusion associated with the posterior  subtalar facet. Impression IMPRESSION:   1. Tear of the ATFL. 2. Small fluid within the posterior subtalar facet. 3. Hindfoot degenerative change. Most recent Echo:  No results found for this visit on 06/27/19. Assessment:     Patient seen as a code S with complaints of left-sided numbness slurred speech difficulty with vision. Noted to have imbalance on transfer from chair to stretcher. For. Initial NIHSS was 4. CT of the head was obtained and reviewed by me at 940. Showed no evidence of hemorrhage or early ischemic change or intravascular thrombus. CTA of head neck was obtained reviewed by me with radiology at 10 AM.  No proximal large vessel occlusion. The patient was not a candidate for alteplase because Last known well outside of 4.5-hour window. The patient was not a candidate for mechanical thrombectomy, as no large vessel occlusion was identified on CTA. Patient has a history of palpitations and states that there has been some concern about atrial fibrillation in the past she should be monitored carefully for A. Fib undergo echocardiography. She may be a candidate for advanced cardiac evaluation including ZOHAIB or loop recorder depending on preliminary results.       Plan:     · Start ASA 81 mg daily after STAND  · Neurochecks Q4H  · MRI of brain  · CTA of head and neck   · Bedside swallow test   · Labs: A1c, FLP, TSH, Cardiac enzymes, BMP, CBC  · Telemetry and echocardiogram with bubble study  · PT/OT/ST  · Lovenox or heparin for DVT ppx   · BP management - allow permissive HTN to 220/120 x24 hours, treat with labetalol 10 mg IV or nicardipine gtt  · Smoking cessation if applicable   · Depression Screening prior to discharge     Signed By: Maciej Borges MD     June 27, 2019      Critical care time in 925   critical care time out 1025

## 2019-06-27 NOTE — PROGRESS NOTES
Spoke with patient at bedside regarding discharge planning. Alert and oriented x 4  Lives with  and children in their own two story home with 6 step entry and 15/16 steps to second floor. Prior to admission independent with ADL's and driving. PCP- Dr. John Paul Valencia and last seen 2 weeks ago  DME- crutches  Denies any previous [facility] services. Denies any problems affording Rx. TIA/CVA work up in progress- MRI,CTA head/neck, PT/OT/ST evals pending  CM will continue to follow. Care Management Interventions  PCP Verified by CM: Yes  Mode of Transport at Discharge:  Other (see comment)(family)  Transition of Care Consult (CM Consult): Discharge Planning  Physical Therapy Consult: Yes  Occupational Therapy Consult: Yes  Speech Therapy Consult: Yes  Current Support Network: Lives with Spouse, Own Home  Confirm Follow Up Transport: Family  Plan discussed with Pt/Family/Caregiver: Yes  Freedom of Choice Offered: Yes  Discharge Location  Discharge Placement: Unable to determine at this time

## 2019-06-27 NOTE — ED NOTES
Pt resting comfortably in bed at this time.  at bedside. Pt A&Ox4 at this time. No distress noted. Per  pt has gotten \"significantly better\" since arrival to ER. Told on arrival pt was having difficulty with speech and cognition. Pt does not appear to be having any difficulties at this time. Pt ambulated to and from restroom with no complications with RN at side to assist if needed. Will continue to very closely monitor.

## 2019-06-27 NOTE — PROGRESS NOTES
Patient experiencing visual, auditory, and tactile hallucinations. No suicidal or homicidal thoughts. Dr. Anna May said to continue to monitor patient.

## 2019-06-27 NOTE — ED PROVIDER NOTES
Chief complaint : Stroke-like symptoms    HISTORY OF PRESENT ILLNESS :  Location : Left-sided numbness and tingling with mild aphasia    Quality : Patient also having difficulty working her Smart phone    Quantity : Constant    Timing : 5 AM this morning, upon awakening. Last known normal was 1 AM this morning when she went to bed    Severity : Moderate    Alleviating / exacerbating factors : Thinks she may have had a TIA in December,  Family indicates her PCP was fixing to get her seen by somebody to evaluate for possible TIAs. Associated Symptoms : Headache, mild nausea, no vomiting.              Past Medical History:   Diagnosis Date    3-part fracture of surgical neck of left humerus 10/27/2016    ADHD (attention deficit hyperactivity disorder)     Arthritis     Carpal tunnel syndrome 11/11/2015    Closed fracture of anatomical neck of humerus 6/16/2015    Degeneration of lumbar or lumbosacral intervertebral disc 2/27/2015    Depressive disorder, not elsewhere classified 2/27/2015    Dermatophytosis of the body 2/27/2015    Essential hypertension, benign 2/27/2015    managed with med    GERD (gastroesophageal reflux disease) 2/27/2015    managed with med    Heart murmur     pt reports since birth; echo dated 9- states trace aortic and mitral regurg    History of MRSA infection     Hypopotassemia 2/27/2015    Insomnia, unspecified 2/27/2015    Kidney stone     Kidney stones     Liver disease     Hepatits C    Migraine, unspecified, without mention of intractable migraine without mention of status migrainosus 2/27/2015    Odontoid fracture with type II morphology ([facility] [address]     Osteoporosis 1/23/2017    Other closed fractures of upper end of humerus 6/16/2015    Polycythemia, secondary 2/27/2015    Traumatic tear of left rotator cuff 10/27/2016    Type 2 superior 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 shoulder 10/27/2016    Unspecified viral hepatitis C without hepatic coma 2015    pt reports hx only; normal LFTs noted labs        Past Surgical History:   Procedure Laterality Date    CARDIAC SURG PROCEDURE UNLIST      heart cath wnl, no stent placed    HX BLADDER SUSPENSION  2006    bladder tack    HX CARPAL TUNNEL RELEASE Right     then left    HX LITHOTRIPSY  2005    HX ORTHOPAEDIC      Slap repair right    HX ORTHOPAEDIC  2011    hip repair, left    HX ORTHOPAEDIC Left     Hand surgery    HX ROTATOR CUFF REPAIR Bilateral          Family History:   Problem Relation Age of Onset    Lung Disease Mother     Hypertension Mother     Heart Disease Mother     Stroke Mother     COPD Mother     Cancer Father         lung    Heart Attack Father     Diabetes Other     Osteoporosis Other     Other Other         Arthritis    Breast Cancer Maternal Aunt 76    Breast Cancer Paternal Aunt 76       Social History     Socioeconomic History    Marital status:      Spouse name: Not on file    Number of children: Not on file    Years of education: Not on file    Highest education level: Not on file   Occupational History    Occupation: housekeeping     Employer: SELF EMPLOYED   Social Needs    Financial resource strain: Not on file    Food insecurity:     Worry: Not on file     Inability: Not on file    Transportation needs:     Medical: Not on file     Non-medical: Not on file   Tobacco Use    Smoking status: Former Smoker     Packs/day: 1.00     Years: 10.00     Pack years: 10.00     Types: Cigarettes     Last attempt to quit: 2017     Years since quittin.9    Smokeless tobacco: Never Used    Tobacco comment: 18- smoked a pack past 3-4 days   Substance and Sexual Activity    Alcohol use: No     Alcohol/week: 0.0 oz    Drug use: No    Sexual activity: Not on file   Lifestyle    Physical activity:     Days per week: Not on file     Minutes per session: Not on file    Stress: Not on file   Relationships    Social connections:     Talks on phone: Not on file     Gets together: Not on file     Attends Judaism service: Not on file     Active member of club or organization: Not on file     Attends meetings of clubs or organizations: Not on file     Relationship status: Not on file    Intimate partner violence:     Fear of current or ex partner: Not on file     Emotionally abused: Not on file     Physically abused: Not on file     Forced sexual activity: Not on file   Other Topics Concern     Service Not Asked    Blood Transfusions Not Asked    Caffeine Concern Not Asked    Occupational Exposure Not Asked   Diego [place] Hazards Not Asked    Sleep Concern Not Asked    Stress Concern Not Asked    Weight Concern Not Asked    Special Diet Not Asked    Back Care Not Asked    Exercise No    Bike Helmet Not Asked   [address] Not Asked    Self-Exams Not Asked   Social History Narrative    Not on file         ALLERGIES: Benadryl [diphenhydramine hcl]; Demerol [meperidine]; Dilaudid [hydromorphone]; Lortab [hydrocodone-acetaminophen]; Macrodantin [nitrofurantoin macrocrystal]; Morphine; and Nucynta [tapentadol]    Review of Systems   Constitutional: Negative for chills and fever. HENT: Negative for rhinorrhea and sore throat. Eyes: Negative for discharge and redness. Respiratory: Negative for cough and shortness of breath. Cardiovascular: Negative for chest pain and palpitations. Gastrointestinal: Positive for nausea. Negative for abdominal pain and vomiting. Musculoskeletal: Negative for neck pain and neck stiffness. Skin: Negative for rash. Neurological: Positive for speech difficulty, numbness and headaches. Negative for dizziness. Psychiatric/Behavioral: Positive for decreased concentration. The patient is nervous/anxious. All other systems reviewed and are negative.       Vitals:    06/27/19 0920   BP: 150/89   Pulse: 99   Resp: 20   Temp: 97.8 °F (36.6 °C)   SpO2: 99%   Weight: 54.4 kg (120 lb)   Height: 5' (1.524 m)            Physical Exam   Constitutional: She is oriented to person, place, and time. She appears well-developed and well-nourished. She appears distressed. HENT:   Head: Normocephalic and atraumatic. Eyes: Pupils are equal, round, and reactive to light. Conjunctivae are normal. Right eye exhibits no discharge. Left eye exhibits no discharge. No scleral icterus. Neck: Normal range of motion. Neck supple. Cardiovascular: Normal rate, regular rhythm and normal heart sounds. Exam reveals no gallop. No murmur heard. Pulmonary/Chest: Effort normal and breath sounds normal. No respiratory distress. She has no wheezes. She has no rales. Abdominal: Soft. Bowel sounds are normal. There is no tenderness. There is no guarding. Musculoskeletal: Normal range of motion. She exhibits no edema. Neurological: She is alert and oriented to person, place, and time. A sensory deficit (hypoesthesia to left face, left arm, left leg) is present. No cranial nerve deficit. She exhibits normal muscle tone. Coordination (Finger to nose accurate, albeit slightly tremulous) normal.   cni 2-12 grossly   Skin: Skin is warm and dry. She is not diaphoretic. Psychiatric: She has a normal mood and affect. Her behavior is normal.   Nursing note and vitals reviewed. MDM  Number of Diagnoses or Management Options  Cerebrovascular accident (CVA), unspecified mechanism [facility]):   Diagnosis management comments: Medical decision making note:  Numbness with mild aphasia and procedural processing difficulties related to operating her iPhone  Suspect CVA - obtaining CT scan and Labs, neurology already present for consult during triage process. 10:27 AM CT head and CTA, CT perfusion negative for any TPA indication  Admit to hospital service  This concludes the \"medical decision making note\" part of this emergency department visit note.            Procedures

## 2019-06-28 VITALS
HEART RATE: 77 BPM | TEMPERATURE: 98.4 F | OXYGEN SATURATION: 98 % | HEIGHT: 60 IN | WEIGHT: 120 LBS | RESPIRATION RATE: 17 BRPM | SYSTOLIC BLOOD PRESSURE: 159 MMHG | BODY MASS INDEX: 23.56 KG/M2 | DIASTOLIC BLOOD PRESSURE: 76 MMHG

## 2019-06-28 LAB
APTT PPP: 28.2 SEC (ref 24.7–39.8)
CHOLEST SERPL-MCNC: 206 MG/DL
ERYTHROCYTE [DISTWIDTH] IN BLOOD BY AUTOMATED COUNT: 12.3 % (ref 11.9–14.6)
EST. AVERAGE GLUCOSE BLD GHB EST-MCNC: 131 MG/DL
HBA1C MFR BLD: 6.2 % (ref 4.8–6)
HCT VFR BLD AUTO: 39.1 % (ref 35.8–46.3)
HDLC SERPL-MCNC: 64 MG/DL (ref 40–60)
HDLC SERPL: 3.2 {RATIO}
HGB BLD-MCNC: 13.5 G/DL (ref 11.7–15.4)
INR PPP: 1
LDLC SERPL CALC-MCNC: 112.2 MG/DL
LIPID PROFILE,FLP: ABNORMAL
MCH RBC QN AUTO: 31.9 PG (ref 26.1–32.9)
MCHC RBC AUTO-ENTMCNC: 34.5 G/DL (ref 31.4–35)
MCV RBC AUTO: 92.4 FL (ref 79.6–97.8)
NRBC # BLD: 0 K/UL (ref 0–0.2)
PLATELET # BLD AUTO: 328 K/UL (ref 150–450)
PMV BLD AUTO: 9.8 FL (ref 9.4–12.3)
PROTHROMBIN TIME: 12.6 SEC (ref 11.7–14.5)
RBC # BLD AUTO: 4.23 M/UL (ref 4.05–5.2)
TRIGL SERPL-MCNC: 149 MG/DL (ref 35–150)
VLDLC SERPL CALC-MCNC: 29.8 MG/DL (ref 6–23)
WBC # BLD AUTO: 6.5 K/UL (ref 4.3–11.1)

## 2019-06-28 PROCEDURE — 85027 COMPLETE CBC AUTOMATED: CPT

## 2019-06-28 PROCEDURE — 85610 PROTHROMBIN TIME: CPT

## 2019-06-28 PROCEDURE — 74011250637 HC RX REV CODE- 250/637: Performed by: INTERNAL MEDICINE

## 2019-06-28 PROCEDURE — 74011250637 HC RX REV CODE- 250/637: Performed by: HOSPITALIST

## 2019-06-28 PROCEDURE — 80061 LIPID PANEL: CPT

## 2019-06-28 PROCEDURE — 99213 OFFICE O/P EST LOW 20 MIN: CPT | Performed by: PSYCHIATRY & NEUROLOGY

## 2019-06-28 PROCEDURE — 99218 HC RM OBSERVATION: CPT

## 2019-06-28 PROCEDURE — 85730 THROMBOPLASTIN TIME PARTIAL: CPT

## 2019-06-28 PROCEDURE — 74011250636 HC RX REV CODE- 250/636: Performed by: HOSPITALIST

## 2019-06-28 PROCEDURE — 36415 COLL VENOUS BLD VENIPUNCTURE: CPT

## 2019-06-28 PROCEDURE — 83036 HEMOGLOBIN GLYCOSYLATED A1C: CPT

## 2019-06-28 RX ORDER — ZOLPIDEM TARTRATE 5 MG/1
5 TABLET ORAL ONCE
Status: DISCONTINUED | OUTPATIENT
Start: 2019-06-28 | End: 2019-06-28 | Stop reason: HOSPADM

## 2019-06-28 RX ORDER — GUAIFENESIN 100 MG/5ML
81 LIQUID (ML) ORAL DAILY
Qty: 30 TAB | Refills: 2 | Status: SHIPPED | OUTPATIENT
Start: 2019-06-29 | End: 2019-07-29

## 2019-06-28 RX ORDER — ZOLPIDEM TARTRATE 5 MG/1
7.5 TABLET ORAL
Status: DISCONTINUED | OUTPATIENT
Start: 2019-06-28 | End: 2019-06-28 | Stop reason: HOSPADM

## 2019-06-28 RX ORDER — KETOROLAC TROMETHAMINE 15 MG/ML
15 INJECTION, SOLUTION INTRAMUSCULAR; INTRAVENOUS ONCE
Status: COMPLETED | OUTPATIENT
Start: 2019-06-28 | End: 2019-06-28

## 2019-06-28 RX ORDER — PRAVASTATIN SODIUM 80 MG/1
80 TABLET ORAL
Qty: 30 TAB | Refills: 2 | Status: SHIPPED | OUTPATIENT
Start: 2019-06-28 | End: 2019-07-28

## 2019-06-28 RX ORDER — GUAIFENESIN 100 MG/5ML
81 LIQUID (ML) ORAL DAILY
Status: DISCONTINUED | OUTPATIENT
Start: 2019-06-29 | End: 2019-06-28 | Stop reason: HOSPADM

## 2019-06-28 RX ADMIN — PANTOPRAZOLE SODIUM 40 MG: 40 TABLET, DELAYED RELEASE ORAL at 04:47

## 2019-06-28 RX ADMIN — ZOLPIDEM TARTRATE 7.5 MG: 5 TABLET ORAL at 00:21

## 2019-06-28 RX ADMIN — ACETAMINOPHEN 650 MG: 325 TABLET, FILM COATED ORAL at 02:50

## 2019-06-28 RX ADMIN — Medication 5 ML: at 04:47

## 2019-06-28 RX ADMIN — Medication 1 AMPULE: at 08:07

## 2019-06-28 RX ADMIN — ASPIRIN 325 MG: 325 TABLET ORAL at 08:07

## 2019-06-28 RX ADMIN — OXYCODONE HYDROCHLORIDE AND ACETAMINOPHEN 1 TABLET: 5; 325 TABLET ORAL at 08:09

## 2019-06-28 RX ADMIN — METHOCARBAMOL 750 MG: 750 TABLET ORAL at 08:07

## 2019-06-28 RX ADMIN — OXYCODONE HYDROCHLORIDE AND ACETAMINOPHEN 1 TABLET: 5; 325 TABLET ORAL at 00:21

## 2019-06-28 RX ADMIN — LORAZEPAM 0.5 MG: 0.5 TABLET ORAL at 02:04

## 2019-06-28 RX ADMIN — KETOROLAC TROMETHAMINE 15 MG: 15 INJECTION, SOLUTION INTRAMUSCULAR; INTRAVENOUS at 10:13

## 2019-06-28 NOTE — PROGRESS NOTES
Neurology Daily Progress Note     I have seen and examined the patient along with Rahda Ojeda NP. I agree with the documentation as recorded. In addition I would add:    I have seen the patient independently during which time I reviewed the history and performed a physical examination, reviewed the NP documentation and discussed with the NP . The interval history obtained is continued left-sided numbness notable for: The physical examination performed is notable for: Cranial nerves II-XII intact. No motor weakness. Sensory loss on the left. MRI demonstrates diffuse supra tentorial and infratentorial areas of increased FLA IR signal but no restricted diffusion    The medical decision making including assessment and recommendations is notable for: I do not think the patient has had a stroke but she may have multiple sclerosis. Will need to undergo further evaluation for MS including a lumbar puncture under fluoroscopy as an outpatient. Orders will be placed. Patient she was follow-up with me in my office at [employer] [address]. [phone]. Kalyn Álvarez MD      Assessment:     Patient seen as a code S with complaints of left-sided numbness slurred speech difficulty with vision. CT of the head showed no evidence of hemorrhage or early ischemic change or intravascular thrombus. CTA of head neck no LVO or high grade stenosis. MRI of brain showed no acute infarction. Difffuse hyperintensities throughout the the supratentorial and pontine white matter. TTE negative for PFO or atrial enlargement. Stroke work up negative. Patient was given cymbalta last night and experienced hallucinations, patient spouse stated patient has had similar reaction to Cymbalta in the past. Discontinue Cymbalta, resume lexapro. Patient continues to have left sided hemiparesthesia, will work up outpatient in clinic for possible MS.        Plan: Will need LP under fluoroscopy, this can be scheduled outpatient. Subjective: Interval history:    Patient resting in bed with spouse at bedside. Continues to have left sided hemiparesthesia. Patient stated she was experiencing visual hallucinations during the night, stated she was seeing a bear skin rug on the floor and it's legs started moving. Stated she was also seeing bugs crawling across the walls. Patient was given cymbalta last night, patient spouse stated patient has had similar reaction to Cymbalta in the past. Denies hallucinations at present time. MRI of brain showed no acute infarction. Difffuse hyperintensities throughout the the supratentorial and pontine white matter. TTE negative for PFO or atrial enlargement. Stroke work up negative. History:    Johnnie Manriquez is a 61 y.o. female who is being seen for Code S. Review of systems negative with exception of pertinent positives and negatives noted above. Objective:     Vitals:    06/28/19 0012 06/28/19 0412 06/28/19 0800 06/28/19 1200   BP: 156/74 164/76 151/84 159/76   Pulse: 62 (!) 58 62 77   Resp: 15 15 16 17   Temp: 97.8 °F (36.6 °C) 97.6 °F (36.4 °C) 98.1 °F (36.7 °C) 98.4 °F (36.9 °C)   SpO2: 99% 99% 99% 98%   Weight:       Height:            No current facility-administered medications for this encounter. Current Outpatient Medications:     [START ON 6/29/2019] aspirin 81 mg chewable tablet, Take 1 Tab by mouth daily for 30 days. , Disp: 30 Tab, Rfl: 2    pravastatin (PRAVACHOL) 80 mg tablet, Take 1 Tab by mouth nightly for 30 days. , Disp: 30 Tab, Rfl: 2    methocarbamol (ROBAXIN) 750 mg tablet, Take 750 mg by mouth four (4) times daily. , Disp: , Rfl:     diclofenac (FLECTOR) 1.3 % pt12, 1 Patch by TransDERmal route every twelve (12) hours every twelve (12) hours. , Disp: 60 Patch, Rfl: prn    penciclovir (DENAVIR) 1 % topical cream, Apply  to affected area every two (2) hours. , Disp: 5 g, Rfl: PRN    itraconazole (SPORONAX) 100 mg capsule, TK ONE C PO  QD WITH DINNER, Disp: , Rfl: 0    latanoprost (XALATAN) 0.005 % ophthalmic solution, Administer 1 Drop to both eyes nightly., Disp: , Rfl:     escitalopram oxalate (LEXAPRO) 20 mg tablet, TAKE 1 TABLET BY MOUTH EVERY DAY, Disp: 30 Tab, Rfl: 11    amLODIPine-benazepril (LOTREL) 5-40 mg per capsule, TAKE 1 CAPSULE BY MOUTH EVERY DAY, Disp: 30 Cap, Rfl: 11    omeprazole (PRILOSEC) 20 mg capsule, Take 20 mg by mouth daily. Patient instructed to take morning of surgery per anesthesia guidelines, Disp: , Rfl:     SUMAtriptan (IMITREX) 100 mg tablet, Take 1 Tab by mouth once as needed for Migraine for up to 1 dose. 1/2 tab prn headache may repeat x 1 within 24 hours, Disp: 10 Tab, Rfl: 11    levalbuterol tartrate (XOPENEX) 45 mcg/actuation inhaler, Take 2 Puffs by inhalation every six (6) hours as needed for Wheezing. Indications: Bronchospastic Pulmonary Disease, Disp: 1 Inhaler, Rfl: 2    denosumab (PROLIA) 60 mg/mL injection, 60 mg by SubCUTAneous route. Twice a year, Disp: , Rfl:     promethazine (PHENERGAN) 25 mg tablet, 1/2-1 po tid prn nausea, Disp: 30 Tab, Rfl: 0    MRI Results (most recent):  Results from Hospital Encounter encounter on 06/27/19   MRI BRAIN WO CONT    Narrative MRI BRAIN WITHOUT CONTRAST 6/27/2019    HISTORY: Left facial numbness and tingling with left extremity weakness. Evaluate for stroke. TECHNIQUE: Sagittal and axial T1-weighted, axial T2-weighted, axial and coronal  FLAIR, axial T2-weighted gradient-echo, axial diffusion weighted images with ADC  maps of the brain. COMPARISON: Head CT June 27, 2019    FINDINGS: There is no acute infarction, acute intracranial hemorrhage,  intra-axial mass, hydrocephalus or extra-axial hematoma. Artifact is present  from hardware in the upper cervical spine. The cerebellar tonsils are in a  normal position. On the T2-weighted and FLAIR sequences, there are scattered  punctate white matter hyperintensities.  Findings are nonspecific and can be seen  with chronic small vessel ischemic disease, demyelinating disease or with  migraine headaches. There is more confluent T2/FLAIR hyperintense white matter  signal within the brunilda. Impression IMPRESSION:    1. No acute infarction. 2. Diffuse hyperintensities throughout the supratentorial and pontine white  matter present as described. Correlate with neurological history. Transthoracic Echocardiogram  2D, M-mode, Doppler, and Color Doppler    Patient: Melissa Brown  MR #: [id number]  : 1960  Age: 61 years  Gender: Female  Study date: 2019  Account #: [de-identified]  Height: 60 in  Weight: 119.7 lb  BSA: 1.5 mï¾²  Status:Routine  Location: [facility]  BP: 152/ 61    Allergies: DIPHENHYDRAMINE HCL, MEPERIDINE, HYDROMORPHONE,  HYDROCODONE-ACETAMINOPHEN, NITROFURANTOIN MACROCRYSTAL, MORPHINE, TAPENTADOL    Sonographer: FELICE Moncada  Group:  [facility]  Referring Physician: Simon Cardoso MD  Reading Physician: Jassi Rodas MD [facility]    INDICATIONS: TIA/CVA work up    PROCEDURE: This was a routine study. A transthoracic echocardiogram was  performed. The study included complete 2D imaging, M-mode, complete spectral  Doppler, and color Doppler. Intravenous contrast (agitated saline) was  administered. Image quality was adequate. LEFT VENTRICLE: Size was normal. Systolic function was normal. Ejection  fraction was estimated in the range of 55 % to 60 %. There were no regional  wall motion abnormalities. Wall thickness was normal. Left ventricular  diastolic function was normal with an average E/e' of 9.4    RIGHT VENTRICLE: The size was normal. Systolic function was normal. The  tricuspid jet envelope definition was inadequate for estimation of RV   systolic  pressure. LEFT ATRIUM: Size was normal.    RIGHT ATRIUM: Size was normal.    SYSTEMIC VEINS: IVC: The inferior vena cava was normal in size and course.     AORTIC VALVE: The valve was structurally normal, tri-commissural. Leaflets  exhibited mild sclerosis. There was no evidence for stenosis. There was   trivial  regurgitation. MITRAL VALVE: Valve structure was normal. There was no evidence for stenosis. There was trivial regurgitation. TRICUSPID VALVE: The valve structure was normal. There was no evidence for  stenosis. There was trivial regurgitation. PULMONIC VALVE: The valve structure was normal. There was no evidence for  stenosis. There was no insufficiency. PERICARDIUM: There was no pericardial effusion. AORTA: The root exhibited normal size. SUMMARY:    -  Left ventricle: Systolic function was normal. Ejection fraction was  estimated in the range of 55 % to 60 %. There were no regional wall motion  abnormalities. Left ventricular diastolic function was normal with an average  E/e' of 9.4    -  Aortic valve: The valve was structurally normal, tri-commissural. Leaflets  exhibited mild sclerosis. There was trivial regurgitation. -  Mitral valve: There was trivial regurgitation. -  Tricuspid valve: There was trivial regurgitation. SYSTEM MEASUREMENT TABLES    2D mode  AoR Diam (2D): 2.8 cm  LA Dimension (2D): 3.5 cm  Left Atrium Systolic Volume Index; Method of Disks, Biplane; 2D mode;: 22   ml/m2  IVS/LVPW (2D): 1.1  IVSd (2D): 1 cm  LVIDd (2D): 4.6 cm  LVIDs (2D): 3.3 cm  LVPWd (2D): 0.9 cm  RVIDd (2D): 2.7 cm    Unspecified Scan Mode  Peak Grad; Mean; Antegrade Flow: 16 mm[Hg]  Vmax; Antegrade Flow: 195 cm/s  Peak Grad; Mean; Antegrade Flow: 5 mm[Hg]  Vmax; Antegrade Flow: 117 cm/s    Prepared and signed by    SELENA Patel MD [facility]  Signed 27-Jun-2019 16:25:37      Physical Exam:  General - Well developed, well nourished, in no apparent distress. Pleasant and conversent. HEENT - Normocephalic, atraumatic. Conjunctiva are clear. Neck - Supple without masses  Cardiovascular - Regular rate and rhythm. Normal S1, S2 without murmurs, rubs, or gallops. Lungs - Clear to auscultation.   Abdomen - Soft, nontender with normal bowel sounds. Extremities - Peripheral pulses intact. No edema and no rashes. Neurological examination - Comprehension, attention, memory and reasoning are intact. Language and speech are normal.   On cranial nerve examination, (II, III, IV, VI) pupils are equal, round, and reactive to light. Visual acuity is adequate. Visual fields are full to finger confrontation. Extraocular motility is normal. (V, VII) Face is symmetric and sensation is intact to light touch. (VIII) Hearing is intact. (IX, X) Palate and uvula elevate symmetrically. Voice is normal. (XI) Shoulder shrug is strong and equal bilaterally. (XII)Tongue is midline. Motor examination - There is normal muscle tone and bulk. Power is full throughout. Muscle stretch reflexes are 3+BUE/LE and there are no pathological reflexes present. Plantar response is flexor. Sensation is decreased in LUE/LLE to light touch, pinprick, vibration and proprioception in all extremities. Cerebellar examination is normal finger/nose and heel/shin.  Gait and stance are normal.       Signed By: Juanita Carcamo NP     June 28, 2019

## 2019-06-28 NOTE — PROGRESS NOTES
06/28/19 0659   NIH Stroke Scale   Interval Other (comment)  (Shift Change)   LOC 0   LOC Questions 0   LOC Commands 0   Best Gaze 0   Visual 0   Facial Palsy 0   Motor Right Arm 0   Motor Left Arm 0   Motor Right Leg 0   Motor Left Leg 0   Limb Ataxia 0   Sensory 1  (L side tingling)   Best Language 0   Dysarthria 0   Extinction and Inattention 0   Total 1

## 2019-06-28 NOTE — PROGRESS NOTES
06/27/19 1743   NIH Stroke Scale   Interval Other (comment)  (Shift change)   LOC 0   LOC Questions 0   LOC Commands 0   Best Gaze 0   Visual 0   Facial Palsy 0   Motor Right Arm 0   Motor Left Arm 0   Motor Right Leg 0   Motor Left Leg 0   Limb Ataxia 0   Sensory 1  (Tingling in L. side)   Best Language 0   Dysarthria 0   Extinction and Inattention 0   Total 1

## 2019-06-28 NOTE — DISCHARGE SUMMARY
Hospitalist Discharge Summary     Patient ID:  Manuel Dickinson  [id number]  44 y.o.  1960  Admit date: 6/27/2019  9:19 AM  Discharge date and time: 6/28/2019  Attending: Salty Mccann MD  PCP:  Shakila Schwab MD  Treatment Team: Attending Provider: Salty Mccann MD; Consulting Provider: Ariella Peterson MD; Care Manager: Lacey Jones RN; Speech Language Pathologist: Sadia Lama; Physical Therapist: Fabienne Medellin DPT    Principal Diagnosis <principal problem not specified>   Active Problems:    Hypertension (7/6/2017)      TIA (transient ischemic attack) (6/27/2019)             Hospital Course:  Please refer to the admission H&P for details of presentation. In summary, the patient is 61years old female with hx of HTN, TIA, GERD, anxiety d/o, nephrolithiasis, ADHD admitted for possible TIA on 6/27 in view of left sided weakness/numbness/tingling. Pt had Code S called in ER, pt did not qualify for t-pA. MRI ruled out acute CVA. Pt had improvement in numbness/tingling on left side, MRI did show mild demyelinating changes for which neurology recommends outpatient MS work up. Pt is advised to be compliant with ASA and statin. Possibility of TIA can't be ruled out. Pt is feeling better today, is cleared by Neurology. She is medically stable for discharge to home today. Rest of the hospital course was uneventful. Significant Diagnostic Studies:   MRI BRAIN WITHOUT CONTRAST 6/27/2019     HISTORY: Left facial numbness and tingling with left extremity weakness. Evaluate for stroke.     TECHNIQUE: Sagittal and axial T1-weighted, axial T2-weighted, axial and coronal  FLAIR, axial T2-weighted gradient-echo, axial diffusion weighted images with ADC  maps of the brain.     COMPARISON: Head CT June 27, 2019     FINDINGS: There is no acute infarction, acute intracranial hemorrhage,  intra-axial mass, hydrocephalus or extra-axial hematoma.  Artifact is present  from hardware in the upper cervical spine. The cerebellar tonsils are in a  normal position. On the T2-weighted and FLAIR sequences, there are scattered  punctate white matter hyperintensities. Findings are nonspecific and can be seen  with chronic small vessel ischemic disease, demyelinating disease or with  migraine headaches. There is more confluent T2/FLAIR hyperintense white matter  signal within the brunilda.     IMPRESSION  IMPRESSION:     1. No acute infarction.     2. Diffuse hyperintensities throughout the supratentorial and pontine white  matter present as described. Correlate with neurological history. History: Code stroke. Unsteady gait     Comments: CT ANGIOGRAM OF THE NECK AND CT ANGIOGRAM OF THE [place] [facility] [place] was  obtained following the administration of IV contrast. IV contrast was  administered to evaluate the arterial vasculature. Reformatted images in the  coronal and sagittal planes as well as 3-D imaging was obtained and reviewed on  a dedicated PACS and [address]. Radiation reduction dose techniques  were used for the study. Our CT scanner use one or all of the following-  Automated exposure control, adjustment of the mA and/or KV according the patient  size, iterative reconstruction. All measurements are based upon NASCET criteria  if appropriate.     Findings:     CT ANGIOGRAM OF THE NECK:     The arch and proximal great vessels are patent. The right left carotid bulbs are  patent with eccentric calcified plaque disease. Degree of stenosis is less than  50%.    The vertebral arteries are patent     The lung apices are clear.     There are subcentimeter nodules as within the right thyroid gland.     CT ANGIOGRAM OF [place] [facility] [place]:     The petrous, cavernous, and supraclinoid internal carotid arteries are patent.     The anterior and middle cerebral arteries are patent.     The distal vertebral arteries, basilar artery, posterior cerebral arteries are  patent.     IMPRESSION  IMPRESSION:  1.  Mild atherosclerotic changes without evidence of large vessel occlusive  disease or high-grade stenosis. EXAMINATION: CT PERFUSION     DATE: 6/27/2019 9:53 AM     INDICATION: : code S posterior circulation     COMPARISON: Head CT from the same day     TECHNIQUE: CT perfusion of the brain was obtained after the administration of  intravenous contrast. Perfusion maps and perfusion analysis output were  generated using the RAPID perfusion processing software algorithm. Radiation  dose reduction techniques were used for this study: All CT scans performed at  this facility use one or all of the following: Automated exposure control,  adjustment of the mA and/or kVp according to patient's size, iterative  reconstruction.     FINDINGS:    Color perfusion maps demonstrate no acute large artery territorial  perfusion defect.        RAPID Output Values:      CBF < 30% volume (best correlation with core infarct volume without overcalls):  0 ml (core infarction volume greater than 50 cc associated with poor outcomes)     Tmax > 6 seconds: 0 ml     Tmax/CBF Mismatch Volume: 0 ml     Tmax/CBF Mismatch Ratio: None        Tmax > 10 seconds Volume: 0 ml (volume greater than 100 mL is associated with  poor outcome)     IMPRESSION  IMPRESSION: Unremarkable CT perfusion. SUMMARY:    -  Left ventricle: Systolic function was normal. Ejection fraction was  estimated in the range of 55 % to 60 %. There were no regional wall motion  abnormalities. Left ventricular diastolic function was normal with an average  E/e' of 9.4    -  Aortic valve: The valve was structurally normal, tri-commissural. Leaflets  exhibited mild sclerosis. There was trivial regurgitation. -  Mitral valve: There was trivial regurgitation. -  Tricuspid valve: There was trivial regurgitation.     Labs: Results:       Chemistry Recent Labs     06/27/19  0925   *      K 3.4*   *   CO2 26   BUN 13   CREA 0.77   CA 9.5   AGAP 6*      CBC w/Diff Recent Labs     06/28/19 0419 06/27/19 0925   WBC 6.5 6.9   RBC 4.23 4.09   HGB 13.5 13.0   HCT 39.1 38.1    341   GRANS  --  61   LYMPH  --  30   EOS  --  1      Cardiac Enzymes No results for input(s): CPK, CKND1, JANETTE in the last 72 hours. No lab exists for component: CKRMB, TROIP   Coagulation Recent Labs     06/28/19 0419 06/27/19 0925   PTP 12.6 12.8   INR 1.0 1.0   APTT 28.2  --        Lipid Panel Lab Results   Component Value Date/Time    Cholesterol, total 206 (H) 06/28/2019 04:19 AM    HDL Cholesterol 64 (H) 06/28/2019 04:19 AM    LDL, calculated 112.2 (H) 06/28/2019 04:19 AM    VLDL, calculated 29.8 (H) 06/28/2019 04:19 AM    Triglyceride 149 06/28/2019 04:19 AM    CHOL/HDL Ratio 3.2 06/28/2019 04:19 AM      BNP No results for input(s): BNPP in the last 72 hours. Liver Enzymes No results for input(s): TP, ALB, TBIL, AP, SGOT, GPT in the last 72 hours. No lab exists for component: DBIL   Thyroid Studies Lab Results   Component Value Date/Time    TSH 0.916 02/06/2019 10:44 AM            Discharge Exam:  Visit Vitals  /84 (BP 1 Location: Left arm, BP Patient Position: At rest)   Pulse 62   Temp 98.1 °F (36.7 °C)   Resp 16   Ht 5' (1.524 m)   Wt 54.4 kg (120 lb)   SpO2 99%   BMI 23.44 kg/m²     General appearance: alert, cooperative, no distress, appears stated age  Lungs: clear to auscultation bilaterally  Heart: regular rate and rhythm, S1, S2 normal, no murmur, click, rub or gallop  Abdomen: soft, non-tender. Bowel sounds normal. No masses,  no organomegaly  Extremities: no cyanosis or edema  Neurologic: Grossly normal    Disposition: home  Discharge Condition: stable  Patient Instructions:   Current Discharge Medication List      START taking these medications    Details   aspirin 81 mg chewable tablet Take 1 Tab by mouth daily for 30 days. Qty: 30 Tab, Refills: 2      pravastatin (PRAVACHOL) 80 mg tablet Take 1 Tab by mouth nightly for 30 days.   Qty: 30 Tab, Refills: 2 CONTINUE these medications which have NOT CHANGED    Details   methocarbamol (ROBAXIN) 750 mg tablet Take 750 mg by mouth four (4) times daily. diclofenac (FLECTOR) 1.3 % pt12 1 Patch by TransDERmal route every twelve (12) hours every twelve (12) hours. Qty: 60 Patch, Refills: prn      penciclovir (DENAVIR) 1 % topical cream Apply  to affected area every two (2) hours. Qty: 5 g, Refills: PRN      itraconazole (SPORONAX) 100 mg capsule TK ONE C PO  QD WITH DINNER  Refills: 0      latanoprost (XALATAN) 0.005 % ophthalmic solution Administer 1 Drop to both eyes nightly. escitalopram oxalate (LEXAPRO) 20 mg tablet TAKE 1 TABLET BY MOUTH EVERY DAY  Qty: 30 Tab, Refills: 11      amLODIPine-benazepril (LOTREL) 5-40 mg per capsule TAKE 1 CAPSULE BY MOUTH EVERY DAY  Qty: 30 Cap, Refills: 11      omeprazole (PRILOSEC) 20 mg capsule Take 20 mg by mouth daily. Patient instructed to take morning of surgery per anesthesia guidelines      levalbuterol tartrate (XOPENEX) 45 mcg/actuation inhaler Take 2 Puffs by inhalation every six (6) hours as needed for Wheezing. Indications: Bronchospastic Pulmonary Disease  Qty: 1 Inhaler, Refills: 2      denosumab (PROLIA) 60 mg/mL injection 60 mg by SubCUTAneous route. Twice a year      promethazine (PHENERGAN) 25 mg tablet 1/2-1 po tid prn nausea  Qty: 30 Tab, Refills: 0         STOP taking these medications       oxyCODONE-acetaminophen (PERCOCET) 5-325 mg per tablet Comments:   Reason for Stopping:               Activity: Activity as tolerated  Diet: Low fat, Low cholesterol  Wound Care: None needed    Follow-up  ·   Follow up with Neurology as scheduled. · Follow up with PCP in 1-2 weeks as scheduled.   Time spent to discharge patient 35 minutes  Signed:  Tami Higgins MD  6/28/2019  11:24 AM

## 2019-06-28 NOTE — PROGRESS NOTES
Ischemic Stroke without Activase/TIA     VTE Prophylaxis: Yes: Lovenox     Antiplatelet: Yes: Aspirin     Statin if LDL Greater Than or Equal to100: Yes: Pravastatin     BP Parameters: Less Than 220/120 for 24 hours, then consult MD for parameters     Controlled With: None     Dysphagia Screen Completed: Yes: Pass  Dysphagia Screening  Vocal Quality/Secretions: Normal  History of Dysphagia: No  O2 Saturation: Normal  Alertness: Normal  Pre-Swallow Assessment Score: 0  Purees: No difficulty noted  Water by Cup: No difficulty noted  Water by Straw: No difficulty noted     Patient has PEG, NG Tube, Feeding Tube: No     Medication orders per above route: Yes     Nutrition Status: PO     NIH Stroke Scale Complete: Yes: 1     Frequency of Vital Signs: Every 4 hours     Frequency of Neuro Checks: Every 4 hours     Daily Education/Care Plan Updated:  Yes

## 2019-06-28 NOTE — DISCHARGE INSTRUCTIONS
Stroke: After Your Visit     Your Care Instructions     You have had a stroke. Risk factors for stroke include being overweight, smoking, and sedentary lifestyle. This means that the blood flow to a part of your brain was blocked for some time, which damages the nerve cells in that part of the brain. The part of your body controlled by that part of your brain may not function properly now. The brain is an amazing organ that can heal itself to some degree. The stroke you had damaged part of your brain, but other parts of your brain may take over in some way for the damaged areas. You have already started this process. Going home may be hard for you and your family. The more you can try to do for yourself, the better. Remember to take each day one at a time. Follow-up care is a key part of your treatment and safety. Be sure to make and go to all appointments, and call your doctor if you are having problems. Its also a good idea to know your test results and keep a list of the medicines you take. How can you care for yourself at home? Enter a stroke rehabilitation (rehab) program, if your doctor recommends it. Physical, speech, and occupational therapies can help you manage bathing, dressing, eating, and other basics of daily living. Eat a heart-healthy diet that is low in cholesterol, saturated fat, and salt. Eat lots of fresh fruits and vegetables and foods high in fiber. Increase your activities slowly. Take short rest breaks when you get tired. Gradually increase the amount you walk. Start out by walking a little more than you did the day before. Do not drive until your doctor says it is okay. It is normal to feel sad or depressed after a stroke. If the blues last, talk to your doctor. If you are having problems with urine leakage, go to the bathroom at regular times, including when you first wake up and at bedtime. Also, limit fluids after dinner.   If you are constipated, drink plenty of fluids, enough so that your urine is light yellow or clear like water. If you have kidney, heart, or liver disease and have to limit fluids, talk with your doctor before you increase the amount of fluids you drink. Set up a regular time for using the toilet. If you continue to have constipation, your doctor may suggest using a bulking agent, such as Metamucil, or a stool softener, laxative, or enema. Medicines  Take your medicines exactly as prescribed. Call your doctor if you think you are having a problem with your medicine. You may be taking several medicines. ACE (angiotensin-converting enzyme) inhibitors, angiotensin II receptor blockers (ARBs), beta-blockers, diuretics (water pills), and calcium channel blockers control your blood pressure. Statins help lower cholesterol. Your doctor may also prescribe medicines for depression, pain, sleep problems, anxiety, or agitation. If your doctor has given you medicine that prevents blood clots, such as warfarin (Coumadin), aspirin combined with extended-release dipyridamole (Aggrenox), clopidogrel (Plavix), or aspirin to prevent another stroke, you should:  Tell your dentist, pharmacist, and other health professionals that you take these medicines. Watch for unusual bruising or bleeding, such as blood in your urine, red or black stools, or bleeding from your nose or gums. Get regular blood tests to check your clotting time if you are taking Coumadin. Wear medical alert jewelry that says you take blood thinners. You can buy this at most drugstores. Do not take any over-the-counter medicines or herbal products without talking to your doctor first.  If you take birth control pills or hormone replacement therapy, talk to your doctor about whether they are right for you. For family members and caregivers  Make the home safe. Set up a room so that your loved one does not have to climb stairs. Be sure the bathroom is on the same floor.  Move throw rugs and furniture that could cause falls, and make sure that the lighting is good. Put grab bars and seats in tubs and showers. Find out what your loved one can do and what he or she needs help with. Try not to do things for your loved one that your loved one can do on his or her own. Help him or her learn and practice new skills. Visit and talk with your loved one often. Try doing activities together that you both enjoy, such as playing cards or board games. Keep in touch with your loved one's friends as much as you can, and encourage them to visit. Take care of yourself. Do not try to do everything yourself. Ask other family members to help. Eat well, get enough rest, and take time to do things that you enjoy. Keep up with your own doctor visits, and make sure to take your medicines regularly. Get out of the house as much as you can. Join a local support group. Find out if you qualify for home health care visits to help with rehab or for adult day care. When should you call for help? Call 911 anytime you think you may need emergency care. For example, call if:  You have signs of another stroke. These may include:  Sudden numbness, paralysis, or weakness in your face, arm, or leg, especially on only one side of your body. New problems with walking or balance. Sudden vision changes. Drooling or slurred speech. New problems speaking or understanding simple statements, or you feel confused. A sudden, severe headache that is different from past headaches. Call 911 even if these symptoms go away in a few minutes. You cough up blood. You vomit blood or what looks like coffee grounds. You pass maroon or very bloody stools. Call your doctor now or seek immediate medical care if:  You have new bruises or blood spots under your skin. You have a nosebleed. Your gums bleed when you brush your teeth. You have blood in your urine. Your stools are black and tarlike or have streaks of blood.   You have vaginal bleeding when you are not having your period, or heavy period bleeding. You have new symptoms that may be related to your stroke, such as falls or trouble swallowing. Watch closely for changes in your health, and be sure to contact your doctor if you have any problems. Where can you learn more? Go to Biodesy.be    Enter C294  in the search box to learn more about \"Stroke: After Your Visit\". © 6694-5627 [employer], [employer]. Care instructions adapted under license by [employer] (which disclaims liability or warranty for this information). This care instruction is for use with your licensed healthcare professional. If you have questions about a medical condition or this instruction, always ask your healthcare professional. Bud Bicker any warranty or liability for your use of this information. Patient Education        High Blood Pressure: Care Instructions  Overview    It's normal for blood pressure to go up and down throughout the day. But if it stays up, you have high blood pressure. Another name for high blood pressure is hypertension. Despite what a lot of people think, high blood pressure usually doesn't cause headaches or make you feel dizzy or lightheaded. It usually has no symptoms. But it does increase your risk of stroke, heart attack, and other problems. You and your doctor will talk about your risks of these problems based on your blood pressure. Your doctor will give you a goal for your blood pressure. Your goal will be based on your health and your age. Lifestyle changes, such as eating healthy and being active, are always important to help lower blood pressure. You might also take medicine to reach your blood pressure goal.  Follow-up care is a key part of your treatment and safety. Be sure to make and go to all appointments, and call your doctor if you are having problems.  It's also a good idea to know your test results and keep a list of the medicines you take.  How can you care for yourself at home? Medical treatment  · If you stop taking your medicine, your blood pressure will go back up. You may take one or more types of medicine to lower your blood pressure. Be safe with medicines. Take your medicine exactly as prescribed. Call your doctor if you think you are having a problem with your medicine. · Talk to your doctor before you start taking aspirin every day. Aspirin can help certain people lower their risk of a heart attack or stroke. But taking aspirin isn't right for everyone, because it can cause serious bleeding. · See your doctor regularly. You may need to see the doctor more often at first or until your blood pressure comes down. · If you are taking blood pressure medicine, talk to your doctor before you take decongestants or anti-inflammatory medicine, such as ibuprofen. Some of these medicines can raise blood pressure. · Learn how to check your blood pressure at home. Lifestyle changes  · Stay at a healthy weight. This is especially important if you put on weight around the waist. Losing even 10 pounds can help you lower your blood pressure. · If your doctor recommends it, get more exercise. Walking is a good choice. Bit by bit, increase the amount you walk every day. Try for at least 30 minutes on most days of the week. You also may want to swim, bike, or do other activities. · Avoid or limit alcohol. Talk to your doctor about whether you can drink any alcohol. · Try to limit how much sodium you eat to less than 2,300 milligrams (mg) a day. Your doctor may ask you to try to eat less than 1,500 mg a day. · Eat plenty of fruits (such as bananas and oranges), vegetables, legumes, whole grains, and low-fat dairy products. · Lower the amount of saturated fat in your diet. Saturated fat is found in animal products such as milk, cheese, and meat. Limiting these foods may help you lose weight and also lower your risk for heart disease.   · Do not smoke. Smoking increases your risk for heart attack and stroke. If you need help quitting, talk to your doctor about stop-smoking programs and medicines. These can increase your chances of quitting for good. When should you call for help? Call 911 anytime you think you may need emergency care. This may mean having symptoms that suggest that your blood pressure is causing a serious heart or blood vessel problem. Your blood pressure may be over 180/120.   For example, call 911 if:    · You have symptoms of a heart attack. These may include:  ? Chest pain or pressure, or a strange feeling in the chest.  ? Sweating. ? Shortness of breath. ? Nausea or vomiting. ? Pain, pressure, or a strange feeling in the back, neck, jaw, or upper belly or in one or both shoulders or arms. ? Lightheadedness or sudden weakness. ? A fast or irregular heartbeat.     · You have symptoms of a stroke. These may include:  ? Sudden numbness, tingling, weakness, or loss of movement in your face, arm, or leg, especially on only one side of your body. ? Sudden vision changes. ? Sudden trouble speaking. ? Sudden confusion or trouble understanding simple statements. ? Sudden problems with walking or balance. ? A sudden, severe headache that is different from past headaches.     · You have severe back or belly pain.    Do not wait until your blood pressure comes down on its own. Get help right away.   Call your doctor now or seek immediate care if:    · Your blood pressure is much higher than normal (such as 180/120 or higher), but you don't have symptoms.     · You think high blood pressure is causing symptoms, such as:  ? Severe headache.  ? Blurry vision.    Watch closely for changes in your health, and be sure to contact your doctor if:    · Your blood pressure measures higher than your doctor recommends at least 2 times.  That means the top number is higher or the bottom number is higher, or both.     · You think you may be having side effects from your blood pressure medicine. Where can you learn more? Go to http://catracho-ricardo.info/. Enter I265 in the search box to learn more about \"High Blood Pressure: Care Instructions. \"  Current as of: July 22, 2018  Content Version: 11.9  © 9666-3669 [employer]. Care instructions adapted under license by [employer] (which disclaims liability or warranty for this information). If you have questions about a medical condition or this instruction, always ask your healthcare professional. [place] [address] any warranty or liability for your use of this information. DISCHARGE SUMMARY from Nurse    PATIENT INSTRUCTIONS:    After general anesthesia or intravenous sedation, for 24 hours or while taking prescription Narcotics:  · Limit your activities  · Do not drive and operate hazardous machinery  · Do not make important personal or business decisions  · Do  not drink alcoholic beverages  · If you have not urinated within 8 hours after discharge, please contact your surgeon on call. Report the following to your surgeon:  · Excessive pain, swelling, redness or odor of or around the surgical area  · Temperature over 100.5  · Nausea and vomiting lasting longer than 4 hours or if unable to take medications  · Any signs of decreased circulation or nerve impairment to extremity: change in color, persistent  numbness, tingling, coldness or increase pain  · Any questions    What to do at Home:  Recommended activity: Activity as tolerated, per MD instructions    If you experience any of the following symptoms fever > 100.5, nausea, vomiting, pain call MD, chest pain and/or shortness of breath to ER please follow up with MD.    *  Please give a list of your current medications to your Primary Care Provider.     *  Please update this list whenever your medications are discontinued, doses are      changed, or new medications (including over-the-counter products) are added. *  Please carry medication information at all times in case of emergency situations. These are general instructions for a healthy lifestyle:    No smoking/ No tobacco products/ Avoid exposure to second hand smoke  Surgeon General's Warning:  Quitting smoking now greatly reduces serious risk to your health. Obesity, smoking, and sedentary lifestyle greatly increases your risk for illness    A healthy diet, regular physical exercise & weight monitoring are important for maintaining a healthy lifestyle    You may be retaining fluid if you have a history of heart failure or if you experience any of the following symptoms:  Weight gain of 3 pounds or more overnight or 5 pounds in a week, increased swelling in our hands or feet or shortness of breath while lying flat in bed. Please call your doctor as soon as you notice any of these symptoms; do not wait until your next office visit. The discharge information has been reviewed with the patient. The patient verbalized understanding. Discharge medications reviewed with the patient and appropriate educational materials and side effects teaching were provided.   ___________________________________________________________________________________________________________________________________

## 2019-06-28 NOTE — PROGRESS NOTES
Ischemic Stroke without Activase/TIA    VTE Prophylaxis: Yes: Lovenox    Antiplatelet: Yes: Aspirin    Statin if LDL Greater Than or Equal to100: Yes: Pravastatin    BP Parameters: Less Than 220/120 for 24 hours, then consult MD for parameters    Controlled With: None    Dysphagia Screen Completed: Yes: Pass  Dysphagia Screening  Vocal Quality/Secretions: Normal  History of Dysphagia: No  O2 Saturation: Normal  Alertness: Normal  Pre-Swallow Assessment Score: 0  Purees: No difficulty noted  Water by Cup: No difficulty noted  Water by Straw: No difficulty noted    Patient has PEG, NG Tube, Feeding Tube: No    Medication orders per above route: Yes    Nutrition Status: PO    NIH Stroke Scale Complete: Yes: 1    Frequency of Vital Signs: Every 4 hours     Frequency of Neuro Checks: Every 4 hours    Daily Education/Care Plan Updated: Yes    Doron Savage RN

## 2019-06-29 ENCOUNTER — APPOINTMENT (OUTPATIENT)
Dept: MRI IMAGING | Age: 59
End: 2019-06-29
Attending: NURSE PRACTITIONER
Payer: COMMERCIAL

## 2019-06-29 ENCOUNTER — HOSPITAL ENCOUNTER (OUTPATIENT)
Age: 59
Setting detail: OBSERVATION
Discharge: HOME | End: 2019-07-01
Attending: EMERGENCY MEDICINE | Admitting: INTERNAL MEDICINE
Payer: COMMERCIAL

## 2019-06-29 DIAGNOSIS — R41.0 DELIRIUM: ICD-10-CM

## 2019-06-29 DIAGNOSIS — R41.82 ALTERED MENTAL STATUS, UNSPECIFIED ALTERED MENTAL STATUS TYPE: Primary | ICD-10-CM

## 2019-06-29 PROBLEM — T42.8X1A: Status: ACTIVE | Noted: 2019-06-29

## 2019-06-29 PROBLEM — R56.9 WITNESSED SEIZURE-LIKE ACTIVITY (HCC): Status: ACTIVE | Noted: 2019-06-29

## 2019-06-29 LAB
ALBUMIN SERPL-MCNC: 4.5 G/DL (ref 3.5–5)
ALBUMIN/GLOB SERPL: 1.3 {RATIO} (ref 1.2–3.5)
ALP SERPL-CCNC: 100 U/L (ref 50–136)
ALT SERPL-CCNC: 27 U/L (ref 12–65)
AMPHET UR QL SCN: NEGATIVE
ANION GAP SERPL CALC-SCNC: 10 MMOL/L (ref 7–16)
AST SERPL-CCNC: 16 U/L (ref 15–37)
ATRIAL RATE: 163 BPM
BARBITURATES UR QL SCN: NEGATIVE
BASOPHILS # BLD: 0 K/UL (ref 0–0.2)
BASOPHILS NFR BLD: 1 % (ref 0–2)
BENZODIAZ UR QL: NEGATIVE
BILIRUB SERPL-MCNC: 0.5 MG/DL (ref 0.2–1.1)
BUN SERPL-MCNC: 12 MG/DL (ref 6–23)
CALCIUM SERPL-MCNC: 9.8 MG/DL (ref 8.3–10.4)
CALCULATED P AXIS, ECG09: 83 DEGREES
CALCULATED R AXIS, ECG10: 73 DEGREES
CALCULATED T AXIS, ECG11: 53 DEGREES
CANNABINOIDS UR QL SCN: NEGATIVE
CHLORIDE SERPL-SCNC: 107 MMOL/L (ref 98–107)
CO2 SERPL-SCNC: 25 MMOL/L (ref 21–32)
COCAINE UR QL SCN: NEGATIVE
CREAT SERPL-MCNC: 0.81 MG/DL (ref 0.6–1)
DIAGNOSIS, 93000: NORMAL
DIFFERENTIAL METHOD BLD: ABNORMAL
EOSINOPHIL # BLD: 0 K/UL (ref 0–0.8)
EOSINOPHIL NFR BLD: 0 % (ref 0.5–7.8)
ERYTHROCYTE [DISTWIDTH] IN BLOOD BY AUTOMATED COUNT: 12.5 % (ref 11.9–14.6)
GLOBULIN SER CALC-MCNC: 3.6 G/DL (ref 2.3–3.5)
GLUCOSE SERPL-MCNC: 119 MG/DL (ref 65–100)
HCT VFR BLD AUTO: 43.1 % (ref 35.8–46.3)
HGB BLD-MCNC: 14.9 G/DL (ref 11.7–15.4)
IMM GRANULOCYTES # BLD AUTO: 0 K/UL (ref 0–0.5)
IMM GRANULOCYTES NFR BLD AUTO: 1 % (ref 0–5)
LYMPHOCYTES # BLD: 1.7 K/UL (ref 0.5–4.6)
LYMPHOCYTES NFR BLD: 22 % (ref 13–44)
MCH RBC QN AUTO: 32.1 PG (ref 26.1–32.9)
MCHC RBC AUTO-ENTMCNC: 34.6 G/DL (ref 31.4–35)
MCV RBC AUTO: 92.9 FL (ref 79.6–97.8)
METHADONE UR QL: NEGATIVE
MONOCYTES # BLD: 0.6 K/UL (ref 0.1–1.3)
MONOCYTES NFR BLD: 8 % (ref 4–12)
NEUTS SEG # BLD: 5.2 K/UL (ref 1.7–8.2)
NEUTS SEG NFR BLD: 69 % (ref 43–78)
NRBC # BLD: 0 K/UL (ref 0–0.2)
OPIATES UR QL: NEGATIVE
P-R INTERVAL, ECG05: 176 MS
PCP UR QL: NEGATIVE
PLATELET # BLD AUTO: 330 K/UL (ref 150–450)
PMV BLD AUTO: 9.7 FL (ref 9.4–12.3)
POTASSIUM SERPL-SCNC: 3.2 MMOL/L (ref 3.5–5.1)
PROT SERPL-MCNC: 8.1 G/DL (ref 6.3–8.2)
Q-T INTERVAL, ECG07: 346 MS
QRS DURATION, ECG06: 92 MS
QTC CALCULATION (BEZET), ECG08: 509 MS
RBC # BLD AUTO: 4.64 M/UL (ref 4.05–5.2)
SODIUM SERPL-SCNC: 142 MMOL/L (ref 136–145)
VENTRICULAR RATE, ECG03: 130 BPM
WBC # BLD AUTO: 7.6 K/UL (ref 4.3–11.1)

## 2019-06-29 PROCEDURE — 74011250637 HC RX REV CODE- 250/637: Performed by: INTERNAL MEDICINE

## 2019-06-29 PROCEDURE — 51798 US URINE CAPACITY MEASURE: CPT

## 2019-06-29 PROCEDURE — 99222 1ST HOSP IP/OBS MODERATE 55: CPT | Performed by: PSYCHIATRY & NEUROLOGY

## 2019-06-29 PROCEDURE — 93005 ELECTROCARDIOGRAM TRACING: CPT | Performed by: INTERNAL MEDICINE

## 2019-06-29 PROCEDURE — 85025 COMPLETE CBC W/AUTO DIFF WBC: CPT

## 2019-06-29 PROCEDURE — 70551 MRI BRAIN STEM W/O DYE: CPT

## 2019-06-29 PROCEDURE — 74011250636 HC RX REV CODE- 250/636: Performed by: EMERGENCY MEDICINE

## 2019-06-29 PROCEDURE — 77030020263 HC SOL INJ SOD CL0.9% LFCR 1000ML

## 2019-06-29 PROCEDURE — 77030011943

## 2019-06-29 PROCEDURE — 96374 THER/PROPH/DIAG INJ IV PUSH: CPT | Performed by: EMERGENCY MEDICINE

## 2019-06-29 PROCEDURE — 81003 URINALYSIS AUTO W/O SCOPE: CPT | Performed by: EMERGENCY MEDICINE

## 2019-06-29 PROCEDURE — 74011000250 HC RX REV CODE- 250: Performed by: INTERNAL MEDICINE

## 2019-06-29 PROCEDURE — 80307 DRUG TEST PRSMV CHEM ANLYZR: CPT

## 2019-06-29 PROCEDURE — 74011250636 HC RX REV CODE- 250/636: Performed by: INTERNAL MEDICINE

## 2019-06-29 PROCEDURE — 96376 TX/PRO/DX INJ SAME DRUG ADON: CPT

## 2019-06-29 PROCEDURE — 99218 HC RM OBSERVATION: CPT

## 2019-06-29 PROCEDURE — 80053 COMPREHEN METABOLIC PANEL: CPT

## 2019-06-29 PROCEDURE — 95816 EEG AWAKE AND DROWSY: CPT | Performed by: INTERNAL MEDICINE

## 2019-06-29 PROCEDURE — 51701 INSERT BLADDER CATHETER: CPT | Performed by: EMERGENCY MEDICINE

## 2019-06-29 PROCEDURE — 99284 EMERGENCY DEPT VISIT MOD MDM: CPT | Performed by: EMERGENCY MEDICINE

## 2019-06-29 PROCEDURE — 96361 HYDRATE IV INFUSION ADD-ON: CPT

## 2019-06-29 PROCEDURE — 96375 TX/PRO/DX INJ NEW DRUG ADDON: CPT | Performed by: EMERGENCY MEDICINE

## 2019-06-29 RX ORDER — SODIUM CHLORIDE 0.9 % (FLUSH) 0.9 %
5-40 SYRINGE (ML) INJECTION AS NEEDED
Status: DISCONTINUED | OUTPATIENT
Start: 2019-06-29 | End: 2019-07-01 | Stop reason: HOSPADM

## 2019-06-29 RX ORDER — SODIUM CHLORIDE 9 MG/ML
75 INJECTION, SOLUTION INTRAVENOUS CONTINUOUS
Status: DISCONTINUED | OUTPATIENT
Start: 2019-06-29 | End: 2019-06-30

## 2019-06-29 RX ORDER — AMLODIPINE BESYLATE 5 MG/1
5 TABLET ORAL DAILY
Status: DISCONTINUED | OUTPATIENT
Start: 2019-06-29 | End: 2019-07-01 | Stop reason: HOSPADM

## 2019-06-29 RX ORDER — ACETAMINOPHEN 325 MG/1
650 TABLET ORAL
Status: DISCONTINUED | OUTPATIENT
Start: 2019-06-29 | End: 2019-07-01 | Stop reason: HOSPADM

## 2019-06-29 RX ORDER — LORAZEPAM 2 MG/ML
1 INJECTION INTRAMUSCULAR
Status: COMPLETED | OUTPATIENT
Start: 2019-06-29 | End: 2019-06-29

## 2019-06-29 RX ORDER — LISINOPRIL 20 MG/1
40 TABLET ORAL DAILY
Status: DISCONTINUED | OUTPATIENT
Start: 2019-06-29 | End: 2019-07-01 | Stop reason: HOSPADM

## 2019-06-29 RX ORDER — LATANOPROST 50 UG/ML
1 SOLUTION/ DROPS OPHTHALMIC
Status: DISCONTINUED | OUTPATIENT
Start: 2019-06-29 | End: 2019-07-01 | Stop reason: HOSPADM

## 2019-06-29 RX ORDER — SODIUM CHLORIDE 0.9 % (FLUSH) 0.9 %
5-40 SYRINGE (ML) INJECTION EVERY 8 HOURS
Status: DISCONTINUED | OUTPATIENT
Start: 2019-06-29 | End: 2019-07-01 | Stop reason: HOSPADM

## 2019-06-29 RX ORDER — PANTOPRAZOLE SODIUM 40 MG/1
40 TABLET, DELAYED RELEASE ORAL
Status: DISCONTINUED | OUTPATIENT
Start: 2019-06-29 | End: 2019-07-01 | Stop reason: HOSPADM

## 2019-06-29 RX ORDER — HALOPERIDOL 5 MG/ML
5 INJECTION INTRAMUSCULAR ONCE
Status: COMPLETED | OUTPATIENT
Start: 2019-06-29 | End: 2019-06-29

## 2019-06-29 RX ORDER — ESCITALOPRAM OXALATE 10 MG/1
20 TABLET ORAL DAILY
Status: DISCONTINUED | OUTPATIENT
Start: 2019-06-29 | End: 2019-07-01 | Stop reason: HOSPADM

## 2019-06-29 RX ORDER — PROMETHAZINE HYDROCHLORIDE 25 MG/1
25 TABLET ORAL
Status: DISCONTINUED | OUTPATIENT
Start: 2019-06-29 | End: 2019-07-01 | Stop reason: HOSPADM

## 2019-06-29 RX ORDER — HALOPERIDOL 5 MG/ML
3 INJECTION INTRAMUSCULAR
Status: DISCONTINUED | OUTPATIENT
Start: 2019-06-29 | End: 2019-07-01 | Stop reason: HOSPADM

## 2019-06-29 RX ORDER — GUAIFENESIN 100 MG/5ML
81 LIQUID (ML) ORAL DAILY
Status: DISCONTINUED | OUTPATIENT
Start: 2019-06-29 | End: 2019-07-01 | Stop reason: HOSPADM

## 2019-06-29 RX ORDER — POTASSIUM CHLORIDE 20 MEQ/1
20 TABLET, EXTENDED RELEASE ORAL
Status: COMPLETED | OUTPATIENT
Start: 2019-06-29 | End: 2019-06-29

## 2019-06-29 RX ORDER — PRAVASTATIN SODIUM 80 MG/1
80 TABLET ORAL
Status: DISCONTINUED | OUTPATIENT
Start: 2019-06-29 | End: 2019-07-01 | Stop reason: HOSPADM

## 2019-06-29 RX ADMIN — POTASSIUM CHLORIDE 20 MEQ: 20 TABLET, EXTENDED RELEASE ORAL at 06:43

## 2019-06-29 RX ADMIN — HALOPERIDOL LACTATE 5 MG: 5 INJECTION, SOLUTION INTRAMUSCULAR at 03:20

## 2019-06-29 RX ADMIN — LISINOPRIL 40 MG: 20 TABLET ORAL at 08:36

## 2019-06-29 RX ADMIN — LATANOPROST 1 DROP: 50 SOLUTION OPHTHALMIC at 21:37

## 2019-06-29 RX ADMIN — SODIUM CHLORIDE 75 ML/HR: 900 INJECTION, SOLUTION INTRAVENOUS at 21:42

## 2019-06-29 RX ADMIN — Medication 10 ML: at 08:38

## 2019-06-29 RX ADMIN — PANTOPRAZOLE SODIUM 40 MG: 40 TABLET, DELAYED RELEASE ORAL at 06:43

## 2019-06-29 RX ADMIN — AMLODIPINE BESYLATE 5 MG: 5 TABLET ORAL at 08:35

## 2019-06-29 RX ADMIN — SODIUM CHLORIDE 75 ML/HR: 900 INJECTION, SOLUTION INTRAVENOUS at 06:50

## 2019-06-29 RX ADMIN — LORAZEPAM 1 MG: 2 INJECTION, SOLUTION INTRAMUSCULAR; INTRAVENOUS at 03:20

## 2019-06-29 RX ADMIN — ASPIRIN 81 MG 81 MG: 81 TABLET ORAL at 08:35

## 2019-06-29 RX ADMIN — HALOPERIDOL LACTATE 3 MG: 5 INJECTION INTRAMUSCULAR at 09:30

## 2019-06-29 RX ADMIN — PRAVASTATIN SODIUM 80 MG: 80 TABLET ORAL at 21:37

## 2019-06-29 RX ADMIN — ESCITALOPRAM OXALATE 20 MG: 10 TABLET ORAL at 08:35

## 2019-06-29 RX ADMIN — Medication 10 ML: at 21:37

## 2019-06-29 NOTE — H&P
HOSPITALIST H&P  NAME:  Rene Garcia   Age:  61 y.o.  :   1960   MRN:   [id number]  PCP: Ariella Melvin MD  Consulting MD:  Treatment Team: Attending Provider: Ligia Ayala MD  HPI:   Emmy Pennington is a 62 yo F brought in to the ED for altered mental status. She is accompanied by her sister, Mandeep Carlton. Buddy Mora states that over the last week, she will become 'totally out of it,' and then have times of lucidity. She is unable to provide good history. Her sister states last night, she had 'convulsions.'  Lost control of bowel and bladder with nausea and vomiting. Last night, patient admits she tried CBD oil. Upon further questioning, patient endorses abusing Robaxin for the last several months but has been on the medication for years. She denies any other drug use, bath salts, or street drugs. During evaluation, she was alert and oriented x 3, but could not give a good history of recent events. She reports having memory problems over the last week. In the ED, she started screaming bizarre things such as 'he killed me' while pointing to her . She received 3 mg of haldol for sedation. Hospitalist service asked to admit for altered mental status.     Complete ROS done and is as stated in HPI or otherwise negative  Past Medical History:   Diagnosis Date    3-part fracture of surgical neck of left humerus 10/27/2016    ADHD (attention deficit hyperactivity disorder)     Arthritis     Carpal tunnel syndrome 2015    Closed fracture of anatomical neck of humerus 2015    Degeneration of lumbar or lumbosacral intervertebral disc 2015    Depressive disorder, not elsewhere classified 2015    Dermatophytosis of the body 2015    Essential hypertension, benign 2015    managed with med    GERD (gastroesophageal reflux disease) 2015    managed with med    Heart murmur     pt reports since birth; echo dated 2006 states trace aortic and mitral regurg    History of MRSA infection     Hypopotassemia 2015    Insomnia, unspecified 2015    Kidney stone     Kidney stones     Liver disease     Hepatits C    Migraine, unspecified, without mention of intractable migraine without mention of status migrainosus 2015    Odontoid fracture with type II morphology ([facility] [address]     Osteoporosis 2017    Other closed fractures of upper end of humerus 2015    Polycythemia, secondary 2015    Traumatic tear of left rotator cuff 10/27/2016    Type 2 superior 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 shoulder 10/27/2016    Unspecified viral hepatitis C without hepatic coma 2015    pt reports hx only; normal LFTs noted labs       Past Surgical History:   Procedure Laterality Date   [address] UNLIST      heart cath wnl, no stent placed    HX BLADDER SUSPENSION  2006    bladder tack    HX CARPAL TUNNEL RELEASE Right     then left    HX CERVICAL FUSION      HX LITHOTRIPSY  2005    HX ORTHOPAEDIC      Slap repair right    HX ORTHOPAEDIC  2011    hip repair, left    HX ORTHOPAEDIC Left     Hand surgery    HX ROTATOR CUFF REPAIR Bilateral       Prior to Admission Medications   Prescriptions Last Dose Informant Patient Reported? Taking? SUMAtriptan (IMITREX) 100 mg tablet   No No   Sig: Take 1 Tab by mouth once as needed for Migraine for up to 1 dose. 1/2 tab prn headache may repeat x 1 within 24 hours   amLODIPine-benazepril (LOTREL) 5-40 mg per capsule   No No   Sig: TAKE 1 CAPSULE BY MOUTH EVERY DAY   aspirin 81 mg chewable tablet   No No   Sig: Take 1 Tab by mouth daily for 30 days. denosumab (PROLIA) 60 mg/mL injection   Yes No   Si mg by SubCUTAneous route. Twice a year   diclofenac (FLECTOR) 1.3 % pt12   No No   Si Patch by TransDERmal route every twelve (12) hours every twelve (12) hours.    escitalopram oxalate (LEXAPRO) 20 mg tablet   No No   Sig: TAKE 1 TABLET BY MOUTH EVERY DAY   itraconazole (SPORONAX) 100 mg capsule   Yes No   Sig: TK ONE C PO  QD WITH DINNER   latanoprost (XALATAN) 0.005 % ophthalmic solution   Yes No   Sig: Administer 1 Drop to both eyes nightly. levalbuterol tartrate (XOPENEX) 45 mcg/actuation inhaler   No No   Sig: Take 2 Puffs by inhalation every six (6) hours as needed for Wheezing. Indications: Bronchospastic Pulmonary Disease   methocarbamol (ROBAXIN) 750 mg tablet   Yes No   Sig: Take 750 mg by mouth four (4) times daily. omeprazole (PRILOSEC) 20 mg capsule   Yes No   Sig: Take 20 mg by mouth daily. Patient instructed to take morning of surgery per anesthesia guidelines   penciclovir (DENAVIR) 1 % topical cream   No No   Sig: Apply  to affected area every two (2) hours. pravastatin (PRAVACHOL) 80 mg tablet   No No   Sig: Take 1 Tab by mouth nightly for 30 days.    promethazine (PHENERGAN) 25 mg tablet   No No   Si/2-1 po tid prn nausea      Facility-Administered Medications: None     Allergies   Allergen Reactions    Benadryl [Diphenhydramine Hcl] Other (comments)     hyperactivity    Cymbalta [Duloxetine] Other (comments)     Hallucinations      Demerol [Meperidine] Other (comments)     Hyperactivity    Dilaudid [Hydromorphone] Other (comments)     Hyperactivity    Lortab [Hydrocodone-Acetaminophen] Other (comments)     Hyperactivity    Macrodantin [Nitrofurantoin Macrocrystal] Nausea and Vomiting    Morphine Other (comments)     Hallucinations      Nucynta [Tapentadol] Other (comments)     Bad dreams      Social History     Tobacco Use    Smoking status: Former Smoker     Packs/day: 1.00     Years: 10.00     Pack years: 10.00     Types: Cigarettes     Last attempt to quit: 2017     Years since quittin.9    Smokeless tobacco: Never Used    Tobacco comment: 18- smoked a pack past 3-4 days   Substance Use Topics    Alcohol use: No     Alcohol/week: 0.0 oz      Family History   Problem Relation Age of Onset    Lung Disease Mother     Hypertension Mother     Heart Disease Mother     Stroke Mother     COPD Mother     Cancer Father         lung    Heart Attack Father     Diabetes Other     Osteoporosis Other     Other Other         Arthritis    Breast Cancer Maternal Aunt 76    Breast Cancer Paternal Aunt 76      Objective:     Visit Vitals  /85   Pulse 80   Temp 98.7 °F (37.1 °C)   Resp 21   Ht 5' 1\" (1.549 m)   Wt 56.7 kg (125 lb)   SpO2 97%   BMI 23.62 kg/m²      Temp (24hrs), Av.8 °F (37.1 °C), Min:98.7 °F (37.1 °C), Max:98.8 °F (37.1 °C)    Oxygen Therapy  O2 Sat (%): 97 % (19 0613)  O2 Device: Room air (19 0530)  Physical Exam:  General:    Alert, cooperative, tangential in conversation. Head:   Normocephalic, without obvious abnormality, atraumatic. Nose:  Nares normal. No drainage or sinus tenderness. Lungs:   Clear to auscultation bilaterally. No Wheezing or Rhonchi. No rales. Heart:   Regular rate and rhythm,  no murmur, rub or gallop. Abdomen:   Soft, non-tender. Not distended. Bowel sounds normal.   Extremities: No cyanosis. No edema. No clubbing  Skin:     Texture, turgor normal. No rashes or lesions. Not Jaundiced  Neurologic: Alert and oriented x 3, no focal deficits   Data Review:   Recent Results (from the past 24 hour(s))   CBC WITH AUTOMATED DIFF    Collection Time: 19  2:01 AM   Result Value Ref Range    WBC 7.6 4.3 - 11.1 K/uL    RBC 4.64 4.05 - 5.2 M/uL    HGB 14.9 11.7 - 15.4 g/dL    HCT 43.1 35.8 - 46.3 %    MCV 92.9 79.6 - 97.8 FL    MCH 32.1 26.1 - 32.9 PG    MCHC 34.6 31.4 - 35.0 g/dL    RDW 12.5 11.9 - 14.6 %    PLATELET 519 099 - 401 K/uL    MPV 9.7 9.4 - 12.3 FL    ABSOLUTE NRBC 0.00 0.0 - 0.2 K/uL    DF AUTOMATED      NEUTROPHILS 69 43 - 78 %    LYMPHOCYTES 22 13 - 44 %    MONOCYTES 8 4.0 - 12.0 %    EOSINOPHILS 0 (L) 0.5 - 7.8 %    BASOPHILS 1 0.0 - 2.0 %    IMMATURE GRANULOCYTES 1 0.0 - 5.0 %    ABS. NEUTROPHILS 5.2 1.7 - 8.2 K/UL    ABS. LYMPHOCYTES 1.7 0.5 - 4.6 K/UL    ABS. MONOCYTES 0.6 0.1 - 1.3 K/UL    ABS. EOSINOPHILS 0.0 0.0 - 0.8 K/UL    ABS. BASOPHILS 0.0 0.0 - 0.2 K/UL    ABS. IMM. GRANS. 0.0 0.0 - 0.5 K/UL   METABOLIC PANEL, COMPREHENSIVE    Collection Time: 06/29/19  2:01 AM   Result Value Ref Range    Sodium 142 136 - 145 mmol/L    Potassium 3.2 (L) 3.5 - 5.1 mmol/L    Chloride 107 98 - 107 mmol/L    CO2 25 21 - 32 mmol/L    Anion gap 10 7 - 16 mmol/L    Glucose 119 (H) 65 - 100 mg/dL    BUN 12 6 - 23 MG/DL    Creatinine 0.81 0.6 - 1.0 MG/DL    GFR est AA >60 >60 ml/min/1.73m2    GFR est non-AA >60 >60 ml/min/1.73m2    Calcium 9.8 8.3 - 10.4 MG/DL    Bilirubin, total 0.5 0.2 - 1.1 MG/DL    ALT (SGPT) 27 12 - 65 U/L    AST (SGOT) 16 15 - 37 U/L    Alk. phosphatase 100 50 - 136 U/L    Protein, total 8.1 6.3 - 8.2 g/dL    Albumin 4.5 3.5 - 5.0 g/dL    Globulin 3.6 (H) 2.3 - 3.5 g/dL    A-G Ratio 1.3 1.2 - 3.5     DRUG SCREEN, URINE    Collection Time: 06/29/19  4:02 AM   Result Value Ref Range    PCP(PHENCYCLIDINE) NEGATIVE       BENZODIAZEPINES NEGATIVE       COCAINE NEGATIVE       AMPHETAMINES NEGATIVE       METHADONE NEGATIVE       THC (TH-CANNABINOL) NEGATIVE       OPIATES NEGATIVE       BARBITURATES NEGATIVE        Imaging /Procedures /Studies   No orders to display       Assessment and Plan: Active Hospital Problems    Diagnosis Date Noted    Altered mental status  - Place in observation to medical bed  - Suspect related to methacarbamol abuse + trial of CBC oil  - Due to possible seizure given witnessed seizure-like activity  - Check EEG  - Have neurology evaluate to see if she needs anti-epileptic medication  - Haldol prn   06/29/2019    Witnessed seizure-like activity ([address]  - EEG   06/29/2019    Methocarbamol overdose/abuse  - Likely responsible for her bizarre behavior, amnesia, and possible seizure  - Hold methocarbamol.  - Will need closer monitoring at home if she must take it for her longstanding pain.   - Gentle IVF 06/29/2019    Hypertension  - Continue home med 07/06/2017        Admission plan discussed with patient and her sister, Jamal Kaur, at bedside. Code Status: Full    Estimated LOS:  < 2 midnights    Signed By: Zan Street.  Walt Gomez MD     June 29, 2019

## 2019-06-29 NOTE — PROGRESS NOTES
TRANSFER - IN REPORT:    Verbal report received from ER on Jan W Henri Cervantes  being received from ED for routine progression of care      Report consisted of patients Situation, Background, Assessment and   Recommendations(SBAR). Information from the following report(s) Kardex was reviewed with the receiving nurse. Opportunity for questions and clarification was provided. Assessment completed upon patients arrival to unit and care assumed.

## 2019-06-29 NOTE — CONSULTS
Consult    Patient: Silas Long MRN: [id number]     YOB: 1960  Age: 61 y.o. Sex: female        I have seen and examined the patient along with Chanell Jason NP. I agree with the documentation as recorded. In addition I would add:    I have seen the patient independently during which time I reviewed the history and performed a physical examination, reviewed the NP documentation and discussed with the NP . The interval history obtained is notable for: Altered mental status/acute psychosis. Patient initially thought to have seizures. Eyewitness account initially not available subsequently  describes visual hallucinations and severe agitation. Patient received IV Haldol in the emergency department    The physical examination performed is notable for: The patient is now calm. She cannot provide many details of history. No focal weakness. Reflexes remain brisk    The medical decision making including assessment and recommendations is notable for: Acute delirium/psychosis. History is not supportive of seizures. Etiology of psychosis unclear. Possible drug intoxication versus withdrawal.      Nguyen Ibanez MD      Subjective:      Silas Long is a 61 y.o. female who is being seen for possible seizures/AMS. Patient is known to Neurology. She was seen as a CODE S, stroke work up was negative was discharged home yesterday. Patient was to follow up outpatient for MS evaluation due to continued numbness in LUE/LE extremities. MRI demonstrates diffuse supra tentorial and infratentorial areas of increased FLA IR signal but no restricted diffusion. During hospitalization, she started experiencing visual and auditory hallucinations after the administration of Cymbalta,which resolved. Cymbalta was discontinued. Patient was discharged home with spouse. Due to AMS, patient is poor historian. History obtained from spouse and medical record.      Spouse stated that patient started becoming more confused and agitated starting about 10 PM last night.  stated he felt like her mood swings were associated with withdrawal.  He indicated she has a past history of substance abuse. She sees a pain specialist for management of her neck and shoulder pain The patient stated she is currently taking Robaxin, and endorses that she has taken more then prescribed dose to \"take the edge off\". Last dose per the spouse was 2 tablets administered last night about 9 PM. Stated as the night went on she became more mean and hateful with her words. Stated the patient threw her e-cigarette (vape) against the wall as son was leaving the room. He removed son from the situation and went downstairs to sleep in another room. He stated the patient was seen eating a dog biscuit that was infused with CBD. Patient denies any other illicit drug use or prescription use. Around 1130, he went up to the bedroom to check on the her. She was found pulling on the head board, yelling out \"Chris, help me\". Then she sat up, wide eyed, vomited,  and proceeded to fall over and appeared to be jerking. Lasted approximately 1.5 to 2 minutes in duration. EMS called.  stated she loss control of her bowels, not her bladder. Did not bite her tongue. Patient was brought to [facility] ED, where she became more lucid. She was alert and oriented x3. During the course of her time in the ED, \"she started screaming bizarre things such as 'he killed me' while pointing to her . She received 3 mg of haldol for sedation. \"      Past Medical History:   Diagnosis Date    3-part fracture of surgical neck of left humerus 10/27/2016    ADHD (attention deficit hyperactivity disorder)     Arthritis     Carpal tunnel syndrome 11/11/2015    Closed fracture of anatomical neck of humerus 6/16/2015    Degeneration of lumbar or lumbosacral intervertebral disc 2/27/2015    Depressive disorder, not elsewhere classified 2/27/2015    Dermatophytosis of the body 2/27/2015    Essential hypertension, benign 2/27/2015    managed with med    GERD (gastroesophageal reflux disease) 2/27/2015    managed with med    Heart murmur     pt reports since birth; echo dated 9- states trace aortic and mitral regurg    History of MRSA infection     Hypopotassemia 2/27/2015    Insomnia, unspecified 2/27/2015    Kidney stone     Kidney stones     Liver disease     Hepatits C    Migraine, unspecified, without mention of intractable migraine without mention of status migrainosus 2/27/2015    Odontoid fracture with type II morphology ([facility] [address]     Osteoporosis 1/23/2017    Other closed fractures of upper end of humerus 6/16/2015    Polycythemia, secondary 2/27/2015    Traumatic tear of left rotator cuff 10/27/2016    Type 2 superior 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 shoulder 10/27/2016    Unspecified viral hepatitis C without hepatic coma 02/27/2015    pt reports hx only; normal LFTs noted labs June, 2016     Past Surgical History:   Procedure Laterality Date    CARDIAC SURG PROCEDURE UNLIST      heart cath wnl, no stent placed    HX BLADDER SUSPENSION  2006    bladder tack    HX CARPAL TUNNEL RELEASE Right 2001    then left    HX CERVICAL FUSION      HX LITHOTRIPSY  2005    HX ORTHOPAEDIC      Slap repair right    HX ORTHOPAEDIC  2011    hip repair, left    HX ORTHOPAEDIC Left     Hand surgery    HX ROTATOR CUFF REPAIR Bilateral       Family History   Problem Relation Age of Onset    Lung Disease Mother     Hypertension Mother     Heart Disease Mother     Stroke Mother     COPD Mother     Cancer Father         lung    Heart Attack Father     Diabetes Other     Osteoporosis Other     Other Other         Arthritis    Breast Cancer Maternal Aunt 76    Breast Cancer Paternal Aunt 76     Social History     Tobacco Use    Smoking status: Former Smoker     Packs/day: 1.00     Years: 10.00     Pack years: 10.00     Types: Cigarettes     Last attempt to quit: 2017     Years since quittin.9    Smokeless tobacco: Never Used    Tobacco comment: 18- smoked a pack past 3-4 days   Substance Use Topics    Alcohol use: No     Alcohol/week: 0.0 oz      Current Facility-Administered Medications   Medication Dose Route Frequency Provider Last Rate Last Dose    aspirin chewable tablet 81 mg  81 mg Oral DAILY Lorence Cea., MD   81 mg at 19 0835    escitalopram oxalate (LEXAPRO) tablet 20 mg  20 mg Oral DAILY Lorence Cea., MD   20 mg at 19 0835    latanoprost (XALATAN) 0.005 % ophthalmic solution 1 Drop  1 Drop Both Eyes QHS Lorence Cea., MD        pantoprazole (PROTONIX) tablet 40 mg  40 mg Oral ACB Lorence Cea., MD   40 mg at 19 8992    pravastatin (PRAVACHOL) tablet 80 mg  80 mg Oral QHS Lorence Cea., MD        promethazine (PHENERGAN) tablet 25 mg  25 mg Oral Q6H PRN Lorence Cea., MD        sodium chloride (NS) flush 5-40 mL  5-40 mL IntraVENous Q8H Lorence Cea., MD   10 mL at 19 0838    sodium chloride (NS) flush 5-40 mL  5-40 mL IntraVENous PRN Lorence Cea., MD        acetaminophen (TYLENOL) tablet 650 mg  650 mg Oral Q4H PRN Lorence Cea., MD        haloperidol lactate (HALDOL) injection 3 mg  3 mg IntraVENous Q6H PRN Lorence Cea., MD   3 mg at 19 0930    amLODIPine (NORVASC) tablet 5 mg  5 mg Oral DAILY Lorence Cea., MD   5 mg at 19 0835    lisinopril (PRINIVIL, ZESTRIL) tablet 40 mg  40 mg Oral DAILY Lorence Cea., MD   40 mg at 19 0439    0.9% sodium chloride infusion  75 mL/hr IntraVENous CONTINUOUS Lorence Cea., MD 75 mL/hr at 19 0650 75 mL/hr at 19 0650        Allergies   Allergen Reactions    Benadryl [Diphenhydramine Hcl] Other (comments)     hyperactivity    Cymbalta [Duloxetine] Other (comments)     Hallucinations      Demerol [Meperidine] Other (comments)     Hyperactivity  Dilaudid [Hydromorphone] Other (comments)     Hyperactivity    Lortab [Hydrocodone-Acetaminophen] Other (comments)     Hyperactivity    Macrodantin [Nitrofurantoin Macrocrystal] Nausea and Vomiting    Morphine Other (comments)     Hallucinations      Nucynta [Tapentadol] Other (comments)     Bad dreams       Review of Systems:  CONSTITUTIONAL:  Denies weight loss, fever, chills, weakness or fatigue. HEENT:  Eyes:  Denies visual loss, blurred vision, double vision or yellow sclerae. Ears, Nose, Throat:  Denies hearing loss, sneezing, congestion, runny nose or sore throat. SKIN:  Denies rash or itching. CARDIOVASCULAR:  Denies chest pain, chest pressure or chest discomfort. No palpitations or edema. RESPIRATORY:  Denies shortness of breath, cough or sputum. GASTROINTESTINAL:  Denies anorexia, nausea, vomiting or diarrhea. No abdominal pain or blood. GENITOURINARY:  Denies burning with urination. NEUROLOGICAL: LUE/LE numbness and tingling. Denies headache, dizziness, syncope, paralysis, ataxia. Denies change in bowel or bladder control. MUSCULOSKELETAL:  Denies muscle, back pain, joint pain or stiffness. HEMATOLOGIC:  Denies anemia, bleeding or bruising. LYMPHATICS:  Denies enlarged nodes. PSYCHIATRIC: Depression. Denies history anxiety. ENDOCRINOLOGIC:  Denies reports of sweating, cold or heat intolerance. No polyuria or polydipsia. ALLERGIES:  Denies history of asthma, hives, eczema or rhinitis. Objective:     Vitals:    06/29/19 0326 06/29/19 0530 06/29/19 0613 06/29/19 1112   BP:  141/67 161/85 147/83   Pulse:  85 80 82   Resp:  20 21 18   Temp:   98.7 °F (37.1 °C) 98.4 °F (36.9 °C)   SpO2: 97% 96% 97% 96%   Weight:       Height:            Physical Exam:  General - Well developed, well nourished, in NAD. Confused. HEENT - Normocephalic, atraumatic. Conjunctiva are clear. Neck - Supple without masses  Cardiovascular - Regular rate and rhythm.  Normal S1, S2 without murmurs, rubs, or gallops. Lungs - Clear to auscultation. Abdomen - Soft, nontender with normal bowel sounds. Extremities - Peripheral pulses intact. No edema and no rashes.      Neurological examination - Limited to AMS. Alert to person, place, not time. Comprehension, attention, memory and reasoning are impaired. Language and speech are dsyarthric. On cranial nerve examination, (II, III, IV, VI) pupils are equal, round, and reactive to light. Visual acuity is adequate. Visual fields are full to finger confrontation. Extraocular motility is normal. (V, VII) Face is symmetric and sensation is intact to light touch. (VIII) Hearing is intact. (IX, X) Palate and uvula elevate symmetrically. Voice is normal. (XI) Shoulder shrug is strong and equal bilaterally. (XII)Tongue is midline. Motor examination - There is normal muscle tone and bulk. Power is full throughout. Muscle stretch reflexes are 3+BUE/LE and there are no pathological reflexes present. Plantar response is flexor. Sensation is decreased in LUE/LLE to light touch, pinprick. Unable to assess vibration and proprioception. Cerebellar examination is normal finger/nose and heel/shin. Lab Results   Component Value Date/Time    Cholesterol, total 206 (H) 06/28/2019 04:19 AM    HDL Cholesterol 64 (H) 06/28/2019 04:19 AM    LDL, calculated 112.2 (H) 06/28/2019 04:19 AM    VLDL, calculated 29.8 (H) 06/28/2019 04:19 AM    Triglyceride 149 06/28/2019 04:19 AM    CHOL/HDL Ratio 3.2 06/28/2019 04:19 AM        Lab Results   Component Value Date/Time    Hemoglobin A1c 6.2 (H) 06/28/2019 04:19 AM        Results for [place] (MRN [id number]) as of 6/29/2019 12:37   Ref.  Range 6/29/2019 04:02   AMPHETAMINES Latest Units:   NEGATIVE   BARBITURATES Latest Units:   NEGATIVE   BENZODIAZEPINES Latest Units:   NEGATIVE   COCAINE Latest Units:   NEGATIVE   METHADONE Latest Units:   NEGATIVE   OPIATES Latest Units:   NEGATIVE   PCP(PHENCYCLIDINE) Latest Units:   NEGATIVE   THC ([facility]) Latest Units:   NEGATIVE       CT Results (most recent): HEAD CT WITHOUT CONTRAST  6/27/2019      HISTORY: Patient went to sleep 1:00 AM and awoke at 5:00 AM with left-sided  numbness  ; code stroke     TECHNIQUE: Noncontrast axial images were obtained through the brain. All CT  scans at this facility used dose modulation, interactive reconstruction and/or  weight based dosing when appropriate to reduce radiation dose to as low as  reasonably achievable.     COMPARISON: August 9, 2017     FINDINGS: There is no acute intracranial hemorrhage, significant mass effect or  CT evidence of acute large artery territorial infarction. Please note that a  hyperacute infarct or small vessel infarct may not be apparent on initial CT  imaging.     A cerclage wire is present along the posterior arch of C1. Stable areas of low  attenuation in the supratentorial white matter are consistent with chronic small  vessel ischemic disease.     There is no hydrocephalus , intra-axial mass or abnormal extra-axial fluid  collection. There are no displaced skull fractures. The mastoid air cells and  paranasal sinuses are clear where imaged.     IMPRESSION  IMPRESSION: No acute intracranial hemorrhage. White matter findings compatible  with chronic small vessel ischemic disease    Results for orders placed or performed during the hospital encounter of 06/29/19   EKG, 12 LEAD, INITIAL   Result Value Ref Range    Ventricular Rate 130 BPM    Atrial Rate 163 BPM    P-R Interval 176 ms    QRS Duration 92 ms    Q-T Interval 346 ms    QTC Calculation (Bezet) 509 ms    Calculated P Axis 83 degrees    Calculated R Axis 73 degrees    Calculated T Axis 53 degrees    Diagnosis       !! AGE AND GENDER SPECIFIC ECG ANALYSIS !!   Sinus tachycardia with Possible Premature atrial complexes with Aberrant   conduction and Premature ventricular complexes   or Fusion complexes  Cannot rule out Anterior infarct , age undetermined  Abnormal ECG  When compared with ECG of 27-JUN-2019 10:17,  Fusion complexes are now Present  Premature ventricular complexes are now Present  Aberrant conduction is now Present  MA interval has decreased  Vent. rate has increased BY  69 BPM     Results for orders placed or performed in visit on 07/26/18   AMB POC EKG ROUTINE W/ 12 LEADS, INTER & REP    Impression    Normal rate. Normal rhythm. No ectopy. Normal MA axis. Normal QRS. Mo STEMI        MRI Results (most recent):   Results from [place] encounter on 06/27/19   MRI BRAIN WO CONT    Narrative MRI BRAIN WITHOUT CONTRAST 6/27/2019    HISTORY: Left facial numbness and tingling with left extremity weakness. Evaluate for stroke. TECHNIQUE: Sagittal and axial T1-weighted, axial T2-weighted, axial and coronal  FLAIR, axial T2-weighted gradient-echo, axial diffusion weighted images with ADC  maps of the brain. COMPARISON: Head CT June 27, 2019    FINDINGS: There is no acute infarction, acute intracranial hemorrhage,  intra-axial mass, hydrocephalus or extra-axial hematoma. Artifact is present  from hardware in the upper cervical spine. The cerebellar tonsils are in a  normal position. On the T2-weighted and FLAIR sequences, there are scattered  punctate white matter hyperintensities. Findings are nonspecific and can be seen  with chronic small vessel ischemic disease, demyelinating disease or with  migraine headaches. There is more confluent T2/FLAIR hyperintense white matter  signal within the brunilda. Impression IMPRESSION:    1. No acute infarction. 2. Diffuse hyperintensities throughout the supratentorial and pontine white  matter present as described. Correlate with neurological history. Most recent CTA:  History: Code stroke.  Unsteady gait     Comments: CT ANGIOGRAM OF THE NECK AND CT ANGIOGRAM OF THE [place] was  obtained following the administration of IV contrast. IV contrast was  administered to evaluate the arterial vasculature. Reformatted images in the  coronal and sagittal planes as well as 3-D imaging was obtained and reviewed on  a dedicated PACS and [address] Radiation reduction dose techniques  were used for the study. Our CT scanner use one or all of the following-  Automated exposure control, adjustment of the mA and/or KV according the patient  size, iterative reconstruction. All measurements are based upon NASCET criteria  if appropriate.     Findings:     CT ANGIOGRAM OF THE NECK:     The arch and proximal great vessels are patent. The right left carotid bulbs are  patent with eccentric calcified plaque disease. Degree of stenosis is less than  50%.    The vertebral arteries are patent     The lung apices are clear.     There are subcentimeter nodules as within the right thyroid gland.     CT ANGIOGRAM OF [place]:     The petrous, cavernous, and supraclinoid internal carotid arteries are patent.     The anterior and middle cerebral arteries are patent.     The distal vertebral arteries, basilar artery, posterior cerebral arteries are  patent.     IMPRESSION  IMPRESSION:  1. Mild atherosclerotic changes without evidence of large vessel occlusive  disease or high-grade stenosis. Most recent MRA:      Most recent Echo:          Assessment:     61year old female being seen for AMS, possible seizure. Patient is known to Neurology. She was seen as a CODE S, stroke work up was negative was discharged home yesterday. Patient was to follow up outpatient for MS evaluation due to continued numbness in LUE/LE extremities, which is improving. Previous MRI demonstrates diffuse supra tentorial and infratentorial areas of increased FLA IR signal but no restricted diffusion. During previous hospitalization, she started experiencing visual and auditory hallucinations after the administration of Cymbalta,which resolved. UDS negative.  Differentials include but are not limited to Encephalopathy due to Polypharmacy v. Medication withdrawal from Robaxin, which can cause N/V and mood fluctuations v. Seizure. Will obtain MRI of brain and EEG pending. Plan:       Brain MRI without contrast with thin coronal cuts (seizure protocol)   Laboratory studies looking for provoking factors   Routine 30 minute EEG   Ativan 0.1 mg/kg IV (max dose: 4 mg) for seizure activity over 3-5 minutes. If seizure continues after additional 3-5 minutes then repeat same dose and call neurology. If the patient does not have IV access then give IM midazolam 10 mg as a single dose.        Signed By: Judy Hernandez NP     June 29, 2019

## 2019-06-29 NOTE — ED NOTES
TRANSFER - OUT REPORT:    Verbal report given to Arnulfo(name) on Jan W Marni Angelucci  being transferred to 802(unit) for routine progression of care       Report consisted of patients Situation, Background, Assessment and   Recommendations(SBAR). Information from the following report(s) ED Summary was reviewed with the receiving nurse. Lines:   Peripheral IV Right Antecubital (Active)        Opportunity for questions and clarification was provided.       Patient transported with:   Lessno

## 2019-06-29 NOTE — PROGRESS NOTES
Patient returned from MRI, via stretcher; accompanied by transport. Patient A/Ox3.  Denies needs, \"wants to be left alone\"

## 2019-06-29 NOTE — PROGRESS NOTES
Patient resting in bed, alert with periods of confusion, denies pain or distress, no SOB noted, dual skin assessment done with Alverto Knight RN no skin issues noted at this time. Safety measures in place,call light within reach.

## 2019-06-29 NOTE — ED TRIAGE NOTES
Pt arrives from home via [facility]. Ems contacted by spouse after spouse found her in floor altered, vomiting and defecating on self. C/o numbness to entire left side of body however with further questioning pt denies numbness. Admitted 6/27 for cva. A/o x2, states at [facility]. Unable to follow commands with initial assessment.

## 2019-06-29 NOTE — PROGRESS NOTES
Patient CANDELARIA to MRI, via stretcher; accompanied by transport. Patient ambulatory with assistance to stretcher. Patient tolerated well.

## 2019-06-29 NOTE — PROGRESS NOTES
Patient has not voided this shift. Bladder scanned patient, only 100cc shown. Will continue to monitor.

## 2019-06-29 NOTE — ED PROVIDER NOTES
Patient was brought to the emergency room for evaluation after episode of abnormal behavior and vomiting at home this morning. Patient was recently seen in the hospital for stroke like symptoms and discharged with a diagnosis of TIA. Her MRI did show some diffuse abnormalities of nonspecific nature and is scheduled to follow up with neurology for possible MS workup.  states this evening that she was acting unusually aggressive and/or angry with her son this evening and then when he went into the bedroom later she was apparently laying in the bed holding on to the headboard yelling \"help me Chris\". She then sat up and vomited and fell on the floor. The patient currently has no complaints and is alert and oriented ×3. The history is provided by the patient, the spouse and medical records. Altered mental status    This is a new problem. The current episode started 3 to 5 hours ago. The problem has been resolved. Associated symptoms include agitation. Pertinent negatives include no confusion, no somnolence, no seizures, no unresponsiveness, no weakness, no delusions, no hallucinations, no self-injury, no violence, no tingling and no numbness. Mental status baseline is normal.  Risk factors: recent TIA and hospitalization.         Past Medical History:   Diagnosis Date    3-part fracture of surgical neck of left humerus 10/27/2016    ADHD (attention deficit hyperactivity disorder)     Arthritis     Carpal tunnel syndrome 11/11/2015    Closed fracture of anatomical neck of humerus 6/16/2015    Degeneration of lumbar or lumbosacral intervertebral disc 2/27/2015    Depressive disorder, not elsewhere classified 2/27/2015    Dermatophytosis of the body 2/27/2015    Essential hypertension, benign 2/27/2015    managed with med    GERD (gastroesophageal reflux disease) 2/27/2015    managed with med    Heart murmur     pt reports since birth; echo dated 9- states trace aortic and mitral regurg    History of MRSA infection     Hypopotassemia 2/27/2015    Insomnia, unspecified 2/27/2015    Kidney stone     Kidney stones     Liver disease     Hepatits C    Migraine, unspecified, without mention of intractable migraine without mention of status migrainosus 2/27/2015    Odontoid fracture with type II morphology ([facility] [address]     Osteoporosis 1/23/2017    Other closed fractures of upper end of humerus 6/16/2015    Polycythemia, secondary 2/27/2015    Traumatic tear of left rotator cuff 10/27/2016    Type 2 superior 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 shoulder 10/27/2016    Unspecified viral hepatitis C without hepatic coma 02/27/2015    pt reports hx only; normal LFTs noted labs June, 2016       Past Surgical History:   Procedure Laterality Date    CARDIAC SURG PROCEDURE UNLIST      heart cath wnl, no stent placed    HX BLADDER SUSPENSION  2006    bladder tack    HX CARPAL TUNNEL RELEASE Right 2001    then left    HX LITHOTRIPSY  2005    HX ORTHOPAEDIC      Slap repair right    HX ORTHOPAEDIC  2011    hip repair, left    HX ORTHOPAEDIC Left     Hand surgery    HX ROTATOR CUFF REPAIR Bilateral          Family History:   Problem Relation Age of Onset    Lung Disease Mother     Hypertension Mother     Heart Disease Mother     Stroke Mother     COPD Mother     Cancer Father         lung    Heart Attack Father     Diabetes Other     Osteoporosis Other     Other Other         Arthritis    Breast Cancer Maternal Aunt 76    Breast Cancer Paternal Aunt 76       Social History     Socioeconomic History    Marital status:      Spouse name: Not on file    Number of children: Not on file    Years of education: Not on file    Highest education level: Not on file   Occupational History    Occupation: housekeeping     Employer: SELF EMPLOYED   Social Needs    Financial resource strain: Not on file    Food insecurity:     Worry: Not on file     Inability: Not on file    Transportation needs:     Medical: Not on file     Non-medical: Not on file   Tobacco Use    Smoking status: Former Smoker     Packs/day: 1.00     Years: 10.00     Pack years: 10.00     Types: Cigarettes     Last attempt to quit: 2017     Years since quittin.9    Smokeless tobacco: Never Used    Tobacco comment: 18- smoked a pack past 3-4 days   Substance and Sexual Activity    Alcohol use: No     Alcohol/week: 0.0 oz    Drug use: No    Sexual activity: Not on file   Lifestyle    Physical activity:     Days per week: Not on file     Minutes per session: Not on file    Stress: Not on file   Relationships    Social connections:     Talks on phone: Not on file     Gets together: Not on file     Attends [employer] service: Not on file     Active member of club or organization: Not on file     Attends meetings of clubs or organizations: Not on file     Relationship status: Not on file    Intimate partner violence:     Fear of current or ex partner: Not on file     Emotionally abused: Not on file     Physically abused: Not on file     Forced sexual activity: Not on file   Other Topics Concern     Service Not Asked    Blood Transfusions Not Asked    Caffeine Concern Not Asked    Occupational Exposure Not Asked   Velora Misael Hazards Not Asked    Sleep Concern Not Asked    Stress Concern Not Asked    Weight Concern Not Asked    Special Diet Not Asked    Back Care Not Asked    Exercise No    Bike Helmet Not Asked    [address] Not Asked    Self-Exams Not Asked   Social History Narrative    Not on file         ALLERGIES: Benadryl [diphenhydramine hcl]; Cymbalta [duloxetine]; Demerol [meperidine]; Dilaudid [hydromorphone]; Lortab [hydrocodone-acetaminophen]; Macrodantin [nitrofurantoin macrocrystal]; Morphine; and Nucynta [tapentadol]    Review of Systems   Neurological: Negative for tingling, seizures, weakness and numbness. Psychiatric/Behavioral: Positive for agitation. Negative for confusion, hallucinations and self-injury. All other systems reviewed and are negative. Vitals:    06/29/19 0150   BP: 175/81   Pulse: 88   Resp: 20   Temp: 98.8 °F (37.1 °C)   SpO2: 97%   Weight: 56.7 kg (125 lb)   Height: 5' 1\" (1.549 m)            Physical Exam   Constitutional: She is oriented to person, place, and time. She appears well-developed and well-nourished. No distress. HENT:   Head: Normocephalic and atraumatic. Eyes: Pupils are equal, round, and reactive to light. Conjunctivae and EOM are normal.   Neck: Normal range of motion. Neck supple. Cardiovascular: Normal rate and regular rhythm. Pulmonary/Chest: Effort normal and breath sounds normal.   Abdominal: Soft. Bowel sounds are normal.   Musculoskeletal: Normal range of motion. Neurological: She is alert and oriented to person, place, and time. Skin: Skin is warm and dry. Capillary refill takes less than 2 seconds. She is not diaphoretic. Psychiatric: She has a normal mood and affect. Her behavior is normal.   Nursing note and vitals reviewed. MDM  Number of Diagnoses or Management Options  Diagnosis management comments: Further history from the  as the patient had taken CBD oil for the first time this evening. While awaiting a urine specimen. The patient became agitated and started yelling \"he killed me, he killed me, he killed me\" while pointing to her .        Amount and/or Complexity of Data Reviewed  Clinical lab tests: ordered and reviewed  Review and summarize past medical records: yes  Independent visualization of images, tracings, or specimens: yes    Risk of Complications, Morbidity, and/or Mortality  Presenting problems: moderate  Diagnostic procedures: moderate  Management options: moderate    Patient Progress  Patient progress: stable         Procedures

## 2019-06-29 NOTE — PROGRESS NOTES
Patient resting in bed alert with periods of confusion, patient denies pain or distress, No SOB noted at this time, safety measures in place, call light within reach.

## 2019-06-29 NOTE — PROGRESS NOTES
Assumed care of patient. Patient resting quietly in bed. Family at bedside. Encouraged to call for needs. Will continue to monitor.

## 2019-06-30 LAB
ANION GAP SERPL CALC-SCNC: 8 MMOL/L (ref 7–16)
BUN SERPL-MCNC: 8 MG/DL (ref 6–23)
CALCIUM SERPL-MCNC: 8.3 MG/DL (ref 8.3–10.4)
CHLORIDE SERPL-SCNC: 112 MMOL/L (ref 98–107)
CO2 SERPL-SCNC: 25 MMOL/L (ref 21–32)
CREAT SERPL-MCNC: 0.55 MG/DL (ref 0.6–1)
GLUCOSE SERPL-MCNC: 92 MG/DL (ref 65–100)
POTASSIUM SERPL-SCNC: 3.6 MMOL/L (ref 3.5–5.1)
SODIUM SERPL-SCNC: 145 MMOL/L (ref 136–145)

## 2019-06-30 PROCEDURE — 74011250636 HC RX REV CODE- 250/636: Performed by: HOSPITALIST

## 2019-06-30 PROCEDURE — 74011250637 HC RX REV CODE- 250/637: Performed by: HOSPITALIST

## 2019-06-30 PROCEDURE — 96376 TX/PRO/DX INJ SAME DRUG ADON: CPT

## 2019-06-30 PROCEDURE — 74011250636 HC RX REV CODE- 250/636: Performed by: PSYCHIATRY & NEUROLOGY

## 2019-06-30 PROCEDURE — 74011250636 HC RX REV CODE- 250/636: Performed by: INTERNAL MEDICINE

## 2019-06-30 PROCEDURE — 80048 BASIC METABOLIC PNL TOTAL CA: CPT

## 2019-06-30 PROCEDURE — 74011250637 HC RX REV CODE- 250/637: Performed by: INTERNAL MEDICINE

## 2019-06-30 PROCEDURE — 99218 HC RM OBSERVATION: CPT

## 2019-06-30 PROCEDURE — 36415 COLL VENOUS BLD VENIPUNCTURE: CPT

## 2019-06-30 PROCEDURE — 99213 OFFICE O/P EST LOW 20 MIN: CPT | Performed by: NURSE PRACTITIONER

## 2019-06-30 RX ORDER — DIPHENHYDRAMINE HYDROCHLORIDE 50 MG/ML
25 INJECTION, SOLUTION INTRAMUSCULAR; INTRAVENOUS ONCE
Status: ACTIVE | OUTPATIENT
Start: 2019-06-30 | End: 2019-06-30

## 2019-06-30 RX ORDER — DEXTROSE MONOHYDRATE, SODIUM CHLORIDE, SODIUM LACTATE, POTASSIUM CHLORIDE, CALCIUM CHLORIDE 5; 600; 310; 179; 20 G/100ML; MG/100ML; MG/100ML; MG/100ML; MG/100ML
INJECTION, SOLUTION INTRAVENOUS CONTINUOUS
Status: DISCONTINUED | OUTPATIENT
Start: 2019-06-30 | End: 2019-07-01 | Stop reason: HOSPADM

## 2019-06-30 RX ORDER — LORAZEPAM 2 MG/ML
1 INJECTION INTRAMUSCULAR
Status: DISCONTINUED | OUTPATIENT
Start: 2019-06-30 | End: 2019-07-01 | Stop reason: HOSPADM

## 2019-06-30 RX ORDER — LORAZEPAM 2 MG/ML
2 INJECTION INTRAMUSCULAR ONCE
Status: COMPLETED | OUTPATIENT
Start: 2019-06-30 | End: 2019-06-30

## 2019-06-30 RX ORDER — POTASSIUM CHLORIDE 1.5 G/1.77G
40 POWDER, FOR SOLUTION ORAL 2 TIMES DAILY WITH MEALS
Status: COMPLETED | OUTPATIENT
Start: 2019-06-30 | End: 2019-07-01

## 2019-06-30 RX ADMIN — LORAZEPAM 1 MG: 2 INJECTION INTRAMUSCULAR; INTRAVENOUS at 23:42

## 2019-06-30 RX ADMIN — LORAZEPAM 1 MG: 2 INJECTION INTRAMUSCULAR; INTRAVENOUS at 21:26

## 2019-06-30 RX ADMIN — LORAZEPAM 2 MG: 2 INJECTION INTRAMUSCULAR; INTRAVENOUS at 14:11

## 2019-06-30 RX ADMIN — POTASSIUM CHLORIDE 40 MEQ: 1.5 POWDER, FOR SOLUTION ORAL at 07:59

## 2019-06-30 RX ADMIN — Medication 10 ML: at 08:02

## 2019-06-30 RX ADMIN — PANTOPRAZOLE SODIUM 40 MG: 40 TABLET, DELAYED RELEASE ORAL at 05:31

## 2019-06-30 RX ADMIN — HALOPERIDOL LACTATE 3 MG: 5 INJECTION INTRAMUSCULAR at 07:59

## 2019-06-30 RX ADMIN — PRAVASTATIN SODIUM 80 MG: 80 TABLET ORAL at 21:25

## 2019-06-30 RX ADMIN — ASPIRIN 81 MG 81 MG: 81 TABLET ORAL at 07:47

## 2019-06-30 RX ADMIN — LISINOPRIL 40 MG: 20 TABLET ORAL at 07:47

## 2019-06-30 RX ADMIN — ESCITALOPRAM OXALATE 20 MG: 10 TABLET ORAL at 07:47

## 2019-06-30 RX ADMIN — LATANOPROST 1 DROP: 50 SOLUTION OPHTHALMIC at 21:33

## 2019-06-30 RX ADMIN — ACETAMINOPHEN 650 MG: 325 TABLET, FILM COATED ORAL at 08:51

## 2019-06-30 RX ADMIN — Medication 10 ML: at 21:26

## 2019-06-30 RX ADMIN — AMLODIPINE BESYLATE 5 MG: 5 TABLET ORAL at 07:48

## 2019-06-30 RX ADMIN — POTASSIUM CHLORIDE 40 MEQ: 1.5 POWDER, FOR SOLUTION ORAL at 18:53

## 2019-06-30 RX ADMIN — Medication 10 ML: at 05:32

## 2019-06-30 NOTE — PROGRESS NOTES
Hospitalist Progress Note    2019  Admit Date: 2019  1:48 AM   NAME: Rene Salomon   :  1960   MRN:  [id number]   Attending: Usha Ellison., MD  PCP:  Mateusz Mendez MD    SUBJECTIVE:     Henry Baumgarten is a 61years old female with hx of HTN, TIA, GERD, anxiety d/o, nephrolithiasis, ADHD discharged from [facility] on  after having a negative stroke work up, got readmitted on  for possible seizure like episode at home. Neurology has evaluated the pt, recommended for MRI without contrast seizure protocol. EEG has been completed, official read is pending. Pt has been depressed and worsening anxiety for past one year with increasing narcotic abuse. Pt had tele psych eval on .    :  Pt comfortably resting in bed, appears anxious. Discussed about tele psych eval for progressive depression and anxiety. No further seizure like activity. She denies chest pain, nausea/vomiting, fever, chills. C/o of sensation of something crawling under her skin this AM. It has resolved now        Review of Systems negative with exception of pertinent positives noted above      PHYSICAL EXAM       Visit Vitals  /89 (BP 1 Location: Left arm, BP Patient Position: At rest)   Pulse 67   Temp 97.9 °F (36.6 °C)   Resp 19   Ht 5' 1\" (1.549 m)   Wt 56.7 kg (125 lb)   SpO2 97%   BMI 23.62 kg/m²      Temp (24hrs), Av.2 °F (36.8 °C), Min:97.9 °F (36.6 °C), Max:98.4 °F (36.9 °C)    Oxygen Therapy  O2 Sat (%): 97 % (19 0723)  O2 Device: Room air (19 0530)    Intake/Output Summary (Last 24 hours) at 2019 0746  Last data filed at 2019 0726  Gross per 24 hour   Intake 1800 ml   Output 1400 ml   Net 400 ml          General: Middle aged, NAD, alert/awake  Head:  Atraumatic Normocephalic. Eyes:  PERRLA, EOMI, Anicteric. ENT:  No discharges/lesions. Lungs:  CTA Bilaterally. CVS:  Regular rate and rhythm,  No murmur, rub, or gallop, No JVD, No lower   extremity edema.   Abdomen: Soft, Non distended, Non tender, Positive bowel sounds. MSK:  No deformities, lesions, Spontaneously moves extremities. Neurologic:  GCS 15, motor 4+ in LUE/LLE, 5/5 RUE/RLE, reflexes 2+ in bilateral UE/LE. Psychiatry:      No anxiety/Depression  Skin:   No rash/lesions. Good skin turgor  Heme/Lymph/Immune:  No petechiae, ecchymoses, overt signs of bleeding or    lymphadenopathy noted. No results found for this or any previous visit (from the past 24 hour(s)). Imaging /Procedures /Studies   All diagnostic imaging personally reviewed by me. ASSESSMENT      Hospital Problems as of 6/30/2019 Date Reviewed: 2/22/2018          Codes Class Noted - Resolved POA    * (Principal) Altered mental status ICD-10-CM: R41.82  ICD-9-CM: 780.97  6/29/2019 - Present Yes        Witnessed seizure-like activity ([facility] [address] ICD-10-CM: R56.9  ICD-9-CM: 780.39  6/29/2019 - Present Yes        Methocarbamol overdose ICD-10-CM: T42.8X1A  ICD-9-CM: 968.0, E980.4  6/29/2019 - Present Yes        Hypertension ICD-10-CM: I10  ICD-9-CM: 401.9  7/6/2017 - Present Yes                  Plan:  - tele psych eval pending  - continue prn ativan and haldol for agitation/restlessness  EEG and MRI brain unremarkable.   Pt does not have suicidal or homicidal ideation  No anti seizure medications recommended  Avoid robaxin in future  Continue other home meds as reconciled in [facility]    DVT Prophylaxis: scd's  Dispo: likely discharge to home in AM, 7/1/19    Nikhil Leija MD

## 2019-06-30 NOTE — PROGRESS NOTES
Patient with continuing c/o \"crawling out of my skin\" and \"agitation\". Dr Nugent Asp paged in regards. New orders received.

## 2019-06-30 NOTE — PROGRESS NOTES
Problem: Falls - Risk of  Goal: *Absence of Falls  Description  Document Adam Hickman Fall Risk and appropriate interventions in the flowsheet.   Outcome: Progressing Towards Goal     Problem: Patient Education: Go to Patient Education Activity  Goal: Patient/Family Education  Outcome: Progressing Towards Goal

## 2019-06-30 NOTE — PROGRESS NOTES
Neurology Daily Progress Note     Assessment:       Acute delirium/psychosis, improving. History is not supportive of seizures. Etiology of psychosis unclear. Possible drug intoxication versus withdrawal. EEG normal, no focal lateralizing or epileptiform features. MRI of brain no change from previous image, negative for acute intracranial abnormalities. Tele-psych evaluation pending. Plan:     Continue supportive therapies. Management of Delirium     Non-pharmacological intervention  · Reorient the patient throughout the day  · Window open and lights on during the day. Lights off, television off, noises down during the night. If able, decrease nursing checks at night  · Therapies as often as possible  · Avoid restraints to the best of your ability   · Avoid sensory deprivation by using glasses and hearing aids, if applicable       Pharmacological intervention  · Replete electrolyte abnormalities and correct metabolic abnormalities  · Limit benzodiazepines, antihistamines, narcotics, anticholinergics. Preference towards Precedex for sedation over fentanyl and benzodiazepines/GABAa agonists. · For dangerous behavior/aggression to self or others can consider Zyprexa or Seroquel if benefits outweigh risk  · For persistent insomnia can use melatonin four hours prior to bedtime or Seroquel 25 mg at bedtime      Subjective: Interval history:    Patient is doing better today. Alert and oriented to person, place, and time. Was given IV haloperidol this AM due to increased anxiety and agitation. Patient calm. Able to follow commands. Denies visual or auditory hallucinations, numbness or tingling in LUE/LE. EEG normal, no focal lateralizing or epileptiform features. MRI of brain no change from previous image, negative for acute intracranial abnormalities. Tele-psych evaluation pending. History:    Russel Barraza is a 61 y.o. female who is being seen for AMS.     Review of systems negative with exception of pertinent positives and negatives noted above.        Objective:     Vitals:    06/29/19 2231 06/30/19 0348 06/30/19 0400 06/30/19 0723   BP: 165/75 158/74 144/78 163/89   Pulse: 65 73  67   Resp: 17 17 19   Temp: 98.2 °F (36.8 °C) 98.1 °F (36.7 °C)  97.9 °F (36.6 °C)   SpO2: 96% 98%  97%   Weight:       Height:              Current Facility-Administered Medications:     dextrose 5% - LR with KCl 20 mEq/L infusion, , IntraVENous, CONTINUOUS, Vinnie Dc MD    potassium chloride (KLOR-CON) packet for solution 40 mEq, 40 mEq, Oral, BID WITH MEALS, Vinnie Dc MD, 40 mEq at 06/30/19 0759    diphenhydrAMINE (BENADRYL) injection 25 mg, 25 mg, IntraVENous, ONCE, Vinnie Dc MD    LORazepam (ATIVAN) injection 1 mg, 1 mg, IntraVENous, Q2H PRN, Vinnie Dc MD    aspirin chewable tablet 81 mg, 81 mg, Oral, DAILY, Rakesh Douglas MD, 81 mg at 06/30/19 0747    escitalopram oxalate (LEXAPRO) tablet 20 mg, 20 mg, Oral, DAILY, Rakesh Douglas MD, 20 mg at 06/30/19 0747    latanoprost (XALATAN) 0.005 % ophthalmic solution 1 Drop, 1 Drop, Both Eyes, QHS, Rakesh Douglas MD, 1 Drop at 06/29/19 2137    pantoprazole (PROTONIX) tablet 40 mg, 40 mg, Oral, ACB, Rakesh Douglas MD, 40 mg at 06/30/19 0531    pravastatin (PRAVACHOL) tablet 80 mg, 80 mg, Oral, QHS, Rakesh Douglas MD, 80 mg at 06/29/19 2137    promethazine (PHENERGAN) tablet 25 mg, 25 mg, Oral, Q6H PRN, Rakesh Douglas MD    sodium chloride (NS) flush 5-40 mL, 5-40 mL, IntraVENous, Q8H, Rakesh Douglas MD, 10 mL at 06/30/19 0802    sodium chloride (NS) flush 5-40 mL, 5-40 mL, IntraVENous, PRN, Rakesh Douglas MD    acetaminophen (TYLENOL) tablet 650 mg, 650 mg, Oral, Q4H PRN, Rakesh Douglas MD, 650 mg at 06/30/19 0851    haloperidol lactate (HALDOL) injection 3 mg, 3 mg, IntraVENous, Q6H PRN, Rakesh Douglas MD, 3 mg at 06/30/19 0759    amLODIPine (NORVASC) tablet 5 mg, 5 mg, Oral, DAILY, Rakesh Douglas MD, 5 mg at 06/30/19 0748    lisinopril (PRINIVIL, ZESTRIL) tablet 40 mg, 40 mg, Oral, DAILY, Erich Rojas MD, 40 mg at 06/30/19 0747    Recent Results (from the past 12 hour(s))   METABOLIC PANEL, BASIC    Collection Time: 06/30/19  7:11 AM   Result Value Ref Range    Sodium 145 136 - 145 mmol/L    Potassium 3.6 3.5 - 5.1 mmol/L    Chloride 112 (H) 98 - 107 mmol/L    CO2 25 21 - 32 mmol/L    Anion gap 8 7 - 16 mmol/L    Glucose 92 65 - 100 mg/dL    BUN 8 6 - 23 MG/DL    Creatinine 0.55 (L) 0.6 - 1.0 MG/DL    GFR est AA >60 >60 ml/min/1.73m2    GFR est non-AA >60 >60 ml/min/1.73m2    Calcium 8.3 8.3 - 10.4 MG/DL     MRI Results (most recent):  Results from Hospital Encounter encounter on 06/29/19   MRI BRAIN WO CONT    Narrative MRI BRAIN WITHOUT CONTRAST. HISTORY: Left facial numbness and tingling of the left extremity with new onset  seizure. COMPARISON:  27 June 2018. Study performed within 24 hours of admission. TECHNIQUE:  Sagittal T1, axial T1, T2, FLAIR, gradient echo, diffusion with ADC  map and coronal FLAIR. FINDINGS:  Diffusion images do not demonstrate any areas of restricted diffusion  to suggest acute infarction. Nonspecific FLAIR white matter hyperintensities are  present which include the brunilda. No midline shift, mass or mass effect. Gradient  echo images are unremarkable. No evidence of acute hemorrhage. The lateral  ventricles are normal size. The pituitary, parasellar and midline structures  are unremarkable on the sagittal T1 images. There are normal T2 flow-voids in  the major vessels. Posterior fossa structures are unremarkable. The basal  ganglia appear symmetric. Orbits are grossly normal.  Paranasal sinuses are  clear. [Posterior cervical spine fusion upper cervical spine. Impression IMPRESSION: No acute infarction. Nonspecific white matter changes, similar to  recent exam           Physical Exam:  General - Well developed, well nourished, in NAD. Confused.    HEENT - Normocephalic, atraumatic. Conjunctiva are clear. Neck - Supple without masses  Cardiovascular - Regular rate and rhythm. Normal S1, S2 without murmurs, rubs, or gallops. Lungs - Clear to auscultation. Abdomen - Soft, nontender with normal bowel sounds. Extremities - Peripheral pulses intact. No edema and no rashes.      Neurological examination - Limited to AMS. Alert to person, place, not time. Comprehension, attention, memory and reasoning are impaired. Language and speech are dsyarthric. On cranial nerve examination, (II, III, IV, VI) pupils are equal, round, and reactive to light. Visual acuity is adequate. Visual fields are full to finger confrontation. Extraocular motility is normal. (V, VII) Face is symmetric and sensation is intact to light touch. (VIII) Hearing is intact. (IX, X) Palate and uvula elevate symmetrically. Voice is normal. (XI) Shoulder shrug is strong and equal bilaterally. (XII)Tongue is midline.    Motor examination - There is normal muscle tone and bulk. Power is full throughout. Muscle stretch reflexes are 3+BUE/LE and there are no pathological reflexes present. Plantar response is flexor. Sensation is intact to light touch, pinprick, vibration and proprioception in all extremities. Cerebellar examination is normal finger/nose and heel/khan.      Signed By: Nikki Bravo NP     June 30, 2019

## 2019-06-30 NOTE — PROGRESS NOTES
Patient resting quietly in bed. NAD noted. Respirations even, non labored. Call light within reach. Will continue to monitor.

## 2019-06-30 NOTE — PROCEDURES
[facility] Neurology   Routine Electroencephalogram Report      DATE:   June 29, 2019; [id number]    EEG Number: [id number]    Indication: Questionable seizure    Medications:   Current Facility-Administered Medications   Medication Dose Route Frequency Provider Last Rate Last Dose    dextrose 5% - LR with KCl 20 mEq/L infusion   IntraVENous CONTINUOUS Treasure Leos MD        potassium chloride (KLOR-CON) packet for solution 40 mEq  40 mEq Oral BID WITH MEALS Treasure Leos MD   40 mEq at 06/30/19 0759    diphenhydrAMINE (BENADRYL) injection 25 mg  25 mg IntraVENous Omi Bergman MD        aspirin chewable tablet 81 mg  81 mg Oral DAILY Otto Ansari MD   81 mg at 06/30/19 0747    escitalopram oxalate (LEXAPRO) tablet 20 mg  20 mg Oral DAILY Otto Ansari MD   20 mg at 06/30/19 0747    latanoprost (XALATAN) 0.005 % ophthalmic solution 1 Drop  1 Drop Both Eyes QHS Otto Ansari MD   1 Drop at 06/29/19 2137    pantoprazole (PROTONIX) tablet 40 mg  40 mg Oral ACB Otto Ansari MD   40 mg at 06/30/19 0531    pravastatin (PRAVACHOL) tablet 80 mg  80 mg Oral QHS Otto Ansari MD   80 mg at 06/29/19 2137    promethazine (PHENERGAN) tablet 25 mg  25 mg Oral Q6H PRN Otto Ansari MD        sodium chloride (NS) flush 5-40 mL  5-40 mL IntraVENous Q8H Otto Ansari MD   10 mL at 06/30/19 0802    sodium chloride (NS) flush 5-40 mL  5-40 mL IntraVENous PRN Otto Ansari MD        acetaminophen (TYLENOL) tablet 650 mg  650 mg Oral Q4H PRN Otto Ansari MD        haloperidol lactate (HALDOL) injection 3 mg  3 mg IntraVENous Q6H PRN Otto nAsari MD   3 mg at 06/30/19 0759    amLODIPine (NORVASC) tablet 5 mg  5 mg Oral DAILY Otto Ansari MD   5 mg at 06/30/19 0748    lisinopril (PRINIVIL, ZESTRIL) tablet 40 mg  40 mg Oral DAILY Otto Ansari MD   40 mg at 06/30/19 5528       Technique: The EEG was recorded on a 32 channel [employer] digital EEG machine.  A full electrode headset was applied in accordance with the International 10-20 System of Electrode Placement. All impedances are less than 5000 Ohms. State of Consciousness: awake and drowsy       Description:  Waking EEG reveals intermittent 9.5-10 Hz, 40-60 µV activity symmetrically in the posterior head regions bilaterally. Reactivity to eye opening and eye closure is good. Increased EMG activity is noted frontally. During drowsiness diffuse mixed frequency slowing is seen in the frontal and central head regions bilaterally. Intermittent low amplitude asynchronous \"jerking\" is noted at various points during the record. There is no change in the EEG other than increased movement and EMG artifact. No focal slowing or epileptiform activity is seen. Activation Procedures:  Hyperventilation: Was not performed  Photic Stimulation: Did not alter the record      Interpretation: Normal electroencephalogram, awake, asleep and with activation procedures. There are no focal lateralizing or epileptiform features. N/A

## 2019-07-01 VITALS
BODY MASS INDEX: 23.6 KG/M2 | HEART RATE: 77 BPM | DIASTOLIC BLOOD PRESSURE: 77 MMHG | TEMPERATURE: 97.9 F | HEIGHT: 61 IN | WEIGHT: 125 LBS | OXYGEN SATURATION: 100 % | SYSTOLIC BLOOD PRESSURE: 140 MMHG | RESPIRATION RATE: 16 BRPM

## 2019-07-01 LAB
ANION GAP SERPL CALC-SCNC: 9 MMOL/L (ref 7–16)
BUN SERPL-MCNC: 9 MG/DL (ref 6–23)
CALCIUM SERPL-MCNC: 8.9 MG/DL (ref 8.3–10.4)
CHLORIDE SERPL-SCNC: 106 MMOL/L (ref 98–107)
CO2 SERPL-SCNC: 27 MMOL/L (ref 21–32)
CREAT SERPL-MCNC: 0.62 MG/DL (ref 0.6–1)
GLUCOSE SERPL-MCNC: 84 MG/DL (ref 65–100)
POTASSIUM SERPL-SCNC: 3.4 MMOL/L (ref 3.5–5.1)
SODIUM SERPL-SCNC: 142 MMOL/L (ref 136–145)

## 2019-07-01 PROCEDURE — 80048 BASIC METABOLIC PNL TOTAL CA: CPT

## 2019-07-01 PROCEDURE — 96376 TX/PRO/DX INJ SAME DRUG ADON: CPT

## 2019-07-01 PROCEDURE — 99218 HC RM OBSERVATION: CPT

## 2019-07-01 PROCEDURE — 36415 COLL VENOUS BLD VENIPUNCTURE: CPT

## 2019-07-01 PROCEDURE — 74011250637 HC RX REV CODE- 250/637: Performed by: HOSPITALIST

## 2019-07-01 PROCEDURE — 74011250636 HC RX REV CODE- 250/636: Performed by: HOSPITALIST

## 2019-07-01 PROCEDURE — 74011250637 HC RX REV CODE- 250/637: Performed by: INTERNAL MEDICINE

## 2019-07-01 RX ORDER — OLANZAPINE 2.5 MG/1
2.5 TABLET ORAL
Qty: 30 TAB | Refills: 1 | Status: SHIPPED | OUTPATIENT
Start: 2019-07-01 | End: 2019-07-31

## 2019-07-01 RX ORDER — FLUOXETINE 20 MG/1
20 TABLET ORAL
Qty: 30 TAB | Refills: 1 | Status: SHIPPED | OUTPATIENT
Start: 2019-07-01 | End: 2019-07-31

## 2019-07-01 RX ORDER — METHOCARBAMOL 750 MG/1
750 TABLET, FILM COATED ORAL 2 TIMES DAILY
Qty: 35 TAB | Refills: 0 | Status: SHIPPED | OUTPATIENT
Start: 2019-07-01 | End: 2019-08-09

## 2019-07-01 RX ADMIN — AMLODIPINE BESYLATE 5 MG: 5 TABLET ORAL at 08:44

## 2019-07-01 RX ADMIN — PANTOPRAZOLE SODIUM 40 MG: 40 TABLET, DELAYED RELEASE ORAL at 05:41

## 2019-07-01 RX ADMIN — LORAZEPAM 1 MG: 2 INJECTION INTRAMUSCULAR; INTRAVENOUS at 04:11

## 2019-07-01 RX ADMIN — Medication 10 ML: at 05:41

## 2019-07-01 RX ADMIN — POTASSIUM CHLORIDE 40 MEQ: 1.5 POWDER, FOR SOLUTION ORAL at 08:39

## 2019-07-01 RX ADMIN — ASPIRIN 81 MG 81 MG: 81 TABLET ORAL at 08:45

## 2019-07-01 RX ADMIN — LISINOPRIL 40 MG: 20 TABLET ORAL at 08:43

## 2019-07-01 RX ADMIN — ESCITALOPRAM OXALATE 20 MG: 10 TABLET ORAL at 08:46

## 2019-07-01 NOTE — PROGRESS NOTES
Pt is for discharge home today with no needs/supportive care orders recieved for CM at this time.   Care Management Interventions  PCP Verified by CM: Fang Valdez)  Mode of Transport at Discharge: (family)  Transition of Care Consult (CM Consult): Discharge Planning  Discharge Durable Medical Equipment: No  Physical Therapy Consult: No  Occupational Therapy Consult: No  Speech Therapy Consult: No  Current Support Network: Lives with Spouse  Confirm Follow Up Transport: Self  Plan discussed with Pt/Family/Caregiver: Yes  Discharge Location  Discharge Placement: Home

## 2019-07-01 NOTE — PROGRESS NOTES
Patient states\" I want to leave I need to go home\". Patient was reoriented and agrees to stay at her home.

## 2019-07-01 NOTE — PROGRESS NOTES
Discharge instructions, follow up information, medication list, prescriptions, and prescription information provided and explained to the patient and spouse at bedside. IV removed from right arm, no remote telemetry on. Opportunity for questions provided. PCT notified patient is ready to leave.

## 2019-07-01 NOTE — DISCHARGE INSTRUCTIONS
DISCHARGE SUMMARY from Nurse    PATIENT INSTRUCTIONS:    After general anesthesia or intravenous sedation, for 24 hours or while taking prescription Narcotics:  · Limit your activities  · Do not drive and operate hazardous machinery  · Do not make important personal or business decisions  · Do  not drink alcoholic beverages  · If you have not urinated within 8 hours after discharge, please contact your surgeon on call. What to do at Home:  Recommended activity: Activity as tolerated. If you experience any of the following symptoms temp > 101.5, unrelieved pain, nausea or vomiting, shortness of breath or fatigue not relieved with rest, please follow up with MD.    *  Please give a list of your current medications to your Primary Care Provider. *  Please update this list whenever your medications are discontinued, doses are      changed, or new medications (including over-the-counter products) are added. *  Please carry medication information at all times in case of emergency situations. These are general instructions for a healthy lifestyle:    No smoking/ No tobacco products/ Avoid exposure to second hand smoke  Surgeon General's Warning:  Quitting smoking now greatly reduces serious risk to your health. Obesity, smoking, and sedentary lifestyle greatly increases your risk for illness    A healthy diet, regular physical exercise & weight monitoring are important for maintaining a healthy lifestyle    You may be retaining fluid if you have a history of heart failure or if you experience any of the following symptoms:  Weight gain of 3 pounds or more overnight or 5 pounds in a week, increased swelling in our hands or feet or shortness of breath while lying flat in bed. Please call your doctor as soon as you notice any of these symptoms; do not wait until your next office visit. The discharge information has been reviewed with the patient. The patient verbalized understanding.   Discharge medications reviewed with the patient and appropriate educational materials and side effects teaching were provided. Patient Education        Altered Mental Status: Care Instructions  Your Care Instructions    Altered mental status is a change in how well your brain is working. As a result, you may be confused, be less alert than usual, or act in odd ways. This may include seeing or hearing things that aren't really there (hallucinations). A mental status change has many possible causes. For example, it may be the result of an infection, an imbalance of chemicals in the body, or a chronic disease such as diabetes or COPD. It can also be caused by things such as a head injury, taking certain medicines, or using alcohol or drugs. The doctor may do tests to look for the cause. These tests may include urine tests, blood tests, and imaging tests such as a CT scan. Sometimes a clear cause isn't found. But tests can help the doctor rule out a serious cause of your symptoms. A change in mental status can be scary. But mental status will often return to normal when the cause is treated. So it is important to get any follow-up testing or treatment the doctor has suggested. The doctor has checked you carefully, but problems can develop later. If you notice any problems or new symptoms, get medical treatment right away. Follow-up care is a key part of your treatment and safety. Be sure to make and go to all appointments, and call your doctor if you are having problems. It's also a good idea to know your test results and keep a list of the medicines you take. How can you care for yourself at home? · Be safe with medicines. Take your medicines exactly as prescribed. Call your doctor if you think you are having a problem with your medicine. · Have another adult stay with you until you are better. This can help keep you safe. Ask that person to watch for signs that your mental status is getting worse.   When should you call for help? Call 911 anytime you think you may need emergency care. For example, call if:    · You passed out (lost consciousness).    Call your doctor now or seek immediate medical care if:    · Your mental status is getting worse.     · You have new symptoms, such as a fever, chills, or shortness of breath.     · You do not feel safe.    Watch closely for changes in your health, and be sure to contact your doctor if:    · You do not get better as expected. Where can you learn more? Go to http://catracho-ricardo.info/. Enter U484 in the search box to learn more about \"Altered Mental Status: Care Instructions. \"  Current as of: Park 3, 2018  Content Version: 11.9  © 6971-7032 [employer]. Care instructions adapted under license by [employer] (which disclaims liability or warranty for this information). If you have questions about a medical condition or this instruction, always ask your healthcare professional. [address] any warranty or liability for your use of this information.          ___________________________________________________________________________________________________________________________________

## 2019-07-01 NOTE — PROGRESS NOTES
SBAR shift report received from [facility]. Pt stable. Assessment complete. Pt is sitting up in bed,  at bedside. Resp even, unlabored. Pt is alert, orient X 4 with sporadiac statements like \"let's go shopping. \" Pt appears in no acute distress at this time. Pt is on RA. Pt states \"let's go I am ready to be discharged. \" Pt encouraged to call for assistance, call light in reach. Safety measures in place.  Will continue to monitor

## 2019-07-01 NOTE — PROGRESS NOTES
Patient resting in bed, alert and oriented, cooperative with care, no visual S/S of pain or distress, safety measures in place, call light within reach.

## 2019-07-01 NOTE — DISCHARGE SUMMARY
Hospitalist Discharge Summary     Patient ID:  Jesse Chandra  [id number]  61 y.o.  1960  Admit date: 6/29/2019  1:48 AM  Discharge date and time: 7/1/2019  Attending: Franki Boyce MD  PCP:  Jethro Mccarty MD  Treatment Team: Attending Provider: Franki Boyce MD; Consulting Provider: Marika Sands MD; Utilization Review: Juliana Mary RN    Principal Diagnosis:  Acute encephalopathy  Withdrawal seizure, resolved  Generalized anxiety disorder  depression      Principal Problem:    Altered mental status (6/29/2019)    Active Problems:    Hypertension (7/6/2017)      Witnessed seizure-like activity ([address] (6/29/2019)      Methocarbamol overdose (6/29/2019)             Hospital Course:  Please refer to the admission H&P for details of presentation. In summary, the patient is 61years old female with hx of HTN, TIA, GERD, anxiety d/o, nephrolithiasis, ADHD discharged from [facility] on 6/28 after having a negative stroke work up, got readmitted on 6/29 for possible seizure like episode at home. Neurology has evaluated the pt, recommended for MRI without contrast seizure protocol. EEG has been completed. Pt has been depressed and worsening anxiety for past one year with increasing narcotic abuse. Pt had tele psych eval on 6/29, recommended to stop lexapro and to start zyprexa and prozac. Pt will be weaned off robaxin over next 3 weeks. MRI and EEG were unremarkable. She is advised to follow up with scheduled neurology appointment for outpatient work up for MS. She has been asymptomatic with no further seizure like activity since admission. She is asymptomatic, feeling more at her baseline. She is medically cleared and hemodynamically stable for discharge today. Plan of care discussed with pt and her . Rest of the hospital course was uneventful. For further details, please refer to daily progress notes.         Significant Diagnostic Studies:   MRI BRAIN WITHOUT CONTRAST.     HISTORY: Left facial numbness and tingling of the left extremity with new onset  seizure.     COMPARISON:  27 June 2018. Study performed within 24 hours of admission.     TECHNIQUE:  Sagittal T1, axial T1, T2, FLAIR, gradient echo, diffusion with ADC  map and coronal FLAIR.       FINDINGS:  Diffusion images do not demonstrate any areas of restricted diffusion  to suggest acute infarction. Nonspecific FLAIR white matter hyperintensities are  present which include the brunilda. No midline shift, mass or mass effect. Gradient  echo images are unremarkable. No evidence of acute hemorrhage. The lateral  ventricles are normal size. The pituitary, parasellar and midline structures  are unremarkable on the sagittal T1 images. There are normal T2 flow-voids in  the major vessels. Posterior fossa structures are unremarkable. The basal  ganglia appear symmetric. Orbits are grossly normal.  Paranasal sinuses are  clear. [Posterior cervical spine fusion upper cervical spine.     IMPRESSION  IMPRESSION: No acute infarction. Nonspecific white matter changes, similar to  recent exam      EEG:  Interpretation: Normal electroencephalogram, awake, asleep and   with activation procedures. There are no focal lateralizing or   epileptiform features. Labs: Results:       Chemistry Recent Labs     07/01/19  0558 06/30/19  0711 06/29/19  0201   GLU 84 92 119*    145 142   K 3.4* 3.6 3.2*    112* 107   CO2 27 25 25   BUN 9 8 12   CREA 0.62 0.55* 0.81   CA 8.9 8.3 9.8   AGAP 9 8 10   AP  --   --  100   TP  --   --  8.1   ALB  --   --  4.5   GLOB  --   --  3.6*   AGRAT  --   --  1.3      CBC w/Diff Recent Labs     06/29/19  0201   WBC 7.6   RBC 4.64   HGB 14.9   HCT 43.1      GRANS 69   LYMPH 22   EOS 0*      Cardiac Enzymes No results for input(s): CPK, CKND1, JANETTE in the last 72 hours. No lab exists for component: CKRMB, TROIP   Coagulation No results for input(s): PTP, INR, APTT in the last 72 hours.     No lab exists for component: INREXT    Lipid Panel Lab Results   Component Value Date/Time    Cholesterol, total 206 (H) 06/28/2019 04:19 AM    HDL Cholesterol 64 (H) 06/28/2019 04:19 AM    LDL, calculated 112.2 (H) 06/28/2019 04:19 AM    VLDL, calculated 29.8 (H) 06/28/2019 04:19 AM    Triglyceride 149 06/28/2019 04:19 AM    CHOL/HDL Ratio 3.2 06/28/2019 04:19 AM      BNP No results for input(s): BNPP in the last 72 hours. Liver Enzymes Recent Labs     06/29/19  0201   TP 8.1   ALB 4.5      SGOT 16      Thyroid Studies Lab Results   Component Value Date/Time    TSH 0.916 02/06/2019 10:44 AM            Discharge Exam:  Visit Vitals  /72 (BP 1 Location: Left arm, BP Patient Position: At rest)   Pulse 82   Temp 98 °F (36.7 °C)   Resp 18   Ht 5' 1\" (1.549 m)   Wt 56.7 kg (125 lb)   SpO2 98%   BMI 23.62 kg/m²     General appearance: alert, cooperative, no distress, appears stated age  Lungs: clear to auscultation bilaterally  Heart: regular rate and rhythm, S1, S2 normal, no murmur, click, rub or gallop  Abdomen: soft, non-tender. Bowel sounds normal. No masses,  no organomegaly  Extremities: no cyanosis or edema  Neurologic: GCS 15, no motor or sensory deficits, CN 2-12 intact  Psych: AOx 3, mood and affect appropriate    Disposition: home  Discharge Condition: stable  Patient Instructions:   Current Discharge Medication List      CONTINUE these medications which have NOT CHANGED    Details   aspirin 81 mg chewable tablet Take 1 Tab by mouth daily for 30 days. Qty: 30 Tab, Refills: 2      pravastatin (PRAVACHOL) 80 mg tablet Take 1 Tab by mouth nightly for 30 days. Qty: 30 Tab, Refills: 2      penciclovir (DENAVIR) 1 % topical cream Apply  to affected area every two (2) hours. Qty: 5 g, Refills: PRN      itraconazole (SPORONAX) 100 mg capsule TK ONE C PO  QD WITH DINNER  Refills: 0      latanoprost (XALATAN) 0.005 % ophthalmic solution Administer 1 Drop to both eyes nightly.       escitalopram oxalate (LEXAPRO) 20 mg tablet TAKE 1 TABLET BY MOUTH EVERY DAY  Qty: 30 Tab, Refills: 11      amLODIPine-benazepril (LOTREL) 5-40 mg per capsule TAKE 1 CAPSULE BY MOUTH EVERY DAY  Qty: 30 Cap, Refills: 11      levalbuterol tartrate (XOPENEX) 45 mcg/actuation inhaler Take 2 Puffs by inhalation every six (6) hours as needed for Wheezing. Indications: Bronchospastic Pulmonary Disease  Qty: 1 Inhaler, Refills: 2      denosumab (PROLIA) 60 mg/mL injection 60 mg by SubCUTAneous route. Twice a year      promethazine (PHENERGAN) 25 mg tablet 1/2-1 po tid prn nausea  Qty: 30 Tab, Refills: 0      omeprazole (PRILOSEC) 20 mg capsule Take 20 mg by mouth daily. Patient instructed to take morning of surgery per anesthesia guidelines         STOP taking these medications       methocarbamol (ROBAXIN) 750 mg tablet Comments:   Reason for Stopping:               Activity: Activity as tolerated  Diet: Cardiac Diet  Wound Care: None needed    Follow-up  · Follow up with PCP and Neurology in 1-2 weeks as scheduled.   · Establish outpatient Psychiatry follow up    Time spent to discharge patient 35 minutes  Signed:  Liane Melgar MD  7/1/2019  8:01 AM

## 2019-07-01 NOTE — PROGRESS NOTES
at patient's room, and requesting information about this patient.  states \"Ativan in high doses can cause confusion\". First shift nurse aware about this patient behavior.

## 2019-07-23 ENCOUNTER — HOSPITAL ENCOUNTER (OUTPATIENT)
Dept: INTERVENTIONAL RADIOLOGY/VASCULAR | Age: 59
Discharge: HOME | End: 2019-07-23
Attending: NURSE PRACTITIONER
Payer: COMMERCIAL

## 2019-07-23 VITALS
RESPIRATION RATE: 18 BRPM | DIASTOLIC BLOOD PRESSURE: 78 MMHG | OXYGEN SATURATION: 93 % | BODY MASS INDEX: 23.6 KG/M2 | SYSTOLIC BLOOD PRESSURE: 130 MMHG | HEART RATE: 88 BPM | HEIGHT: 61 IN | WEIGHT: 125 LBS | TEMPERATURE: 98 F

## 2019-07-23 DIAGNOSIS — R20.0 NUMBNESS AND TINGLING OF LEFT ARM AND LEG: ICD-10-CM

## 2019-07-23 DIAGNOSIS — R20.2 NUMBNESS AND TINGLING OF LEFT ARM AND LEG: ICD-10-CM

## 2019-07-23 PROCEDURE — 77030014143 HC TY PUNC LUMBR BD -A

## 2019-07-23 PROCEDURE — 62270 DX LMBR SPI PNXR: CPT

## 2019-07-23 PROCEDURE — 77030003666 HC NDL SPINAL BD -A

## 2019-07-23 PROCEDURE — 74011636320 HC RX REV CODE- 636/320: Performed by: RADIOLOGY

## 2019-07-23 PROCEDURE — 74011250636 HC RX REV CODE- 250/636: Performed by: RADIOLOGY

## 2019-07-23 RX ORDER — LIDOCAINE HYDROCHLORIDE 20 MG/ML
1-15 INJECTION, SOLUTION INFILTRATION; PERINEURAL ONCE
Status: COMPLETED | OUTPATIENT
Start: 2019-07-23 | End: 2019-07-23

## 2019-07-23 RX ADMIN — IOPAMIDOL 3 ML: 408 INJECTION, SOLUTION INTRATHECAL at 15:53

## 2019-07-23 RX ADMIN — LIDOCAINE HYDROCHLORIDE 200 MG: 20 INJECTION, SOLUTION INFILTRATION; PERINEURAL at 15:03

## 2019-07-23 NOTE — DISCHARGE INSTRUCTIONS
[address] [facility] [address]  Department of Interventional Radiology  [address] [place] [address] [address] [facility]  [phone] Office  [phone] Fax  POST LUMBAR PUNCTURE/MYELOGRAM/INTRATHECAL CHEMOTHERAPY DISCHARGE INSTRUCTIONS  General Information:  Lumbar Puncture: A LP is done to help diagnose several disorders, like pseudo tumor, migraines, meningitis, and multiple sclerosis. It involves a puncture (usually in the lower spine) into the sac that protects the spinal column. A sample of the fluid in that space is removed and tested in the lab. Call If:   You should call your Physician and/or the Radiology Nurse if you develop a headache that is not relieved by Tylenol, and worsens when you stand and eases when you lie down, you need to call. You may have developed what is referred to as a spinal headache. Our physicians will probably advise you to be on strict bed rest for 24 hours, to drink lots of fluids and caffeine. If this does not help the head pain, call again the next day. You should call if you have bleeding other than a small spot on your bandage. You should call if you have any numbness, tingling, weakness, fever, chills, urinary retention, severe itching, rash, welts, swelling, or confusion. Follow-up Instructions: See the doctor who ordered your procedure as he/she has instructed. If you had a Lumbar Puncture or Myelogram, your results should be available to your ordering doctor in 3-5 business days. You can remove your dressing in 24 hours and shower regularly. Do not bathe or swim for 72 hours. To Reach Us:  Patient Signature:  Date: 7/23/2019  Discharging Nurse: Jacki Galvez            Interventional Radiology Lumbar Puncture Note    7/23/2019    Procedure(s): Fluoroscopic guided diagnostic lumbar puncture performed at {Vertebrae:21641999}. Approximately *** cc of fluid. Opening pressures ***cm H2O.    CSF samples sent to lab.     Complications: None    Plan: Observation,  bed rest for {Numbers; 1-4 :41405247} hours. Patient to remain recumbent for the remainder of today. Labs Pending:    Full dictated report to follow  If you have any questions about your procedure, please call the Interventional Radiology department at [phone]. After business hours (5pm) and weekends, call the answering service at [phone] and ask for the Radiologist on call to be paged. Si tiene Preguntas acerca del procedimiento, por favor llame al departamento de Radiología Intervencional al [phone]. Después de horas de oficina (5 pm) y los fines de [place], llamar al [facility] Ried[facility] [place] al [phone] y pregunte por el Radiologo de [facility]. Interventional Radiology General Nurse Discharge    After general anesthesia or intravenous sedation, for 24 hours or while taking prescription Narcotics:  · Limit your activities  · Do not drive and operate hazardous machinery  · Do not make important personal or business decisions  · Do  not drink alcoholic beverages  · If you have not urinated within 8 hours after discharge, please contact your surgeon on call. * Please give a list of your current medications to your Primary Care Provider. * Please update this list whenever your medications are discontinued, doses are     changed, or new medications (including over-the-counter products) are added. * Please carry medication information at all times in case of emergency situations. These are general instructions for a healthy lifestyle:    No smoking/ No tobacco products/ Avoid exposure to second hand smoke  Surgeon General's Warning:  Quitting smoking now greatly reduces serious risk to your health.     Obesity, smoking, and sedentary lifestyle greatly increases your risk for illness  A healthy diet, regular physical exercise & weight monitoring are important for maintaining a healthy lifestyle    You may be retaining fluid if you have a history of heart failure or if you experience any of the following symptoms:  Weight gain of 3 pounds or more overnight or 5 pounds in a week, increased swelling in our hands or feet or shortness of breath while lying flat in bed. Please call your doctor as soon as you notice any of these symptoms; do not wait until your next office visit. Recognize signs and symptoms of STROKE:  F-face looks uneven    A-arms unable to move or move unevenly    S-speech slurred or non-existent    T-time-call 911 as soon as signs and symptoms begin-DO NOT go       Back to bed or wait to see if you get better-TIME IS BRAIN.

## 2019-08-17 ENCOUNTER — HOSPITAL ENCOUNTER (EMERGENCY)
Age: 59
Discharge: HOME | End: 2019-08-17
Attending: EMERGENCY MEDICINE
Payer: COMMERCIAL

## 2019-08-17 ENCOUNTER — APPOINTMENT (OUTPATIENT)
Dept: CT IMAGING | Age: 59
End: 2019-08-17
Attending: EMERGENCY MEDICINE
Payer: COMMERCIAL

## 2019-08-17 ENCOUNTER — APPOINTMENT (OUTPATIENT)
Dept: GENERAL RADIOLOGY | Age: 59
End: 2019-08-17
Attending: EMERGENCY MEDICINE
Payer: COMMERCIAL

## 2019-08-17 VITALS
RESPIRATION RATE: 16 BRPM | WEIGHT: 135 LBS | BODY MASS INDEX: 25.49 KG/M2 | TEMPERATURE: 97.8 F | HEIGHT: 61 IN | DIASTOLIC BLOOD PRESSURE: 76 MMHG | OXYGEN SATURATION: 96 % | SYSTOLIC BLOOD PRESSURE: 150 MMHG | HEART RATE: 85 BPM

## 2019-08-17 DIAGNOSIS — T50.905A MEDICATION ADVERSE EFFECT, INITIAL ENCOUNTER: Primary | ICD-10-CM

## 2019-08-17 LAB
ALBUMIN SERPL-MCNC: 3.7 G/DL (ref 3.5–5)
ALBUMIN/GLOB SERPL: 0.9 {RATIO} (ref 1.2–3.5)
ALP SERPL-CCNC: 99 U/L (ref 50–136)
ALT SERPL-CCNC: 29 U/L (ref 12–65)
ANION GAP SERPL CALC-SCNC: 12 MMOL/L (ref 7–16)
AST SERPL-CCNC: 29 U/L (ref 15–37)
BASOPHILS # BLD: 0 K/UL (ref 0–0.2)
BASOPHILS NFR BLD: 1 % (ref 0–2)
BILIRUB SERPL-MCNC: 0.2 MG/DL (ref 0.2–1.1)
BUN SERPL-MCNC: 12 MG/DL (ref 6–23)
CALCIUM SERPL-MCNC: 10 MG/DL (ref 8.3–10.4)
CHLORIDE SERPL-SCNC: 107 MMOL/L (ref 98–107)
CO2 SERPL-SCNC: 23 MMOL/L (ref 21–32)
CREAT SERPL-MCNC: 0.76 MG/DL (ref 0.6–1)
DIFFERENTIAL METHOD BLD: ABNORMAL
EOSINOPHIL # BLD: 0 K/UL (ref 0–0.8)
EOSINOPHIL NFR BLD: 0 % (ref 0.5–7.8)
ERYTHROCYTE [DISTWIDTH] IN BLOOD BY AUTOMATED COUNT: 12.3 % (ref 11.9–14.6)
ETHANOL SERPL-MCNC: <3 MG/DL
GLOBULIN SER CALC-MCNC: 4.1 G/DL (ref 2.3–3.5)
GLUCOSE SERPL-MCNC: 126 MG/DL (ref 65–100)
HCT VFR BLD AUTO: 41.8 % (ref 35.8–46.3)
HGB BLD-MCNC: 14.1 G/DL (ref 11.7–15.4)
IMM GRANULOCYTES # BLD AUTO: 0 K/UL (ref 0–0.5)
IMM GRANULOCYTES NFR BLD AUTO: 0 % (ref 0–5)
LACTATE BLD-SCNC: 1.14 MMOL/L (ref 0.5–1.9)
LIPASE SERPL-CCNC: 143 U/L (ref 73–393)
LYMPHOCYTES # BLD: 1.6 K/UL (ref 0.5–4.6)
LYMPHOCYTES NFR BLD: 24 % (ref 13–44)
MCH RBC QN AUTO: 32.3 PG (ref 26.1–32.9)
MCHC RBC AUTO-ENTMCNC: 33.7 G/DL (ref 31.4–35)
MCV RBC AUTO: 95.9 FL (ref 79.6–97.8)
MONOCYTES # BLD: 0.4 K/UL (ref 0.1–1.3)
MONOCYTES NFR BLD: 6 % (ref 4–12)
NEUTS SEG # BLD: 4.6 K/UL (ref 1.7–8.2)
NEUTS SEG NFR BLD: 69 % (ref 43–78)
NRBC # BLD: 0 K/UL (ref 0–0.2)
PLATELET # BLD AUTO: 229 K/UL (ref 150–450)
PMV BLD AUTO: 9.3 FL (ref 9.4–12.3)
POTASSIUM SERPL-SCNC: 3.5 MMOL/L (ref 3.5–5.1)
PROT SERPL-MCNC: 7.8 G/DL (ref 6.3–8.2)
RBC # BLD AUTO: 4.36 M/UL (ref 4.05–5.2)
SODIUM SERPL-SCNC: 142 MMOL/L (ref 136–145)
WBC # BLD AUTO: 6.8 K/UL (ref 4.3–11.1)

## 2019-08-17 PROCEDURE — 80053 COMPREHEN METABOLIC PANEL: CPT

## 2019-08-17 PROCEDURE — 71045 X-RAY EXAM CHEST 1 VIEW: CPT

## 2019-08-17 PROCEDURE — 93005 ELECTROCARDIOGRAM TRACING: CPT | Performed by: EMERGENCY MEDICINE

## 2019-08-17 PROCEDURE — 85025 COMPLETE CBC W/AUTO DIFF WBC: CPT

## 2019-08-17 PROCEDURE — 80307 DRUG TEST PRSMV CHEM ANLYZR: CPT

## 2019-08-17 PROCEDURE — 74011250636 HC RX REV CODE- 250/636: Performed by: EMERGENCY MEDICINE

## 2019-08-17 PROCEDURE — 96374 THER/PROPH/DIAG INJ IV PUSH: CPT | Performed by: EMERGENCY MEDICINE

## 2019-08-17 PROCEDURE — 99285 EMERGENCY DEPT VISIT HI MDM: CPT | Performed by: EMERGENCY MEDICINE

## 2019-08-17 PROCEDURE — 96361 HYDRATE IV INFUSION ADD-ON: CPT | Performed by: EMERGENCY MEDICINE

## 2019-08-17 PROCEDURE — 83690 ASSAY OF LIPASE: CPT

## 2019-08-17 PROCEDURE — 70450 CT HEAD/BRAIN W/O DYE: CPT

## 2019-08-17 PROCEDURE — 83605 ASSAY OF LACTIC ACID: CPT

## 2019-08-17 RX ORDER — LORAZEPAM 2 MG/ML
1 INJECTION INTRAMUSCULAR
Status: COMPLETED | OUTPATIENT
Start: 2019-08-17 | End: 2019-08-17

## 2019-08-17 RX ADMIN — SODIUM CHLORIDE 1000 ML: 900 INJECTION, SOLUTION INTRAVENOUS at 11:06

## 2019-08-17 RX ADMIN — LORAZEPAM 1 MG: 2 INJECTION INTRAMUSCULAR; INTRAVENOUS at 11:28

## 2019-08-17 NOTE — ED PROVIDER NOTES
Pt. admitted June 29. Note follows. In summary, the patient is 61years old female with hx of HTN, TIA, GERD, anxiety d/o, nephrolithiasis, ADHD discharged from [facility] on 6/28 after having a negative stroke work up, got readmitted on 6/29 for possible seizure like episode at home. Neurology has evaluated the pt, recommended for MRI without contrast seizure protocol. EEG has been completed. Pt has been depressed and worsening anxiety for past one year with increasing narcotic abuse. Pt had tele psych eval on 6/29, recommended to stop lexapro and to start zyprexa and prozac. Pt will be weaned off robaxin over next 3 weeks. MRI and EEG were unremarkable. She is advised to follow up with scheduled neurology appointment for outpatient work up for MS. She has been asymptomatic with no further seizure like activity since admission. She is asymptomatic, feeling more at her baseline. She is medically cleared and hemodynamically stable for discharge today. Plan of care discussed with pt and her . Rest of the hospital course was uneventful. For further details, please refer to daily progress notes. Patient has been doing well. Earlier this month followed up with her primary care doctor after a fall where she hit her head. No loss of consciousness. No acute found by primary care doctor. Possible concussion. Patient reports today by EMS before some confusion and agitation. She states she has been taken off her pain medication and muscle relaxers feels she may be withdrawing. Her last dose of any of these medications was for 4 to 6 days ago. He denies any headache or blurry vision. No chest pain or shortness of breath. No abdominal pain. The history is provided by the patient and the EMS personnel. No  was used. Altered mental status    This is a new problem. The current episode started 3 to 5 hours ago. The problem has not changed since onset. Associated symptoms include confusion and agitation. Pertinent negatives include no seizures, no unresponsiveness, no weakness, no tingling and no numbness. Mental status baseline is normal.  Her past medical history is significant for TIA and hypertension. Her past medical history does not include seizures.         Past Medical History:   Diagnosis Date    3-part fracture of surgical neck of left humerus 10/27/2016    ADHD (attention deficit hyperactivity disorder)     Arthritis     Carpal tunnel syndrome 11/11/2015    Closed fracture of anatomical neck of humerus 6/16/2015    Degeneration of lumbar or lumbosacral intervertebral disc 2/27/2015    Depressive disorder, not elsewhere classified 2/27/2015    Dermatophytosis of the body 2/27/2015    Essential hypertension, benign 2/27/2015    managed with med    GERD (gastroesophageal reflux disease) 2/27/2015    managed with med    Heart murmur     pt reports since birth; echo dated 9- states trace aortic and mitral regurg    History of MRSA infection     Hypopotassemia 2/27/2015    Insomnia, unspecified 2/27/2015    Kidney stone     Kidney stones     Liver disease     Hepatits C    Migraine, unspecified, without mention of intractable migraine without mention of status migrainosus 2/27/2015    Odontoid fracture with type II morphology ([facility] [address]     Osteoporosis 1/23/2017    Other closed fractures of upper end of humerus 6/16/2015    Polycythemia, secondary 2/27/2015    Traumatic tear of left rotator cuff 10/27/2016    Type 2 superior 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 shoulder 10/27/2016    Unspecified viral hepatitis C without hepatic coma 02/27/2015    pt reports hx only; normal LFTs noted labs June, 2016       Past Surgical History:   Procedure Laterality Date    CARDIAC SURG PROCEDURE UNLIST      heart cath wnl, no stent placed    HX BLADDER SUSPENSION  2006    bladder tack    HX CARPAL TUNNEL RELEASE Right 2001    then left    HX CERVICAL FUSION      HX LITHOTRIPSY  2005    HX ORTHOPAEDIC      Slap repair right    HX ORTHOPAEDIC  2011    hip repair, left    HX ORTHOPAEDIC Left     Hand surgery    HX ROTATOR CUFF REPAIR Bilateral          Family History:   Problem Relation Age of Onset    Lung Disease Mother     Hypertension Mother     Heart Disease Mother     Stroke Mother     COPD Mother     Cancer Father         lung    Heart Attack Father     Diabetes Other     Osteoporosis Other     Other Other         Arthritis    Breast Cancer Maternal Aunt 76    Breast Cancer Paternal Aunt 76       Social History     Socioeconomic History    Marital status:      Spouse name: Not on file    Number of children: Not on file    Years of education: Not on file    Highest education level: Not on file   Occupational History    Occupation: housekeeping     Employer: SELF EMPLOYED   Social Needs    Financial resource strain: Not very hard    Food insecurity:     Worry: Patient refused     Inability: Patient refused    Transportation needs:     Medical: Patient refused     Non-medical: Patient refused   Tobacco Use    Smoking status: Former Smoker     Packs/day: 1.00     Years: 10.00     Pack years: 10.00     Types: Cigarettes     Last attempt to quit: 2017     Years since quittin.1    Smokeless tobacco: Never Used    Tobacco comment: 18- smoked a pack past 3-4 days   Substance and Sexual Activity    Alcohol use: No     Alcohol/week: 0.0 standard drinks    Drug use: Not Currently     Types: Cocaine    Sexual activity: Yes   Lifestyle    Physical activity:     Days per week: Patient refused     Minutes per session: Patient refused    Stress:  To some extent   Relationships    Social connections:     Talks on phone: Patient refused     Gets together: Patient refused     Attends Synagogue service: Patient refused     Active member of club or organization: Patient refused     Attends meetings of clubs or organizations: Patient refused     Relationship status: Patient refused    Intimate partner violence:     Fear of current or ex partner: Patient refused     Emotionally abused: Patient refused     Physically abused: Patient refused     Forced sexual activity: Patient refused   Other Topics Concern     Service No    Blood Transfusions No    Caffeine Concern No    Occupational Exposure No    Hobby Hazards No    Sleep Concern No    Stress Concern No    Weight Concern No    Special Diet No    Back Care No    Exercise No    Bike Helmet No    Seat Belt No    Self-Exams No   Social History Narrative    Not on file         ALLERGIES: Benadryl [diphenhydramine hcl]; Cymbalta [duloxetine]; Demerol [meperidine]; Dilaudid [hydromorphone]; Lortab [hydrocodone-acetaminophen]; Macrodantin [nitrofurantoin macrocrystal]; Morphine; and Nucynta [tapentadol]    Review of Systems   Constitutional: Negative for chills and fever. HENT: Negative for rhinorrhea and sore throat. Eyes: Negative for pain and redness. Respiratory: Negative for chest tightness, shortness of breath and wheezing. Cardiovascular: Negative for chest pain and leg swelling. Gastrointestinal: Negative for abdominal pain, diarrhea, nausea and vomiting. Genitourinary: Negative for dysuria and hematuria. Musculoskeletal: Negative for back pain, gait problem, neck pain and neck stiffness. Skin: Negative for color change and rash. Neurological: Negative for tingling, seizures, weakness, numbness and headaches. Psychiatric/Behavioral: Positive for agitation and confusion. Vitals:    08/17/19 1057   BP: (!) 162/94   Pulse: (!) 107   Resp: 20   Temp: 97.8 °F (36.6 °C)   SpO2: 98%   Weight: 61.2 kg (135 lb)   Height: 5' 1\" (1.549 m)            Physical Exam   Constitutional: She appears well-developed and well-nourished. HENT:   Head: Normocephalic and atraumatic. Eyes: Pupils are equal, round, and reactive to light.  EOM are normal.   Neck: Normal range of motion. Neck supple. Cardiovascular: Normal rate and regular rhythm. Pulmonary/Chest: Effort normal and breath sounds normal.   Abdominal: Soft. Bowel sounds are normal. There is no tenderness. Musculoskeletal: Normal range of motion. She exhibits no edema. Neurological: She is alert. No cranial nerve deficit. She exhibits normal muscle tone. Coordination normal.   Skin: Skin is warm and dry. MDM  Number of Diagnoses or Management Options  Diagnosis management comments:  reports patient has been through similar in the past with high doses of methocarbamol. He found a bottle of baclofen 10 mg in her purse filled after August 1 with 90 pills and it is all gone. He believes this to be the cause of her behavior. Also found some tinazidine mostly gone which was more recently filled this week. Confusion and agitation likely from medications. Stable here with no acute on CT. Will discharge. Amount and/or Complexity of Data Reviewed  Clinical lab tests: ordered and reviewed  Tests in the radiology section of CPT®: ordered and reviewed  Tests in the medicine section of CPT®: ordered and reviewed    Patient Progress  Patient progress: stable         Procedures        Results Include:    Recent Results (from the past 24 hour(s))   CBC WITH AUTOMATED DIFF    Collection Time: 08/17/19 10:59 AM   Result Value Ref Range    WBC 6.8 4.3 - 11.1 K/uL    RBC 4.36 4.05 - 5.2 M/uL    HGB 14.1 11.7 - 15.4 g/dL    HCT 41.8 35.8 - 46.3 %    MCV 95.9 79.6 - 97.8 FL    MCH 32.3 26.1 - 32.9 PG    MCHC 33.7 31.4 - 35.0 g/dL    RDW 12.3 11.9 - 14.6 %    PLATELET 289 321 - 934 K/uL    MPV 9.3 (L) 9.4 - 12.3 FL    ABSOLUTE NRBC 0.00 0.0 - 0.2 K/uL    DF AUTOMATED      NEUTROPHILS 69 43 - 78 %    LYMPHOCYTES 24 13 - 44 %    MONOCYTES 6 4.0 - 12.0 %    EOSINOPHILS 0 (L) 0.5 - 7.8 %    BASOPHILS 1 0.0 - 2.0 %    IMMATURE GRANULOCYTES 0 0.0 - 5.0 %    ABS.  NEUTROPHILS 4.6 1.7 - 8.2 K/UL    ABS. LYMPHOCYTES 1.6 0.5 - 4.6 K/UL    ABS. MONOCYTES 0.4 0.1 - 1.3 K/UL    ABS. EOSINOPHILS 0.0 0.0 - 0.8 K/UL    ABS. BASOPHILS 0.0 0.0 - 0.2 K/UL    ABS. IMM. GRANS. 0.0 0.0 - 0.5 K/UL   METABOLIC PANEL, COMPREHENSIVE    Collection Time: 08/17/19 10:59 AM   Result Value Ref Range    Sodium 142 136 - 145 mmol/L    Potassium 3.5 3.5 - 5.1 mmol/L    Chloride 107 98 - 107 mmol/L    CO2 23 21 - 32 mmol/L    Anion gap 12 7 - 16 mmol/L    Glucose 126 (H) 65 - 100 mg/dL    BUN 12 6 - 23 MG/DL    Creatinine 0.76 0.6 - 1.0 MG/DL    GFR est AA >60 >60 ml/min/1.73m2    GFR est non-AA >60 >60 ml/min/1.73m2    Calcium 10.0 8.3 - 10.4 MG/DL    Bilirubin, total 0.2 0.2 - 1.1 MG/DL    ALT (SGPT) 29 12 - 65 U/L    AST (SGOT) 29 15 - 37 U/L    Alk. phosphatase 99 50 - 136 U/L    Protein, total 7.8 6.3 - 8.2 g/dL    Albumin 3.7 3.5 - 5.0 g/dL    Globulin 4.1 (H) 2.3 - 3.5 g/dL    A-G Ratio 0.9 (L) 1.2 - 3.5     ETHYL ALCOHOL    Collection Time: 08/17/19 10:59 AM   Result Value Ref Range    ALCOHOL(ETHYL),SERUM <3 MG/DL   LIPASE    Collection Time: 08/17/19 10:59 AM   Result Value Ref Range    Lipase 143 73 - 393 U/L   POC LACTIC ACID    Collection Time: 08/17/19 12:23 PM   Result Value Ref Range    Lactic Acid (POC) 1.14 0.5 - 1.9 mmol/L                  CT HEAD WO CONT (Final result)   Result time 08/17/19 12:14:48   Final result by Leslie Ho MD (08/17/19 12:14:48)                Impression:    IMPRESSION:  1. No evidence of acute intracranial abnormality. Bilateral periventricular and  centrum semiovale without white matter hypointensities most indicative of  chronic ischemic white matter change. Narrative:    EXAM: CT head without contrast.  INDICATION: Altered mental status. COMPARISON: None.     Multiple axial images obtained through the brain without intravenous contrast.   Radiation dose reduction techniques were used for this study:  All CT scans  performed at this facility use one or all of the following: Automated exposure  control, adjustment of the mA and/or kVp according to patient's size, iterative  reconstruction. FINDINGS:  Bilateral periventricular and centrum semiovale white matter hypointensities  most indicative of chronic ischemic white matter change. No evidence of intracranial mass, hemorrhage, or large territorial infarct. The  ventricles are normal in size and position. The basal cisterns are patent. No  extra-axial fluid collection or mass effect. The orbital contents are within normal limits. The paranasal sinuses are clear. The mastoid air cells and middle ears are clear. Partially imaged upper cervical  fusion hardware.                    XR CHEST PORT (Final result)   Result time 08/17/19 11:34:59   Final result by Gigi Franco MD (08/17/19 11:34:59)                Impression:    IMPRESSION: Small right lung nodules could be infectious or malignant. Follow-up CT recommended to further evaluate. Narrative:    Chest portable    CLINICAL INDICATION: Acute coughing, altered mental status, weakness, drug  withdrawal    COMPARISON: August 20, 2018    TECHNIQUE: single AP portable view chest at 11:25 AM upright     FINDINGS: Small indeterminate nodular opacities project over the perihilar right  lower lung. There is no evidence of dense consolidation, pneumothorax, pleural  effusion or pulmonary edema. The mediastinal and hilar contours are normal given  technique. No acute osseous abnormalities are evident. There are chronic  appearing changes of the left shoulder, and overall low bone density. Old healed  nondisplaced fractures noted of left posterior fifth and sixth ribs.

## 2019-08-17 NOTE — ED TRIAGE NOTES
EMS reports patient states she is having withdrawals from adderall and focalin. Patient states she has a brain injury from a couple of days ago.

## 2019-08-17 NOTE — ED NOTES
I have reviewed discharge instructions with the patient. The patient verbalized understanding. Patient left ED via Discharge Method: ambulatory to Home with     Opportunity for questions and clarification provided. Patient given 0 scripts. To continue your aftercare when you leave the hospital, you may receive an automated call from our care team to check in on how you are doing. This is a free service and part of our promise to provide the best care and service to meet your aftercare needs.  If you have questions, or wish to unsubscribe from this service please call [phone]. Thank you for Choosing our [facility] AT THE [facility] Emergency Department.

## 2019-08-19 ENCOUNTER — HOSPITAL ENCOUNTER (EMERGENCY)
Age: 59
Discharge: LEFT BEFORE DISCHARGE | End: 2019-08-19
Attending: EMERGENCY MEDICINE
Payer: COMMERCIAL

## 2019-08-19 VITALS
HEIGHT: 61 IN | DIASTOLIC BLOOD PRESSURE: 77 MMHG | SYSTOLIC BLOOD PRESSURE: 147 MMHG | HEART RATE: 114 BPM | WEIGHT: 135 LBS | RESPIRATION RATE: 16 BRPM | BODY MASS INDEX: 25.49 KG/M2 | OXYGEN SATURATION: 96 % | TEMPERATURE: 98.1 F

## 2019-08-19 PROCEDURE — 75810000275 HC EMERGENCY DEPT VISIT NO LEVEL OF CARE: Performed by: EMERGENCY MEDICINE

## 2019-08-19 NOTE — ED TRIAGE NOTES
Pt arrives with concern for BP. States BP was high at home. BP is 147/77. Pt states she was seen here on the 17th for seizures due to withdrawal from medications. Pt expressing wanting to go to rehab. FAVOR to be made aware. Pt denies SI or HI. Pt is very agitated with staff in triage and interrupting staff.

## 2019-11-12 PROBLEM — I73.9 PERIPHERAL VASCULAR DISEASE (HCC): Status: RESOLVED | Noted: 2019-11-12 | Resolved: 2019-11-12

## 2019-11-12 PROBLEM — I73.9 PERIPHERAL VASCULAR DISEASE (HCC): Status: ACTIVE | Noted: 2019-11-12

## 2019-11-20 ENCOUNTER — HOSPITAL ENCOUNTER (OUTPATIENT)
Dept: MRI IMAGING | Age: 59
Discharge: HOME | End: 2019-11-20
Attending: PSYCHIATRY & NEUROLOGY
Payer: COMMERCIAL

## 2019-11-20 DIAGNOSIS — I67.81 LEUKOARIOSIS: ICD-10-CM

## 2019-11-20 PROCEDURE — 70551 MRI BRAIN STEM W/O DYE: CPT

## 2019-12-05 ENCOUNTER — HOSPITAL ENCOUNTER (OUTPATIENT)
Dept: MRI IMAGING | Age: 59
Discharge: HOME | End: 2019-12-05
Attending: INTERNAL MEDICINE
Payer: COMMERCIAL

## 2019-12-05 DIAGNOSIS — M51.36 DDD (DEGENERATIVE DISC DISEASE), LUMBAR: ICD-10-CM

## 2019-12-05 DIAGNOSIS — M54.16 RIGHT LUMBAR RADICULOPATHY: ICD-10-CM

## 2019-12-05 DIAGNOSIS — M25.551 RIGHT HIP PAIN: ICD-10-CM

## 2019-12-05 PROCEDURE — 72148 MRI LUMBAR SPINE W/O DYE: CPT

## 2019-12-16 ENCOUNTER — HOSPITAL ENCOUNTER (OUTPATIENT)
Dept: CT IMAGING | Age: 59
Discharge: HOME | End: 2019-12-16
Attending: INTERNAL MEDICINE
Payer: COMMERCIAL

## 2019-12-16 DIAGNOSIS — R91.1 PULMONARY NODULE: ICD-10-CM

## 2019-12-16 PROCEDURE — 71250 CT THORAX DX C-: CPT

## 2019-12-16 NOTE — PROGRESS NOTES
You can review imaging online. No findings of concern. Hope you're feeling well. Thanks.   Sepideh Pierce

## 2020-03-30 NOTE — PROGRESS NOTES
06/27/19 1117   Dual Skin Pressure Injury Assessment   Dual Skin Pressure Injury Assessment WDL   Second Care Provider (Based on [address] [place] [address]) Gurpreet Gongora RN   Skin Integumentary   Skin Integumentary (WDL) X   Skin Color Appropriate for ethnicity   Skin Condition/Temp Warm;Dry   Skin Integrity Scars (comment)   Turgor Non-tenting   Wound Prevention and Protection Methods   Orientation of Wound Prevention Posterior   Location of Wound Prevention Sacrum/Coccyx   Dressing Present  No   Wound Offloading (Prevention Methods) Bed, pressure redistribution/air;Bed, pressure reduction mattress;Pillows;Repositioning;Turning Patient called by me and sent to [place] [facility]

## 2021-01-08 PROBLEM — R56.9 WITNESSED SEIZURE-LIKE ACTIVITY (HCC): Status: RESOLVED | Noted: 2019-06-29 | Resolved: 2021-01-08

## 2021-01-08 PROBLEM — E78.5 HYPERLIPIDEMIA: Status: ACTIVE | Noted: 2020-09-22

## 2021-01-08 PROBLEM — J84.9 ILD (INTERSTITIAL LUNG DISEASE) (HCC): Status: ACTIVE | Noted: 2020-10-07

## 2021-01-08 PROBLEM — J43.2 CENTRILOBULAR EMPHYSEMA (HCC): Status: ACTIVE | Noted: 2020-09-27

## 2021-04-16 ENCOUNTER — HOSPITAL ENCOUNTER (OUTPATIENT)
Dept: MRI IMAGING | Age: 61
Discharge: HOME | End: 2021-04-16
Attending: INTERNAL MEDICINE
Payer: COMMERCIAL

## 2021-04-16 DIAGNOSIS — Z98.890 HISTORY OF BACK SURGERY: ICD-10-CM

## 2021-04-16 DIAGNOSIS — M54.16 RIGHT LUMBAR RADICULOPATHY: ICD-10-CM

## 2021-04-16 PROCEDURE — 72148 MRI LUMBAR SPINE W/O DYE: CPT

## 2021-04-18 NOTE — PROGRESS NOTES
Do you already have an appointment to see either someone for an injection or neurosurgeon? Results are as following:    Degenerative changes, most prominent at L4-L5. At L4-L5, there is prominent  broad-based disc bulge, resulting in severe right neural foraminal canal  narrowing and mass effect on the exiting right L4 nerve root.

## 2021-09-02 PROBLEM — M79.7 FIBROMYALGIA: Status: ACTIVE | Noted: 2021-09-02

## 2021-09-02 PROBLEM — M19.042 OSTEOARTHRITIS OF BOTH HANDS: Status: ACTIVE | Noted: 2021-09-02

## 2021-09-02 PROBLEM — M19.041 OSTEOARTHRITIS OF BOTH HANDS: Status: ACTIVE | Noted: 2021-09-02

## 2021-09-28 PROBLEM — Z12.11 ENCOUNTER FOR SCREENING COLONOSCOPY: Status: ACTIVE | Noted: 2021-09-28

## 2021-10-28 PROBLEM — Z12.11 ENCOUNTER FOR SCREENING COLONOSCOPY: Status: RESOLVED | Noted: 2021-09-28 | Resolved: 2021-10-28

## 2022-01-19 ENCOUNTER — ANESTHESIA (OUTPATIENT)
Dept: ENDOSCOPY | Age: 62
End: 2022-01-19
Payer: COMMERCIAL

## 2022-01-19 ENCOUNTER — ANESTHESIA EVENT (OUTPATIENT)
Dept: ENDOSCOPY | Age: 62
End: 2022-01-19
Payer: COMMERCIAL

## 2022-01-19 ENCOUNTER — HOSPITAL ENCOUNTER (OUTPATIENT)
Age: 62
Setting detail: OUTPATIENT SURGERY
Discharge: HOME | End: 2022-01-19
Attending: SURGERY | Admitting: SURGERY
Payer: COMMERCIAL

## 2022-01-19 VITALS
HEART RATE: 83 BPM | OXYGEN SATURATION: 95 % | SYSTOLIC BLOOD PRESSURE: 145 MMHG | DIASTOLIC BLOOD PRESSURE: 67 MMHG | TEMPERATURE: 97.7 F | RESPIRATION RATE: 14 BRPM

## 2022-01-19 DIAGNOSIS — Z12.11 ENCOUNTER FOR SCREENING COLONOSCOPY: ICD-10-CM

## 2022-01-19 PROCEDURE — 45380 COLONOSCOPY AND BIOPSY: CPT | Performed by: SURGERY

## 2022-01-19 PROCEDURE — 88305 TISSUE EXAM BY PATHOLOGIST: CPT

## 2022-01-19 PROCEDURE — 74011000250 HC RX REV CODE- 250: Performed by: REGISTERED NURSE

## 2022-01-19 PROCEDURE — 74011250636 HC RX REV CODE- 250/636: Performed by: ANESTHESIOLOGY

## 2022-01-19 PROCEDURE — 76040000026: Performed by: SURGERY

## 2022-01-19 PROCEDURE — 76060000032 HC ANESTHESIA 0.5 TO 1 HR: Performed by: SURGERY

## 2022-01-19 PROCEDURE — 74011250636 HC RX REV CODE- 250/636: Performed by: REGISTERED NURSE

## 2022-01-19 PROCEDURE — 77030021593 HC FCPS BIOP ENDOSC BSC -A: Performed by: SURGERY

## 2022-01-19 PROCEDURE — 2709999900 HC NON-CHARGEABLE SUPPLY: Performed by: SURGERY

## 2022-01-19 RX ORDER — SODIUM CHLORIDE, SODIUM LACTATE, POTASSIUM CHLORIDE, CALCIUM CHLORIDE 600; 310; 30; 20 MG/100ML; MG/100ML; MG/100ML; MG/100ML
1000 INJECTION, SOLUTION INTRAVENOUS CONTINUOUS
Status: DISCONTINUED | OUTPATIENT
Start: 2022-01-19 | End: 2022-01-21 | Stop reason: HOSPADM

## 2022-01-19 RX ORDER — PROPOFOL 10 MG/ML
INJECTION, EMULSION INTRAVENOUS AS NEEDED
Status: DISCONTINUED | OUTPATIENT
Start: 2022-01-19 | End: 2022-01-19 | Stop reason: HOSPADM

## 2022-01-19 RX ORDER — LIDOCAINE HYDROCHLORIDE 20 MG/ML
INJECTION, SOLUTION EPIDURAL; INFILTRATION; INTRACAUDAL; PERINEURAL AS NEEDED
Status: DISCONTINUED | OUTPATIENT
Start: 2022-01-19 | End: 2022-01-19 | Stop reason: HOSPADM

## 2022-01-19 RX ORDER — PROPOFOL 10 MG/ML
INJECTION, EMULSION INTRAVENOUS
Status: DISCONTINUED | OUTPATIENT
Start: 2022-01-19 | End: 2022-01-19 | Stop reason: HOSPADM

## 2022-01-19 RX ADMIN — SODIUM CHLORIDE, SODIUM LACTATE, POTASSIUM CHLORIDE, AND CALCIUM CHLORIDE 1000 ML: 600; 310; 30; 20 INJECTION, SOLUTION INTRAVENOUS at 09:07

## 2022-01-19 RX ADMIN — PROPOFOL 30 MG: 10 INJECTION, EMULSION INTRAVENOUS at 10:00

## 2022-01-19 RX ADMIN — PROPOFOL 50 MG: 10 INJECTION, EMULSION INTRAVENOUS at 09:57

## 2022-01-19 RX ADMIN — PROPOFOL 160 MCG/KG/MIN: 10 INJECTION, EMULSION INTRAVENOUS at 09:57

## 2022-01-19 RX ADMIN — PROPOFOL 30 MG: 10 INJECTION, EMULSION INTRAVENOUS at 10:25

## 2022-01-19 RX ADMIN — LIDOCAINE HYDROCHLORIDE 50 MG: 20 INJECTION, SOLUTION EPIDURAL; INFILTRATION; INTRACAUDAL; PERINEURAL at 09:57

## 2022-01-19 NOTE — BRIEF OP NOTE
Brief Postoperative Note    Patient: Rene Cameron  YOB: 1960  MRN: [id number]    Date of Procedure: 1/19/2022     Pre-Op Diagnosis: Screen for colon cancer [Z12.11]    Post-Op Diagnosis: polyp @ 25cm      Procedure(s):  COLONOSCOPY/ BMI 28  COLON BIOPSY    Surgeon(s):  Lauro Stephens MD    Surgical Assistant: None    Anesthesia: MAC     Estimated Blood Loss (mL): Minimal    Complications: None    Specimens:   ID Type Source Tests Collected by Time Destination   1 : colon polyp bxs 25cm Preservative   Lauro Stephens MD 1/19/2022 1019 Pathology        Implants: * No implants in log *    Drains: * No LDAs found *    Findings: as above.  Recc repeat in 1 year    Electronically Signed by Nichelle Castro MD on 1/19/2022 at 10:35 AM

## 2022-01-19 NOTE — H&P
JOHNNA Márquez is a 64 y.o. female Patient presents today for screening colonoscopy. Patient denies melena, denies hematochezia, denies abdominal or rectal pain. Patient states bowel habits are good, denies diarrhea or constipation. Good appetite, no unintentional weight loss noted. Denies N/V. Denies CP or SOB. She has never had a colonoscopy.     Does not  have a family history of colon cancer.   Does not take blood thinners                Past Medical History:   Diagnosis Date    3-part fracture of surgical neck of left humerus 10/27/2016    ADHD (attention deficit hyperactivity disorder)      Arthritis      Carpal tunnel syndrome 11/11/2015    Closed fracture of anatomical neck of humerus 6/16/2015    Degeneration of lumbar or lumbosacral intervertebral disc 2/27/2015    Depressive disorder, not elsewhere classified 2/27/2015    Dermatophytosis of the body 2/27/2015    Essential hypertension, benign 2/27/2015     managed with med    GERD (gastroesophageal reflux disease) 2/27/2015     managed with med    Heart murmur       pt reports since birth; echo dated 9- states trace aortic and mitral regurg    History of MRSA infection      Hypopotassemia 2/27/2015    Insomnia, unspecified 2/27/2015    Kidney stone      Kidney stones      Liver disease       Hepatits C    Migraine, unspecified, without mention of intractable migraine without mention of status migrainosus 2/27/2015    Odontoid fracture with type II morphology ([facility] [address]      Osteoporosis 1/23/2017    Other closed fractures of upper end of humerus 6/16/2015    Polycythemia, secondary 2/27/2015    Traumatic tear of left rotator cuff 10/27/2016    Type 2 superior 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 shoulder 10/27/2016    Unspecified viral hepatitis C without hepatic coma 02/27/2015     pt reports hx only; normal LFTs noted labs June, 2016             Current Outpatient Medications   Medication Sig Dispense Refill    benazepriL (LOTENSIN) 10 mg tablet TAKE 1 TABLET BY MOUTH DAILY 90 Tablet 3    pravastatin (PRAVACHOL) 80 mg tablet TAKE 1 TABLET BY MOUTH EVERY NIGHT 90 Tab 3    diclofenac (VOLTAREN) 1 % gel Apply  to affected area four (4) times daily. 5 Each prn    denosumab (PROLIA) 60 mg/mL injection 60 mg by SubCUTAneous route. Twice a year        escitalopram oxalate (Lexapro) 20 mg tablet 0.5 tablet        denosumab (PROLIA) 60 mg/mL injection 1 mL by SubCUTAneous route every 6 months. 1 Syringe 2    sertraline (Zoloft) 100 mg tablet Take 100 mg by mouth daily. (Patient not taking: Reported on 9/28/2021)        promethazine (PHENERGAN) 25 mg tablet 1/2-1 po tid prn nausea (Patient not taking: Reported on 9/28/2021) 30 Tab 0    levalbuterol tartrate (XOPENEX) 45 mcg/actuation inhaler INHALE 2 PUFFS BY MOUTH EVERY 6 HOURS AS NEEDED FOR WHEEZING OR BRONCHOSPASM (Patient not taking: Reported on 9/28/2021) 1 Inhaler 1    OLANZapine (ZYPREXA) 5 mg tablet Take 1 Tab by mouth nightly. (Patient not taking: Reported on 9/28/2021) 30 Tab 11    omeprazole (PRILOSEC) 20 mg capsule Take 20 mg by mouth daily.  Patient instructed to take morning of surgery per anesthesia guidelines                Allergies   Allergen Reactions    Benadryl [Diphenhydramine Hcl] Other (comments)       hyperactivity    Cymbalta [Duloxetine] Other (comments)       Hallucinations       Demerol [Meperidine] Other (comments)       Hyperactivity    Dilaudid [Hydromorphone] Other (comments)       Hyperactivity    Lortab [Hydrocodone-Acetaminophen] Other (comments)       Hyperactivity    Macrodantin [Nitrofurantoin Macrocrystal] Nausea and Vomiting    Morphine Other (comments)       Hallucinations       Nucynta [Tapentadol] Other (comments)       Bad dreams            Past Surgical History:   Procedure Laterality Date    HX BLADDER SUSPENSION   2006     bladder tack    HX CARPAL TUNNEL RELEASE Right 2001     then left    HX CERVICAL FUSION        HX LITHOTRIPSY       HX ORTHOPAEDIC         Slap repair right    HX ORTHOPAEDIC   2011     hip repair, left    HX ORTHOPAEDIC Left       Hand surgery    HX ROTATOR CUFF REPAIR Bilateral      [place] CARDIAC SURG PROCEDURE UNLIST         heart cath wnl, no stent placed            Family History   Problem Relation Age of Onset    Lung Disease Mother      Hypertension Mother      Heart Disease Mother      Stroke Mother      COPD Mother      Cancer Father           lung    Heart Attack Father      Diabetes Other      Osteoporosis Other      Other Other           Arthritis    Breast Cancer Maternal Aunt 76    Breast Cancer Paternal Aunt 76      Social History            Tobacco Use    Smoking status: Former Smoker       Packs/day: 1.00       Years: 10.00       Pack years: 10.00       Types: Cigarettes       Quit date: 2017       Years since quittin.2    Smokeless tobacco: Never Used    Tobacco comment: 18- smoked a pack past 3-4 days   Substance Use Topics    Alcohol use: No       Alcohol/week: 0.0 standard drinks         Review of Systems   A comprehensive review of systems was negative except for that written in the HPI. Physical Exam  Visit Vitals  Pulse 75   Ht 5' 1\" (1.549 m)   Wt 149 lb 3.2 oz (67.7 kg)   SpO2 97%   BMI 28.19 kg/m²         Constitutional: Alert, oriented, cooperative patient in no acute distress; appears stated age    Eyes:Sclera are clear. EOMs intact  ENMT: no external lesions gross hearing normal; no obvious neck masses, no ear or lip lesions, nares normal  CV: RRR. Normal perfusion  Resp: No JVD. Breathing is  non-labored; no audible wheezing. GI: soft and non-distended     Musculoskeletal: unremarkable with normal function. No embolic signs or cyanosis.    Neuro:  Oriented; moves all 4; no focal deficits  Psychiatric: normal affect and mood, no memory impairment        Labs:      Assessment/Plan:   Rene Munoz is a 64 y.o. female who has signs and symptoms consistent with need for screening colonoscopy. Patient verbalized understanding of importance for bowel prep.      1. Schedule screening colonoscopy  She would like to proceed. Proceed with screening colonoscopy. The patient was counseled at length about the risks of elida Covid-19 during their perioperative period and any recovery window from their procedure.  The patient was made aware that elida Covid-19  may worsen their prognosis for recovering from their procedure and lend to a higher morbidity and/or mortality risk.  All material risks, benefits, and reasonable alternatives including postponing the procedure were discussed. The patient does  wish to proceed with the procedure at this time.                 I discussed the patient's condition and treatment options with the patient. I discussed risks of colonoscopy in language the patient could understand including bleeding, infection, aspiration, perforation, medication reaction, need for further endoscopy or surgery, abscess, fistula, SBO, DVT, PE, heart attack, stroke, renal failure, respiratory failure, ventilatory dependence, and death. The patient voiced understanding of all this and all questions were answered. Alternatives to colonoscopy were discussed also and risks of the alternatives. The patient requested that we proceed with colonoscopy. Informed consent was obtained. A copy of this note is sent to the referring physician.       Electronically signed by Yu Durham MD

## 2022-01-19 NOTE — ANESTHESIA PREPROCEDURE EVALUATION
Anesthetic History   No history of anesthetic complications            Review of Systems / Medical History  Patient summary reviewed and pertinent labs reviewed    Pulmonary    COPD: moderate      Smoker (Quit one year ago)      Comments: Interstitial lung disease  H/O pneumonia, pulmonary edema   Neuro/Psych         TIA, headaches and psychiatric history    Comments: Fibromyalgia Cardiovascular    Hypertension: well controlled          Hyperlipidemia    Exercise tolerance: >4 METS     GI/Hepatic/Renal     GERD: well controlled  Hepatitis: type C  Renal disease: stones  Liver disease     Endo/Other        Blood dyscrasia (Polycythemia, last hgb 14.4) and arthritis     Other Findings   Comments: H/O narcotic dependence         Physical Exam    Airway  Mallampati: II  TM Distance: 4 - 6 cm  Neck ROM: normal range of motion   Mouth opening: Normal     Cardiovascular  Regular rate and rhythm,  S1 and S2 normal,  no murmur, click, rub, or gallop             Dental  No notable dental hx       Pulmonary  Breath sounds clear to auscultation               Abdominal  GI exam deferred       Other Findings            Anesthetic Plan    ASA: 3  Anesthesia type: total IV anesthesia          Induction: Intravenous  Anesthetic plan and risks discussed with: Patient

## 2022-01-19 NOTE — DISCHARGE INSTRUCTIONS
Gastrointestinal Colonoscopy/Flexible Sigmoidoscopy - Lower Exam Discharge Instructions  1. Call Dr. Mckay Murguia at  for any problems or questions. 2. Contact the doctors office for follow up appointment as directed  3. Medication may cause drowsiness for several hours, therefore:  · Do not drive or operate machinery for reminder of the day. · No alcohol today. · Do not make any important or legal decisions for 24 hours. · Do not sign any legal documents for 24 hours. 4. Ordinarily, you may resume regular diet and activity after exam unless otherwise specified by your physician. 5. Because of air put into your colon during exam, you may experience some abdominal distension, relieved by the passage of gas, for several hours. 6. Contact your physician if you have any of the following:  · Excessive amount of bleeding - large amount when having a bowel movement. Occasional specks of blood with bowel movement would not be unusual.  · Severe abdominal pain  · Fever or Chills  7. Polyp Removal - follow these additional instructions  · Clear liquid diet for the next meal (jell-o, broth, clear drinks)  · Soft diet for 24 hours, then resume regular diet   · Take Metamucil - 1 tablespoon in juice every morning for 3 days  · No Aspirin, Advil, Aleve, Nuprin, Ibuprofen, or medications that contain these drugs for 2 weeks. Any additional instructions:  ***      Instructions given to Rene Keyes and other family members.

## 2022-01-19 NOTE — ANESTHESIA POSTPROCEDURE EVALUATION
Procedure(s):  COLONOSCOPY/ BMI 28  COLON BIOPSY. total IV anesthesia    Anesthesia Post Evaluation        Patient location during evaluation: PACU  Patient participation: complete - patient participated  Level of consciousness: awake and alert  Pain management: adequate  Airway patency: patent  Anesthetic complications: no  Cardiovascular status: acceptable  Respiratory status: acceptable  Hydration status: acceptable  Post anesthesia nausea and vomiting:  none  Final Post Anesthesia Temperature Assessment:  Normothermia (36.0-37.5 degrees C)      INITIAL Post-op Vital signs:   Vitals Value Taken Time   /61 01/19/22 1101   Temp 36.5 °C (97.7 °F) 01/19/22 1038   Pulse 83 01/19/22 1101   Resp 14 01/19/22 1038   SpO2 85 % 01/19/22 1101   Vitals shown include unvalidated device data.

## 2022-01-20 NOTE — PROCEDURES
[facility]  PROCEDURE NOTE    Name:  Ashley Herndon  MR#:  [id number]  :  1960  ACCOUNT #:  [de-identified]  DATE OF SERVICE:  2022    PREOPERATIVE DIAGNOSIS:  Screening colonoscopy. POSTOPERATIVE DIAGNOSIS:  Polyp at 25 cm/biopsied. PROCEDURE PERFORMED:  Colonoscopy. SURGEON:  Sivakumar Díaz MD    ASSISTANT:  None. ANESTHESIA:  MAC.    ESTIMATED BLOOD LOSS:  2 mL    COMPLICATIONS:  None. SPECIMENS REMOVED:  Biopsies of polyp at 25 cm. COMPLICATIONS:  None. IMPLANTS:  None. COUNT:  Correct. PROCEDURE:  After the patient was brought to the room, time-out was carried out, she was rolled onto her left side, and all were in agreement. MAC anesthesia was administered. Scope was introduced and the scope was advanced to the level of the cecum. Slow withdrawal was then done. No abnormalities were seen until approximately 25 cm because there was a relatively broad-based polyp present. I did not think that I could safely loop and remove this. Therefore, multiple biopsies were taken which were very adequate. This appeared to be a benign polyp. This was quite difficult as the patient who is a smoker had a good deal of problem with coughing and spasming of the colon, subsequently with Valsalva maneuvers that were, at times, violent, but I do believe that I got very good biopsies of this area. Scope was withdrawn. Rectum was normal.  As much gas as could be removed was done. Scope was withdrawn. The patient tolerated the procedure well.       MD QUINTIN [id number]  D:  2022 10:39  T:  2022 19:09  JOB #:  [id number]

## 2022-03-18 PROBLEM — R33.8 POSTOPERATIVE URINARY RETENTION: Status: ACTIVE | Noted: 2017-07-28

## 2022-03-18 PROBLEM — M79.7 FIBROMYALGIA: Status: ACTIVE | Noted: 2021-09-02

## 2022-03-18 PROBLEM — N99.89 POSTOPERATIVE URINARY RETENTION: Status: ACTIVE | Noted: 2017-07-28

## 2022-03-18 PROBLEM — J43.2 CENTRILOBULAR EMPHYSEMA (HCC): Status: ACTIVE | Noted: 2020-09-27

## 2022-03-18 PROBLEM — M81.0 OSTEOPOROSIS: Status: ACTIVE | Noted: 2017-01-23

## 2022-03-18 PROBLEM — J84.9 ILD (INTERSTITIAL LUNG DISEASE) (HCC): Status: ACTIVE | Noted: 2020-10-07

## 2022-03-19 PROBLEM — F11.21 HISTORY OF NARCOTIC ADDICTION (HCC): Status: ACTIVE | Noted: 2018-10-30

## 2022-03-19 PROBLEM — S12.110G: Status: ACTIVE | Noted: 2017-07-26

## 2022-03-19 PROBLEM — M19.042 OSTEOARTHRITIS OF BOTH HANDS: Status: ACTIVE | Noted: 2021-09-02

## 2022-03-19 PROBLEM — Z79.899 DRUG THERAPY: Status: ACTIVE | Noted: 2018-02-01

## 2022-03-19 PROBLEM — R41.82 ALTERED MENTAL STATUS: Status: ACTIVE | Noted: 2019-06-29

## 2022-03-19 PROBLEM — J18.9 CAP (COMMUNITY ACQUIRED PNEUMONIA): Status: ACTIVE | Noted: 2017-07-06

## 2022-03-19 PROBLEM — J18.9 BILATERAL PNEUMONIA: Status: ACTIVE | Noted: 2017-07-03

## 2022-03-19 PROBLEM — R00.1 SINUS BRADYCARDIA: Status: ACTIVE | Noted: 2017-07-06

## 2022-03-19 PROBLEM — G45.9 TIA (TRANSIENT ISCHEMIC ATTACK): Status: ACTIVE | Noted: 2019-06-27

## 2022-03-19 PROBLEM — M54.9 BACK PAIN: Status: ACTIVE | Noted: 2017-07-06

## 2022-03-19 PROBLEM — J81.1 PULMONARY EDEMA: Status: ACTIVE | Noted: 2017-07-06

## 2022-03-19 PROBLEM — T42.8X1A: Status: ACTIVE | Noted: 2019-06-29

## 2022-03-19 PROBLEM — R91.8 BILATERAL PULMONARY INFILTRATES ON CHEST X-RAY: Status: ACTIVE | Noted: 2017-07-06

## 2022-03-19 PROBLEM — M19.041 OSTEOARTHRITIS OF BOTH HANDS: Status: ACTIVE | Noted: 2021-09-02

## 2022-03-20 PROBLEM — R07.9 CHEST PAIN: Status: ACTIVE | Noted: 2017-07-06

## 2022-03-20 PROBLEM — I10 HYPERTENSION: Status: ACTIVE | Noted: 2017-07-06

## 2022-03-20 PROBLEM — E78.5 HYPERLIPIDEMIA: Status: ACTIVE | Noted: 2020-09-22

## 2022-04-26 NOTE — PERIOP NOTES
The following records have been requested from [address] [place] [address] 643:       [address] Records    Please send last EKG tracing via fax to [phone]. Thank you!

## 2022-04-27 ENCOUNTER — HOSPITAL ENCOUNTER (OUTPATIENT)
Dept: SURGERY | Age: 62
Discharge: HOME | End: 2022-04-27
Attending: ORTHOPAEDIC SURGERY
Payer: COMMERCIAL

## 2022-04-27 VITALS
OXYGEN SATURATION: 99 % | BODY MASS INDEX: 27 KG/M2 | DIASTOLIC BLOOD PRESSURE: 67 MMHG | SYSTOLIC BLOOD PRESSURE: 127 MMHG | WEIGHT: 143 LBS | RESPIRATION RATE: 18 BRPM | TEMPERATURE: 97.6 F | HEART RATE: 55 BPM | HEIGHT: 61 IN

## 2022-04-27 LAB
ANION GAP SERPL CALC-SCNC: 10 MMOL/L (ref 7–16)
APPEARANCE UR: CLEAR
APTT PPP: 26.4 SEC (ref 24.1–35.1)
ATRIAL RATE: 86 BPM
BACTERIA SPEC CULT: NORMAL
BILIRUB UR QL: NEGATIVE
BUN SERPL-MCNC: 23 MG/DL (ref 8–23)
CALCIUM SERPL-MCNC: 9.7 MG/DL (ref 8.3–10.4)
CALCULATED P AXIS, ECG09: 59 DEGREES
CALCULATED R AXIS, ECG10: 94 DEGREES
CALCULATED T AXIS, ECG11: 91 DEGREES
CHLORIDE SERPL-SCNC: 109 MMOL/L (ref 98–107)
CO2 SERPL-SCNC: 22 MMOL/L (ref 21–32)
COLOR UR: YELLOW
CREAT SERPL-MCNC: 0.68 MG/DL (ref 0.6–1)
DIAGNOSIS, 93000: NORMAL
ERYTHROCYTE [DISTWIDTH] IN BLOOD BY AUTOMATED COUNT: 12.8 % (ref 11.9–14.6)
GLUCOSE SERPL-MCNC: 93 MG/DL (ref 65–100)
GLUCOSE UR STRIP.AUTO-MCNC: NEGATIVE MG/DL
HCT VFR BLD AUTO: 43.6 % (ref 35.8–46.3)
HGB BLD-MCNC: 14.8 G/DL (ref 11.7–15.4)
HGB UR QL STRIP: NEGATIVE
INR PPP: 1
KETONES UR QL STRIP.AUTO: NEGATIVE MG/DL
LEUKOCYTE ESTERASE UR QL STRIP.AUTO: NEGATIVE
MAGNESIUM SERPL-MCNC: 2 MG/DL (ref 1.8–2.4)
MCH RBC QN AUTO: 31.8 PG (ref 26.1–32.9)
MCHC RBC AUTO-ENTMCNC: 33.9 G/DL (ref 31.4–35)
MCV RBC AUTO: 93.6 FL (ref 79.6–97.8)
NITRITE UR QL STRIP.AUTO: NEGATIVE
NRBC # BLD: 0 K/UL (ref 0–0.2)
P-R INTERVAL, ECG05: 214 MS
PH UR STRIP: 6.5 [PH] (ref 5–9)
PLATELET # BLD AUTO: 234 K/UL (ref 150–450)
PMV BLD AUTO: 9.5 FL (ref 9.4–12.3)
POTASSIUM SERPL-SCNC: 4.1 MMOL/L (ref 3.5–5.1)
PROT UR STRIP-MCNC: NEGATIVE MG/DL
PROTHROMBIN TIME: 13.5 SEC (ref 12.6–14.5)
Q-T INTERVAL, ECG07: 352 MS
QRS DURATION, ECG06: 64 MS
QTC CALCULATION (BEZET), ECG08: 421 MS
RBC # BLD AUTO: 4.66 M/UL (ref 4.05–5.2)
SERVICE CMNT-IMP: NORMAL
SODIUM SERPL-SCNC: 141 MMOL/L (ref 136–145)
SP GR UR REFRACTOMETRY: 1.01 (ref 1–1.02)
UROBILINOGEN UR QL STRIP.AUTO: 0.2 EU/DL (ref 0.2–1)
VENTRICULAR RATE, ECG03: 86 BPM
WBC # BLD AUTO: 9.3 K/UL (ref 4.3–11.1)

## 2022-04-27 PROCEDURE — 87641 MR-STAPH DNA AMP PROBE: CPT

## 2022-04-27 PROCEDURE — 77030012341 HC CHMB SPCR OPTC MDI VYRM -A

## 2022-04-27 PROCEDURE — 85027 COMPLETE CBC AUTOMATED: CPT

## 2022-04-27 PROCEDURE — 85730 THROMBOPLASTIN TIME PARTIAL: CPT

## 2022-04-27 PROCEDURE — 94760 N-INVAS EAR/PLS OXIMETRY 1: CPT

## 2022-04-27 PROCEDURE — 80048 BASIC METABOLIC PNL TOTAL CA: CPT

## 2022-04-27 PROCEDURE — 93005 ELECTROCARDIOGRAM TRACING: CPT

## 2022-04-27 PROCEDURE — 83735 ASSAY OF MAGNESIUM: CPT

## 2022-04-27 PROCEDURE — 85610 PROTHROMBIN TIME: CPT

## 2022-04-27 PROCEDURE — 81003 URINALYSIS AUTO W/O SCOPE: CPT

## 2022-04-27 PROCEDURE — 94664 DEMO&/EVAL PT USE INHALER: CPT

## 2022-04-27 RX ORDER — IBUPROFEN 400 MG/1
400 TABLET ORAL
COMMUNITY

## 2022-04-27 RX ORDER — DEXTROMETHORPHAN HYDROBROMIDE, GUAIFENESIN 5; 100 MG/5ML; MG/5ML
650 LIQUID ORAL EVERY 8 HOURS
COMMUNITY

## 2022-04-27 NOTE — PERIOP NOTES
PLEASE CONTINUE TAKING ALL PRESCRIPTION MEDICATIONS UP TO THE DAY OF SURGERY UNLESS OTHERWISE DIRECTED BELOW. DISCONTINUE all vitamins, herbals and supplements 21 days prior to surgery. DISCONTINUE Non-Steriodal Anti-Inflammatory (NSAIDS) such as Advil, Ibuprofen, and Aleve 5 days prior to surgery. Home Medications to HOLD      All vitamins, supplements, and herbals stop today. All NSAIDs such as Advil, Aleve, Ibuprofen, Diclofenac, Naproxen, Aspirin etc. Stop 5 days prior to surgery. Home Medications to take  the day of surgery   Phenergan if needed, Xopenex inhaler if needed, Prilosec, Lexapro, tizanidine if needed        Comments   *On the day before surgery, 5/4/22, take Acetaminophen (Tylenol) 1000mg in the morning and again at bedtime. Can substitute 650mg Tylenol*   BRING: inhaler, Omeprazole (Prilosec), incentive spirometer           Please do not bring home medications with you on the day of surgery unless otherwise directed by your nurse. If you are instructed to bring home medications, please give them to your nurse as they will be administered by the nursing staff. If you have any questions, please call [address] [phone] or [facility] [phone]. Copy of above instructions given to patient.

## 2022-04-27 NOTE — PERIOP NOTES
Patient verified name and . Order for consent NOT found in EHR; patient verified. Type 3 surgery, joint assessment complete. Labs per surgeon: No orders received. Labs per anesthesia protocol: cbc, bmp, mg, pt/ptt, ua; results pending. T&S DOS; order signed and held in EHR. EKG: Completed 22 at [address] [place] [address] 1330 requested and received. Results within anesthesia limits. Tracing scanned into EHR if needed. Patient's HR irregular during PAT assessment, repeat EKG performed; results to be reviewed by anesthesia. Charge nurse to f/uAndry Echo (19) in EHR if needed. MRSA/MSSA swab collected; pharmacy to review and dose antibiotic as appropriate. Hospital approved surgical skin cleanser and instructions to return bottle on DOS given per hospital policy. Patient provided with handouts including Guide to Surgery, Pain Management, Hand Hygiene, Blood Transfusion Education, and [facility] Brochure. Patient answered medical/surgical history questions at their best of ability. All prior to admission medications documented in [facility]. Original medication prescription bottle NOT visualized during patient appointment. Patient instructed to hold all vitamins, supplements, herbals 3 weeks prior to surgery and NSAIDS/ASA 5 days prior to surgery. Patient teach back successful and patient demonstrates knowledge of instruction.

## 2022-04-27 NOTE — PERIOP NOTES
Lab results listed below are within anesthesia limits.      Recent Results (from the past 12 hour(s))   EKG, 12 LEAD, INITIAL    Collection Time: 04/27/22 12:43 PM   Result Value Ref Range    Ventricular Rate 86 BPM    Atrial Rate 86 BPM    P-R Interval 214 ms    QRS Duration 64 ms    Q-T Interval 352 ms    QTC Calculation (Bezet) 421 ms    Calculated P Axis 59 degrees    Calculated R Axis 94 degrees    Calculated T Axis 91 degrees    Diagnosis       Sinus rhythm with 1st degree A-V block with frequent and consecutive   Premature ventricular complexes and Fusion  complexes  Rightward axis  Low voltage QRS  Nonspecific ST abnormality  Abnormal ECG  When compared with ECG of 29-JUN-2019 03:26,  Significant changes have occurred  Confirmed by Margi Maldonado ([id number]) on 4/27/2022 2:32:48 PM     CBC W/O DIFF    Collection Time: 04/27/22 12:55 PM   Result Value Ref Range    WBC 9.3 4.3 - 11.1 K/uL    RBC 4.66 4.05 - 5.2 M/uL    HGB 14.8 11.7 - 15.4 g/dL    HCT 43.6 35.8 - 46.3 %    MCV 93.6 79.6 - 97.8 FL    MCH 31.8 26.1 - 32.9 PG    MCHC 33.9 31.4 - 35.0 g/dL    RDW 12.8 11.9 - 14.6 %    PLATELET 540 689 - 481 K/uL    MPV 9.5 9.4 - 12.3 FL    ABSOLUTE NRBC 0.00 0.0 - 0.2 K/uL   METABOLIC PANEL, BASIC    Collection Time: 04/27/22 12:55 PM   Result Value Ref Range    Sodium 141 136 - 145 mmol/L    Potassium 4.1 3.5 - 5.1 mmol/L    Chloride 109 (H) 98 - 107 mmol/L    CO2 22 21 - 32 mmol/L    Anion gap 10 7 - 16 mmol/L    Glucose 93 65 - 100 mg/dL    BUN 23 8 - 23 MG/DL    Creatinine 0.68 0.6 - 1.0 MG/DL    GFR est AA >60 >60 ml/min/1.73m2    GFR est non-AA >60 >60 ml/min/1.73m2    Calcium 9.7 8.3 - 10.4 MG/DL   MAGNESIUM    Collection Time: 04/27/22 12:55 PM   Result Value Ref Range    Magnesium 2.0 1.8 - 2.4 mg/dL   PROTHROMBIN TIME + INR    Collection Time: 04/27/22 12:55 PM   Result Value Ref Range    Prothrombin time 13.5 12.6 - 14.5 sec    INR 1.0     PTT    Collection Time: 04/27/22 12:55 PM   Result Value Ref Range aPTT 26.4 24.1 - 35.1 SEC   URINALYSIS W/ RFLX MICROSCOPIC    Collection Time: 04/27/22 12:55 PM   Result Value Ref Range    Color YELLOW      Appearance CLEAR      Specific gravity 1.011 1.001 - 1.023      pH (UA) 6.5 5.0 - 9.0      Protein Negative NEG mg/dL    Glucose Negative mg/dL    Ketone Negative NEG mg/dL    Bilirubin Negative NEG      Blood Negative NEG      Urobilinogen 0.2 0.2 - 1.0 EU/dL    Nitrites Negative NEG      Leukocyte Esterase Negative NEG

## 2022-04-27 NOTE — PROGRESS NOTES
22 1245   Oxygen Therapy   O2 Sat (%) 99 %   Pulse via Oximetry 59 beats per minute   O2 Device None (Room air)   Pre-Treatment   Breath Sounds Bilateral Diminished   Pre FEV1 (liters) 1.6 liters   % Predicted 80   Procedures   $$ Demo/Evaluation Yes   Delivery Source MDI     Initial respiratory Assessment completed with pt. Pt was interviewed and evaluated in Joint camp prior to surgery. Patient ID:  Jamilah Hernadez  [id number]  64 y.o.  1960  Surgeon: Dr. Trini Eric  Date of Surgery: 2022  Procedure:  Total Left Shoulder Arthroplasty  Primary Care Physician: Tunde Tom [phone]  Specialists: Tamara HOLT [address] [place] [address] 2020    Pt taught proper COUGH technique  DIAPHRAGMATIC BREATHING EXERCISE INSTRUCTIONS GIVEN    History of smokin PPD FOR 30 YEARS                 Quit date:         HX OF VAPING- PT STATES SHE STOPPED 2 YEARS AGO  Secondhand smoke:DENIES    Past procedures with Oxygen desaturation or delayed awakening:DENIES    Past Medical History:   Diagnosis Date    ADHD (attention deficit hyperactivity disorder)     Arthritis     Chronic obstructive pulmonary disease ([facility] [address]     Per pt \"Mild,\" PRN inhaler-last used , followed by Dr. Gtz Or Degeneration of lumbar or lumbosacral intervertebral disc 2015    Depression 2015    managed with medication     Dermatophytosis of the body 2015    Essential hypertension, benign 2015    managed with med    GERD (gastroesophageal reflux disease) 2015    managed with med    H/O echocardiogram 2019    EF 55-60%, trivial aortic, mitral, and tricuspid regurgitation     Heart murmur     pt reports since birth; echo dated  19 states trivial aortic, mitral, and tricuspid regurgitation     History of hepatitis C     History of kidney stones     History of MRSA infection     History of seizure 2019    per pt secondary to Robaxin     Hypopotassemia 2015    Insomnia, unspecified 2/27/2015    Migraine, unspecified, without mention of intractable migraine without mention of status migrainosus 2/27/2015    Odontoid fracture with type II morphology ([facility] [address]     Osteoporosis 1/23/2017    Polycythemia, secondary 2/27/2015      CENTRILOBULAR EMPHYSEMA, HX OF RESPIRATORY FAILURE 9/21/2020 WITH BILATERAL GROUND GLASS OPACITIES  PULMONARY EDEMA & INFILTRATES 9/6/2017   Respiratory history:DENIES SOB                                                                   Respiratory meds:  PT uses MDI PRN with PROAIR. MDI instructions given verbally & written along with spacer. Pt to use home MDI's morning of surgery & bring to [address]:             NO     PAST SLEEP STUDY:                       DENIES  HX OF MAYNOR:                                         DENIES  MAYNOR assessment:                                               SLEEPS ON SIDE         PHYSICAL EXAM   Body mass index is 27.02 kg/m².    Visit Vitals  /67 (BP 1 Location: Right upper arm)   Pulse (!) 55   Temp 97.6 °F (36.4 °C)   Resp 18   Ht 5' 1\" (1.549 m)   Wt 64.9 kg (143 lb)   SpO2 (P) 99%   BMI 27.02 kg/m²     Neck circumference: 36.5     cm    Loud snoring:                                                 YES             Witnessed apnea or wakening gasping or choking:        DENIES        Awakens with headaches:                                               DENIES  Morning or daytime tiredness/ sleepiness:                              TIRED  Dry mouth or sore throat in morning:                    DENIES                                   Thompson stage:  4                                   SACS score:9  Stop Bang   STOP-BANG  Does the patient snore loudly (louder than talking or loud enough to be heard through closed doors)?: Yes  Does the patient often feel tired, fatigued, or sleepy during the daytime, even after a \"good\" night's sleep?: Yes  Has anyone ever observed the patient stop breathing during their sleep? : No  Does the patient have or are they being treated for high blood pressure?: Yes  Is the patient's BMI greater than 35?: No  Is your neck circumference greater than 17 inches (Male) or 16 inches (Female)?: No  Is the patient older than 48?: Yes  Is the patient male?: No  MAYNOR Score: 4  Has the patient been referred to Sleep Medicine?: No  Has the patient previously been diagnosed with Obstructive Sleep Apnea?: No                            POST OP SHOULDER SURGERY  CONTINUOUS SAT MONITOR UPON ARRIVAL TO ROOM. AIRVO FOR LUNG EXPANSION FOR 24 HOURS UPON ARRIVAL TO ROOM. RESPIRATORY ASSESSMENT Q SHIFT. Referrals:  [facility]  Pt.  Phone Number:

## 2022-04-28 NOTE — PERIOP NOTES
Dr. Wing Hood reviewed chart. New order received \"repeat Echo and EKG, assess patient for any symptoms CP/SOB/palpitations/lung issues. \" Office notified of Echo order. LVM for patient regarding EKG order. Orders signed/held.

## 2022-04-29 ENCOUNTER — HOSPITAL ENCOUNTER (OUTPATIENT)
Dept: SURGERY | Age: 62
Discharge: HOME | End: 2022-04-29
Payer: COMMERCIAL

## 2022-04-29 LAB
ATRIAL RATE: 71 BPM
CALCULATED P AXIS, ECG09: 42 DEGREES
CALCULATED R AXIS, ECG10: 28 DEGREES
CALCULATED T AXIS, ECG11: 60 DEGREES
DIAGNOSIS, 93000: NORMAL
P-R INTERVAL, ECG05: 216 MS
Q-T INTERVAL, ECG07: 418 MS
QRS DURATION, ECG06: 94 MS
QTC CALCULATION (BEZET), ECG08: 454 MS
VENTRICULAR RATE, ECG03: 71 BPM

## 2022-04-29 PROCEDURE — 93005 ELECTROCARDIOGRAM TRACING: CPT | Performed by: ANESTHESIOLOGY

## 2022-05-02 PROBLEM — M19.112 POST-TRAUMATIC OSTEOARTHRITIS OF LEFT SHOULDER: Status: ACTIVE | Noted: 2022-05-02

## 2022-05-02 NOTE — H&P
Name: Minerva Tejeda  YOB: 1960  Gender: female  MRN: [id number]          HPI: Minerva Tejeda is a 64 y.o. right-hand-dominant female seen for left shoulder problems. She returns noting that the left shoulder is no better and is worse. It is affecting her quality of life. Very painful. She gets her back brace off for 20 2022. ROS/Meds/PSH/PMH/FH/SH: A ten system review of systems was performed and is negative other than what is in the HPI. Tobacco:  reports that she quit smoking about 4 years ago. Her smoking use included cigarettes. She has a 10.00 pack-year smoking history. She has never used smokeless tobacco.  There were no vitals taken for this visit. Physical Examination:  She is an awake alert pleasant female ambulating without difficulty  She has a slight restriction in cervical spine range of motion without radicular findings    Her right shoulder has a well-healed deltopectoral incision  The right shoulder has 0 to 170 degrees of active and 0 to 170 degrees passive forward elevation. Internal rotation is to T8. External rotation is to 60 degrees at the side. In the 90 degree abducted position 90 degrees of external and 90 degrees internal rotation  The AC joint is non-tender  SC joint is non-tender. Greater tuberosity is non-tender. negative biceps  Negative O'Briens sign  negative lift-off sign  Negative belly press sign  Negative bear huggers sign  negative drop sign  negative hornblower's sign  No posterior glenohumeral joint line tenderness. No evident excessive external rotation  Rotator cuff strength is 5/5.  negative external rotation stress test.   Negative empty can sign  There is no evident anterior or posterior apprehension with a negative sulcus sign. No instability  negative external and internal Rotation lag sign  Neurovascularly intact.       Left shoulder is guarded at the side  Any motion causes pain  Incisions of healed  Active forward elevation is 0-60  Passively goes 0-90 with pain  ER to 20 degrees  No erythema  Global left shoulder pain and weakness  She appears neurovascularly intact      Data Reviewed:        MRI left shoulder dated 3/23/2022 was reviewed  MRI left shoulder demonstrates a type I acromion. Evidence of a distal clavicle resection. Her rotator cuff had some increased signal but I do not see evidence of definitive rotator cuff tear. She has degenerative changes in the glenohumeral joint. There are postoperative changes as well           Impression:   1. Post-traumatic osteoarthritis of left shoulder       Rule out internal derangement left shoulder   Status post EUA and manipulation left shoulder arthroscopy left shoulder ASD, ADCR, lysis of adhesions, debridement SLAP tear, mini open rotator cuff repair and biceps tenodesis 5.5 years   Status post ORIF three-part right proximal humerus fracture 7 years   Cervical spondylosis status post fusion C1-2  · Hypertension  · Polycythemia vera  · Remote history of hepatitis C  · GERD  · ADD  · Depression  · Nicotine addiction  · Frequent falls  · Recent low back surgery    Plan:   I discussed the problem with the patient. She is no better and is getting worse. In my opinion she has exhausted nonoperative modalities. She would be a candidate for a reverse left total shoulder arthroplasty with a delta xtend prosthesis biceps tenodesis in combination with a latissimus dorsi and teres major tendon transfer. This will be performed utilizing general anesthesia with an interscalene block and I would keep her overnight 23 hours for observation. I would measure a hemoglobin A1c and a fasting blood glucose. I discussed the risks and benefits of the procedure with her and expected outcomes. Given her multiple medical comorbidities including polycythemia vera and hypertension she is at increased risk for postoperative complications.   I believe a reverse shoulder would provide her a more reliable outcome in regards to relief of pain and improvement of function. I discussed the risks and benefits and we will proceed after the removal of her back brace. We will not inject her today I did give her a Medrol Dosepak. We will proceed as outlined above  5.   Chronic pain with severe exacerbation      Follow up:       N13.086      Baltazar Coelho MD

## 2022-05-02 NOTE — ADVANCED PRACTICE NURSE
Total Joint Surgery Preoperative Chart Review      Patient ID:  Ca Collins  [id number]  64 y.o.  1960  Surgeon: Dr. Barber Hilario  Date of Surgery: 5/5/2022  Procedure: Total Left Shoulder Arthroplasty  Primary Care Physician: Zain Krueger [phone]  Specialty Physician(s):      Subjective:   Ca Collins is a 64 y.o. WHITE/NON- female who presents for preoperative evaluation for Total Left Shoulder arthroplasty. This is a preoperative chart review note based on data collected by the nurse at the surgical Pre-Assessment visit.     Past Medical History:   Diagnosis Date    ADHD (attention deficit hyperactivity disorder)     Arthritis     Chronic obstructive pulmonary disease ([facility] [address]     Per pt \"Mild,\" PRN inhaler-last used 2021, followed by Dr. Kwabena Wynne Degeneration of lumbar or lumbosacral intervertebral disc 2/27/2015    Depression 02/27/2015    managed with medication     Dermatophytosis of the body 2/27/2015    Essential hypertension, benign 2/27/2015    managed with med    GERD (gastroesophageal reflux disease) 2/27/2015    managed with med    H/O echocardiogram 06/27/2019    EF 55-60%, trivial aortic, mitral, and tricuspid regurgitation     Heart murmur     pt reports since birth; echo dated  6/27/19 states trivial aortic, mitral, and tricuspid regurgitation     History of hepatitis C     History of kidney stones     History of MRSA infection     History of seizure 06/2019    per pt secondary to Robaxin     Hypopotassemia 2/27/2015    Insomnia, unspecified 2/27/2015    Migraine, unspecified, without mention of intractable migraine without mention of status migrainosus 2/27/2015    Odontoid fracture with type II morphology ([facility] [address]     Osteoporosis 1/23/2017    Polycythemia, secondary 2/27/2015      Past Surgical History:   Procedure Laterality Date    COLONOSCOPY N/A 1/19/2022    COLONOSCOPY/ BMI 28 performed by Jo Estrella MD at [address] [place] [address] BACK SURGERY  01/2022 L4-5 DLIF W/PEEK CAGE, BMP, DBM AND PEDICLE SCREWS, C-ARM, NEUROMONITORING, MEDTRONIC(BRENDA), SYNTHES(DANNA)    HX BLADDER SUSPENSION  2006    bladder tack    HX CARPAL TUNNEL RELEASE Right 2001    then left    HX CERVICAL FUSION      HX HEART CATHETERIZATION      no intervention     HX HEENT Bilateral     HX LITHOTRIPSY  2005    HX ORTHOPAEDIC      Slap repair right    HX ORTHOPAEDIC  2011    hip repair, left    HX ORTHOPAEDIC Left     Hand surgery    HX ROTATOR CUFF REPAIR Bilateral      Family History   Problem Relation Age of Onset    Lung Disease Mother     Hypertension Mother     Heart Disease Mother     Stroke Mother     COPD Mother     Cancer Father         lung    Heart Attack Father     Diabetes Other     Osteoporosis Other     Other Other         Arthritis    Breast Cancer Maternal Aunt 76    Breast Cancer Paternal Aunt 76      Social History     Tobacco Use    Smoking status: Former Smoker     Packs/day: 1.00     Years: 10.00     Pack years: 10.00     Types: Cigarettes     Quit date: 2017     Years since quittin.8    Smokeless tobacco: Never Used    Tobacco comment: 18- smoked a pack past 3-4 days   Substance Use Topics    Alcohol use: No     Alcohol/week: 0.0 standard drinks       Prior to Admission medications    Medication Sig Start Date End Date Taking? Authorizing Provider   acetaminophen (Tylenol Arthritis Pain) 650 mg TbER Take 650 mg by mouth every eight (8) hours. Yes Provider, Historical   ibuprofen (MOTRIN) 400 mg tablet Take  by mouth every six (6) hours as needed for Pain. Yes Provider, Historical   tiZANidine (ZANAFLEX) 2 mg tablet TAKE 1 TABLET BY MOUTH THREE TIMES DAILY AS NEEDED FOR MUSCLE SPASMS 3/6/22  Yes Luana Dutta MD   OLANZapine (ZyPREXA) 10 mg tablet Take 10 mg by mouth every evening.  Takes at 5:00   Yes Provider, Historical   pravastatin (PRAVACHOL) 80 mg tablet TAKE 1 TABLET BY MOUTH EVERY NIGHT  Patient taking differently: every evening. 11/18/21  Yes Brice Contreras MD   escitalopram oxalate (Lexapro) 20 mg tablet 30 mg daily. Yes Provider, Historical   benazepriL (LOTENSIN) 10 mg tablet TAKE 1 TABLET BY MOUTH DAILY  Patient taking differently: Take 10 mg by mouth daily. 9/15/21  Yes Brice Contreras MD   promethazine (PHENERGAN) 25 mg tablet 1/2-1 po tid prn nausea 10/2/20  Yes Brice Contreras MD   levalbuterol tartrate (XOPENEX) 45 mcg/actuation inhaler INHALE 2 PUFFS BY MOUTH EVERY 6 HOURS AS NEEDED FOR WHEEZING OR BRONCHOSPASM 1/6/20  Yes Brice Contreras MD   denosumab (PROLIA) 60 mg/mL injection 60 mg by SubCUTAneous route. Twice a year   Yes Provider, Historical   omeprazole (PRILOSEC) 20 mg capsule Take 20 mg by mouth daily. Patient instructed to take morning of surgery per anesthesia guidelines   Yes Provider, Historical     Allergies   Allergen Reactions    Benadryl [Diphenhydramine Hcl] Other (comments)     hyperactivity    Cymbalta [Duloxetine] Other (comments)     Hallucinations      Demerol [Meperidine] Other (comments)     Hyperactivity    Dilaudid [Hydromorphone] Other (comments)     Hyperactivity    Hydrocodone-Acetaminophen Other (comments)     Hyperactivity  \" jacks me up\"    Macrodantin [Nitrofurantoin Macrocrystal] Nausea and Vomiting    Morphine Other (comments)     Hallucinations      Nucynta [Tapentadol] Other (comments)     Bad dreams    Robaxin [Methocarbamol] Seizures          Objective:     Physical Exam:   No data found.     ECG:    EKG Results     Procedure 720 Value Units Date/Time    EKG, 12 LEAD, INITIAL [[id number]] Collected: 04/27/22 1243    Order Status: Completed Updated: 04/27/22 1433     Ventricular Rate 86 BPM      Atrial Rate 86 BPM      P-R Interval 214 ms      QRS Duration 64 ms      Q-T Interval 352 ms      QTC Calculation (Bezet) 421 ms      Calculated P Axis 59 degrees      Calculated R Axis 94 degrees      Calculated T Axis 91 degrees      Diagnosis -- Sinus rhythm with 1st degree A-V block with frequent and consecutive   Premature ventricular complexes and Fusion  complexes  Rightward axis  Low voltage QRS  Nonspecific ST abnormality  Abnormal ECG  When compared with ECG of 29-JUN-2019 03:26,  Significant changes have occurred  Confirmed by Michaela Albert ([id number]) on 4/27/2022 2:32:48 PM            Data Review:   Labs:   Labs reviewed    Problem List:  )  Patient Active Problem List   Diagnosis Code    Migraine headache G43.909    Unspecified viral hepatitis C without hepatic coma B19.20    Polycythemia, secondary D75.1    Degeneration of lumbar or lumbosacral intervertebral disc M51.37    GERD (gastroesophageal reflux disease) K21.9    Insomnia, unspecified G47.00    Dysthymia F34.1    Hypopotassemia E87.6    Heart murmur R01.1    ADHD (attention deficit hyperactivity disorder) F90.9    Osteoarthritis of left acromioclavicular joint M19.012    Type 2 superior 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 shoulder S43.432A    Osteoporosis M81.0    Arthritis M19.90    Bilateral pneumonia J18.9    Back pain M54.9    Chest pain R07.9    Pulmonary edema J81.1    Hypertension I10    Sinus bradycardia R00.1    Bilateral pulmonary infiltrates on chest x-ray R91.8    CAP (community acquired pneumonia) J18.9    Odontoid fracture with type II morphology and delayed healing S12.110G    Postoperative urinary retention N99.89, R33.8    Drug therapy Z79.899    History of narcotic addiction (HCC) F11.21    TIA (transient ischemic attack) G45.9    Altered mental status R41.82    Methocarbamol overdose T42.8X1A    Centrilobular emphysema ([address] J43.2    ILD (interstitial lung disease) ([address] J84.9    Hyperlipidemia E78.5    Osteoarthritis of both hands M19.041, M19.042    Fibromyalgia M79.7    Post-traumatic osteoarthritis of left shoulder M19.112       Total Joint Surgery Pre-Assessment Recommendations:           Continuous saturation monitoring UPON ARRIVAL TO FLOOR. Heated high flow lung expansion x 24 hours and prn.   IP RT consult for respiratory evaluation every shift      Signed By: FRANKLYN Jones    May 2, 2022

## 2022-05-02 NOTE — H&P
Subjective:     Patient is a 64 y.o. RHD FEMALE WITH LEFT SHOULDER PAIN. SEE OFFICE NOTE.     Patient Active Problem List    Diagnosis Date Noted    Post-traumatic osteoarthritis of left shoulder 05/02/2022    Osteoarthritis of both hands 09/02/2021    Fibromyalgia 09/02/2021    ILD (interstitial lung disease) ([facility] [address] 10/07/2020    Centrilobular emphysema ([facility] [address] 09/27/2020    Hyperlipidemia 09/22/2020    Altered mental status 06/29/2019    Methocarbamol overdose 06/29/2019    TIA (transient ischemic attack) 06/27/2019    History of narcotic addiction ([facility] [address] 10/30/2018    Drug therapy 02/01/2018    Postoperative urinary retention 07/28/2017    Odontoid fracture with type II morphology and delayed healing 07/26/2017    Back pain 07/06/2017    Chest pain 07/06/2017    Pulmonary edema 07/06/2017    Hypertension 07/06/2017    Sinus bradycardia 07/06/2017    Bilateral pulmonary infiltrates on chest x-ray 07/06/2017    CAP (community acquired pneumonia) 07/06/2017    Bilateral pneumonia 07/03/2017    Arthritis     Osteoporosis 01/23/2017    Osteoarthritis of left acromioclavicular joint 10/27/2016    Type 2 superior 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 shoulder 10/27/2016    Heart murmur     ADHD (attention deficit hyperactivity disorder)     Migraine headache 02/27/2015    Unspecified viral hepatitis C without hepatic coma 02/27/2015    Polycythemia, secondary 02/27/2015    Degeneration of lumbar or lumbosacral intervertebral disc 02/27/2015    GERD (gastroesophageal reflux disease) 02/27/2015    Insomnia, unspecified 02/27/2015    Dysthymia 02/27/2015    Hypopotassemia 02/27/2015     Past Medical History:   Diagnosis Date    ADHD (attention deficit hyperactivity disorder)     Arthritis     Chronic obstructive pulmonary disease ([facility] [address]     Per pt \"Mild,\" PRN inhaler-last used 2021, followed by Dr. Yusuf Olmos Degeneration of lumbar or lumbosacral intervertebral disc 2/27/2015    Depression 02/27/2015    managed with medication     Dermatophytosis of the body 2/27/2015    Essential hypertension, benign 2/27/2015    managed with med    GERD (gastroesophageal reflux disease) 2/27/2015    managed with med    H/O echocardiogram 06/27/2019    EF 55-60%, trivial aortic, mitral, and tricuspid regurgitation     Heart murmur     pt reports since birth; echo dated  6/27/19 states trivial aortic, mitral, and tricuspid regurgitation     History of hepatitis C     History of kidney stones     History of MRSA infection     History of seizure 06/2019    per pt secondary to Robaxin     Hypopotassemia 2/27/2015    Insomnia, unspecified 2/27/2015    Migraine, unspecified, without mention of intractable migraine without mention of status migrainosus 2/27/2015    Odontoid fracture with type II morphology ([facility] [address]     Osteoporosis 1/23/2017    Polycythemia, secondary 2/27/2015      Past Surgical History:   Procedure Laterality Date    COLONOSCOPY N/A 1/19/2022    COLONOSCOPY/ BMI 28 performed by Ramandeep Middleton MD at [address] [facility] HX BACK SURGERY  01/2022    L4-5 DLIF W/PEEK CAGE, BMP, DBM AND PEDICLE SCREWS, C-ARM, NEUROMONITORING, MEDTRONIC(BRENDA), SYNTHES(DANNA)    HX BLADDER SUSPENSION  2006    bladder tack    HX CARPAL TUNNEL RELEASE Right 2001    then left    HX CERVICAL FUSION      HX HEART CATHETERIZATION      no intervention     HX HEENT Bilateral     HX LITHOTRIPSY  2005    HX ORTHOPAEDIC      Slap repair right    HX ORTHOPAEDIC  2011    hip repair, left    HX ORTHOPAEDIC Left     Hand surgery    HX ROTATOR CUFF REPAIR Bilateral       Prior to Admission medications    Medication Sig Start Date End Date Taking? Authorizing Provider   acetaminophen (Tylenol Arthritis Pain) 650 mg TbER Take 650 mg by mouth every eight (8) hours. Provider, Historical   ibuprofen (MOTRIN) 400 mg tablet Take  by mouth every six (6) hours as needed for Pain. Provider, Historical   tiZANidine (ZANAFLEX) 2 mg tablet TAKE 1 TABLET BY MOUTH THREE TIMES DAILY AS NEEDED FOR MUSCLE SPASMS 3/6/22   Unique Pearson MD   OLANZapine (ZyPREXA) 10 mg tablet Take 10 mg by mouth every evening. Takes at 5:00    Provider, Historical   pravastatin (PRAVACHOL) 80 mg tablet TAKE 1 TABLET BY MOUTH EVERY NIGHT  Patient taking differently: every evening. 11/18/21   Unique Pearson MD   escitalopram oxalate (Lexapro) 20 mg tablet 30 mg daily. Provider, Historical   benazepriL (LOTENSIN) 10 mg tablet TAKE 1 TABLET BY MOUTH DAILY  Patient taking differently: Take 10 mg by mouth daily. 9/15/21   Unique Pearson MD   promethazine (PHENERGAN) 25 mg tablet 1/2-1 po tid prn nausea 10/2/20   Unique Pearson MD   levalbuterol tartrate [facility]) 45 mcg/actuation inhaler INHALE 2 PUFFS BY MOUTH EVERY 6 HOURS AS NEEDED FOR WHEEZING OR BRONCHOSPASM 1/6/20   Unique Pearson MD   denosumab (PROLIA) 60 mg/mL injection 60 mg by SubCUTAneous route. Twice a year    Provider, Historical   omeprazole (PRILOSEC) 20 mg capsule Take 20 mg by mouth daily.  Patient instructed to take morning of surgery per anesthesia guidelines    Provider, Historical     Allergies   Allergen Reactions    Benadryl [Diphenhydramine Hcl] Other (comments)     hyperactivity    Cymbalta [Duloxetine] Other (comments)     Hallucinations      Demerol [Meperidine] Other (comments)     Hyperactivity    Dilaudid [Hydromorphone] Other (comments)     Hyperactivity    Hydrocodone-Acetaminophen Other (comments)     Hyperactivity  \" jacks me up\"    Macrodantin [Nitrofurantoin Macrocrystal] Nausea and Vomiting    Morphine Other (comments)     Hallucinations      Nucynta [Tapentadol] Other (comments)     Bad dreams    Robaxin [Methocarbamol] Seizures      Social History     Tobacco Use    Smoking status: Former Smoker     Packs/day: 1.00     Years: 10.00     Pack years: 10.00     Types: Cigarettes     Quit date: 6/30/2017 Years since quittin.8    Smokeless tobacco: Never Used    Tobacco comment: 18- smoked a pack past 3-4 days   Substance Use Topics    Alcohol use: No     Alcohol/week: 0.0 standard drinks      Family History   Problem Relation Age of Onset    Lung Disease Mother     Hypertension Mother     Heart Disease Mother     Stroke Mother     COPD Mother     Cancer Father         lung    Heart Attack Father     Diabetes Other     Osteoporosis Other     Other Other         Arthritis    Breast Cancer Maternal Aunt 76    Breast Cancer Paternal Aunt 76      Review of Systems  A comprehensive review of systems was negative except for that written in the HPI. Objective:     No data found. There were no vitals taken for this visit. General:  Alert, cooperative, no distress, appears stated age. Head:  Normocephalic, without obvious abnormality, atraumatic. Back:   Symmetric, no curvature. ROM normal. No CVA tenderness. Lungs:   Clear to auscultation bilaterally. Chest wall:  No tenderness or deformity. Heart:  Regular rate and rhythm, S1, S2 normal, no murmur, click, rub or gallop. Extremities: Extremities normal, atraumatic, no cyanosis or edema. Pulses: 2+ and symmetric all extremities. Skin: Skin color, texture, turgor normal. No rashes or lesions. Lymph nodes: Cervical, supraclavicular, and axillary nodes normal.   Neurologic: CNII-XII intact. Normal strength, sensation and reflexes throughout. Assessment:     Principal Problem:    Post-traumatic osteoarthritis of left shoulder (2022)        Plan:     The various methods of treatment have been discussed with the patient and family. PATIENT HAS EXHAUSTED NON-OPERATIVE MODALITIES     After consideration of risks, benefits and other options for treatment, the patient has consented to surgical intervention.     SEE OFFICE NOTE    Coby Almonte MD

## 2022-05-04 ENCOUNTER — ANESTHESIA EVENT (OUTPATIENT)
Dept: SURGERY | Age: 62
End: 2022-05-04
Payer: COMMERCIAL

## 2022-05-05 PROBLEM — E78.2 MIXED HYPERLIPIDEMIA: Status: ACTIVE | Noted: 2020-09-22

## 2022-05-05 PROBLEM — I49.1 PAC (PREMATURE ATRIAL CONTRACTION): Status: ACTIVE | Noted: 2022-05-05

## 2022-05-05 RX ORDER — SODIUM CHLORIDE 0.9 % (FLUSH) 0.9 %
5-40 SYRINGE (ML) INJECTION EVERY 8 HOURS
Status: CANCELLED | OUTPATIENT
Start: 2022-05-05

## 2022-05-05 RX ORDER — SODIUM CHLORIDE 0.9 % (FLUSH) 0.9 %
5-40 SYRINGE (ML) INJECTION AS NEEDED
Status: CANCELLED | OUTPATIENT
Start: 2022-05-05

## 2022-05-05 NOTE — ANESTHESIA PREPROCEDURE EVALUATION
Anesthetic History   No history of anesthetic complications            Review of Systems / Medical History  Patient summary reviewed and pertinent labs reviewed    Pulmonary    COPD: moderate      Smoker (Quit one year ago)      Comments: Interstitial lung disease  H/O pneumonia, pulmonary edema   Neuro/Psych         TIA, headaches and psychiatric history    Comments: Fibromyalgia Cardiovascular    Hypertension: well controlled        Dysrhythmias (PAC's)   Hyperlipidemia    Exercise tolerance: >4 METS  Comments: -Echo 5/4/2022: EF 60 - 65%, no regional wall motion abnormalities,  borderline voltage criteria for LVH, trace to mild aortic regurgitation   GI/Hepatic/Renal     GERD: well controlled  Hepatitis: type C  Renal disease: stones  Liver disease     Endo/Other        Blood dyscrasia (Polycythemia, last hgb 14.4) and arthritis     Other Findings   Comments: Patient and spouse report - paradoxical reaction to opioids especially morphine and hydromorphone           Physical Exam    Airway  Mallampati: II  TM Distance: 4 - 6 cm  Neck ROM: normal range of motion   Mouth opening: Normal     Cardiovascular  Regular rate and rhythm,  S1 and S2 normal,  no murmur, click, rub, or gallop  Rhythm: regular  Rate: normal      Pertinent negatives: No murmur   Dental  No notable dental hx       Pulmonary  Breath sounds clear to auscultation               Abdominal  GI exam deferred       Other Findings            Anesthetic Plan    ASA: 3  Anesthesia type: general      Post-op pain plan if not by surgeon: peripheral nerve block single    Induction: Intravenous  Anesthetic plan and risks discussed with: Patient        Posta is aware of patients paradoxical reaction to specific opioids. No hydromorphone or morphine. Total Joint Surgery Pre-Assessment Recommendations:  Continuous saturation monitoring UPON ARRIVAL TO FLOOR. Heated high flow lung expansion x 24 hours and prn.   IP RT consult for respiratory evaluation every shift

## 2022-05-05 NOTE — PERIOP NOTES
Cardiac Clearance appt completed on 05/05/22 and reads as below:  ASSESSMENT and PLAN     1. Pre-operative cardiovascular examination  -Plans for left shoulder replacement surgery-intermediate risk elective surgery-she would be low risk for major adverse cardiovascular outcomes. Occasional PACs noted on exam, no significant active cardiac conditions. Echo with preserved LV function.     2. Primary hypertension  -Well-controlled on current therapy-continue benazepril 10 mg daily.     3. PAC (premature atrial contraction)  -Occasional PACs noted even on EKG and on exam.  Asymptomatic. No changes for now.     4. Mixed hyperlipidemia  -Counseled on the importance of continuing her pravastatin therapy as her lipids have certainly improved.     5. Tobacco abuse  Has managed to quit smoking completely and got addicted to nicotine gum but now is using a nicotine based patch to help get her off the gum and I have told her to use the tapered method.           Overall Impression     Follow-up and Dispositions    · Return if symptoms worsen or fail to improve.       I made no changes with her medical therapy. Blood pressures and heart rates are good. Her lungs are fairly clear to auscultation bilaterally, baseline functional capacity is greater than 4 metabolic equivalents.  -Okay to go ahead with left shoulder replacement surgery with low risk for major adverse cardiovascular outcomes for this intermediate risk type of surgery.  -Echo done yesterday reviewed with her in detail which shows an EF at 60% with no regional wall motion abnormalities, borderline concentric LVH which could be the reason for her minor EKG changes. Thank you Dr. Ilya Landa for allowing us to participate in the care of your patient. If you have any further questions, please do not hesitate to contact us. -She can see us in follow-up on an as-needed basis.   Sincerely,     Pavan King MD   5/5/2022

## 2022-05-06 ENCOUNTER — ANESTHESIA (OUTPATIENT)
Dept: SURGERY | Age: 62
End: 2022-05-06
Payer: COMMERCIAL

## 2022-05-06 ENCOUNTER — HOME HEALTH ADMISSION (OUTPATIENT)
Dept: HOME HEALTH SERVICES | Facility: HOME HEALTH | Age: 62
End: 2022-05-06
Payer: COMMERCIAL

## 2022-05-06 ENCOUNTER — APPOINTMENT (OUTPATIENT)
Dept: GENERAL RADIOLOGY | Age: 62
End: 2022-05-06
Attending: ORTHOPAEDIC SURGERY
Payer: COMMERCIAL

## 2022-05-06 ENCOUNTER — HOSPITAL ENCOUNTER (OUTPATIENT)
Age: 62
Setting detail: OBSERVATION
Discharge: HOME HEALTH | End: 2022-05-07
Attending: ORTHOPAEDIC SURGERY | Admitting: ORTHOPAEDIC SURGERY
Payer: COMMERCIAL

## 2022-05-06 DIAGNOSIS — M12.812 ROTATOR CUFF TEAR ARTHROPATHY OF LEFT SHOULDER: Primary | ICD-10-CM

## 2022-05-06 DIAGNOSIS — M75.22 BICIPITAL TENDINITIS OF LEFT SHOULDER: ICD-10-CM

## 2022-05-06 DIAGNOSIS — M75.102 ROTATOR CUFF TEAR ARTHROPATHY OF LEFT SHOULDER: Primary | ICD-10-CM

## 2022-05-06 DIAGNOSIS — M19.112 POST-TRAUMATIC OSTEOARTHRITIS OF LEFT SHOULDER: ICD-10-CM

## 2022-05-06 LAB
EST. AVERAGE GLUCOSE BLD GHB EST-MCNC: 126 MG/DL
GLUCOSE BLD STRIP.AUTO-MCNC: 123 MG/DL (ref 65–100)
HBA1C MFR BLD: 6 % (ref 4.2–6.3)
HISTORY CHECKED?,CKHIST: NORMAL
SERVICE CMNT-IMP: ABNORMAL

## 2022-05-06 PROCEDURE — 74011250636 HC RX REV CODE- 250/636: Performed by: ANESTHESIOLOGY

## 2022-05-06 PROCEDURE — 77030031139 HC SUT VCRL2 J&J -A: Performed by: ORTHOPAEDIC SURGERY

## 2022-05-06 PROCEDURE — 77030039425 HC BLD LARYNG TRULITE DISP TELE -A: Performed by: ANESTHESIOLOGY

## 2022-05-06 PROCEDURE — 74011000250 HC RX REV CODE- 250: Performed by: NURSE ANESTHETIST, CERTIFIED REGISTERED

## 2022-05-06 PROCEDURE — 74011000250 HC RX REV CODE- 250: Performed by: ORTHOPAEDIC SURGERY

## 2022-05-06 PROCEDURE — 77030012547: Performed by: ORTHOPAEDIC SURGERY

## 2022-05-06 PROCEDURE — 74011000258 HC RX REV CODE- 258: Performed by: ORTHOPAEDIC SURGERY

## 2022-05-06 PROCEDURE — 77030040361 HC SLV COMPR DVT MDII -B: Performed by: ORTHOPAEDIC SURGERY

## 2022-05-06 PROCEDURE — 74011250636 HC RX REV CODE- 250/636

## 2022-05-06 PROCEDURE — 77010033711 HC HIGH FLOW OXYGEN

## 2022-05-06 PROCEDURE — 74011250637 HC RX REV CODE- 250/637: Performed by: ANESTHESIOLOGY

## 2022-05-06 PROCEDURE — 77030035643 HC BLD SAW OSC PRECIS STRY -C: Performed by: ORTHOPAEDIC SURGERY

## 2022-05-06 PROCEDURE — C1713 ANCHOR/SCREW BN/BN,TIS/BN: HCPCS | Performed by: ORTHOPAEDIC SURGERY

## 2022-05-06 PROCEDURE — G0378 HOSPITAL OBSERVATION PER HR: HCPCS

## 2022-05-06 PROCEDURE — 73030 X-RAY EXAM OF SHOULDER: CPT

## 2022-05-06 PROCEDURE — 74011250636 HC RX REV CODE- 250/636: Performed by: NURSE ANESTHETIST, CERTIFIED REGISTERED

## 2022-05-06 PROCEDURE — 74011250637 HC RX REV CODE- 250/637: Performed by: ORTHOPAEDIC SURGERY

## 2022-05-06 PROCEDURE — 76010000162 HC OR TIME 1.5 TO 2 HR INTENSV-TIER 1: Performed by: ORTHOPAEDIC SURGERY

## 2022-05-06 PROCEDURE — C1776 JOINT DEVICE (IMPLANTABLE): HCPCS | Performed by: ORTHOPAEDIC SURGERY

## 2022-05-06 PROCEDURE — A4565 SLINGS: HCPCS | Performed by: ORTHOPAEDIC SURGERY

## 2022-05-06 PROCEDURE — 77030019908 HC STETH ESOPH SIMS -A: Performed by: ANESTHESIOLOGY

## 2022-05-06 PROCEDURE — 83036 HEMOGLOBIN GLYCOSYLATED A1C: CPT

## 2022-05-06 PROCEDURE — 76060000034 HC ANESTHESIA 1.5 TO 2 HR: Performed by: ORTHOPAEDIC SURGERY

## 2022-05-06 PROCEDURE — 94762 N-INVAS EAR/PLS OXIMTRY CONT: CPT

## 2022-05-06 PROCEDURE — 74011000250 HC RX REV CODE- 250: Performed by: ANESTHESIOLOGY

## 2022-05-06 PROCEDURE — 82962 GLUCOSE BLOOD TEST: CPT

## 2022-05-06 PROCEDURE — 76942 ECHO GUIDE FOR BIOPSY: CPT | Performed by: ORTHOPAEDIC SURGERY

## 2022-05-06 PROCEDURE — 77030020044 HC CLD THERAPY UNIT -B

## 2022-05-06 PROCEDURE — 74011250636 HC RX REV CODE- 250/636: Performed by: ORTHOPAEDIC SURGERY

## 2022-05-06 PROCEDURE — 23472 RECONSTRUCT SHOULDER JOINT: CPT | Performed by: ORTHOPAEDIC SURGERY

## 2022-05-06 PROCEDURE — 86900 BLOOD TYPING SEROLOGIC ABO: CPT

## 2022-05-06 PROCEDURE — 74011000272 HC RX REV CODE- 272: Performed by: ORTHOPAEDIC SURGERY

## 2022-05-06 PROCEDURE — 86923 COMPATIBILITY TEST ELECTRIC: CPT

## 2022-05-06 PROCEDURE — 76010010054 HC POST OP PAIN BLOCK: Performed by: ORTHOPAEDIC SURGERY

## 2022-05-06 PROCEDURE — 77030003827 HC BIT DRL J&J -B: Performed by: ORTHOPAEDIC SURGERY

## 2022-05-06 PROCEDURE — 77030037088 HC TUBE ENDOTRACH ORAL NSL COVD-A: Performed by: ANESTHESIOLOGY

## 2022-05-06 PROCEDURE — 77030040922 HC BLNKT HYPOTHRM STRY -A: Performed by: ANESTHESIOLOGY

## 2022-05-06 PROCEDURE — 77030003602 HC NDL NRV BLK BBMI -B: Performed by: ANESTHESIOLOGY

## 2022-05-06 PROCEDURE — 2709999900 HC NON-CHARGEABLE SUPPLY: Performed by: ORTHOPAEDIC SURGERY

## 2022-05-06 PROCEDURE — 76210000006 HC OR PH I REC 0.5 TO 1 HR: Performed by: ORTHOPAEDIC SURGERY

## 2022-05-06 DEVICE — DELTA XTEND STANDARD HUMERAL PE CUP DIA38 /+9MM STD
Type: IMPLANTABLE DEVICE | Site: SHOULDER | Status: FUNCTIONAL
Brand: DELTA XTEND

## 2022-05-06 DEVICE — SCREW BNE L14MM DIA4.5MM PROX CORT TIB S STL ST LOK FULL: Type: IMPLANTABLE DEVICE | Site: SHOULDER | Status: FUNCTIONAL

## 2022-05-06 DEVICE — BIOSTOP G BIORESORBABLE CEMENT RESTRICTOR SIZE 10
Type: IMPLANTABLE DEVICE | Site: SHOULDER | Status: FUNCTIONAL
Brand: BIOSTOP

## 2022-05-06 DEVICE — SCREW BNE L50MM DIA4.5MM PROX CORT TIB S STL ST LOK FULL: Type: IMPLANTABLE DEVICE | Site: SHOULDER | Status: FUNCTIONAL

## 2022-05-06 DEVICE — DELTA XTEND CEMENTLESS METAGLENE +10MM HA
Type: IMPLANTABLE DEVICE | Site: SHOULDER | Status: FUNCTIONAL
Brand: DELTA XTEND

## 2022-05-06 DEVICE — DELTA XTEND MONOBLOC HUM CEMENTED EPIPHYSIS SZ2 /DIA 10 STD
Type: IMPLANTABLE DEVICE | Site: SHOULDER | Status: FUNCTIONAL
Brand: DELTA XTEND

## 2022-05-06 DEVICE — DELTA XTEND LATERALIZED GLENOSPHERE +4MM ECCENTRIC 038MM
Type: IMPLANTABLE DEVICE | Site: SHOULDER | Status: FUNCTIONAL
Brand: DELTA XTEND

## 2022-05-06 DEVICE — TOBRA FULL DOSE ANTIBIOTIC BONE CEMENT, 10 PACK CATALOG NUMBER IS 6197-9-010
Type: IMPLANTABLE DEVICE | Site: SHOULDER | Status: FUNCTIONAL
Brand: SIMPLEX

## 2022-05-06 DEVICE — SHOULDER S3 TOT ADV REVERSE IMPL CAPPED S3: Type: IMPLANTABLE DEVICE | Status: FUNCTIONAL

## 2022-05-06 DEVICE — SCREW BNE L40MM DIA4.5MM PROX CORT TIB S STL ST LOK FULL: Type: IMPLANTABLE DEVICE | Site: SHOULDER | Status: FUNCTIONAL

## 2022-05-06 RX ORDER — ROPIVACAINE HYDROCHLORIDE 5 MG/ML
INJECTION, SOLUTION EPIDURAL; INFILTRATION; PERINEURAL
Status: COMPLETED | OUTPATIENT
Start: 2022-05-06 | End: 2022-05-06

## 2022-05-06 RX ORDER — CELECOXIB 200 MG/1
200 CAPSULE ORAL ONCE
Status: COMPLETED | OUTPATIENT
Start: 2022-05-06 | End: 2022-05-06

## 2022-05-06 RX ORDER — SODIUM CHLORIDE, SODIUM LACTATE, POTASSIUM CHLORIDE, CALCIUM CHLORIDE 600; 310; 30; 20 MG/100ML; MG/100ML; MG/100ML; MG/100ML
75 INJECTION, SOLUTION INTRAVENOUS CONTINUOUS
Status: DISCONTINUED | OUTPATIENT
Start: 2022-05-06 | End: 2022-05-06 | Stop reason: HOSPADM

## 2022-05-06 RX ORDER — OXYCODONE HYDROCHLORIDE 5 MG/1
10 TABLET ORAL ONCE
Status: COMPLETED | OUTPATIENT
Start: 2022-05-06 | End: 2022-05-06

## 2022-05-06 RX ORDER — HYDROGEN PEROXIDE 3 %
SOLUTION, NON-ORAL MISCELLANEOUS AS NEEDED
Status: DISCONTINUED | OUTPATIENT
Start: 2022-05-06 | End: 2022-05-06 | Stop reason: HOSPADM

## 2022-05-06 RX ORDER — LISINOPRIL 5 MG/1
10 TABLET ORAL DAILY
Status: DISCONTINUED | OUTPATIENT
Start: 2022-05-06 | End: 2022-05-07 | Stop reason: HOSPADM

## 2022-05-06 RX ORDER — HYDROMORPHONE HYDROCHLORIDE 1 MG/ML
1 INJECTION, SOLUTION INTRAMUSCULAR; INTRAVENOUS; SUBCUTANEOUS
Status: DISCONTINUED | OUTPATIENT
Start: 2022-05-06 | End: 2022-05-07 | Stop reason: HOSPADM

## 2022-05-06 RX ORDER — SODIUM CHLORIDE 9 MG/ML
75 INJECTION, SOLUTION INTRAVENOUS CONTINUOUS
Status: DISCONTINUED | OUTPATIENT
Start: 2022-05-06 | End: 2022-05-07 | Stop reason: HOSPADM

## 2022-05-06 RX ORDER — DEXAMETHASONE SODIUM PHOSPHATE 4 MG/ML
INJECTION, SOLUTION INTRA-ARTICULAR; INTRALESIONAL; INTRAMUSCULAR; INTRAVENOUS; SOFT TISSUE
Status: COMPLETED | OUTPATIENT
Start: 2022-05-06 | End: 2022-05-06

## 2022-05-06 RX ORDER — PRAVASTATIN SODIUM 80 MG/1
80 TABLET ORAL
Status: DISCONTINUED | OUTPATIENT
Start: 2022-05-06 | End: 2022-05-07 | Stop reason: HOSPADM

## 2022-05-06 RX ORDER — LIDOCAINE HYDROCHLORIDE 20 MG/ML
INJECTION, SOLUTION EPIDURAL; INFILTRATION; INTRACAUDAL; PERINEURAL AS NEEDED
Status: DISCONTINUED | OUTPATIENT
Start: 2022-05-06 | End: 2022-05-06 | Stop reason: HOSPADM

## 2022-05-06 RX ORDER — OXYCODONE HYDROCHLORIDE 10 MG/1
10 TABLET ORAL
Qty: 40 TABLET | Refills: 0 | Status: SHIPPED | OUTPATIENT
Start: 2022-05-06 | End: 2022-05-13

## 2022-05-06 RX ORDER — ONDANSETRON 2 MG/ML
4 INJECTION INTRAMUSCULAR; INTRAVENOUS
Status: DISCONTINUED | OUTPATIENT
Start: 2022-05-06 | End: 2022-05-06 | Stop reason: HOSPADM

## 2022-05-06 RX ORDER — MIDAZOLAM HYDROCHLORIDE 1 MG/ML
5 INJECTION, SOLUTION INTRAMUSCULAR; INTRAVENOUS ONCE
Status: COMPLETED | OUTPATIENT
Start: 2022-05-06 | End: 2022-05-06

## 2022-05-06 RX ORDER — SODIUM CHLORIDE 0.9 % (FLUSH) 0.9 %
5-40 SYRINGE (ML) INJECTION EVERY 8 HOURS
Status: DISCONTINUED | OUTPATIENT
Start: 2022-05-06 | End: 2022-05-07 | Stop reason: HOSPADM

## 2022-05-06 RX ORDER — PROMETHAZINE HYDROCHLORIDE 25 MG/1
25 TABLET ORAL
Status: DISCONTINUED | OUTPATIENT
Start: 2022-05-06 | End: 2022-05-07 | Stop reason: HOSPADM

## 2022-05-06 RX ORDER — NALOXONE HYDROCHLORIDE 0.4 MG/ML
0.2 INJECTION, SOLUTION INTRAMUSCULAR; INTRAVENOUS; SUBCUTANEOUS AS NEEDED
Status: DISCONTINUED | OUTPATIENT
Start: 2022-05-06 | End: 2022-05-06 | Stop reason: HOSPADM

## 2022-05-06 RX ORDER — ONDANSETRON 2 MG/ML
INJECTION INTRAMUSCULAR; INTRAVENOUS AS NEEDED
Status: DISCONTINUED | OUTPATIENT
Start: 2022-05-06 | End: 2022-05-06 | Stop reason: HOSPADM

## 2022-05-06 RX ORDER — OXYCODONE HYDROCHLORIDE 5 MG/1
10 TABLET ORAL
Status: DISCONTINUED | OUTPATIENT
Start: 2022-05-06 | End: 2022-05-07 | Stop reason: HOSPADM

## 2022-05-06 RX ORDER — ROCURONIUM BROMIDE 10 MG/ML
INJECTION, SOLUTION INTRAVENOUS AS NEEDED
Status: DISCONTINUED | OUTPATIENT
Start: 2022-05-06 | End: 2022-05-06 | Stop reason: HOSPADM

## 2022-05-06 RX ORDER — SODIUM CHLORIDE 0.9 % (FLUSH) 0.9 %
5-40 SYRINGE (ML) INJECTION AS NEEDED
Status: DISCONTINUED | OUTPATIENT
Start: 2022-05-06 | End: 2022-05-06 | Stop reason: HOSPADM

## 2022-05-06 RX ORDER — SODIUM CHLORIDE, SODIUM LACTATE, POTASSIUM CHLORIDE, CALCIUM CHLORIDE 600; 310; 30; 20 MG/100ML; MG/100ML; MG/100ML; MG/100ML
100 INJECTION, SOLUTION INTRAVENOUS CONTINUOUS
Status: DISCONTINUED | OUTPATIENT
Start: 2022-05-06 | End: 2022-05-06 | Stop reason: HOSPADM

## 2022-05-06 RX ORDER — FACIAL-BODY WIPES
10 EACH TOPICAL DAILY PRN
Status: DISCONTINUED | OUTPATIENT
Start: 2022-05-06 | End: 2022-05-07 | Stop reason: HOSPADM

## 2022-05-06 RX ORDER — LANOLIN ALCOHOL/MO/W.PET/CERES
1 CREAM (GRAM) TOPICAL 2 TIMES DAILY WITH MEALS
Status: DISCONTINUED | OUTPATIENT
Start: 2022-05-07 | End: 2022-05-07 | Stop reason: HOSPADM

## 2022-05-06 RX ORDER — FENTANYL CITRATE 50 UG/ML
100 INJECTION, SOLUTION INTRAMUSCULAR; INTRAVENOUS ONCE
Status: COMPLETED | OUTPATIENT
Start: 2022-05-06 | End: 2022-05-06

## 2022-05-06 RX ORDER — MIDAZOLAM HYDROCHLORIDE 1 MG/ML
2 INJECTION, SOLUTION INTRAMUSCULAR; INTRAVENOUS ONCE
Status: COMPLETED | OUTPATIENT
Start: 2022-05-06 | End: 2022-05-06

## 2022-05-06 RX ORDER — FLUMAZENIL 0.1 MG/ML
0.2 INJECTION INTRAVENOUS
Status: DISCONTINUED | OUTPATIENT
Start: 2022-05-06 | End: 2022-05-06 | Stop reason: HOSPADM

## 2022-05-06 RX ORDER — DOCUSATE SODIUM 100 MG/1
100 CAPSULE, LIQUID FILLED ORAL 2 TIMES DAILY
Status: DISCONTINUED | OUTPATIENT
Start: 2022-05-07 | End: 2022-05-07 | Stop reason: HOSPADM

## 2022-05-06 RX ORDER — OXYCODONE HYDROCHLORIDE 5 MG/1
5 TABLET ORAL
Status: DISCONTINUED | OUTPATIENT
Start: 2022-05-06 | End: 2022-05-06 | Stop reason: HOSPADM

## 2022-05-06 RX ORDER — CEFAZOLIN SODIUM/WATER 2 G/20 ML
2 SYRINGE (ML) INTRAVENOUS ONCE
Status: COMPLETED | OUTPATIENT
Start: 2022-05-06 | End: 2022-05-06

## 2022-05-06 RX ORDER — GABAPENTIN 100 MG/1
100 CAPSULE ORAL 3 TIMES DAILY
Qty: 90 CAPSULE | Refills: 0 | Status: SHIPPED | OUTPATIENT
Start: 2022-05-06 | End: 2022-06-05

## 2022-05-06 RX ORDER — ACETAMINOPHEN 325 MG/1
650 TABLET ORAL ONCE
Status: DISCONTINUED | OUTPATIENT
Start: 2022-05-06 | End: 2022-05-06 | Stop reason: HOSPADM

## 2022-05-06 RX ORDER — LIDOCAINE HYDROCHLORIDE 10 MG/ML
0.1 INJECTION INFILTRATION; PERINEURAL AS NEEDED
Status: DISCONTINUED | OUTPATIENT
Start: 2022-05-06 | End: 2022-05-06 | Stop reason: HOSPADM

## 2022-05-06 RX ORDER — PROMETHAZINE HYDROCHLORIDE 25 MG/1
25 TABLET ORAL
Qty: 20 TABLET | Refills: 0 | Status: SHIPPED | OUTPATIENT
Start: 2022-05-06 | End: 2022-05-11

## 2022-05-06 RX ORDER — SODIUM CHLORIDE 0.9 % (FLUSH) 0.9 %
5-40 SYRINGE (ML) INJECTION AS NEEDED
Status: DISCONTINUED | OUTPATIENT
Start: 2022-05-06 | End: 2022-05-07 | Stop reason: HOSPADM

## 2022-05-06 RX ORDER — SODIUM CHLORIDE 0.9 % (FLUSH) 0.9 %
5-40 SYRINGE (ML) INJECTION EVERY 8 HOURS
Status: DISCONTINUED | OUTPATIENT
Start: 2022-05-06 | End: 2022-05-06 | Stop reason: HOSPADM

## 2022-05-06 RX ORDER — OXYCODONE HYDROCHLORIDE 5 MG/1
20 TABLET ORAL
Status: DISCONTINUED | OUTPATIENT
Start: 2022-05-06 | End: 2022-05-07 | Stop reason: HOSPADM

## 2022-05-06 RX ORDER — DEXAMETHASONE SODIUM PHOSPHATE 100 MG/10ML
INJECTION INTRAMUSCULAR; INTRAVENOUS AS NEEDED
Status: DISCONTINUED | OUTPATIENT
Start: 2022-05-06 | End: 2022-05-06 | Stop reason: HOSPADM

## 2022-05-06 RX ORDER — EPINEPHRINE 1 MG/ML
INJECTION, SOLUTION, CONCENTRATE INTRAVENOUS
Status: COMPLETED | OUTPATIENT
Start: 2022-05-06 | End: 2022-05-06

## 2022-05-06 RX ORDER — MIDAZOLAM HYDROCHLORIDE 1 MG/ML
INJECTION, SOLUTION INTRAMUSCULAR; INTRAVENOUS
Status: ACTIVE
Start: 2022-05-06 | End: 2022-05-06

## 2022-05-06 RX ORDER — ALBUTEROL SULFATE 0.83 MG/ML
2.5 SOLUTION RESPIRATORY (INHALATION)
Status: DISCONTINUED | OUTPATIENT
Start: 2022-05-06 | End: 2022-05-06 | Stop reason: HOSPADM

## 2022-05-06 RX ORDER — GABAPENTIN 100 MG/1
100 CAPSULE ORAL 3 TIMES DAILY
Status: DISCONTINUED | OUTPATIENT
Start: 2022-05-06 | End: 2022-05-07 | Stop reason: HOSPADM

## 2022-05-06 RX ORDER — HYDROMORPHONE HYDROCHLORIDE 2 MG/ML
0.5 INJECTION, SOLUTION INTRAMUSCULAR; INTRAVENOUS; SUBCUTANEOUS
Status: DISCONTINUED | OUTPATIENT
Start: 2022-05-06 | End: 2022-05-06 | Stop reason: HOSPADM

## 2022-05-06 RX ORDER — PROPOFOL 10 MG/ML
INJECTION, EMULSION INTRAVENOUS AS NEEDED
Status: DISCONTINUED | OUTPATIENT
Start: 2022-05-06 | End: 2022-05-06 | Stop reason: HOSPADM

## 2022-05-06 RX ADMIN — LISINOPRIL 10 MG: 5 TABLET ORAL at 14:40

## 2022-05-06 RX ADMIN — MIDAZOLAM HYDROCHLORIDE 2 MG: 2 INJECTION, SOLUTION INTRAMUSCULAR; INTRAVENOUS at 11:08

## 2022-05-06 RX ADMIN — OXYCODONE HYDROCHLORIDE 10 MG: 5 TABLET ORAL at 13:51

## 2022-05-06 RX ADMIN — CEFAZOLIN SODIUM 1 G: 1 INJECTION, POWDER, FOR SOLUTION INTRAMUSCULAR; INTRAVENOUS at 19:40

## 2022-05-06 RX ADMIN — GABAPENTIN 100 MG: 100 CAPSULE ORAL at 17:00

## 2022-05-06 RX ADMIN — MIDAZOLAM 2 MG: 1 INJECTION INTRAMUSCULAR; INTRAVENOUS at 11:12

## 2022-05-06 RX ADMIN — LIDOCAINE HYDROCHLORIDE 0.1 ML: 10 INJECTION, SOLUTION INFILTRATION; PERINEURAL at 09:22

## 2022-05-06 RX ADMIN — ROPIVACAINE HYDROCHLORIDE 30 ML: 5 INJECTION, SOLUTION EPIDURAL; INFILTRATION; PERINEURAL at 11:15

## 2022-05-06 RX ADMIN — Medication 2 G: at 11:39

## 2022-05-06 RX ADMIN — SODIUM CHLORIDE, PRESERVATIVE FREE 10 ML: 5 INJECTION INTRAVENOUS at 20:06

## 2022-05-06 RX ADMIN — SODIUM CHLORIDE, SODIUM LACTATE, POTASSIUM CHLORIDE, AND CALCIUM CHLORIDE: 600; 310; 30; 20 INJECTION, SOLUTION INTRAVENOUS at 13:02

## 2022-05-06 RX ADMIN — OXYCODONE HYDROCHLORIDE 10 MG: 5 TABLET ORAL at 17:00

## 2022-05-06 RX ADMIN — GABAPENTIN 100 MG: 100 CAPSULE ORAL at 21:34

## 2022-05-06 RX ADMIN — PHENYLEPHRINE HYDROCHLORIDE 50 MCG: 10 INJECTION INTRAVENOUS at 11:58

## 2022-05-06 RX ADMIN — EPINEPHRINE 0.3 MG: 1 INJECTION, SOLUTION, CONCENTRATE INTRAVENOUS at 11:15

## 2022-05-06 RX ADMIN — LIDOCAINE HYDROCHLORIDE 80 MG: 20 INJECTION, SOLUTION EPIDURAL; INFILTRATION; INTRACAUDAL; PERINEURAL at 11:44

## 2022-05-06 RX ADMIN — PROPOFOL 160 MG: 10 INJECTION, EMULSION INTRAVENOUS at 11:44

## 2022-05-06 RX ADMIN — DEXAMETHASONE SODIUM PHOSPHATE 4 MG: 4 INJECTION, SOLUTION INTRAMUSCULAR; INTRAVENOUS at 11:15

## 2022-05-06 RX ADMIN — PRAVASTATIN SODIUM 80 MG: 80 TABLET ORAL at 20:05

## 2022-05-06 RX ADMIN — SODIUM CHLORIDE, SODIUM LACTATE, POTASSIUM CHLORIDE, AND CALCIUM CHLORIDE 100 ML/HR: 600; 310; 30; 20 INJECTION, SOLUTION INTRAVENOUS at 09:28

## 2022-05-06 RX ADMIN — ROCURONIUM BROMIDE 50 MG: 50 INJECTION, SOLUTION INTRAVENOUS at 11:44

## 2022-05-06 RX ADMIN — ONDANSETRON 4 MG: 2 INJECTION INTRAMUSCULAR; INTRAVENOUS at 12:56

## 2022-05-06 RX ADMIN — DEXAMETHASONE SODIUM PHOSPHATE 8 MG: 10 INJECTION INTRAMUSCULAR; INTRAVENOUS at 12:56

## 2022-05-06 RX ADMIN — FENTANYL CITRATE 100 MCG: 50 INJECTION INTRAMUSCULAR; INTRAVENOUS at 11:08

## 2022-05-06 RX ADMIN — OXYCODONE HYDROCHLORIDE 10 MG: 5 TABLET ORAL at 20:04

## 2022-05-06 RX ADMIN — CELECOXIB 200 MG: 200 CAPSULE ORAL at 09:18

## 2022-05-06 NOTE — PERIOP NOTES
TRANSFER - OUT REPORT:    Verbal report given to Otilia Reyes RN on Rene W Beatriz Larose  being transferred to Room 319 for routine progression of care       Report consisted of patients Situation, Background, Assessment and   Recommendations(SBAR). Information from the following report(s) SBAR, Procedure Summary, Intake/Output, MAR, Recent Results and Cardiac Rhythm SR w/ pacs, bigeminal pacs was reviewed with the receiving nurse. Lines:   Peripheral IV 05/06/22 Distal;Posterior;Right Forearm (Active)   Site Assessment Clean, dry, & intact 05/06/22 1315   Phlebitis Assessment 0 05/06/22 1315   Infiltration Assessment 0 05/06/22 1315   Dressing Status Clean, dry, & intact 05/06/22 1315   Dressing Type Tape;Transparent 05/06/22 1315   Hub Color/Line Status Green; Infusing;Patent 05/06/22 1315        Opportunity for questions and clarification was provided.       Patient transported with:   O2 @ 3 liters, multiple allergies

## 2022-05-06 NOTE — PROGRESS NOTES
05/06/22 1717   Oxygen Therapy   O2 Sat (%) 97 %   Pulse via Oximetry 65 beats per minute   O2 Device Heated; Hi flow nasal cannula;Humidifier   Incentive Spirometry Treatment   Actual Volume (ml)   (patient eating)   ptient on HHFNC and CS.   Wii attempt IS in a little bit

## 2022-05-06 NOTE — ANESTHESIA POSTPROCEDURE EVALUATION
Procedure(s):  REVERSE LEFT TOTAL SHOULDER ARTHROPLASTY WITH DELTA EXTEND PROSTHESIS, BICEPS TENODESIS. general, regional    Anesthesia Post Evaluation      Multimodal analgesia: multimodal analgesia used between 6 hours prior to anesthesia start to PACU discharge  Patient location during evaluation: PACU  Patient participation: complete - patient participated  Level of consciousness: awake and alert and responsive to verbal stimuli  Pain score: 0  Pain management: adequate  Airway patency: patent  Anesthetic complications: no  Cardiovascular status: acceptable and hemodynamically stable  Respiratory status: acceptable  Hydration status: acceptable  Post anesthesia nausea and vomiting:  none  Final Post Anesthesia Temperature Assessment:  Normothermia (36.0-37.5 degrees C)      INITIAL Post-op Vital signs:   Vitals Value Taken Time   /65 05/06/22 1335   Temp 36.3 °C (97.4 °F) 05/06/22 1315   Pulse 75 05/06/22 1336   Resp 16 05/06/22 1330   SpO2 95 % 05/06/22 1336   Vitals shown include unvalidated device data.

## 2022-05-06 NOTE — PERIOP NOTES
Discussed pain control w/ Dr. Vashti Miles: patient has paradoxical rxn to dilaudid, can take oxycodone. OK to give 10 mg oxycodone now for pain score 6-7.

## 2022-05-06 NOTE — DISCHARGE SUMMARY
[address] [facility] Discharge Summary      Patient ID:  Brodie Beckford  [id number]  64 y.o.  1960    Allergies: Benadryl [diphenhydramine hcl], Cymbalta [duloxetine], Demerol [meperidine], Dilaudid [hydromorphone], Hydrocodone-acetaminophen, Macrodantin [nitrofurantoin macrocrystal], Morphine, Nucynta [tapentadol], and Robaxin [methocarbamol]    Admit date: 5/6/2022    Discharge date and time: 5/7/22    Admitting Physician: Chico Millan MD     Discharge Physician: Chico Millan MD      * Admission Diagnoses: Post-traumatic osteoarthritis of left shoulder [M19.112]    * Discharge Diagnoses:   Hospital Problems as of 5/6/2022 Date Reviewed: 5/5/2022          Codes Class Noted - Resolved POA    * (Principal) Post-traumatic osteoarthritis of left shoulder ICD-10-CM: U24.305  ICD-9-CM: 715.21  5/2/2022 - Present Yes              Surgeon: Chico Millan MD      Preoperative Medical Clearance: YES    * Procedure: Procedure(s):  LEFT SHOULDER ARTHROPLASTY TOTAL REVERSE  WITH BICEPS TENODESIS IN COMBINATION WITH LATISSIMUS DORSI AND TERES  MAJOR TENDON TRANSFER GEN/SCAL  / DELTA EXTEND           Perioperative Antibiotics: Ancef  _X__                                                Vancomycin  ___          Post Op complications: none        * Discharge Condition: good  Wound appears to be healing without any evidence of infection.          * Discharged to: Home    * Follow-up Care/Discharge instructions:  - Resume pre hospital diet            - Resume home medications per medical continuation form     CONTINUE PHYSICAL THERAPY  SLING LEFT SHOULDER  - Follow up in office as scheduled       Signed:  Chico Millan MD  5/6/2022  8:54 AM

## 2022-05-06 NOTE — H&P
Date of Surgery Update:  Christopher Ruiz was seen and examined. History and physical has been reviewed. The patient has been examined.  There have been no significant clinical changes since the completion of the originally dated History and Physical.    Signed By: Zane Tomlinson MD     May 6, 2022 8:40 AM

## 2022-05-06 NOTE — PROGRESS NOTES
TRANSFER - IN REPORT:    Verbal report received from [facility] (name) on Rene Meza  being received from [facility]) for routine post - op      Report consisted of patients Situation, Background, Assessment and   Recommendations(SBAR). Information from the following report(s) SBAR, Kardex, OR Summary, Intake/Output and MAR was reviewed with the receiving nurse. Opportunity for questions and clarification was provided.

## 2022-05-06 NOTE — BRIEF OP NOTE
BRIEF OPERATIVE NOTE    Date of Procedure: 5/6/2022     Preoperative Diagnosis:  POST-TRAUMATIC OSTEOARTHRITIS LEFT SHOULDER      BICEPS TENDINITIS LEFT SHOULDER    Postoperative Diagnosis:  SAME    Procedure(s): REVERSE LEFT TOTAL SHOULDER ARTHROPLASTY WITH DELTA EXTEND PROSTHESIS, BICEPS TENODESIS    Surgeon(s) and Role:     * Hugo Sandoval MD - Primary         Assistant Staff:  NONE      Surgical Staff:  Circ-1: Trev Urena RN  Circ-2: Madonna Jamison RN  Scrub Tech-1: Annie Benedict  Scrub Tech-2: Martín Edgar  Scrub Tech-3: Dimple Juarez  Event Time In   Incision Start 1155   Incision Close        Anesthesia:  GENERAL WITH INTERSCALENE BLOCK    Estimated Blood Loss: 75 cc. Complications: NONE    Implants:   Implant Name Type Inv. Item Serial No.  Lot No. LRB No. Used Action   COMPONENT KALINA FIX [id number] SHLDR METAGLENE LNG PEG GLOB - [id number]  COMPONENT KALINA FIX [id number] SHLDR METAGLENE LNG PEG GLOB  [facility] [employer] [id number] Left 1 Implanted   GLENOSPHERE ECC DELTA XTEND ECC D38MM +4MM - [id number]  GLENOSPHERE ECC DELTA XTEND ECC D38MM +4MM  [facility] [employer] [id number] Left 1 Implanted   RESTRICTOR ANIYAH [id number] UNIV FEM CNL UHMWPE BIOSTP G - [id number]  RESTRICTOR ANIYAH [id number] UNIV FEM CNL UHMWPE BIOSTP G  [facility] [employer] [id number] Left 1 Implanted   STEM HUM SZ 2 L137MM [id number] 155DEG STD SHLDR CO CHROME HA - [id number]  STEM HUM SZ 2 L137MM [id number] 155DEG STD SHLDR CO CHROME HA  [facility] [employer] [id number] Left 1 Implanted   CEMENT BNE 20ML 41GM FULL DOSE PMMA W/ TOBRA M VISC RADPQ - [id number]  CEMENT BNE 20ML 41GM FULL DOSE PMMA W/ TOBRA M VISC RADPQ  [employer] [facility]_ [id number] Left 1 Implanted   SCREW BNE L50MM DIA4. 5MM PROX QUYEN TIB S STL ST CODI FULL - [id number]  SCREW BNE L50MM DIA4. 5MM PROX QUYEN TIB S STL ST CODI FULL  [employer] [id number] Left 1 Implanted   SCREW BNE L40MM DIA4.5MM PROX QUYEN TIB S STL ST CODI FULL - [id number]  SCREW BNE L40MM DIA4. 5MM PROX QUYEN TIB S STL ST CODI FULL  [employer]_ [id number] Left 1 Implanted   SCREW BNE L14MM DIA4. 5MM PROX QUYEN TIB S STL ST CODI FULL - [id number]  SCREW BNE L14MM DIA4. 5MM PROX QUYEN TIB S STL ST CODI FULL  [employer]_ [id number] Left 2 Implanted   CUP HUM [id number] +9MM OFFSET STD SHLDR Omid Evans - [id number]  CUP HUM [id number] +9MM OFFSET STD SHLDR Omid Evans  [employer]_ [id number] Left 1 Implanted       Macie Avendano MD

## 2022-05-06 NOTE — ANESTHESIA PROCEDURE NOTES
Peripheral Block    Start time: 5/6/2022 11:06 AM  End time: 5/6/2022 11:15 AM  Performed by: Dorina Khan MD  Authorized by: Dorina Khan MD       Pre-procedure: Indications: at surgeon's request and post-op pain management    Preanesthetic Checklist: patient identified, risks and benefits discussed, site marked, timeout performed, anesthesia consent given and patient being monitored    Timeout Time: 11:15 EDT  Preanesthetic Checklist comment:  Risk of nerve damage discussed. Block Type:   Block Type: Adductor canal  Laterality:  Left  Monitoring:  Standard ASA monitoring, continuous pulse ox, frequent vital sign checks, heart rate, oxygen and responsive to questions  Injection Technique:  Single shot  Procedures: ultrasound guided    Patient Position: supine  Prep: chlorhexidine    Location:  Interscalene  Needle Type:  Stimuplex (4 inch)  Needle Gauge:  20 G  Needle Localization:  Ultrasound guidance  Medication Injected:  Ropivacaine (PF) (NAROPIN)(0.5%) 5 mg/mL injection, 30 mL  EPINEPHrine HCl (PF) (ADRENALIN) 1 mg/mL (1 mL) injection, 0.3 mg  dexamethasone (DECADRON) 4 mg/mL injection, 4 mg  Med Admin Time: 5/6/2022 11:15 AM    Assessment:  Number of attempts:  1  Injection Assessment:  Incremental injection every 5 mL, local visualized surrounding nerve on ultrasound, negative aspiration for blood, no intravascular symptoms, no paresthesia and ultrasound image on chart  Patient tolerance:  Patient tolerated the procedure well with no immediate complications  3 cc 1% Lidocaine injected at needle insertion site. Needle inserted and placed in close proximity to the nerve under real time ultrasound guidance. Ultrasound was used to visualize the spread of local anesthetic in close proximity to the nerve being blocked. The nerve appeared anatomically normal and there were no abnormal findings.

## 2022-05-06 NOTE — OP NOTES
[address] [address]  OPERATIVE REPORT    Name:  Jelani Banuelos  MR#:  [id number]  :  1960  ACCOUNT #:  [de-identified]  DATE OF SERVICE:  2022    PREOPERATIVE DIAGNOSIS:  1. Posttraumatic degenerative joint disease, left shoulder. 2.  Biceps tendinitis, left shoulder. POSTOPERATIVE DIAGNOSIS:  Posttraumatic degenerative joint disease, left shoulder. PROCEDURE PERFORMED:  Reverse left total shoulder arthroplasty with a Delta Xtend prosthesis, biceps tenodesis. SURGEON:  Campbell Shell. Melissa Jiménez MD        ANESTHESIA:  General with an interscalene block. COMPLICATIONS:  None. IMPLANTS:  Hardware utilized is DePuy +10 metaglene, 38 eccentric +4 lateralized glenosphere, 38+9 cup, 10.2 stem, 10 mm Biostop G, 40 mm superior, 50 mm inferior, and 14 mm anterior and posterior nonlocking cortical screw. ESTIMATED BLOOD LOSS:  75 mL. FINDINGS:  Posttraumatic DJD. CPT CODE:  67618 and 25233 with a modifier 59 for distinct surgical procedure for the biceps tenodesis. ICD-10 CODE:  F63.482. INDICATIONS:  The patient is a 79-year-old female who has developed recalcitrant left shoulder pain. The patient had undergone previous left shoulder surgery. Preoperative physical exam, radiographs, and an MRI demonstrate a posttraumatic DJD, left shoulder. The patient has exhausted nonoperative modalities and electively admitted for operative intervention. PROCEDURE:  Following identification of the patient, the patient was taken to the operative suite. Following administration of general anesthesia, interscalene block for postop pain control, 2 g of IV Ancef, the patient was very carefully positioned on the operative table in the beach-chair fashion. Left shoulder was then prepped and draped in the sterile fashion. Standard deltopectoral incision was identified and marked. Skin was incised. Subcutaneous tissue was then dissected down to the cephalic vein.   It was dissected proximally and distally, retracted laterally with deltoid. Pec major and strap muscles were retracted medially. There was significant scar tissue given her previous surgical procedure and the deltoid was carefully elevated off of the humerus. Axillary nerve was protected throughout the procedure. Biceps tendon was identified. It was dissected free. It was tagged for tenodesis. Subscap was elevated sharply off the lesser tuberosity and tagged. The humerus was very carefully dislocated from the wound. There were marked degenerative changes of the humeral head and glenoid. Scar tissue in the subacromial space from previous rotator cuff repair was completely mobilized. Humerus was dislocated from the wound. Again, marked degenerative changes. Proximal cutting guide was assembled. Proximal cut was then made. Circumferential osteophytes were resected. Previous anchors were removed as well. Attention was then turn to the glenoid. The glenoid was noted to have significant arthritic changes as well. Glenoid was then meticulously exposed. Starter wire was then drilled. Glenoid was then drilled and reamed to a +10 depth. The inferior tilt was reamed in.  A +10 metaglene was inserted and secured with a 40 mm superior, 50 mm inferior, 14 mm anterior and posterior nonlocking cortical screw. Metaglene was stable. A 38 eccentric +4 mm lateralized glenosphere was then inserted in the standard fashion. Metaglene and glenosphere were stable. Humerus was dislocated back from the wound and reamed to accommodate 10.2 stem. It was noted a 10.2 stem with 38+9 cup gave excellent stability and mobility. All this point, all trial components were then removed. The humeral canal was irrigated and dried. Previous suture anchor material was removed in its entirety. A 10 mm Biostop G was then placed distally.   Antibiotic-impregnated cement was mixed and inserted in the humeral canal and a 10.2 stem was cemented in the appropriate version. Excessive cement was removed with a curette. Once the cement was allowed to cure, a true 38+9 cup was inserted. Shoulder was reduced. There was excellent stability with excellent mobility. At this point, the subscapularis was repaired back to the fins of the prosthesis using #5 Mersilene sutures in a modified Jelani-Osorio type fashion. .  Rotator interval was approximated with #5 Mersilene sutures. Biceps was tenodesed using #5 Mersilene sutures. Arm was put through range of motion and stable. Axillary nerve was intact. The wound was irrigated. Deltopectoral interval was approximated with #2 Mersilene sutures. Skin was closed with 0 Vicryl figure-of-eight sutures and a 2-0 Prolene subcuticular stitch. A sterile dressing was applied. A sling and swathe was applied. The patient was then transferred to the recovery room in stable condition. Barbi Cortez MD      [id number]  D:  05/06/2022 13:14  T:  05/06/2022 17:36  JOB #:  [id number]  CC:   Taya López MD

## 2022-05-06 NOTE — PROGRESS NOTES
05/06/22 1717   Oxygen Therapy   O2 Sat (%) 97 %   Pulse via Oximetry 65 beats per minute   O2 Device Heated; Hi flow nasal cannula;Humidifier   Skin Assessment Clean, dry, & intact   Skin Protection for O2 Device N/A   O2 Flow Rate (L/min) 30 l/min  (patient wouldnt tolerated 40 LPM)   O2 Temperature 87.8 °F (31 °C)   FIO2 (%) 35 %   Incentive Spirometry Treatment   Actual Volume (ml)   (patient eating)

## 2022-05-06 NOTE — PROGRESS NOTES
Care Management Interventions  PCP Verified by CM: Yes  Mode of Transport at Discharge: Self  Transition of Care Consult (CM Consult): [address]: Yes  Discharge Durable Medical Equipment: Yes  Physical Therapy Consult: Yes  Occupational Therapy Consult: Yes  Support Systems: Spouse/Significant Other  Confirm Follow Up Transport: Self  The Plan for Transition of Care is Related to the Following Treatment Goals : improve strength in shoulder  The Patient and/or Patient Representative was Provided with a Choice of Provider and Agrees with the Discharge Plan?: Yes  Name of the Patient Representative Who was Provided with a Choice of Provider and Agrees with the Discharge Plan: pt  Freedom of Choice List was Provided with Basic Dialogue that Supports the Patient's Individualized Plan of Care/Goals, Treatment Preferences and Shares the Quality Data Associated with the Providers?: Yes  Discharge Location  Patient Expects to be Discharged to[de-identified] Home with home health    Order rec'd to arrange home health. Pt without preference towards provider. Referral sent to [facility].  Will follow until d/c

## 2022-05-07 VITALS
HEIGHT: 61 IN | OXYGEN SATURATION: 96 % | BODY MASS INDEX: 26.81 KG/M2 | TEMPERATURE: 98 F | HEART RATE: 86 BPM | DIASTOLIC BLOOD PRESSURE: 60 MMHG | RESPIRATION RATE: 18 BRPM | WEIGHT: 142 LBS | SYSTOLIC BLOOD PRESSURE: 112 MMHG

## 2022-05-07 LAB
ANION GAP SERPL CALC-SCNC: 9 MMOL/L (ref 7–16)
BUN SERPL-MCNC: 28 MG/DL (ref 8–23)
CALCIUM SERPL-MCNC: 9.6 MG/DL (ref 8.3–10.4)
CHLORIDE SERPL-SCNC: 110 MMOL/L (ref 98–107)
CO2 SERPL-SCNC: 20 MMOL/L (ref 21–32)
CREAT SERPL-MCNC: 1.01 MG/DL (ref 0.6–1)
ERYTHROCYTE [DISTWIDTH] IN BLOOD BY AUTOMATED COUNT: 12.7 % (ref 11.9–14.6)
GLUCOSE SERPL-MCNC: 136 MG/DL (ref 65–100)
HCT VFR BLD AUTO: 34.1 % (ref 35.8–46.3)
HGB BLD-MCNC: 11.7 G/DL (ref 11.7–15.4)
MAGNESIUM SERPL-MCNC: 1.9 MG/DL (ref 1.8–2.4)
MCH RBC QN AUTO: 32.4 PG (ref 26.1–32.9)
MCHC RBC AUTO-ENTMCNC: 34.3 G/DL (ref 31.4–35)
MCV RBC AUTO: 94.5 FL (ref 79.6–97.8)
NRBC # BLD: 0 K/UL (ref 0–0.2)
PLATELET # BLD AUTO: 202 K/UL (ref 150–450)
PMV BLD AUTO: 9.5 FL (ref 9.4–12.3)
POTASSIUM SERPL-SCNC: 5 MMOL/L (ref 3.5–5.1)
RBC # BLD AUTO: 3.61 M/UL (ref 4.05–5.2)
SODIUM SERPL-SCNC: 139 MMOL/L (ref 136–145)
WBC # BLD AUTO: 12.1 K/UL (ref 4.3–11.1)

## 2022-05-07 PROCEDURE — 83735 ASSAY OF MAGNESIUM: CPT

## 2022-05-07 PROCEDURE — 80048 BASIC METABOLIC PNL TOTAL CA: CPT

## 2022-05-07 PROCEDURE — G0378 HOSPITAL OBSERVATION PER HR: HCPCS

## 2022-05-07 PROCEDURE — 74011250637 HC RX REV CODE- 250/637: Performed by: ORTHOPAEDIC SURGERY

## 2022-05-07 PROCEDURE — 2709999900 HC NON-CHARGEABLE SUPPLY

## 2022-05-07 PROCEDURE — 85027 COMPLETE CBC AUTOMATED: CPT

## 2022-05-07 PROCEDURE — 74011250636 HC RX REV CODE- 250/636: Performed by: ORTHOPAEDIC SURGERY

## 2022-05-07 PROCEDURE — 97530 THERAPEUTIC ACTIVITIES: CPT

## 2022-05-07 PROCEDURE — 97161 PT EVAL LOW COMPLEX 20 MIN: CPT

## 2022-05-07 PROCEDURE — 36415 COLL VENOUS BLD VENIPUNCTURE: CPT

## 2022-05-07 PROCEDURE — 94760 N-INVAS EAR/PLS OXIMETRY 1: CPT

## 2022-05-07 PROCEDURE — 74011000258 HC RX REV CODE- 258: Performed by: ORTHOPAEDIC SURGERY

## 2022-05-07 PROCEDURE — 74011000250 HC RX REV CODE- 250: Performed by: ORTHOPAEDIC SURGERY

## 2022-05-07 RX ADMIN — FERROUS SULFATE 325 MG: 325 TABLET ORAL at 07:27

## 2022-05-07 RX ADMIN — OXYCODONE HYDROCHLORIDE 10 MG: 5 TABLET ORAL at 10:56

## 2022-05-07 RX ADMIN — DOCUSATE SODIUM 100 MG: 100 CAPSULE ORAL at 07:27

## 2022-05-07 RX ADMIN — CEFAZOLIN SODIUM 1 G: 1 INJECTION, POWDER, FOR SOLUTION INTRAMUSCULAR; INTRAVENOUS at 03:10

## 2022-05-07 RX ADMIN — SODIUM CHLORIDE, PRESERVATIVE FREE 10 ML: 5 INJECTION INTRAVENOUS at 03:10

## 2022-05-07 RX ADMIN — OXYCODONE HYDROCHLORIDE 10 MG: 5 TABLET ORAL at 07:27

## 2022-05-07 RX ADMIN — GABAPENTIN 100 MG: 100 CAPSULE ORAL at 07:27

## 2022-05-07 RX ADMIN — OXYCODONE HYDROCHLORIDE 10 MG: 5 TABLET ORAL at 01:45

## 2022-05-07 NOTE — PROGRESS NOTES
Orthopedic Joint Progress Note    May 7, 2022  Admit Date: 2022  Admit Diagnosis: Post-traumatic osteoarthritis of left shoulder [M19.112]  Rotator cuff tear arthropathy of left shoulder [M75.102, M12.812]  Post-traumatic osteoarthritis, left shoulder [M19.112]    1 Day Post-Op    Subjective:     Rene Vitale  doing well. Pain well-controlled. Ready to go home. Review of Systems: Pertinent items are noted in HPI. Objective:     PT/OT:     PATIENT MOBILITY                           Vital Signs:    Blood pressure (!) 94/54, pulse 95, temperature 98 °F (36.7 °C), resp. rate 18, height 5' 1\" (1.549 m), weight 142 lb (64.4 kg), SpO2 98 %.   Temp (24hrs), Av °F (36.7 °C), Min:97.3 °F (36.3 °C), Max:99.3 °F (37.4 °C)      Pain Control:   Pain Assessment  Pain Scale 1: Numeric (0 - 10)  Pain Intensity 1: 6  Pain Onset 1: post op  Pain Location 1: Shoulder  Pain Orientation 1: Left  Pain Description 1: Aching  Pain Intervention(s) 1: Medication (see MAR)    Meds:  Current Facility-Administered Medications   Medication Dose Route Frequency    pravastatin (PRAVACHOL) tablet 80 mg  80 mg Oral QHS    lisinopriL (PRINIVIL, ZESTRIL) tablet 10 mg  10 mg Oral DAILY    0.9% sodium chloride infusion  75 mL/hr IntraVENous CONTINUOUS    sodium chloride (NS) flush 5-40 mL  5-40 mL IntraVENous Q8H    sodium chloride (NS) flush 5-40 mL  5-40 mL IntraVENous PRN    bisacodyL (DULCOLAX) suppository 10 mg  10 mg Rectal DAILY PRN    sodium phosphate (FLEET'S) enema 1 Enema  1 Enema Rectal PRN    promethazine (PHENERGAN) tablet 25 mg  25 mg Oral Q4H PRN    docusate sodium (COLACE) capsule 100 mg  100 mg Oral BID    ferrous sulfate tablet 325 mg  1 Tablet Oral BID WITH MEALS    HYDROmorphone (DILAUDID) injection 1 mg  1 mg IntraVENous Q1H PRN    oxyCODONE IR (ROXICODONE) tablet 10 mg  10 mg Oral Q3H PRN    oxyCODONE IR (ROXICODONE) tablet 20 mg  20 mg Oral Q3H PRN    ceFAZolin (ANCEF) 1 g in 0.9% sodium chloride (MBP/ADV) 50 mL MBP  1 g IntraVENous Q8H    gabapentin (NEURONTIN) capsule 100 mg  100 mg Oral TID        LAB:    Lab Results   Component Value Date/Time    INR 1.0 04/27/2022 12:55 PM    INR 1.0 06/28/2019 04:19 AM    INR 1.0 06/27/2019 09:25 AM     Lab Results   Component Value Date/Time    HGB 11.7 05/07/2022 05:27 AM    HGB 14.8 04/27/2022 12:55 PM    HGB 15.2 09/02/2021 12:38 PM       Incision 05/06/22 Shoulder Left (Active)   Dressing Status Clean;Dry; Intact 05/06/22 1922   Cleansed Cleansed with saline 05/06/22 1300   Dressing/Treatment ABD pad;Gauze dressing/dressing sponge;Roll gauze;Steri-strips;Tape/Soft cloth adhesive tape 05/06/22 1922   Drainage Amount None 05/06/22 1922   Number of days: 1         Physical Exam:  No significant changes    Assessment:      Principal Problem:    Post-traumatic osteoarthritis of left shoulder (5/2/2022)    Active Problems:    Bicipital tendinitis of left shoulder (5/6/2022)         Plan:     Patient looks very good. Continue PT/OT/Rehab  Anticipated discharge home today  Prescriptions e-prescribed to pharmacy. I have reviewed the patients controlled substance prescription history, as maintained in the [place] prescription monitoring program, so that the prescription(s) for a  controlled substance can be given.                Patient Expects to be Discharged to[de-identified] Home with home health

## 2022-05-07 NOTE — DISCHARGE INSTRUCTIONS
DISCHARGE SUMMARY from Nurse    The following personal items collected during your admission are returned to you:   Dental Appliance: Dental Appliances: Lowers; Other (comment) (bridge )  Vision: Visual Aid: Glasses  Hearing Aid:   na  Jewelry: Jewelry: None  Clothing: Clothing: At bedside  Other Valuables: Other Valuables: None  Valuables sent to safe:   na          PATIENT INSTRUCTIONS:    After general anesthesia or intravenous sedation, for 24 hours or while taking prescription Narcotics:  · Limit your activities  · Do not drive and operate hazardous machinery  · Do not make important personal or business decisions  · Do  not drink alcoholic beverages  · If you have not urinated within 8 hours after discharge, please contact your surgeon on call. Report the following to your surgeon:  · Excessive pain, swelling, redness or odor of or around the surgical area  · Temperature over 101  · Nausea and vomiting lasting longer than 4 hours or if unable to take medications  · Any signs of decreased circulation or nerve impairment to extremity: change in color, persistent  numbness, tingling, coldness or increase pain  · Any questions, call office @ [phone]. What to do at Home:  Recommended activity: activity as tolerated, as instructed per Dr. Russ Lovell. Continue with exercises taught by Physical Therapy. Resume per hospital diet. Wear sling to right arm. Use ice and elevate arm to decrease pain and swelling. If you experience any of the following symptoms temp>101, pain unrelieved by meds, or persistent nausea or vomitting, please follow up with Dr. Russ Lovell @ [phone]. *  Please give a list of your current medications to your Primary Care Provider. *  Please update this list whenever your medications are discontinued, doses are      changed, or new medications (including over-the-counter products) are added.     *  Please carry medication information at all times in case of emergency situations. Shoulder Replacement Surgery: What to Expect at Home  Your Recovery  Shoulder replacement surgery replaces the worn parts of your shoulder joint. When you leave the hospital, your arm will be in a sling. It will be helpful if there is someone to help you at home for the next few weeks or until you have more energy and can move around better. You will go home with a bandage and stitches or staples. You can remove the bandage when your doctor tells you to. If the stitches are not the type that dissolve, your doctor will remove them in 10 to 14 days. You may still have some mild pain, and the area may be swollen for several months after surgery. Your doctor will give you medicine for the pain. You will continue the rehabilitation program (rehab) you started in the hospital. The better you do with your rehab exercises, the sooner you will get your strength and movement back. Depending on your job, you may be able to return to work as early as 2 to 3 weeks after surgery, as long as you avoid certain arm movements, such as lifting. This care sheet gives you a general idea about how long it will take for you to recover. But each person recovers at a different pace. Follow the steps below to get better as quickly as possible. How can you care for yourself at home? Activity  · Rest when you feel tired. You may take a nap, but don't stay in bed all day. · Work with your physical therapist to learn the best way to exercise. · You will have a sling to wear at night. And it's a good idea to also put a small stack of folded sheets or towels under your upper arm while you are in bed to keep your arm from dropping too far back. · Your arm should stay next to your body or in front of it for several weeks, both while you are up and during sleep. · Don't lift anything with the affected arm for 6 weeks. · You may need to take sponge baths until your stitches or staples have been removed.  You will probably be able to shower 24 hours after they are removed. · Ask your doctor when you can drive again. · Ask your doctor when it is okay for you to have sex. · Your doctor may advise you to give up activities that put stress on that shoulder. This includes sports such as weight lifting or tennis, unless your tennis arm was not the one affected. Diet  · By the time you leave the hospital, you will probably be eating your normal diet. If your stomach is upset, try bland, low-fat foods like plain rice, broiled chicken, toast, and yogurt. Your doctor may recommend that you take iron and vitamin supplements. · Drink plenty of fluids (unless your doctor tells you not to). · You may notice that your bowel movements are not regular right after your surgery. This is common. Try to avoid constipation and straining with bowel movements. You may want to take a fiber supplement every day. If you have not had a bowel movement after a couple of days, ask your doctor about taking a mild laxative. Medicines  · Be safe with medicines. Take pain medicines exactly as directed. ¨ If the doctor gave you a prescription medicine for pain, take it as prescribed. ¨ If you are not taking a prescription pain medicine, ask your doctor if you can take an over-the-counter medicine. · If you think your pain medicine is making you sick to your stomach:  ¨ Take your medicine after meals (unless your doctor has told you not to). ¨ Ask your doctor for a different pain medicine. · If your doctor prescribed antibiotics, take them as directed. Don't stop taking them just because you feel better. You need to take the full course of antibiotics. · If you take a blood thinner, be sure you get instructions about how to take your medicine safely. Blood thinners can cause serious bleeding problems. Incision care  · You will have a dressing over the cut (incision). A dressing helps the incision heal and protects it.  Your doctor will tell you how to take care of this. · Keep the area clean and dry. Exercise  · Shoulder rehabilitation is a series of exercises you do after your surgery. This helps you get back your shoulder's range of motion and strength. You will work with your doctor and physical therapist to plan this exercise program. To get the best results, you need to do the exercises correctly and as often and as long as your doctor tells you. Ice  · For pain, put ice or a cold pack on the area for 10 to 20 minutes at a time. Put a thin cloth between the ice and your skin. Other instructions  · Wear medical alert jewelry that says you may need antibiotics before any procedure, including dental work. You can buy this at most drugstores. Follow-up care is a key part of your treatment and safety. Be sure to make and go to all appointments, and call your doctor if you are having problems. It's also a good idea to know your test results and keep a list of the medicines you take. When should you call for help? Call 911 anytime you think you may need emergency care. For example, call if:  · You have severe trouble breathing. · You have symptoms of a blood clot in your lung (called a pulmonary embolism). These may include:  ¨ Sudden chest pain. ¨ Trouble breathing. ¨ Coughing up blood. Call your doctor now or seek immediate medical care if:  · You have severe or increasing pain. · You have symptoms of infection, such as:  ¨ Increased pain, swelling, warmth, or redness. ¨ Red streaks or pus. ¨ A fever. · You have tingling, weakness, or numbness in your arm. · Your arm turns cold or changes color. · You have symptoms of a blood clot in your leg (called a deep vein thrombosis). These may include:  ¨ Pain in the calf, back of the knee, thigh, or groin. ¨ Redness and swelling in the leg or groin. Watch closely for changes in your health, and be sure to contact your doctor if:  · You do not get better as expected. Where can you learn more?    Go to [employer]  Enter R714 in the search box to learn more about \"Shoulder Replacement Surgery: What to Expect at Home. \"   © 4710-6914 [employer]. Care instructions adapted under license by [facility] (which disclaims liability or warranty for this information). This care instruction is for use with your licensed healthcare professional. If you have questions about a medical condition or this instruction, always ask your healthcare professional. [place] [address] any warranty or liability for your use of this information. Content Version: 3.6.993648; Last Revised: August 6, 2013                These are general instructions for a healthy lifestyle:    No smoking/ No tobacco products/ Avoid exposure to second hand smoke    Surgeon General's Warning:  Quitting smoking now greatly reduces serious risk to your health. Obesity, smoking, and sedentary lifestyle greatly increases your risk for illness    A healthy diet, regular physical exercise & weight monitoring are important for maintaining a healthy lifestyle    You may be retaining fluid if you have a history of heart failure or if you experience any of the following symptoms:  Weight gain of 3 pounds or more overnight or 5 pounds in a week, increased swelling in our hands or feet or shortness of breath while lying flat in bed. Please call your doctor as soon as you notice any of these symptoms; do not wait until your next office visit. Recognize signs and symptoms of STROKE:    F-face looks uneven    A-arms unable to move or move unevenly    S-speech slurred or non-existent    T-time-call 911 as soon as signs and symptoms begin-DO NOT go       Back to bed or wait to see if you get better-TIME IS BRAIN. The discharge information has been reviewed with the patient. The patient verbalized understanding.

## 2022-05-07 NOTE — PROGRESS NOTES
Patient resting quietly, alert and oriented, requesting pain medication. Left shoulder dressing c/d/i, voiding clear yellow urine. Neurovascular and peripheral vascular checks WNL. Bed low and locked position. Call light within reach. Patient instructed to call for assistance, verbalizes understanding. Nursing assessment complete.

## 2022-05-07 NOTE — PROGRESS NOTES
In error   Problem: Mobility Impaired (Adult and Pediatric)  Goal: *Acute Goals and Plan of Care (Insert Text)  Outcome: Progressing Towards Goal  Note: GOALS (1-4 days):    (1.)  Patient will ambulate with INDEPENDENT for 650 feet with the least restrictive device. (2.)  Patient will be independent with shoulder HEP to increase range of motion per MD orders. ________________________________________________________________________________________________       PHYSICAL THERAPY: Initial Assessment and AM 5/7/2022  OBSERVATION: Hospital Day: 2  Payor: BLUE CROSS / [place] [facility] / Product Type: PPO /      NAME/AGE/GENDER: Filipe Bailey is a 64 y.o. female   PRIMARY DIAGNOSIS: Post-traumatic osteoarthritis of left shoulder [M19.112]  Rotator cuff tear arthropathy of left shoulder [M75.102, M12.812]  Post-traumatic osteoarthritis, left shoulder [M19.112] Post-traumatic osteoarthritis of left shoulder Post-traumatic osteoarthritis of left shoulder  Procedure(s) (LRB):  REVERSE LEFT TOTAL SHOULDER ARTHROPLASTY WITH DELTA EXTEND PROSTHESIS, BICEPS TENODESIS (Left)  1 Day Post-Op  ICD-10: Treatment Diagnosis:   Pain in Left Shoulder (M25.512)  Stiffness of Left Shoulder, Not elsewhere classified (M25.612)     Precaution/Allergies:  Benadryl [diphenhydramine hcl], Cymbalta [duloxetine], Demerol [meperidine], Dilaudid [hydromorphone], Hydrocodone-acetaminophen, Macrodantin [nitrofurantoin macrocrystal], Morphine, Nucynta [tapentadol], and Robaxin [methocarbamol]     Active and passive range of motion   LEFT Elbow hand   Active assisted and passive range of motion  LEFT Shoulder to tolerance   Pulleys and pendulums   Do not push motion   nwb  shoulder LEFT   wbat ble   Sling  shoulder LEFT   ASSESSMENT:     Ms. Julia Álvarez presents s/p L reverse TSA. Patient demonstrates decreased L UE strength and ROM and decreased independence with a HEP.   She would benefit from therapy to address these deficits prior to d/c home with assist from her spouse and HHPT. Patient participated well with assessment. Doing well with getting in and out of  bed-with head of bed elevated. Tolerated exercises fairly well with  good demonstration. Copy of HEP provided and reviewed. Pulleys provided but not used today. Patient was able to work on shoulder flexion with table walk-outs. She is ambulating well. Patient will be followed during this admission but safe to d/c home when cleared by MD.    This section established at most recent assessment   PROBLEM LIST (Impairments causing functional limitations):  Decreased Ventress with Bed Mobility  Decreased Ventress with Transfers  Decreased Ventress with Ambulation   Decreased Ventress with shoulder HEP   INTERVENTIONS PLANNED: (Benefits and precautions of physical therapy have been discussed with the patient.)  Bed Mobility Training  Transfer Training  Gait Training  Therapeutic Exercises per MD orders  Modalities for Pain     TREATMENT PLAN: Frequency/Duration: twice daily for duration of hospital stay  Rehabilitation Potential For Stated Goals: Good     RECOMMENDED REHABILITATION/EQUIPMENT: (at time of discharge pending progress): Continue Skilled Therapy.               HISTORY:   History of Present Injury/Illness (Reason for Referral):  L reverse TSA  Past Medical History/Comorbidities:   Ms. Refugio Goldsmith  has a past medical history of ADHD (attention deficit hyperactivity disorder), Arthritis, Chronic obstructive pulmonary disease ([facility] [address] Degeneration of lumbar or lumbosacral intervertebral disc (2/27/2015), Depression (02/27/2015), Dermatophytosis of the body (2/27/2015), Essential hypertension, benign (2/27/2015), GERD (gastroesophageal reflux disease) (2/27/2015), H/O echocardiogram (06/27/2019), Heart murmur, History of hepatitis C, History of kidney stones, History of MRSA infection, History of seizure (06/2019), Hypopotassemia (2/27/2015), Insomnia, unspecified (2/27/2015), Migraine, unspecified, without mention of intractable migraine without mention of status migrainosus (2/27/2015), Odontoid fracture with type II morphology ([address] Osteoporosis (1/23/2017), and Polycythemia, secondary (2/27/2015). She has no past medical history of Malignant hyperthermia due to anesthesia or Pseudocholinesterase deficiency. Ms. Allison Brown  has a past surgical history that includes hx lithotripsy (2005); hx bladder suspension (2006); hx carpal tunnel release (Right, 2001); hx cervical fusion; hx orthopaedic; hx orthopaedic (2011); hx orthopaedic (Left); hx rotator cuff repair (Bilateral); hx heent (Bilateral); colonoscopy (N/A, 1/19/2022); hx back surgery (01/2022); and hx heart catheterization. Social History/Living Environment:   Support Systems: Spouse/Significant Other  Patient Expects to be Discharged to[de-identified] Home with home health  Prior Level of Function/Work/Activity:  Independent; RHD   Number of Personal Factors/Comorbidities that affect the Plan of Care: 0: LOW COMPLEXITY   EXAMINATION:   Most Recent Physical Functioning:   Gross Assessment:  AROM: Within functional limits (B LEs and R UE)  Strength: Within functional limits (B LEs and R UE)  Coordination: Within functional limits     LUE PROM  L Shoulder Flexion: 40         Posture:     Balance:  Sitting: Intact  Standing: Intact Bed Mobility:  Supine to Sit: Independent;Bed Modified  Sit to Supine: Independent;Bed Modified  Wheelchair Mobility:     Transfers:  Sit to Stand: Independent  Stand to Sit: Independent  Bed to Chair: Independent  Gait:     Distance (ft): 350 Feet (ft)  Ambulation - Level of Assistance: Supervision      Body Structures Involved:  Joints  Muscles Body Functions Affected: Movement Related Activities and Participation Affected:   Mobility   Number of elements that affect the Plan of Care: 4+: HIGH COMPLEXITY   CLINICAL PRESENTATION:   Presentation: Stable and uncomplicated: LOW COMPLEXITY   CLINICAL DECISION MAKING: [facility] Mobility Inpatient Short Form  How much difficulty does the patient currently have. .. Unable A Lot A Little None   1. Turning over in bed (including adjusting bedclothes, sheets and blankets)? [] 1   [] 2   [x] 3   [] 4   2. Sitting down on and standing up from a chair with arms ( e.g., wheelchair, bedside commode, etc.)   [] 1   [] 2   [x] 3   [] 4   3. Moving from lying on back to sitting on the side of the bed? [] 1   [] 2   [x] 3   [] 4   How much help from another person does the patient currently need. .. Total A Lot A Little None   4. Moving to and from a bed to a chair (including a wheelchair)? [] 1   [] 2   [] 3   [x] 4   5. Need to walk in hospital room? [] 1   [] 2   [] 3   [x] 4   6. Climbing 3-5 steps with a railing? [] 1   [] 2   [] 3   [x] 4   © 2007, Trustees of [facility] [employer], under license to [employer]. All rights reserved      Score:  Initial: 21 Most Recent: X (Date: -- )    Interpretation of Tool:  Represents activities that are increasingly more difficult (i.e. Bed mobility, Transfers, Gait). Medical Necessity:     Patient is expected to demonstrate progress in   strength and range of motion   to   increase independence with HEP and ADLs. .  Reason for Services/Other Comments:  Patient continues to require skilled intervention due to   Decreased L UE strength and ROM. [place] Use of outcome tool(s) and clinical judgement create a POC that gives a: Clear prediction of patient's progress: LOW COMPLEXITY            TREATMENT:   (In addition to Assessment/Re-Assessment sessions the following treatments were rendered)   Pre-treatment Symptoms/Complaints:  patient agreeable. Pain: Initial:   Pain Intensity 1: 4  Post Session:  4     Therapeutic Activity: (    25 minutes):   Therapeutic activities including Bed transfers, Chair transfers, Ambulation on level ground, exercises as below, and d/c education to improve mobility, strength, and dynamic movement of arm - left to improve functional lifting, carrying, and reaching. Required minimal verbal and visual cues   to promote coordination of left, upper extremity(s). assessment   Date:  5/7/22 Date:   Date:     ACTIVITY/EXERCISE AM PM AM PM AM PM   Gripping 15        Wrist Flexion/Extension 15        Wrist Ulnar/Radial Deviation         Pronation/Supination 15        Elbow Flexion/Extension 15        Shoulder Flexion/Extension 10-table walk out        Shoulder AB/ADduction         Shoulder IR/ER         Pulleys         Pendulums 15 CW, 15 CCW        Shrugs 15        Isometric:                 Flexion         Extension         ABduction         ADduction         Biceps/Triceps                  B = bilateral; AA = active assistive; A = active; P = passive  Education:  [x]  Home Exercises  [x]  Sling Application   [x]  Movement Precautions   [x]  Pulleys   [x]  Use of Ice   []  Other:   Treatment/Session Assessment:    Response to Treatment:  patient participated well. Moving well and good demonstration of HEP. Interdisciplinary Collaboration:   Physical Therapist  Registered Nurse  After treatment position/precautions:   Up in chair  Bed/Chair-wheels locked  Call light within reach   Compliance with Program/Exercises: Will assess as treatment progresses. Recommendations/Intent for next treatment session:  Treatment next visit will focus on increasing Ms. Marroquin's independence with bed mobility, transfers, gait training, strength/ROM exercises, modalities for pain, and patient education.    Total Treatment Duration:  PT Patient Time In/Time Out  Time In: 0745  Time Out: [facility]

## 2022-05-07 NOTE — PROGRESS NOTES
Care Management Interventions  PCP Verified by CM:  Yes  Mode of Transport at Discharge: Self  Transition of Care Consult (CM Consult): [address]: Yes  Discharge Durable Medical Equipment: Yes  Physical Therapy Consult: Yes  Occupational Therapy Consult: Yes  Support Systems: Spouse/Significant Other  Confirm Follow Up Transport: Self  The Plan for Transition of Care is Related to the Following Treatment Goals : improve strength in shoulder  The Patient and/or Patient Representative was Provided with a Choice of Provider and Agrees with the Discharge Plan?: Yes  Name of the Patient Representative Who was Provided with a Choice of Provider and Agrees with the Discharge Plan: pt  Freedom of Choice List was Provided with Basic Dialogue that Supports the Patient's Individualized Plan of Care/Goals, Treatment Preferences and Shares the Quality Data Associated with the Providers?: Yes  Discharge Location  Patient Expects to be Discharged to[de-identified] Home with home health

## 2022-05-08 ENCOUNTER — HOME CARE VISIT (OUTPATIENT)
Dept: SCHEDULING | Facility: HOME HEALTH | Age: 62
End: 2022-05-08
Payer: COMMERCIAL

## 2022-05-08 VITALS
SYSTOLIC BLOOD PRESSURE: 134 MMHG | DIASTOLIC BLOOD PRESSURE: 84 MMHG | RESPIRATION RATE: 17 BRPM | TEMPERATURE: 98.3 F | OXYGEN SATURATION: 98 % | HEART RATE: 83 BPM

## 2022-05-08 PROCEDURE — G0151 HHCP-SERV OF PT,EA 15 MIN: HCPCS

## 2022-05-08 PROCEDURE — 400013 HH SOC

## 2022-05-09 NOTE — DISCHARGE SUMMARY
[facility] SUMMARY    Name:  Charanjit Chang  MR#:  [id number]  :  1960  ACCOUNT #:  [de-identified]  ADMIT DATE:  2022  DISCHARGE DATE:  2022    ADMISSION DIAGNOSES:  1. Posttraumatic degenerative joint disease, left shoulder. 2.  Biceps tendinitis, left shoulder. POSTOPERATIVE DIAGNOSES:  1. Posttraumatic degenerative joint disease, left shoulder. 2.  Biceps tendinitis, left shoulder. Please see H and P, operative summaries, and consult for details. HOSPITAL COURSE:  The patient is a 79-year-old female who was admitted on 2022, underwent an uncomplicated reverse left total shoulder arthroplasty with a Delta Xtend prosthesis, biceps tenodesis. On postoperative day #1, all labs were within normal limits. HER pain was controlled. She was discharged home on postoperative day #1. She will continue therapy on the outside and follow up in my office in 10 days. Alexy Lopez MD      [id number]  D:  2022 6:27  T:  2022 14:13  JOB #:  [id number]  CC:   Catrachita Lozano MD

## 2022-05-10 ENCOUNTER — HOME CARE VISIT (OUTPATIENT)
Dept: SCHEDULING | Facility: HOME HEALTH | Age: 62
End: 2022-05-10
Payer: COMMERCIAL

## 2022-05-10 LAB
ABO + RH BLD: NORMAL
BLD PROD TYP BPU: NORMAL
BLD PROD TYP BPU: NORMAL
BLOOD GROUP ANTIBODIES SERPL: NORMAL
BPU ID: NORMAL
BPU ID: NORMAL
CROSSMATCH RESULT,%XM: NORMAL
CROSSMATCH RESULT,%XM: NORMAL
SPECIMEN EXP DATE BLD: NORMAL
STATUS OF UNIT,%ST: NORMAL
STATUS OF UNIT,%ST: NORMAL
UNIT DIVISION, %UDIV: 0
UNIT DIVISION, %UDIV: 0

## 2022-05-10 PROCEDURE — G0151 HHCP-SERV OF PT,EA 15 MIN: HCPCS

## 2022-05-10 NOTE — PROGRESS NOTES
Tammi Comes NP paged and informed that this patient is refusing her IV dextrose 5%- LR with KCl 20 mEq/L infusion, and her blood pressure is above normal average see MAR. NP orders: keep monitoring her blood pressure and keep the IV infusion orders\". Charge nurse notified. No

## 2022-05-12 ENCOUNTER — HOME CARE VISIT (OUTPATIENT)
Dept: SCHEDULING | Facility: HOME HEALTH | Age: 62
End: 2022-05-12
Payer: COMMERCIAL

## 2022-05-12 VITALS
TEMPERATURE: 98.6 F | DIASTOLIC BLOOD PRESSURE: 88 MMHG | SYSTOLIC BLOOD PRESSURE: 138 MMHG | RESPIRATION RATE: 17 BRPM | OXYGEN SATURATION: 92 % | HEART RATE: 88 BPM

## 2022-05-12 PROCEDURE — G0157 HHC PT ASSISTANT EA 15: HCPCS

## 2022-05-16 ENCOUNTER — HOME CARE VISIT (OUTPATIENT)
Dept: HOME HEALTH SERVICES | Facility: HOME HEALTH | Age: 62
End: 2022-05-16
Payer: COMMERCIAL

## 2022-05-16 VITALS
HEART RATE: 67 BPM | RESPIRATION RATE: 18 BRPM | OXYGEN SATURATION: 96 % | SYSTOLIC BLOOD PRESSURE: 118 MMHG | TEMPERATURE: 97.1 F | DIASTOLIC BLOOD PRESSURE: 72 MMHG

## 2022-05-16 PROCEDURE — G0157 HHC PT ASSISTANT EA 15: HCPCS

## 2022-05-19 ENCOUNTER — HOME CARE VISIT (OUTPATIENT)
Dept: SCHEDULING | Facility: HOME HEALTH | Age: 62
End: 2022-05-19
Payer: COMMERCIAL

## 2022-05-19 PROCEDURE — G0151 HHCP-SERV OF PT,EA 15 MIN: HCPCS

## 2022-05-22 VITALS
SYSTOLIC BLOOD PRESSURE: 128 MMHG | HEART RATE: 83 BPM | DIASTOLIC BLOOD PRESSURE: 74 MMHG | OXYGEN SATURATION: 98 % | TEMPERATURE: 98 F | RESPIRATION RATE: 17 BRPM

## 2022-05-23 ENCOUNTER — CARE COORDINATION (OUTPATIENT)
Dept: CARE COORDINATION | Facility: CLINIC | Age: 62
End: 2022-05-23

## 2022-05-23 RX ORDER — TIZANIDINE 2 MG/1
TABLET ORAL
Qty: 60 TABLET | Refills: 5 | Status: SHIPPED | OUTPATIENT
Start: 2022-05-23

## 2022-05-23 NOTE — CARE COORDINATION
Attempted to call patient for [employer] f/u call. Unable to reach, left message. Will attempt to contact next business day.

## 2022-05-24 ENCOUNTER — CARE COORDINATION (OUTPATIENT)
Dept: CARE COORDINATION | Facility: CLINIC | Age: 62
End: 2022-05-24

## 2022-05-24 NOTE — CARE COORDINATION
[facility] Transitions Follow Up Call    2022    Patient: Catalina Correa  Patient : 1960   MRN: [id number]  Reason for Admission: Post traumatic Osteo of Left Shoulder  Discharge Date: 19 RARS: No data recorded       Care Transitions Follow Up Call    Needs to be reviewed by the provider   Additional needs identified to be addressed with provider: No  none             Method of communication with provider : none      LPN Care Coordinator contacted the patient by telephone to follow up after admission on 2022. Verified name and  with patient as identifiers. Addressed changes since last contact: none  Discussed follow-up appointments. If no appointment was previously scheduled, appointment scheduling offered: Yes. Is follow up appointment scheduled within 7 days of discharge? No.    Advance Care Planning:   Does patient have an Advance Directive: Decision marker updated    LPN CC reviewed discharge instructions, medical action plan and red flags with patient and discussed any barriers to care and/or understanding of plan of care after discharge. Discussed appropriate site of care based on symptoms and resources available to patient including: When to call [address] The patient agrees to contact the PCP office for questions related to their healthcare. Patients top risk factors for readmission: Osteo of Left shoulder  Interventions to address risk factors: Obtained and reviewed discharge summary and/or continuity of care documents      Non-[facility] follow up appointment(s): spoke with patient states doing well. Patient states attended follow up appointment with Dr. Jeffrey Keenan on 2022. Patient states will be starting Outpatient therapy today 2022    LPN CC provided contact information for future needs. Plan for follow-up call in 10-14 days based on severity of symptoms and risk factors.   Plan for next call: self management-discuss questions with plan of care     Goals Addressed                    This Visit's Progress      Patient Stated (pt-stated)         Patient states starting Outpatient therapy today 5/24/2022 and will go to each appointment. Barriers: overwhelmed by complexity of regimen  Plan for overcoming my barriers: Follow the instructions of the therapist and ask questions  Confidence: 6/10  Anticipated Goal Completion Date: within the duration of the therapy sessions                  Care Transitions Subsequent and Final Call    Subsequent and Final Calls  Care Transitions Interventions  Other Interventions:            Follow Up  Future Appointments   Date Time Provider Trisha Caraballo   6/8/2022  9:15 AM MD GWEN [facility]ello [facility]   8/25/2022 10:30 AM FIM NURSE [facility]   9/7/2022 10:45 AM Virgie Whitley MD [facility]       Ally Ocampo LPN

## 2022-06-07 ENCOUNTER — CARE COORDINATION (OUTPATIENT)
Dept: CARE COORDINATION | Facility: CLINIC | Age: 62
End: 2022-06-07

## 2022-06-07 NOTE — CARE COORDINATION
Attempted to call patient for  [address] f/u call. Unable to reach, left message. Will attempt to contact next business day.

## 2022-06-08 ENCOUNTER — CARE COORDINATION (OUTPATIENT)
Dept: CARE COORDINATION | Facility: CLINIC | Age: 62
End: 2022-06-08

## 2022-06-08 ENCOUNTER — OFFICE VISIT (OUTPATIENT)
Dept: ORTHOPEDIC SURGERY | Age: 62
End: 2022-06-08

## 2022-06-08 DIAGNOSIS — Z47.1 AFTERCARE FOLLOWING LEFT SHOULDER JOINT REPLACEMENT SURGERY: ICD-10-CM

## 2022-06-08 DIAGNOSIS — Z96.612 PRESENCE OF LEFT ARTIFICIAL SHOULDER JOINT: Primary | ICD-10-CM

## 2022-06-08 DIAGNOSIS — Z96.612 AFTERCARE FOLLOWING LEFT SHOULDER JOINT REPLACEMENT SURGERY: ICD-10-CM

## 2022-06-08 PROCEDURE — 99024 POSTOP FOLLOW-UP VISIT: CPT | Performed by: ORTHOPAEDIC SURGERY

## 2022-06-08 RX ORDER — GABAPENTIN 100 MG/1
200 CAPSULE ORAL 3 TIMES DAILY
Qty: 180 CAPSULE | Refills: 0 | Status: SHIPPED | OUTPATIENT
Start: 2022-06-08 | End: 2022-07-08

## 2022-06-08 NOTE — PROGRESS NOTES
Progress Notes by Blossom Herring MD at 05/18/22 1030              Author: Blossom Herring MD  Service: Deo Nails Type: Physician      Filed: 05/18/22 1056  Encounter Date: 5/18/2022  Status: Signed         : Blossom Herring MD (Physician)                          Name: Carlos Mcwilliams   YOB: 1960   Gender: female   MRN: [id number]               HPI: Carlos Mcwilliams is a 64 y.o.  right-hand-dominant female 5 weeks status post reverse left total shoulder arthroplasty with a delta xtend prosthesis biceps tenodesis. She returns noting she is a little bit sore but doing well. She is making slow progress      ROS/Meds/PSH/PMH/FH/SH: A ten system review of systems was performed and is negative other than what is in the HPI. Tobacco:  reports that she quit smoking about 4 years ago. Her smoking use included cigarettes. She has a 10.00 pack-year smoking history. She has never used smokeless tobacco.   There were no vitals taken for this visit. Physical Examination:   She is an awake alert pleasant female ambulating without difficulty   She has a slight restriction in cervical spine range of motion without radicular findings      Her right shoulder has a well-healed deltopectoral incision   The right shoulder has 0 to 170 degrees of active and 0 to 170 degrees passive forward elevation. Internal rotation is to T8. External rotation is to 60 degrees at the side. In the 90 degree abducted position 90 degrees of external and 90 degrees internal rotation   The AC joint is non-tender   SC joint is non-tender. Greater tuberosity is non-tender. negative biceps   Negative O'Briens sign   negative lift-off sign   Negative belly press sign   Negative bear huggers sign   negative drop sign   negative hornblower's sign   No posterior glenohumeral joint line tenderness.     No evident excessive external rotation   Rotator cuff strength is 5/5.   negative external rotation stress test. 200 3 times daily. We will continue physical therapy working on full motion and full strength. I will recheck her back in 6 weeks with new AP, Y and axillary views left shoulder   Follow up: Follow-up and Dispositions     ·   Return in about 3 weeks (around 6/8/2022).                            Cele Espinosa MD

## 2022-06-08 NOTE — CARE COORDINATION
2nd attempt for JARAD follow up call. Unable to reach, left message. Will re-open if phone call is returned. Episode Resolved.

## 2022-06-16 ENCOUNTER — TELEPHONE (OUTPATIENT)
Dept: ORTHOPEDIC SURGERY | Age: 62
End: 2022-06-16

## 2022-07-05 ENCOUNTER — TELEPHONE (OUTPATIENT)
Dept: INTERNAL MEDICINE CLINIC | Facility: CLINIC | Age: 62
End: 2022-07-05

## 2022-07-26 ENCOUNTER — OFFICE VISIT (OUTPATIENT)
Dept: ORTHOPEDIC SURGERY | Age: 62
End: 2022-07-26

## 2022-07-26 DIAGNOSIS — Z09 FOLLOW-UP EXAMINATION AFTER ORTHOPEDIC SURGERY: ICD-10-CM

## 2022-07-26 DIAGNOSIS — Z96.612 PRESENCE OF LEFT ARTIFICIAL SHOULDER JOINT: Primary | ICD-10-CM

## 2022-07-26 PROCEDURE — 99024 POSTOP FOLLOW-UP VISIT: CPT | Performed by: ORTHOPAEDIC SURGERY

## 2022-07-26 NOTE — PROGRESS NOTES
Progress Notes by Margret Mejia MD at 05/18/22 1030                Author: Margret Mejia MD  Service: --  Author Type: Physician      Filed: 05/18/22 1056  Encounter Date: 5/18/2022  Status: Signed         : Margret Mejia MD (Physician)                            Name: Jeremy Zamudio   YOB: 1960   Gender: female   MRN: [id number]               HPI: Jeremy Zamudio is a 64 y.o.  right-hand-dominant female almost 3 months status post reverse left total shoulder arthroplasty with a delta xtend prosthesis biceps tenodesis. She returns noting she is a little bit sore but doing well. She is making slow progress      ROS/Meds/PSH/PMH/FH/SH: A ten system review of systems was performed and is negative other than what is in the HPI. Tobacco:  reports that she quit smoking about 4 years ago. Her smoking use included cigarettes. She has a 10.00 pack-year smoking history. She has never used smokeless tobacco.   There were no vitals taken for this visit. Physical Examination:   She is an awake alert pleasant female ambulating without difficulty   She has a slight restriction in cervical spine range of motion without radicular findings      Her right shoulder has a well-healed deltopectoral incision   The right shoulder has 0 to 170 degrees of active and 0 to 170 degrees passive forward elevation. Internal rotation is to T8. External rotation is to 60 degrees at the side. In the 90 degree abducted position 90 degrees of external and 90 degrees internal rotation   The AC joint is non-tender   SC joint is non-tender. Greater tuberosity is non-tender. negative biceps   Negative O'Briens sign   negative lift-off sign   Negative belly press sign   Negative bear huggers sign   negative drop sign   negative hornblower's sign   No posterior glenohumeral joint line tenderness.     No evident excessive external rotation   Rotator cuff strength is 5/5.   negative external rotation stress test.    Negative empty can sign   There is no evident anterior or posterior apprehension with a negative sulcus sign. No instability   negative external and internal Rotation lag sign   Neurovascularly intact. The left shoulder deltopectoral incision has healed. Active forward elevation is 0-100   Passively goes 0-1 60   ER to 30 degrees   Biceps has good cosmetic appearance   She is neurovascularly intact  She is globally weak  Deltoid does fire         Data Reviewed:                 XR: AP Y axillary views left shoulder       Clinical Indication       ICD-10-CM  ICD-9-CM      1. Presence of left artificial shoulder joint   Z96.612  V43.61  XR SHOULDER LT AP/LAT MIN 2 V    2. Aftercare following left shoulder joint replacement surgery   Z47.1  V54.81  XR SHOULDER LT AP/LAT MIN 2 V      Z96.612  V43.61                Report: AP, Y and axillary views left shoulder demonstrate reverse  left total shoulder arthroplasty in excellent position      Impression: Status post reverse left total shoulder arthroplasty       Lisbeth Doyle MD                 Impression:      1. Presence of left artificial shoulder joint         2. Aftercare following left shoulder joint replacement surgery           Status post reverse left total shoulder arthroplasty with a delta xtend prosthesis biceps tenodesis almost 3 months    Rule out internal derangement left shoulder    Status post EUA and manipulation left shoulder arthroscopy left shoulder ASD, ADCR, lysis of adhesions, debridement SLAP tear, mini open rotator cuff repair and biceps tenodesis 5.5 years    Status post ORIF three-part right proximal humerus fracture 7 years    Cervical spondylosis status post fusion C1-2    Hypertension    Polycythemia vera    Remote history of hepatitis C    GERD    ADD    Depression    Nicotine addiction    Frequent falls    Recent low back surgery      Plan:   I discussed the plan with the patient.   We will continue working on strength in physical therapy. We will work on full motion full-strength.   I will recheck her back in 6 weeks with new AP, Y and axillary views left shoulder    Follow-up and Dispositions       Return in about 6 weeks                           Meme Millan MD

## 2022-08-25 ENCOUNTER — NURSE ONLY (OUTPATIENT)
Dept: INTERNAL MEDICINE CLINIC | Facility: CLINIC | Age: 62
End: 2022-08-25
Payer: COMMERCIAL

## 2022-08-25 DIAGNOSIS — M81.0 AGE-RELATED OSTEOPOROSIS WITHOUT CURRENT PATHOLOGICAL FRACTURE: Primary | ICD-10-CM

## 2022-08-25 PROCEDURE — 96372 THER/PROPH/DIAG INJ SC/IM: CPT | Performed by: INTERNAL MEDICINE

## 2022-08-25 PROCEDURE — 99211 OFF/OP EST MAY X REQ PHY/QHP: CPT | Performed by: INTERNAL MEDICINE

## 2022-09-06 ENCOUNTER — OFFICE VISIT (OUTPATIENT)
Dept: ORTHOPEDIC SURGERY | Age: 62
End: 2022-09-06
Payer: COMMERCIAL

## 2022-09-06 DIAGNOSIS — Z96.612 PRESENCE OF LEFT ARTIFICIAL SHOULDER JOINT: Primary | ICD-10-CM

## 2022-09-06 DIAGNOSIS — Z09 FOLLOW-UP EXAMINATION AFTER ORTHOPEDIC SURGERY: ICD-10-CM

## 2022-09-06 PROCEDURE — 99212 OFFICE O/P EST SF 10 MIN: CPT | Performed by: ORTHOPAEDIC SURGERY

## 2022-09-06 NOTE — PROGRESS NOTES
Progress Notes by Valorie Mathew MD at 05/18/22 1030                Author: Valorie Mathew MD  Service: --  Author Type: Physician      Filed: 05/18/22 1056  Encounter Date: 5/18/2022  Status: Signed         : Valorie Mathew MD (Physician)                            Name: Leonel Hurtado   YOB: 1960   Gender: female   MRN: [id number]               HPI: Leonel Hurtado is a 64 y.o.  right-hand-dominant female 4 months status post reverse left total shoulder arthroplasty with a delta xtend prosthesis biceps tenodesis. She returns noting that she is doing much better. She is slowly getting stronger. ROS/Meds/PSH/PMH/FH/SH: A ten system review of systems was performed and is negative other than what is in the HPI. Tobacco:  reports that she quit smoking about 4 years ago. Her smoking use included cigarettes. She has a 10.00 pack-year smoking history. She has never used smokeless tobacco.   There were no vitals taken for this visit. Physical Examination:   She is an awake alert pleasant female ambulating without difficulty   She has a slight restriction in cervical spine range of motion without radicular findings      Her right shoulder has a well-healed deltopectoral incision   The right shoulder has 0 to 170 degrees of active and 0 to 170 degrees passive forward elevation. Internal rotation is to T8. External rotation is to 60 degrees at the side. In the 90 degree abducted position 90 degrees of external and 90 degrees internal rotation   The AC joint is non-tender   SC joint is non-tender. Greater tuberosity is non-tender. negative biceps   Negative O'Briens sign   negative lift-off sign   Negative belly press sign   Negative bear huggers sign   negative drop sign   negative hornblower's sign   No posterior glenohumeral joint line tenderness.     No evident excessive external rotation   Rotator cuff strength is 5/5.   negative external rotation stress test. Negative empty can sign   There is no evident anterior or posterior apprehension with a negative sulcus sign. No instability   negative external and internal Rotation lag sign   Neurovascularly intact. The left shoulder deltopectoral incision has healed. Active forward elevation is 0-130   Passively goes 0-1 60   ER to 40 degrees   Biceps has good cosmetic appearance   She is neurovascularly intact           Data Reviewed:                 XR: AP Y axillary views left shoulder       Clinical Indication       ICD-10-CM  ICD-9-CM      1. Presence of left artificial shoulder joint   Z96.612  V43.61  XR SHOULDER LT AP/LAT MIN 2 V    2. Aftercare following left shoulder joint replacement surgery   Z47.1  V54.81  XR SHOULDER LT AP/LAT MIN 2 V      Z96.612  V43.61                Report: AP, Y and axillary views left shoulder demonstrate reverse  left total shoulder arthroplasty in excellent position      Impression: Status post reverse left total shoulder arthroplasty       Pacheco Hensley MD                 Impression:      1. Presence of left artificial shoulder joint         2. Aftercare following left shoulder joint replacement surgery           Status post reverse left total shoulder arthroplasty with a delta xtend prosthesis biceps tenodesis 4 months    Rule out internal derangement left shoulder    Status post EUA and manipulation left shoulder arthroscopy left shoulder ASD, ADCR, lysis of adhesions, debridement SLAP tear, mini open rotator cuff repair and biceps tenodesis 5.5 years    Status post ORIF three-part right proximal humerus fracture 7 years    Cervical spondylosis status post fusion C1-2    Hypertension    Polycythemia vera    Remote history of hepatitis C    GERD    ADD    Depression    Nicotine addiction    Frequent falls    Recent low back surgery      Plan:   I discussed the plan with the patient.   She is continuing to make progress in physical therapy and her strength is improving. We will continue physical therapy for 6 weeks. I will recheck her back in 6 weeks with new AP, Y and axillary views left shoulder    2.   Self-limited problem    Follow-up and Dispositions       Return in about 6 weeks                           Mercy Marie MD

## 2022-09-15 RX ORDER — BENAZEPRIL HYDROCHLORIDE 10 MG/1
TABLET ORAL
Qty: 90 TABLET | Refills: 0 | Status: SHIPPED | OUTPATIENT
Start: 2022-09-15

## 2022-10-25 ENCOUNTER — OFFICE VISIT (OUTPATIENT)
Dept: ORTHOPEDIC SURGERY | Age: 62
End: 2022-10-25
Payer: COMMERCIAL

## 2022-10-25 DIAGNOSIS — Z09 FOLLOW-UP EXAMINATION AFTER ORTHOPEDIC SURGERY: ICD-10-CM

## 2022-10-25 DIAGNOSIS — Z96.612 PRESENCE OF LEFT ARTIFICIAL SHOULDER JOINT: Primary | ICD-10-CM

## 2022-10-25 PROCEDURE — 99212 OFFICE O/P EST SF 10 MIN: CPT | Performed by: ORTHOPAEDIC SURGERY

## 2022-10-25 NOTE — PROGRESS NOTES
Progress Notes by Betty Diaz MD at 05/18/22 1030                Author: Betty Diaz MD  Service: --  Author Type: Physician      Filed: 05/18/22 1056  Encounter Date: 5/18/2022  Status: Signed         : Betty Diaz MD (Physician)                            Name: Manan Ratliff   YOB: 1960   Gender: female   MRN: [id number]               HPI: Manan Ratliff is a 58 y.o.  right-hand-dominant female 6 months status post reverse left total shoulder arthroplasty with a delta xtend prosthesis biceps tenodesis. She returns noting that she is doing much better. She is slowly getting stronger. ROS/Meds/PSH/PMH/FH/SH: A ten system review of systems was performed and is negative other than what is in the HPI. Tobacco:  reports that she quit smoking about 4 years ago. Her smoking use included cigarettes. She has a 10.00 pack-year smoking history. She has never used smokeless tobacco.   There were no vitals taken for this visit. Physical Examination:   She is an awake alert pleasant female ambulating without difficulty   She has a slight restriction in cervical spine range of motion without radicular findings      Her right shoulder has a well-healed deltopectoral incision   The right shoulder has 0 to 170 degrees of active and 0 to 170 degrees passive forward elevation. Internal rotation is to T8. External rotation is to 60 degrees at the side. In the 90 degree abducted position 90 degrees of external and 90 degrees internal rotation   The AC joint is non-tender   SC joint is non-tender. Greater tuberosity is non-tender. negative biceps   Negative O'Briens sign   negative lift-off sign   Negative belly press sign   Negative bear huggers sign   negative drop sign   negative hornblower's sign   No posterior glenohumeral joint line tenderness.     No evident excessive external rotation   Rotator cuff strength is 5/5.   negative external rotation stress test. Negative empty can sign   There is no evident anterior or posterior apprehension with a negative sulcus sign. No instability   negative external and internal Rotation lag sign   Neurovascularly intact. The left shoulder deltopectoral incision has healed. Active forward elevation is 0-140   Passively goes 0-1 60   ER to 40 degrees   Biceps has good cosmetic appearance   She is neurovascularly intact           Data Reviewed:                 XR: AP Y axillary views left shoulder       Clinical Indication       ICD-10-CM  ICD-9-CM      1. Presence of left artificial shoulder joint   Z96.612  V43.61  XR SHOULDER LT AP/LAT MIN 2 V    2. Aftercare following left shoulder joint replacement surgery   Z47.1  V54.81  XR SHOULDER LT AP/LAT MIN 2 V      Z96.612  V43.61                Report: AP, Y and axillary views left shoulder demonstrate reverse  left total shoulder arthroplasty in excellent position      Impression: Status post reverse left total shoulder arthroplasty       Latoya Rodriguez MD                 Impression:      1. Presence of left artificial shoulder joint         2. Aftercare following left shoulder joint replacement surgery           Status post reverse left total shoulder arthroplasty with a delta xtend prosthesis biceps tenodesis 6 months    Rule out internal derangement left shoulder    Status post EUA and manipulation left shoulder arthroscopy left shoulder ASD, ADCR, lysis of adhesions, debridement SLAP tear, mini open rotator cuff repair and biceps tenodesis 5.5 years    Status post ORIF three-part right proximal humerus fracture 7 years    Cervical spondylosis status post fusion C1-2    Hypertension    Polycythemia vera    Remote history of hepatitis C    GERD    ADD    Depression    Nicotine addiction    Frequent falls    Recent low back surgery      Plan:   I discussed the plan with the patient.   She will continue working on shoulder strength emphasizing full motion and full strength. .  I will recheck her back in 3 months with new AP Y and axillary views left shoulder    2.   Self-limited problem    Follow-up and Dispositions       Return in about 6 weeks                           Miranda Matson MD

## 2022-12-12 ENCOUNTER — TELEPHONE (OUTPATIENT)
Dept: INTERNAL MEDICINE CLINIC | Facility: CLINIC | Age: 62
End: 2022-12-12

## 2022-12-12 RX ORDER — BENAZEPRIL HYDROCHLORIDE 10 MG/1
TABLET ORAL
Qty: 90 TABLET | Refills: 3 | Status: SHIPPED | OUTPATIENT
Start: 2022-12-12

## 2022-12-12 NOTE — TELEPHONE ENCOUNTER
COLD SX    FEVER? no  If yes, document temperature:     COUGH? yes  If yes, Dry or Productive:dry  If productive, document color:     NASAL? Yes   If yes, Stuffy or Productive: stuffy  If productive, document color:     SORE THROAT? Yes   If yes, document severity:10-can not swallow    CHILLS? no    Chest Congestion? yes      MEDS? mucinex  If yes, list all Meds taken and duration: since Friday- helps with the congestion but not the sore throat    TESTED FOR COVID? Yes tested with a home test yesterday with negative results     VACCINATION STATUS yes    [facility]?  Started on Friday       Patient did go to the [facility] [place] [facility] last Sunday and returned on Friday     Patient is scheduled with NP for a virtual tomorrow at 1120

## 2022-12-12 NOTE — TELEPHONE ENCOUNTER
Warm salt water gargles--8 oz hot water with 1 tsp salt twice every day recommended and go to ER if you feel you cannot wait for appointment tomorrow. Thanks.   Irina Comer

## 2022-12-13 ENCOUNTER — TELEMEDICINE (OUTPATIENT)
Dept: INTERNAL MEDICINE CLINIC | Facility: CLINIC | Age: 62
End: 2022-12-13
Payer: COMMERCIAL

## 2022-12-13 DIAGNOSIS — B00.9 HERPES SIMPLEX: Primary | ICD-10-CM

## 2022-12-13 DIAGNOSIS — J20.9 COPD (CHRONIC OBSTRUCTIVE PULMONARY DISEASE) WITH ACUTE BRONCHITIS (HCC): ICD-10-CM

## 2022-12-13 DIAGNOSIS — J44.0 COPD (CHRONIC OBSTRUCTIVE PULMONARY DISEASE) WITH ACUTE BRONCHITIS (HCC): ICD-10-CM

## 2022-12-13 LAB
INFLUENZA A ANTIGEN, POC: NEGATIVE
INFLUENZA B ANTIGEN, POC: NEGATIVE
VALID INTERNAL CONTROL, POC: YES

## 2022-12-13 PROCEDURE — 87804 INFLUENZA ASSAY W/OPTIC: CPT | Performed by: NURSE PRACTITIONER

## 2022-12-13 PROCEDURE — 99423 OL DIG E/M SVC 21+ MIN: CPT | Performed by: NURSE PRACTITIONER

## 2022-12-13 RX ORDER — DOXYCYCLINE HYCLATE 100 MG
100 TABLET ORAL 2 TIMES DAILY
Qty: 14 TABLET | Refills: 0 | Status: SHIPPED | OUTPATIENT
Start: 2022-12-13 | End: 2022-12-20

## 2022-12-13 RX ORDER — PENCICLOVIR 1 %
CREAM (GRAM) TOPICAL
Qty: 1.5 G | Refills: 1 | Status: SHIPPED | OUTPATIENT
Start: 2022-12-13 | End: 2022-12-17

## 2022-12-13 SDOH — ECONOMIC STABILITY: FOOD INSECURITY: WITHIN THE PAST 12 MONTHS, THE FOOD YOU BOUGHT JUST DIDN'T LAST AND YOU DIDN'T HAVE MONEY TO GET MORE.: NEVER TRUE

## 2022-12-13 SDOH — ECONOMIC STABILITY: FOOD INSECURITY: WITHIN THE PAST 12 MONTHS, YOU WORRIED THAT YOUR FOOD WOULD RUN OUT BEFORE YOU GOT MONEY TO BUY MORE.: NEVER TRUE

## 2022-12-13 ASSESSMENT — ENCOUNTER SYMPTOMS
SINUS PAIN: 1
RHINORRHEA: 1
COUGH: 1
SHORTNESS OF BREATH: 0
SINUS PRESSURE: 1
WHEEZING: 1
CHEST TIGHTNESS: 0
ABDOMINAL PAIN: 0

## 2022-12-13 ASSESSMENT — SOCIAL DETERMINANTS OF HEALTH (SDOH): HOW HARD IS IT FOR YOU TO PAY FOR THE VERY BASICS LIKE FOOD, HOUSING, MEDICAL CARE, AND HEATING?: NOT HARD AT ALL

## 2022-12-13 NOTE — PROGRESS NOTES
12/13/2022 12:06 PM  Location:[facility] [address] [facility]  [place]  Patient #:  [id number]  YOB: 1960        History of Present Illness     Chief Complaint   Patient presents with    Pharyngitis     White patches on throat, symptoms began Friday morning, stuffy nose. headache       Ms. Conrad Frais is a 58 y.o. female  who presents for vv on fvideo and she has a sore throat, and fever blisters on her lower lip. She has gargled with salt water. Started on Friday 4 days ago. She was in [facility] in [place] on vacation last week. She has had a headache. No fever. Feels weak and tired. She took a covid test at home and negative. She is having sinus congestion, blowing nose, sinus pressure. Taking mucinex. Taking ibuprofen. Coughing a good bit. She does not tolerate steroids. Feels like congestion in chest and wheezing some, no sob or cp. Hx of copd. Using xopenex. ILD and copd hx .        Allergies   Allergen Reactions    Diphenhydramine Other (See Comments)     hyperactivity    Duloxetine Other (See Comments)     Hallucinations    Hydrocodone-Acetaminophen Other (See Comments)     Hyperactivity    Hydromorphone Other (See Comments)     Hyperactivity    Meperidine Other (See Comments)     Hyperactivity    Morphine Other (See Comments)     Hallucinations    Tapentadol Other (See Comments)     Bad dreams    Nitrofurantoin Macrocrystal Nausea And Vomiting     Past Medical History:   Diagnosis Date    ADHD (attention deficit hyperactivity disorder)     Arthritis     Chronic obstructive pulmonary disease ([address]     Per pt \"Mild,\" PRN inhaler-last used 2021, followed by Dr. Ree Burnham     Degeneration of lumbar or lumbosacral intervertebral disc 2/27/2015    Depression 02/27/2015    managed with medication     Dermatophytosis of the body 2/27/2015    Essential hypertension, benign 2/27/2015    managed with med    GERD (gastroesophageal reflux disease) 2/27/2015    managed with med    H/O echocardiogram 2019    EF 55-60%, trivial aortic, mitral, and tricuspid regurgitation     Heart murmur     pt reports since birth; echo dated  19 states trivial aortic, mitral, and tricuspid regurgitation     History of hepatitis C     History of kidney stones     History of MRSA infection     History of seizure 2019    per pt secondary to Robaxin     Hypopotassemia 2015    Insomnia, unspecified 2015    Migraine, unspecified, without mention of intractable migraine without mention of status migrainosus 2015    Odontoid fracture with type II morphology ([facility] [address]     Osteoporosis 2017    Polycythemia, secondary 2015     Social History     Socioeconomic History    Marital status:      Spouse name: None    Number of children: None    Years of education: None    Highest education level: None   Tobacco Use    Smoking status: Former     Packs/day: 1.00     Types: Cigarettes     Quit date: 2017     Years since quittin.4    Smokeless tobacco: Never    Tobacco comments:     Quit smokin18- smoked a pack past 3-4 days   Substance and Sexual Activity    Alcohol use: No     Alcohol/week: 0.0 standard drinks    Drug use: Not Currently     Types: Cocaine     Social Determinants of Health     Financial Resource Strain: Low Risk     Difficulty of Paying Living Expenses: Not hard at all   Food Insecurity: No Food Insecurity    Worried About Running Out of Food in the Last Year: Never true    Ran Out of Food in the Last Year: Never true     Past Surgical History:   Procedure Laterality Date    BACK SURGERY  2022    L4-5 DLIF W/PEEK CAGE, BMP, DBM AND PEDICLE SCREWS, C-ARM, NEUROMONITORING, MEDTRONIC(ROXIE), SYNTHES(HOLLAND)    BLADDER SUSPENSION      bladder tack    CARDIAC CATHETERIZATION      no intervention     CARPAL TUNNEL RELEASE Right 2001    then left    CERVICAL FUSION      COLONOSCOPY N/A 2022    COLONOSCOPY/ BMI 28 performed by Viv Vasquez, Pillo Cespedes MD at [facility] ENDOSCOPY    HEENT Bilateral     LITHOTRIPSY  2005    ORTHOPEDIC SURGERY  2011    hip repair, left    ORTHOPEDIC SURGERY Left     Hand surgery    ORTHOPEDIC SURGERY      Slap repair right    ROTATOR CUFF REPAIR Bilateral      Current Outpatient Medications   Medication Sig Dispense Refill    penciclovir (DENAVIR) 1 % cream Apply topically every 2 hours. 1.5 g 1    doxycycline hyclate (VIBRA-TABS) 100 MG tablet Take 1 tablet by mouth 2 times daily for 7 days 14 tablet 0    benazepril (LOTENSIN) 10 MG tablet TAKE 1 TABLET BY MOUTH DAILY 90 tablet 3    gabapentin (NEURONTIN) 100 MG capsule Take 2 capsules by mouth 3 times daily for 30 days. 180 capsule 0    tiZANidine (ZANAFLEX) 2 MG tablet TAKE 1 TABLET BY MOUTH THREE TIMES DAILY AS NEEDED FOR MUSCLE SPASMS 60 tablet 5    denosumab (PROLIA) 60 MG/ML SOSY SC injection Inject 60 mg into the skin      levalbuterol (XOPENEX HFA) 45 MCG/ACT inhaler INHALE 2 PUFFS BY MOUTH EVERY 6 HOURS AS NEEDED FOR WHEEZING OR BRONCHOSPASM      OLANZapine (ZYPREXA) 10 MG tablet Take 10 mg by mouth every evening      pravastatin (PRAVACHOL) 80 MG tablet TAKE 1 TABLET BY MOUTH EVERY NIGHT      promethazine (PHENERGAN) 25 MG tablet 1/2-1 po tid prn nausea       No current facility-administered medications for this visit.      Health Maintenance   Topic Date Due    Pneumococcal 0-64 years Vaccine (1 - PCV) Never done    HIV screen  Never done    Shingles vaccine (1 of 2) Never done    Breast cancer screen  03/23/2021    COVID-19 Vaccine (4 - Booster for Moderna series) 02/21/2022    Flu vaccine (1) 08/01/2022    Lipids  03/08/2023    Depression Monitoring  03/08/2023    A1C test (Diabetic or Prediabetic)  05/06/2023    Cervical cancer screen  02/06/2024    DTaP/Tdap/Td vaccine (2 - Td or Tdap) 06/04/2028    Colorectal Cancer Screen  01/19/2032    Hepatitis A vaccine  Aged Out    Hib vaccine  Aged Out    Meningococcal (ACWY) vaccine  Aged Out     Family History   Problem Relation Age of Onset    Osteoporosis Other     Other Other         Arthritis    Breast Cancer Maternal Aunt 75    Diabetes Other     Heart Attack Father     Breast Cancer Paternal Aunt 76    COPD Mother     Stroke Mother     Heart Disease Mother     Hypertension Mother     Lung Disease Mother     Cancer Father         lung       Most recent labs    YOUR LAST HEMOGLOBIN A1CS:   No results found for: HBA1C, [id number]    YOUR LAST LIPID PROFILE:   Lab Results   Component Value Date/Time    CHOL 171 03/08/2022 10:13 AM    HDL 60 03/08/2022 10:13 AM    VLDL 25 03/08/2022 10:13 AM       Lab Results   Component Value Date/Time    GFRAA >60 05/07/2022 05:27 AM    BUN 28 05/07/2022 05:27 AM     05/07/2022 05:27 AM    K 5.0 05/07/2022 05:27 AM     05/07/2022 05:27 AM    CO2 20 05/07/2022 05:27 AM       Review of Systems  Review of Systems   Constitutional:  Positive for fatigue. HENT:  Positive for congestion, rhinorrhea, sinus pressure and sinus pain. Respiratory:  Positive for cough and wheezing. Negative for chest tightness and shortness of breath. Cardiovascular:  Negative for chest pain. Gastrointestinal:  Negative for abdominal pain. Musculoskeletal:  Positive for arthralgias. All other systems reviewed and are negative. There were no vitals taken for this visit. Physical Exam    Physical Exam  Constitutional:       General: She is not in acute distress. Appearance: Normal appearance. She is not ill-appearing. Pulmonary:      Effort: Pulmonary effort is normal.   Neurological:      Mental Status: She is alert and oriented to person, place, and time. Comments: Speech clear. Assessment & Plan    Glen was seen today for pharyngitis. Diagnoses and all orders for this visit:    Herpes simplex  -     penciclovir (DENAVIR) 1 % cream; Apply topically every 2 hours.     Viral upper respiratory tract infection  -     AMB POC RAPID INFLUENZA TEST  -     doxycycline hyclate

## 2023-02-08 ENCOUNTER — OFFICE VISIT (OUTPATIENT)
Dept: ORTHOPEDIC SURGERY | Age: 63
End: 2023-02-08

## 2023-02-08 DIAGNOSIS — Z96.612 PRESENCE OF LEFT ARTIFICIAL SHOULDER JOINT: Primary | ICD-10-CM

## 2023-02-08 DIAGNOSIS — M25.552 LEFT HIP PAIN: ICD-10-CM

## 2023-02-08 DIAGNOSIS — Z09 FOLLOW-UP EXAMINATION AFTER ORTHOPEDIC SURGERY: ICD-10-CM

## 2023-02-08 NOTE — PROGRESS NOTES
Progress Notes by Ruma Krishnamurthy MD at 05/18/22 1030                Author: Ruma Krishnamurthy MD  Service: --  Author Type: Physician      Filed: 05/18/22 1056  Encounter Date: 5/18/2022  Status: Signed         : Ruma Krishnamurthy MD (Physician)                            Name: Renae Lopez   YOB: 1960   Gender: female   MRN: [id number]               HPI: Renae Lopez is a 58 y.o.  right-hand-dominant female 9 months status post reverse left total shoulder arthroplasty with a delta xtend prosthesis biceps tenodesis. She returns noting that she is doing much better. She is slowly getting stronger. Her left hip has been hurting      ROS/Meds/PSH/PMH/FH/SH: A ten system review of systems was performed and is negative other than what is in the HPI. Tobacco:  reports that she quit smoking about 4 years ago. Her smoking use included cigarettes. She has a 10.00 pack-year smoking history. She has never used smokeless tobacco.   There were no vitals taken for this visit. Physical Examination:   She is an awake alert pleasant female ambulating without difficulty   She has a slight restriction in cervical spine range of motion without radicular findings      Her right shoulder has a well-healed deltopectoral incision   The right shoulder has 0 to 170 degrees of active and 0 to 170 degrees passive forward elevation. Internal rotation is to T8. External rotation is to 60 degrees at the side. In the 90 degree abducted position 90 degrees of external and 90 degrees internal rotation   The AC joint is non-tender   SC joint is non-tender. Greater tuberosity is non-tender. negative biceps   Negative O'Briens sign   negative lift-off sign   Negative belly press sign   Negative bear huggers sign   negative drop sign   negative hornblower's sign   No posterior glenohumeral joint line tenderness.     No evident excessive external rotation   Rotator cuff strength is 5/5.   negative external rotation stress test.    Negative empty can sign   There is no evident anterior or posterior apprehension with a negative sulcus sign. No instability   negative external and internal Rotation lag sign   Neurovascularly intact. The left shoulder deltopectoral incision has healed. Active forward elevation is 0-160   Passively goes 0-1 60   ER to 40 degrees   Biceps has good cosmetic appearance   She is neurovascularly intact           Data Reviewed:                 XR: AP Y axillary views left shoulder       Clinical Indication       ICD-10-CM  ICD-9-CM      1. Presence of left artificial shoulder joint   Z96.612  V43.61  XR SHOULDER LT AP/LAT MIN 2 V    2. Aftercare following left shoulder joint replacement surgery   Z47.1  V54.81  XR SHOULDER LT AP/LAT MIN 2 V      Z96.612  V43.61                Report: AP, Y and axillary views left shoulder demonstrate reverse  left total shoulder arthroplasty in excellent position      Impression: Status post reverse left total shoulder arthroplasty       Vivian Dunham MD                 Impression:      1. Presence of left artificial shoulder joint         2. Aftercare following left shoulder joint replacement surgery           Status post reverse left total shoulder arthroplasty with a delta xtend prosthesis biceps tenodesis 9 months    Rule out internal derangement left shoulder    Status post EUA and manipulation left shoulder arthroscopy left shoulder ASD, ADCR, lysis of adhesions, debridement SLAP tear, mini open rotator cuff repair and biceps tenodesis 5.5 years    Status post ORIF three-part right proximal humerus fracture 7 years    Cervical spondylosis status post fusion C1-2    Hypertension    Polycythemia vera    Remote history of hepatitis C    GERD    ADD    Depression    Nicotine addiction    Frequent falls    Recent low back surgery      Plan:   I discussed the plan with the patient.   She will continue working on shoulder strength emphasizing full motion and full strength. .  I will refer her to Dr. Zane Espitia for an evaluation of her left hip. I will recheck her back in 3 months with new AP Y and axillary views left shoulder    2.   Self-limited problem    Follow-up and Dispositions       Return in about 3 months                           Jt Gallegos MD

## 2023-03-12 ENCOUNTER — TELEPHONE (OUTPATIENT)
Dept: INTERNAL MEDICINE CLINIC | Facility: CLINIC | Age: 63
End: 2023-03-12

## 2023-03-13 RX ORDER — PRAVASTATIN SODIUM 80 MG/1
TABLET ORAL
Qty: 90 TABLET | Refills: 3 | Status: SHIPPED | OUTPATIENT
Start: 2023-03-13

## 2023-05-09 ENCOUNTER — OFFICE VISIT (OUTPATIENT)
Dept: ORTHOPEDIC SURGERY | Age: 63
End: 2023-05-09
Payer: COMMERCIAL

## 2023-05-09 DIAGNOSIS — Z96.612 PRESENCE OF LEFT ARTIFICIAL SHOULDER JOINT: Primary | ICD-10-CM

## 2023-05-09 DIAGNOSIS — Z09 FOLLOW-UP EXAMINATION AFTER ORTHOPEDIC SURGERY: ICD-10-CM

## 2023-05-09 PROCEDURE — 99212 OFFICE O/P EST SF 10 MIN: CPT | Performed by: ORTHOPAEDIC SURGERY

## 2023-05-09 NOTE — PROGRESS NOTES
Name: Jovon Brandon   YOB: 1960   Gender: female   MRN: [id number]     Date of encounter 5/9/2023         HPI: Jovon Brandon is a 58 y.o.  right-hand-dominant female 1 year status post reverse left total shoulder arthroplasty with a delta xtend prosthesis biceps tenodesis. She returns noting that she is doing much better. ROS/Meds/PSH/PMH/FH/SH: A ten system review of systems was performed and is negative other than what is in the HPI. Tobacco:  reports that she quit smoking about 4 years ago. Her smoking use included cigarettes. She has a 10.00 pack-year smoking history. She has never used smokeless tobacco.   There were no vitals taken for this visit. Physical Examination:   She is an awake alert pleasant female ambulating without difficulty   She has a slight restriction in cervical spine range of motion without radicular findings      Her right shoulder has a well-healed deltopectoral incision   The right shoulder has 0 to 170 degrees of active and 0 to 170 degrees passive forward elevation. Internal rotation is to T8. External rotation is to 60 degrees at the side. In the 90 degree abducted position 90 degrees of external and 90 degrees internal rotation   The AC joint is non-tender   SC joint is non-tender. Greater tuberosity is non-tender. negative biceps   Negative O'Briens sign   negative lift-off sign   Negative belly press sign   Negative bear huggers sign   negative drop sign   negative hornblower's sign   No posterior glenohumeral joint line tenderness. No evident excessive external rotation   Rotator cuff strength is 5/5.   negative external rotation stress test.    Negative empty can sign   There is no evident anterior or posterior apprehension with a negative sulcus sign. No instability   negative external and internal Rotation lag sign   Neurovascularly intact. The left shoulder deltopectoral incision has healed.    Active forward

## 2023-05-10 ENCOUNTER — NURSE ONLY (OUTPATIENT)
Dept: INTERNAL MEDICINE CLINIC | Facility: CLINIC | Age: 63
End: 2023-05-10
Payer: COMMERCIAL

## 2023-05-10 DIAGNOSIS — M81.0 AGE-RELATED OSTEOPOROSIS WITHOUT CURRENT PATHOLOGICAL FRACTURE: Primary | ICD-10-CM

## 2023-05-10 PROCEDURE — 96372 THER/PROPH/DIAG INJ SC/IM: CPT | Performed by: INTERNAL MEDICINE

## 2023-05-25 DIAGNOSIS — M54.2 CERVICALGIA: ICD-10-CM

## 2023-05-25 DIAGNOSIS — N62 MACROMASTIA: Primary | ICD-10-CM

## 2023-06-01 ENCOUNTER — OFFICE VISIT (OUTPATIENT)
Dept: ORTHOPEDIC SURGERY | Age: 63
End: 2023-06-01

## 2023-06-01 DIAGNOSIS — M25.552 LEFT HIP PAIN: Primary | ICD-10-CM

## 2023-06-01 DIAGNOSIS — M70.62 TROCHANTERIC BURSITIS OF LEFT HIP: ICD-10-CM

## 2023-06-01 RX ORDER — METHYLPREDNISOLONE ACETATE 40 MG/ML
40 INJECTION, SUSPENSION INTRA-ARTICULAR; INTRALESIONAL; INTRAMUSCULAR; SOFT TISSUE ONCE
Status: COMPLETED | OUTPATIENT
Start: 2023-06-01 | End: 2023-06-01

## 2023-06-01 RX ADMIN — METHYLPREDNISOLONE ACETATE 40 MG: 40 INJECTION, SUSPENSION INTRA-ARTICULAR; INTRALESIONAL; INTRAMUSCULAR; SOFT TISSUE at 13:16

## 2023-06-01 NOTE — PROGRESS NOTES
Patient ID:    Ham Shipley  [id number]  58 y.o.  1960    Today: June 1, 2023      Chief Complaint: Left hip pain        Patient reports longstanding history of left hip pain. The pain is predominately localized to the lateral hip and is characterized as a general ache with occasional sharp pain. They rate the pain ranging from 3-8 on the pain scale occurring in a cyclical fashion with periods of acute exacerbation. Symptoms are exacerbated with attempting to sleep on the hip, attempting to ascend stairs, and sitting for long periods of time which results in lateral hip pain. Patient denies significant anterior groin pain and denies significant issues with attempting to put on socks and shoes or when attempting to end into or out or a vehicle. No numbness of tingling going down the extremity. Patient has attempted prior conservative treatment including Activity modification.       Past Medical History:  Past Medical History:   Diagnosis Date    ADHD (attention deficit hyperactivity disorder)     Arthritis     Chronic obstructive pulmonary disease ([facility] [address]     Per pt \"Mild,\" PRN inhaler-last used 2021, followed by Dr. Siddharth Mac     Degeneration of lumbar or lumbosacral intervertebral disc 2/27/2015    Depression 02/27/2015    managed with medication     Dermatophytosis of the body 2/27/2015    Essential hypertension, benign 2/27/2015    managed with med    GERD (gastroesophageal reflux disease) 2/27/2015    managed with med    H/O echocardiogram 06/27/2019    EF 55-60%, trivial aortic, mitral, and tricuspid regurgitation     Heart murmur     pt reports since birth; echo dated  6/27/19 states trivial aortic, mitral, and tricuspid regurgitation     History of hepatitis C     History of kidney stones     History of MRSA infection     History of seizure 06/2019    per pt secondary to Robaxin     Hypopotassemia 2/27/2015    Insomnia, unspecified 2/27/2015    Migraine, unspecified, without mention of intractable
Dysphagia, unspecified type

## 2023-06-01 NOTE — PATIENT INSTRUCTIONS
Bursitis: Care Instructions  Overview     A bursa is a small sac of fluid that helps the tissues around a joint slide over one another easily. Injury or overuse of a joint can cause pain, redness, and inflammation in the bursa (bursitis). Bursitis usually gets better if you avoid the activity that caused it. You can help prevent bursitis from coming back by doing stretching and strengthening exercises. You may also need to change the way you do some activities. Follow-up care is a key part of your treatment and safety. Be sure to make and go to all appointments, and call your doctor if you are having problems. It's also a good idea to know your test results and keep a list of the medicines you take. How can you care for yourself at home? Put ice or a cold pack on the area for 10 to 20 minutes at a time. Try to do this every 1 to 2 hours for the next 3 days (when you are awake) or until the swelling goes down. Put a thin cloth between the ice and your skin. After the 3 days of using ice, you may use heat on the area. You can use a hot water bottle; a warm, moist towel; or a heating pad set on low. You can also try alternating heat and ice. Rest the area where you have pain. Stop any activities that cause pain. Switch to activities that do not stress the area. Take pain medicines exactly as directed. If the doctor gave you a prescription medicine for pain, take it as prescribed. If you are not taking a prescription pain medicine, ask your doctor if you can take an over-the-counter medicine. Do not take two or more pain medicines at the same time unless the doctor told you to. Many pain medicines have acetaminophen, which is Tylenol. Too much acetaminophen (Tylenol) can be harmful. To prevent stiffness, gently move the joint as much as you can without pain every day. As the pain gets better, keep doing range-of-motion exercises.  Ask your doctor for exercises that will make the muscles around the joint

## 2023-06-07 ENCOUNTER — OFFICE VISIT (OUTPATIENT)
Dept: INTERNAL MEDICINE CLINIC | Facility: CLINIC | Age: 63
End: 2023-06-07
Payer: COMMERCIAL

## 2023-06-07 VITALS
BODY MASS INDEX: 27 KG/M2 | RESPIRATION RATE: 16 BRPM | DIASTOLIC BLOOD PRESSURE: 80 MMHG | WEIGHT: 143 LBS | SYSTOLIC BLOOD PRESSURE: 141 MMHG | HEART RATE: 79 BPM | HEIGHT: 61 IN

## 2023-06-07 DIAGNOSIS — J84.9 ILD (INTERSTITIAL LUNG DISEASE) (HCC): Primary | ICD-10-CM

## 2023-06-07 DIAGNOSIS — J43.2 CENTRILOBULAR EMPHYSEMA (HCC): ICD-10-CM

## 2023-06-07 DIAGNOSIS — I10 PRIMARY HYPERTENSION: ICD-10-CM

## 2023-06-07 DIAGNOSIS — Z12.31 ENCOUNTER FOR SCREENING MAMMOGRAM FOR MALIGNANT NEOPLASM OF BREAST: ICD-10-CM

## 2023-06-07 DIAGNOSIS — Z12.31 SCREENING MAMMOGRAM, ENCOUNTER FOR: ICD-10-CM

## 2023-06-07 DIAGNOSIS — F11.21 HISTORY OF NARCOTIC ADDICTION (HCC): ICD-10-CM

## 2023-06-07 DIAGNOSIS — R73.01 IFG (IMPAIRED FASTING GLUCOSE): ICD-10-CM

## 2023-06-07 DIAGNOSIS — M81.0 AGE-RELATED OSTEOPOROSIS WITHOUT CURRENT PATHOLOGICAL FRACTURE: ICD-10-CM

## 2023-06-07 DIAGNOSIS — N62 MACROMASTIA: ICD-10-CM

## 2023-06-07 DIAGNOSIS — Z00.00 GENERAL MEDICAL EXAM: ICD-10-CM

## 2023-06-07 LAB
25(OH)D3 SERPL-MCNC: 34.8 NG/ML (ref 30–100)
ALBUMIN SERPL-MCNC: 4 G/DL (ref 3.2–4.6)
ALBUMIN/GLOB SERPL: 1.1 (ref 0.4–1.6)
ALP SERPL-CCNC: 124 U/L (ref 50–136)
ALT SERPL-CCNC: 72 U/L (ref 12–65)
ANION GAP SERPL CALC-SCNC: 5 MMOL/L (ref 2–11)
APPEARANCE UR: CLEAR
AST SERPL-CCNC: 32 U/L (ref 15–37)
BACTERIA URNS QL MICRO: NEGATIVE /HPF
BASOPHILS # BLD: 0.1 K/UL (ref 0–0.2)
BASOPHILS NFR BLD: 1 % (ref 0–2)
BILIRUB SERPL-MCNC: 0.3 MG/DL (ref 0.2–1.1)
BILIRUB UR QL: NEGATIVE
BUN SERPL-MCNC: 26 MG/DL (ref 8–23)
CALCIUM SERPL-MCNC: 9.7 MG/DL (ref 8.3–10.4)
CASTS URNS QL MICRO: ABNORMAL /LPF (ref 0–2)
CHLORIDE SERPL-SCNC: 110 MMOL/L (ref 101–110)
CHOLEST SERPL-MCNC: 155 MG/DL
CO2 SERPL-SCNC: 24 MMOL/L (ref 21–32)
COLOR UR: ABNORMAL
CREAT SERPL-MCNC: 0.9 MG/DL (ref 0.6–1)
DIFFERENTIAL METHOD BLD: ABNORMAL
EOSINOPHIL # BLD: 0.1 K/UL (ref 0–0.8)
EOSINOPHIL NFR BLD: 1 % (ref 0.5–7.8)
EPI CELLS #/AREA URNS HPF: ABNORMAL /HPF (ref 0–5)
ERYTHROCYTE [DISTWIDTH] IN BLOOD BY AUTOMATED COUNT: 12.8 % (ref 11.9–14.6)
GLOBULIN SER CALC-MCNC: 3.8 G/DL (ref 2.8–4.5)
GLUCOSE SERPL-MCNC: 99 MG/DL (ref 65–100)
GLUCOSE UR STRIP.AUTO-MCNC: NEGATIVE MG/DL
HCT VFR BLD AUTO: 47.7 % (ref 35.8–46.3)
HDLC SERPL-MCNC: 70 MG/DL (ref 40–60)
HDLC SERPL: 2.2
HGB BLD-MCNC: 16 G/DL (ref 11.7–15.4)
HGB UR QL STRIP: NEGATIVE
IMM GRANULOCYTES # BLD AUTO: 0.1 K/UL (ref 0–0.5)
IMM GRANULOCYTES NFR BLD AUTO: 1 % (ref 0–5)
KETONES UR QL STRIP.AUTO: NEGATIVE MG/DL
LDLC SERPL CALC-MCNC: 69.2 MG/DL
LEUKOCYTE ESTERASE UR QL STRIP.AUTO: ABNORMAL
LYMPHOCYTES # BLD: 2.2 K/UL (ref 0.5–4.6)
LYMPHOCYTES NFR BLD: 26 % (ref 13–44)
MCH RBC QN AUTO: 31 PG (ref 26.1–32.9)
MCHC RBC AUTO-ENTMCNC: 33.5 G/DL (ref 31.4–35)
MCV RBC AUTO: 92.4 FL (ref 82–102)
MONOCYTES # BLD: 0.8 K/UL (ref 0.1–1.3)
MONOCYTES NFR BLD: 10 % (ref 4–12)
NEUTS SEG # BLD: 5.2 K/UL (ref 1.7–8.2)
NEUTS SEG NFR BLD: 61 % (ref 43–78)
NITRITE UR QL STRIP.AUTO: NEGATIVE
NRBC # BLD: 0 K/UL (ref 0–0.2)
PH UR STRIP: 8 (ref 5–9)
PLATELET # BLD AUTO: 281 K/UL (ref 150–450)
PMV BLD AUTO: 9.8 FL (ref 9.4–12.3)
POTASSIUM SERPL-SCNC: 4.1 MMOL/L (ref 3.5–5.1)
PROT SERPL-MCNC: 7.8 G/DL (ref 6.3–8.2)
PROT UR STRIP-MCNC: NEGATIVE MG/DL
RBC # BLD AUTO: 5.16 M/UL (ref 4.05–5.2)
RBC #/AREA URNS HPF: ABNORMAL /HPF (ref 0–5)
SODIUM SERPL-SCNC: 139 MMOL/L (ref 133–143)
SP GR UR REFRACTOMETRY: 1.01 (ref 1–1.02)
TRIGL SERPL-MCNC: 79 MG/DL (ref 35–150)
TSH W FREE THYROID IF ABNORMAL: 1.59 UIU/ML (ref 0.36–3.74)
UROBILINOGEN UR QL STRIP.AUTO: 0.2 EU/DL (ref 0.2–1)
VLDLC SERPL CALC-MCNC: 15.8 MG/DL (ref 6–23)
WBC # BLD AUTO: 8.5 K/UL (ref 4.3–11.1)
WBC URNS QL MICRO: ABNORMAL /HPF (ref 0–4)

## 2023-06-07 PROCEDURE — 3077F SYST BP >= 140 MM HG: CPT | Performed by: INTERNAL MEDICINE

## 2023-06-07 PROCEDURE — 99213 OFFICE O/P EST LOW 20 MIN: CPT | Performed by: INTERNAL MEDICINE

## 2023-06-07 PROCEDURE — 99396 PREV VISIT EST AGE 40-64: CPT | Performed by: INTERNAL MEDICINE

## 2023-06-07 PROCEDURE — 3079F DIAST BP 80-89 MM HG: CPT | Performed by: INTERNAL MEDICINE

## 2023-06-07 RX ORDER — BENAZEPRIL HYDROCHLORIDE 10 MG/1
10 TABLET ORAL DAILY
Qty: 90 TABLET | Refills: 3 | Status: SHIPPED | OUTPATIENT
Start: 2023-06-07

## 2023-06-07 RX ORDER — CITALOPRAM 20 MG/1
40 TABLET ORAL DAILY
COMMUNITY
Start: 2023-05-30

## 2023-06-07 SDOH — ECONOMIC STABILITY: INCOME INSECURITY: HOW HARD IS IT FOR YOU TO PAY FOR THE VERY BASICS LIKE FOOD, HOUSING, MEDICAL CARE, AND HEATING?: NOT HARD AT ALL

## 2023-06-07 SDOH — ECONOMIC STABILITY: FOOD INSECURITY: WITHIN THE PAST 12 MONTHS, THE FOOD YOU BOUGHT JUST DIDN'T LAST AND YOU DIDN'T HAVE MONEY TO GET MORE.: NEVER TRUE

## 2023-06-07 SDOH — ECONOMIC STABILITY: HOUSING INSECURITY
IN THE LAST 12 MONTHS, WAS THERE A TIME WHEN YOU DID NOT HAVE A STEADY PLACE TO SLEEP OR SLEPT IN A SHELTER (INCLUDING NOW)?: NO

## 2023-06-07 SDOH — ECONOMIC STABILITY: FOOD INSECURITY: WITHIN THE PAST 12 MONTHS, YOU WORRIED THAT YOUR FOOD WOULD RUN OUT BEFORE YOU GOT MONEY TO BUY MORE.: NEVER TRUE

## 2023-06-07 ASSESSMENT — PATIENT HEALTH QUESTIONNAIRE - PHQ9
1. LITTLE INTEREST OR PLEASURE IN DOING THINGS: 0
SUM OF ALL RESPONSES TO PHQ QUESTIONS 1-9: 0
SUM OF ALL RESPONSES TO PHQ9 QUESTIONS 1 & 2: 0
SUM OF ALL RESPONSES TO PHQ QUESTIONS 1-9: 0
2. FEELING DOWN, DEPRESSED OR HOPELESS: 0

## 2023-06-07 ASSESSMENT — ENCOUNTER SYMPTOMS
NAUSEA: 0
BACK PAIN: 0
DIARRHEA: 0
SHORTNESS OF BREATH: 0
VOMITING: 0
CONSTIPATION: 0
BLOOD IN STOOL: 0
COUGH: 0
WHEEZING: 0

## 2023-06-07 NOTE — PROGRESS NOTES
to Robaxin     Hypopotassemia 2015    Insomnia, unspecified 2015    Migraine, unspecified, without mention of intractable migraine without mention of status migrainosus 2015    Odontoid fracture with type II morphology ([address]     Osteoporosis 2017    Polycythemia, secondary 2015     Social History     Socioeconomic History    Marital status:      Spouse name: None    Number of children: None    Years of education: None    Highest education level: None   Tobacco Use    Smoking status: Former     Packs/day: 1.00     Types: Cigarettes     Quit date: 2017     Years since quittin.9    Smokeless tobacco: Never    Tobacco comments:     Quit smokin18- smoked a pack past 3-4 days   Substance and Sexual Activity    Alcohol use: No     Alcohol/week: 0.0 standard drinks    Drug use: Not Currently     Types: Cocaine     Social Determinants of Health     Financial Resource Strain: Low Risk     Difficulty of Paying Living Expenses: Not hard at all   Food Insecurity: No Food Insecurity    Worried About [address] [employer] in the Last Year: Never true    [facility] in the Last Year: Never true   Transportation Needs: Unknown    Lack of Transportation (Non-Medical): No   Housing Stability: Unknown    Unstable Housing in the Last Year: No     Past Surgical History:   Procedure Laterality Date    BACK SURGERY  2022    L4-5 DLIF W/PEEK CAGE, BMP, DBM AND PEDICLE SCREWS, C-ARM, NEUROMONITORING, MEDTRONIC(ROXIE), SYNTHES(HOLLAND)    BLADDER SUSPENSION  2006    bladder tack    CARDIAC CATHETERIZATION      no intervention     CARPAL TUNNEL RELEASE Right 2001    then left    CERVICAL FUSION      COLONOSCOPY N/A 2022    COLONOSCOPY/ BMI 28 performed by Zan Santana MD at [facility] ENDOSCOPY    HEENT Bilateral     LITHOTRIPSY  2005    ORTHOPEDIC SURGERY  2011    hip repair, left    ORTHOPEDIC SURGERY Left     Hand surgery    ORTHOPEDIC SURGERY      Slap repair right    ROTATOR CUFF

## 2023-06-08 LAB
EST. AVERAGE GLUCOSE BLD GHB EST-MCNC: 120 MG/DL
HBA1C MFR BLD: 5.8 % (ref 4.8–5.6)

## 2023-06-08 NOTE — RESULT ENCOUNTER NOTE
Labs are stable. Cholesterol looks good. Maintain a low fat high fiber diet and make sure you are getting 30 minutes of aerobic exercise at least 4-5 days weekly. Continue your current doses of medications. Keep up the good work. Thanks.   Irina Comer

## 2023-06-21 ENCOUNTER — PATIENT MESSAGE (OUTPATIENT)
Dept: INTERNAL MEDICINE CLINIC | Facility: CLINIC | Age: 63
End: 2023-06-21

## 2023-07-03 RX ORDER — BENAZEPRIL HYDROCHLORIDE 20 MG/1
20 TABLET ORAL DAILY
Qty: 90 TABLET | Refills: 3 | Status: SHIPPED | OUTPATIENT
Start: 2023-07-03

## 2023-07-17 NOTE — TELEPHONE ENCOUNTER
Please make sure the patient got this message. Did she increase her medication? Is your BP any better? Thanks.   [address]

## 2023-07-28 ENCOUNTER — HOSPITAL ENCOUNTER (OUTPATIENT)
Dept: MAMMOGRAPHY | Age: 63
Discharge: HOME | End: 2023-07-28
Attending: INTERNAL MEDICINE
Payer: COMMERCIAL

## 2023-07-28 DIAGNOSIS — Z12.31 ENCOUNTER FOR SCREENING MAMMOGRAM FOR MALIGNANT NEOPLASM OF BREAST: ICD-10-CM

## 2023-07-28 PROCEDURE — 77067 SCR MAMMO BI INCL CAD: CPT

## 2023-08-07 ENCOUNTER — HOSPITAL ENCOUNTER (OUTPATIENT)
Dept: MAMMOGRAPHY | Age: 63
Discharge: HOME | End: 2023-08-10
Attending: INTERNAL MEDICINE
Payer: COMMERCIAL

## 2023-08-07 DIAGNOSIS — R92.8 ABNORMAL SCREENING MAMMOGRAM: ICD-10-CM

## 2023-08-07 PROCEDURE — 77065 DX MAMMO INCL CAD UNI: CPT

## 2023-08-07 NOTE — RESULT ENCOUNTER NOTE
Diagnostic imaging revealed no cause for concern. Resume 12 month mammography schedule. Thanks.   [address]

## 2023-08-14 ENCOUNTER — TELEPHONE (OUTPATIENT)
Dept: INTERNAL MEDICINE CLINIC | Facility: CLINIC | Age: 63
End: 2023-08-14

## 2023-08-16 ENCOUNTER — OFFICE VISIT (OUTPATIENT)
Dept: INTERNAL MEDICINE CLINIC | Facility: CLINIC | Age: 63
End: 2023-08-16
Payer: COMMERCIAL

## 2023-08-16 VITALS
SYSTOLIC BLOOD PRESSURE: 172 MMHG | OXYGEN SATURATION: 99 % | HEIGHT: 61 IN | HEART RATE: 73 BPM | TEMPERATURE: 97 F | WEIGHT: 149 LBS | DIASTOLIC BLOOD PRESSURE: 90 MMHG | BODY MASS INDEX: 28.13 KG/M2

## 2023-08-16 DIAGNOSIS — I10 PRIMARY HYPERTENSION: Primary | ICD-10-CM

## 2023-08-16 PROCEDURE — 3079F DIAST BP 80-89 MM HG: CPT | Performed by: NURSE PRACTITIONER

## 2023-08-16 PROCEDURE — 3077F SYST BP >= 140 MM HG: CPT | Performed by: NURSE PRACTITIONER

## 2023-08-16 PROCEDURE — 99214 OFFICE O/P EST MOD 30 MIN: CPT | Performed by: NURSE PRACTITIONER

## 2023-08-16 RX ORDER — AMLODIPINE BESYLATE 5 MG/1
5 TABLET ORAL DAILY
Qty: 30 TABLET | Refills: 3 | Status: SHIPPED | OUTPATIENT
Start: 2023-08-16

## 2023-08-16 RX ORDER — BENAZEPRIL HYDROCHLORIDE 40 MG/1
40 TABLET, FILM COATED ORAL DAILY
Qty: 30 TABLET | Refills: 3 | Status: SHIPPED | OUTPATIENT
Start: 2023-08-16

## 2023-08-16 ASSESSMENT — ENCOUNTER SYMPTOMS: SHORTNESS OF BREATH: 0

## 2023-08-23 NOTE — PROGRESS NOTES
Problem: Patient Education: Go to Patient Education Activity  Goal: Patient/Family Education  Outcome: Progressing Towards Goal     Problem: TIA/CVA Stroke: Day 2 Until Discharge  Goal: Off Pathway (Use only if patient is Off Pathway)  Outcome: Progressing Towards Goal  Goal: Activity/Safety  Outcome: Progressing Towards Goal  Goal: Diagnostic Test/Procedures  Outcome: Progressing Towards Goal  Goal: Nutrition/Diet  Outcome: Progressing Towards Goal  Goal: Discharge Planning  Outcome: Progressing Towards Goal  Goal: Medications  Outcome: Progressing Towards Goal  Goal: Respiratory  Outcome: Progressing Towards Goal  Goal: Treatments/Interventions/Procedures  Outcome: Progressing Towards Goal  Goal: Psychosocial  Outcome: Progressing Towards Goal  Goal: *Verbalizes anxiety and depression are reduced or absent  Outcome: Progressing Towards Goal  Goal: *Absence of aspiration  Outcome: Progressing Towards Goal  Goal: *Absence of deep venous thrombosis signs and symptoms(Stroke Metric)  Outcome: Progressing Towards Goal  Goal: *Optimal pain control at patient's stated goal  Outcome: Progressing Towards Goal  Goal: *Tolerating diet  Outcome: Progressing Towards Goal  Goal: *Ability to perform ADLs and demonstrates progressive mobility and function  Outcome: Progressing Towards Goal  Goal: *Stroke education continued(Stroke Metric)  Outcome: Progressing Towards Goal     Problem: Ischemic Stroke: Discharge Outcomes  Goal: *Verbalizes anxiety and depression are reduced or absent  Outcome: Progressing Towards Goal  Goal: *Verbalize understanding of risk factor modification(Stroke Metric)  Outcome: Progressing Towards Goal  Goal: *Hemodynamically stable  Outcome: Progressing Towards Goal  Goal: *Absence of aspiration pneumonia  Outcome: Progressing Towards Goal  Goal: *Aware of needed dietary changes  Outcome: Progressing Towards Goal  Goal: *Verbalize understanding of prescribed medications including anti-coagulants, anti-lipid, and/or anti-platelets(Stroke Metric)  Outcome: Progressing Towards Goal  Goal: *Tolerating diet  Outcome: Progressing Towards Goal  Goal: *Aware of follow-up diagnostics related to anticoagulants  Outcome: Progressing Towards Goal  Goal: *Ability to perform ADLs and demonstrates progressive mobility and function  Outcome: Progressing Towards Goal  Goal: *Absence of DVT(Stroke Metric)  Outcome: Progressing Towards Goal  Goal: *Absence of aspiration  Outcome: Progressing Towards Goal  Goal: *Optimal pain control at patient's stated goal  Outcome: Progressing Towards Goal  Goal: *Home safety concerns addressed  Outcome: Progressing Towards Goal  Goal: *Describes available resources and support systems  Outcome: Progressing Towards Goal  Goal: *Verbalizes understanding of activation of EMS(911) for stroke symptoms(Stroke Metric)  Outcome: Progressing Towards Goal  Goal: *Understands and describes signs and symptoms to report to providers(Stroke Metric)  Outcome: Progressing Towards Goal  Goal: *Neurolgocially stable (absence of additional neurological deficits)  Outcome: Progressing Towards Goal  Goal: *Verbalizes importance of follow-up with primary care physician(Stroke Metric)  Outcome: Progressing Towards Goal  Goal: *Smoking cessation discussed,if applicable(Stroke Metric)  Outcome: Progressing Towards Goal  Goal: *Depression screening completed(Stroke Metric)  Outcome: Progressing Towards Goal     Problem: Patient Education: Go to Patient Education Activity  Goal: Patient/Family Education  Outcome: Progressing Towards Goal     Problem: Patient Education: Go to Patient Education Activity  Goal: Patient/Family Education  Outcome: Progressing Towards Goal     Problem: Falls - Risk of  Goal: *Absence of Falls  Description  Document Herve Fall Risk and appropriate interventions in the flowsheet.   Outcome: Progressing Towards Goal     Problem: Patient Education: Go to Patient Education Activity  Goal: Patient/Family Education  Outcome: Progressing Towards Goal 20

## 2023-09-20 ENCOUNTER — OFFICE VISIT (OUTPATIENT)
Dept: INTERNAL MEDICINE CLINIC | Facility: CLINIC | Age: 63
End: 2023-09-20
Payer: COMMERCIAL

## 2023-09-20 VITALS
OXYGEN SATURATION: 96 % | HEIGHT: 61 IN | TEMPERATURE: 97.3 F | SYSTOLIC BLOOD PRESSURE: 121 MMHG | DIASTOLIC BLOOD PRESSURE: 67 MMHG | WEIGHT: 152 LBS | BODY MASS INDEX: 28.7 KG/M2 | HEART RATE: 86 BPM

## 2023-09-20 DIAGNOSIS — I10 PRIMARY HYPERTENSION: Primary | ICD-10-CM

## 2023-09-20 PROCEDURE — 3078F DIAST BP <80 MM HG: CPT | Performed by: NURSE PRACTITIONER

## 2023-09-20 PROCEDURE — 3074F SYST BP LT 130 MM HG: CPT | Performed by: NURSE PRACTITIONER

## 2023-09-20 PROCEDURE — 99213 OFFICE O/P EST LOW 20 MIN: CPT | Performed by: NURSE PRACTITIONER

## 2023-09-20 RX ORDER — AMLODIPINE BESYLATE 5 MG/1
5 TABLET ORAL DAILY
Qty: 90 TABLET | Refills: 3 | Status: SHIPPED | OUTPATIENT
Start: 2023-09-20

## 2023-09-20 RX ORDER — BENAZEPRIL HYDROCHLORIDE 40 MG/1
40 TABLET, FILM COATED ORAL DAILY
Qty: 90 TABLET | Refills: 3 | Status: SHIPPED | OUTPATIENT
Start: 2023-09-20

## 2023-09-20 RX ORDER — OLANZAPINE 20 MG/1
10 TABLET ORAL 2 TIMES DAILY
COMMUNITY
Start: 2023-08-14

## 2023-09-20 ASSESSMENT — ENCOUNTER SYMPTOMS
ALLERGIC/IMMUNOLOGIC NEGATIVE: 1
GASTROINTESTINAL NEGATIVE: 1
RESPIRATORY NEGATIVE: 1
EYES NEGATIVE: 1

## 2023-09-20 NOTE — PROGRESS NOTES
9/20/2023 1:56 PM  Location:[address] [facility]  [place]  Patient #:  [id number]  YOB: 1960      History of Present Illness     Chief Complaint   Patient presents with    1 Month Follow-Up     1 month follow-up       Ms. Delmy Arguelles is a 61 y.o. female  who presents for follow up on HTN. Last visit she was started on amlopdine 5 mg and her benazepril was increased to 40 mg. He home readings have been normal, but she forgot to bring in the readings. She states they are around 120s and she checks 2-3 times per week. She denies CP, SOB, headaches, leg swelling or visual changes. She does report a ulceration on her left breast post surgery, she states she is currently on antibiotics, being managed by plastic surgeon, unable to view chart from encounter. No fever, chills, odor at site.       Allergies   Allergen Reactions    Diphenhydramine Other (See Comments)     hyperactivity    Duloxetine Other (See Comments)     Hallucinations    Hydrocodone-Acetaminophen Other (See Comments)     Hyperactivity    Hydromorphone Other (See Comments)     Hyperactivity    Meperidine Other (See Comments)     Hyperactivity    Morphine Other (See Comments)     Hallucinations    Tapentadol Other (See Comments)     Bad dreams    Nitrofurantoin Macrocrystal Nausea And Vomiting        Current Outpatient Medications   Medication Sig Dispense Refill    OLANZapine (ZYPREXA) 20 MG tablet Take 0.5 tablets by mouth 2 times daily      benazepril (LOTENSIN) 40 MG tablet Take 1 tablet by mouth daily 30 tablet 3    amLODIPine (NORVASC) 5 MG tablet Take 1 tablet by mouth daily 30 tablet 3    citalopram (CELEXA) 20 MG tablet Take 2 tablets by mouth daily      pravastatin (PRAVACHOL) 80 MG tablet TAKE 1 TABLET BY MOUTH EVERY NIGHT 90 tablet 3    denosumab (PROLIA) 60 MG/ML SOSY SC injection Inject 1 mL into the skin      levalbuterol (XOPENEX HFA) 45 MCG/ACT inhaler INHALE 2 PUFFS BY MOUTH EVERY 6 HOURS AS

## 2023-09-29 ENCOUNTER — OFFICE VISIT (OUTPATIENT)
Dept: ORTHOPEDIC SURGERY | Age: 63
End: 2023-09-29

## 2023-09-29 ENCOUNTER — TELEPHONE (OUTPATIENT)
Dept: ORTHOPEDIC SURGERY | Age: 63
End: 2023-09-29

## 2023-09-29 DIAGNOSIS — M25.552 LEFT HIP PAIN: Primary | ICD-10-CM

## 2023-09-29 RX ORDER — METHYLPREDNISOLONE ACETATE 40 MG/ML
40 INJECTION, SUSPENSION INTRA-ARTICULAR; INTRALESIONAL; INTRAMUSCULAR; SOFT TISSUE ONCE
Status: COMPLETED | OUTPATIENT
Start: 2023-09-29 | End: 2023-09-29

## 2023-09-29 RX ADMIN — METHYLPREDNISOLONE ACETATE 40 MG: 40 INJECTION, SUSPENSION INTRA-ARTICULAR; INTRALESIONAL; INTRAMUSCULAR; SOFT TISSUE at 10:17

## 2023-09-29 NOTE — TELEPHONE ENCOUNTER
She had the injection this morning and was to call and report how it did.  She said it's about 35-40% better

## 2023-09-29 NOTE — PROGRESS NOTES
Patient ID:    Tamica Virk  [id number]  61 y.o.  1960    Today: September 29, 2023          Chief Complaint: Left hip pain      Patient returns to office today with chief complaint of left hip pain. The pain is predominately localized to the anterior groin and is described as a general ache with occasional sharp pain. They rate the pain ranging from 3-8 on the pain scale occurring in a cyclical fashion with periods of acute exacerbation. Symptoms are exacerbated with getting into and out of their vehicle, crossing their legs, and attempting to put on socks/shoes. No significant pain noted with laying on the affected hip. No numbness of tingling going down the extremity. Patient has attempted prior conservative treatment including Activity modification, NSAIDS, and Trochanteric Bursitis Injection.  Previously patient has had no with trochanteric bursal injections        Past Medical History:  Past Medical History:   Diagnosis Date    ADHD (attention deficit hyperactivity disorder)     Arthritis     Chronic obstructive pulmonary disease ([address])     Per pt \"Mild,\" PRN inhaler-last used 2021, followed by Dr. Idalia Salcido     Degeneration of lumbar or lumbosacral intervertebral disc 2/27/2015    Depression 02/27/2015    managed with medication     Dermatophytosis of the body 2/27/2015    Essential hypertension, benign 2/27/2015    managed with med    GERD (gastroesophageal reflux disease) 2/27/2015    managed with med    H/O echocardiogram 06/27/2019    EF 55-60%, trivial aortic, mitral, and tricuspid regurgitation     Heart murmur     pt reports since birth; echo dated  6/27/19 states trivial aortic, mitral, and tricuspid regurgitation     History of hepatitis C     History of kidney stones     History of MRSA infection     History of seizure 06/2019    per pt secondary to Robaxin     Hypopotassemia 2/27/2015    Insomnia, unspecified 2/27/2015    Migraine, unspecified, without mention of intractable migraine without

## 2023-11-30 ENCOUNTER — OFFICE VISIT (OUTPATIENT)
Dept: ORTHOPEDIC SURGERY | Age: 63
End: 2023-11-30
Payer: COMMERCIAL

## 2023-11-30 DIAGNOSIS — M25.551 RIGHT HIP PAIN: Primary | ICD-10-CM

## 2023-11-30 PROCEDURE — 99214 OFFICE O/P EST MOD 30 MIN: CPT | Performed by: ORTHOPAEDIC SURGERY

## 2023-11-30 RX ORDER — MELOXICAM 15 MG/1
15 TABLET ORAL DAILY
Qty: 30 TABLET | Refills: 2 | Status: SHIPPED | OUTPATIENT
Start: 2023-11-30

## 2023-11-30 RX ORDER — OMEPRAZOLE 40 MG/1
40 CAPSULE, DELAYED RELEASE ORAL DAILY
Qty: 30 CAPSULE | Refills: 0 | Status: SHIPPED | OUTPATIENT
Start: 2023-11-30

## 2023-11-30 NOTE — PROGRESS NOTES
in the hip which causes daily symptoms which affects the patient's activities of daily living and quality of life. The risks, benefits, alternatives and possible complications of left total hip arthroplasty have been discussed with the patient including but not limited to infection, bleeding, damage to nerves and blood vessels with particular attention given to risk of damage of the femoral, obturator, lateral femoral cutaneous, superior gluteal, inferior gluteal, and sciatic nerve, risk of dislocation, fracture both intra-op and post-op, limb length inequality, polyethylene wear, implant loosening, risk for continued pain, and risk for revision surgery secondary to but not limited to all of these discussed risks. Further we discussed risk of venous thrombo-embolism including deep vein thrombosis and pulmonary embolism despite being on prophylaxis. We have also previously discussed the potential of morbidity and mortality associated with, but not limited to, the actual surgical procedure, anesthesia, prior medical conditions, and/or the administration of medications perioperatively. I have made no guarantees to the patient regarding outcomes and the patient has voiced understanding of that. Finally orthopedic implant options were discussed with the patient and I explained my comfort and experience/training was really focused on a handful of implants. Ultimately the decision was made to proceed with using the implant that I had the most comfort/experience with using. The patient has been given the opportunity to ask questions all of which have been answered and the patient wishes to proceed.         Signed By: Ashlee Jackson MD  November 30, 2023

## 2023-12-04 ENCOUNTER — TELEPHONE (OUTPATIENT)
Dept: ORTHOPEDIC SURGERY | Age: 63
End: 2023-12-04

## 2023-12-04 NOTE — TELEPHONE ENCOUNTER
I will leave a surgery book up front at [facility] for the patient to pickup.

## 2023-12-05 DIAGNOSIS — M16.12 PRIMARY OSTEOARTHRITIS OF LEFT HIP: ICD-10-CM

## 2023-12-05 DIAGNOSIS — M16.11 PRIMARY OSTEOARTHRITIS OF RIGHT HIP: Primary | ICD-10-CM

## 2024-01-07 ENCOUNTER — PREP FOR PROCEDURE (OUTPATIENT)
Dept: ORTHOPEDIC SURGERY | Age: 64
End: 2024-01-07

## 2024-01-07 DIAGNOSIS — Z01.818 PRE-OP EVALUATION: Primary | ICD-10-CM

## 2024-01-07 RX ORDER — SODIUM CHLORIDE 9 MG/ML
INJECTION, SOLUTION INTRAVENOUS PRN
Status: CANCELLED | OUTPATIENT
Start: 2024-01-07

## 2024-01-07 RX ORDER — SODIUM CHLORIDE 0.9 % (FLUSH) 0.9 %
5-40 SYRINGE (ML) INJECTION PRN
Status: CANCELLED | OUTPATIENT
Start: 2024-01-07

## 2024-01-07 RX ORDER — SODIUM CHLORIDE 0.9 % (FLUSH) 0.9 %
5-40 SYRINGE (ML) INJECTION EVERY 12 HOURS SCHEDULED
Status: CANCELLED | OUTPATIENT
Start: 2024-01-07

## 2024-01-08 ENCOUNTER — HOSPITAL ENCOUNTER (OUTPATIENT)
Dept: REHABILITATION | Age: 64
Discharge: HOME | End: 2024-01-11
Payer: COMMERCIAL

## 2024-01-08 ENCOUNTER — HOSPITAL ENCOUNTER (OUTPATIENT)
Dept: SURGERY | Age: 64
Discharge: HOME | End: 2024-01-11
Payer: COMMERCIAL

## 2024-01-08 VITALS
RESPIRATION RATE: 16 BRPM | HEIGHT: 61 IN | HEART RATE: 87 BPM | DIASTOLIC BLOOD PRESSURE: 89 MMHG | TEMPERATURE: 98.1 F | SYSTOLIC BLOOD PRESSURE: 161 MMHG | WEIGHT: 156 LBS | BODY MASS INDEX: 29.45 KG/M2 | OXYGEN SATURATION: 98 %

## 2024-01-08 DIAGNOSIS — Z01.818 PRE-OP EVALUATION: ICD-10-CM

## 2024-01-08 LAB
ALBUMIN SERPL-MCNC: 3.6 G/DL (ref 3.2–4.6)
ANION GAP SERPL CALC-SCNC: 7 MMOL/L (ref 2–11)
BASOPHILS # BLD: 0.1 K/UL (ref 0–0.2)
BASOPHILS NFR BLD: 1 % (ref 0–2)
BUN SERPL-MCNC: 15 MG/DL (ref 8–23)
CALCIUM SERPL-MCNC: 10.1 MG/DL (ref 8.3–10.4)
CHLORIDE SERPL-SCNC: 109 MMOL/L (ref 103–113)
CO2 SERPL-SCNC: 26 MMOL/L (ref 21–32)
CREAT SERPL-MCNC: 0.86 MG/DL (ref 0.6–1)
DIFFERENTIAL METHOD BLD: NORMAL
EKG ATRIAL RATE: 103 BPM
EKG DIAGNOSIS: NORMAL
EKG P AXIS: 78 DEGREES
EKG P-R INTERVAL: 204 MS
EKG Q-T INTERVAL: 350 MS
EKG QRS DURATION: 72 MS
EKG QTC CALCULATION (BAZETT): 458 MS
EKG R AXIS: 95 DEGREES
EKG T AXIS: 89 DEGREES
EKG VENTRICULAR RATE: 103 BPM
EOSINOPHIL # BLD: 0.1 K/UL (ref 0–0.8)
EOSINOPHIL NFR BLD: 1 % (ref 0.5–7.8)
ERYTHROCYTE [DISTWIDTH] IN BLOOD BY AUTOMATED COUNT: 12.5 % (ref 11.9–14.6)
EST. AVERAGE GLUCOSE BLD GHB EST-MCNC: 117 MG/DL
GLUCOSE SERPL-MCNC: 109 MG/DL (ref 65–100)
HBA1C MFR BLD: 5.7 % (ref 4.8–5.6)
HCT VFR BLD AUTO: 41.2 % (ref 35.8–46.3)
HGB BLD-MCNC: 13.7 G/DL (ref 11.7–15.4)
IMM GRANULOCYTES # BLD AUTO: 0 K/UL (ref 0–0.5)
IMM GRANULOCYTES NFR BLD AUTO: 0 % (ref 0–5)
INR PPP: 1
LYMPHOCYTES # BLD: 1.5 K/UL (ref 0.5–4.6)
LYMPHOCYTES NFR BLD: 14 % (ref 13–44)
MCH RBC QN AUTO: 30.2 PG (ref 26.1–32.9)
MCHC RBC AUTO-ENTMCNC: 33.3 G/DL (ref 31.4–35)
MCV RBC AUTO: 90.9 FL (ref 82–102)
MONOCYTES # BLD: 0.7 K/UL (ref 0.1–1.3)
MONOCYTES NFR BLD: 7 % (ref 4–12)
NEUTS SEG # BLD: 8 K/UL (ref 1.7–8.2)
NEUTS SEG NFR BLD: 77 % (ref 43–78)
NRBC # BLD: 0 K/UL (ref 0–0.2)
PLATELET # BLD AUTO: 251 K/UL (ref 150–450)
PMV BLD AUTO: 10.1 FL (ref 9.4–12.3)
POTASSIUM SERPL-SCNC: 3.6 MMOL/L (ref 3.5–5.1)
PROTHROMBIN TIME: 13.1 SEC (ref 11.3–14.9)
RBC # BLD AUTO: 4.53 M/UL (ref 4.05–5.2)
SODIUM SERPL-SCNC: 142 MMOL/L (ref 136–146)
WBC # BLD AUTO: 10.4 K/UL (ref 4.3–11.1)

## 2024-01-08 PROCEDURE — 83036 HEMOGLOBIN GLYCOSYLATED A1C: CPT

## 2024-01-08 PROCEDURE — 82040 ASSAY OF SERUM ALBUMIN: CPT

## 2024-01-08 PROCEDURE — 80048 BASIC METABOLIC PNL TOTAL CA: CPT

## 2024-01-08 PROCEDURE — 93010 ELECTROCARDIOGRAM REPORT: CPT | Performed by: INTERNAL MEDICINE

## 2024-01-08 PROCEDURE — 93005 ELECTROCARDIOGRAM TRACING: CPT

## 2024-01-08 PROCEDURE — 80307 DRUG TEST PRSMV CHEM ANLYZR: CPT

## 2024-01-08 PROCEDURE — 87641 MR-STAPH DNA AMP PROBE: CPT

## 2024-01-08 PROCEDURE — 97161 PT EVAL LOW COMPLEX 20 MIN: CPT

## 2024-01-08 PROCEDURE — 85610 PROTHROMBIN TIME: CPT

## 2024-01-08 PROCEDURE — 85025 COMPLETE CBC W/AUTO DIFF WBC: CPT

## 2024-01-08 ASSESSMENT — HOOS JR
RISING FROM SITTING: 3
HOOS JR RAW SCORE: 16
BENDING TO THE FLOOR TO PICK UP OBJECT: 3
GOING UP OR DOWN STAIRS: 3
HOOS JR RAW SCORE: 16
WALKING ON UNEVEN SURFACE: 3
HOOS JR TOTAL INTERVAL SCORE: 39.902
SITTING: 1
LYING IN BED (TURNING OVER, MAINTAINING HIP POSITION): 3

## 2024-01-08 ASSESSMENT — PROMIS GLOBAL HEALTH SCALE
IN GENERAL, HOW WOULD YOU RATE YOUR PHYSICAL HEALTH [ON A SCALE OF 1 (POOR) TO 5 (EXCELLENT)]?: 4
WHO IS THE PERSON COMPLETING THE PROMIS V1.1 SURVEY?: 0
IN GENERAL, WOULD YOU SAY YOUR HEALTH IS...[ON A SCALE OF 1 (POOR) TO 5 (EXCELLENT)]: 3
IN GENERAL, HOW WOULD YOU RATE YOUR SATISFACTION WITH YOUR SOCIAL ACTIVITIES AND RELATIONSHIPS [ON A SCALE OF 1 (POOR) TO 5 (EXCELLENT)]?: 4
SUM OF RESPONSES TO QUESTIONS 3, 6, 7, & 8: 15
IN THE PAST 7 DAYS, HOW WOULD YOU RATE YOUR FATIGUE ON AVERAGE [ON A SCALE FROM 1 (NONE) TO 5 (VERY SEVERE)]?: 3
IN GENERAL, WOULD YOU SAY YOUR QUALITY OF LIFE IS...[ON A SCALE OF 1 (POOR) TO 5 (EXCELLENT)]: 3
IN GENERAL, HOW WOULD YOU RATE YOUR MENTAL HEALTH, INCLUDING YOUR MOOD AND YOUR ABILITY TO THINK [ON A SCALE OF 1 (POOR) TO 5 (EXCELLENT)]?: 4
SUM OF RESPONSES TO QUESTIONS 2, 4, 5, & 10: 15
IN THE PAST 7 DAYS, HOW OFTEN HAVE YOU BEEN BOTHERED BY EMOTIONAL PROBLEMS, SUCH AS FEELING ANXIOUS, DEPRESSED, OR IRRITABLE [ON A SCALE FROM 1 (NEVER) TO 5 (ALWAYS)]?: 4
IN GENERAL, PLEASE RATE HOW WELL YOU CARRY OUT YOUR USUAL SOCIAL ACTIVITIES (INCLUDES ACTIVITIES AT HOME, AT WORK, AND IN YOUR COMMUNITY, AND RESPONSIBILITIES AS A PARENT, CHILD, SPOUSE, EMPLOYEE, FRIEND, ETC) [ON A SCALE OF 1 (POOR) TO 5 (EXCELLENT)]?: 5
IN THE PAST 7 DAYS, HOW WOULD YOU RATE YOUR PAIN ON AVERAGE [ON A SCALE FROM 0 (NO PAIN) TO 10 (WORST IMAGINABLE PAIN)]?: 6
HOW IS THE PROMIS V1.1 BEING ADMINISTERED?: 0
TO WHAT EXTENT ARE YOU ABLE TO CARRY OUT YOUR EVERYDAY PHYSICAL ACTIVITIES SUCH AS WALKING, CLIMBING STAIRS, CARRYING GROCERIES, OR MOVING A CHAIR [ON A SCALE OF 1 (NOT AT ALL) TO 5 (COMPLETELY)]?: 2

## 2024-01-08 ASSESSMENT — PAIN SCALES - GENERAL
PAINLEVEL_OUTOF10: 7
PAINLEVEL_OUTOF10: 7
PAINLEVEL_OUTOF10: 0

## 2024-01-08 ASSESSMENT — PAIN DESCRIPTION - LOCATION: LOCATION: HIP

## 2024-01-08 ASSESSMENT — PAIN DESCRIPTION - ORIENTATION: ORIENTATION: LEFT

## 2024-01-08 NOTE — PERIOP NOTE
PLEASE CONTINUE TAKING ALL PRESCRIPTION MEDICATIONS UP TO THE DAY OF SURGERY UNLESS OTHERWISE DIRECTED BELOW.     DISCONTINUE all over the counter vitamins, supplements, and herbals starting today. DISCONTINUE Non-Steroidal Anti-Inflammatory (NSAIDS) such as Advil, Ibuprofen, Motrin, Naproxen, and Aleve 5 days prior to surgery.      *IT IS OK TO TAKE TYLENOL, Allergy medication, and indigestion medications*     Prescription medications to HOLD     Hold Meloxicam 5 days prior to surgery           CONTINUE all other prescriptions like normal up until the day of surgery--TAKE ONLY THE BELOW MEDICATIONS THE DAY OF SURGERY.    Xopenex inhaler if needed, citalopram, phenergan if needed,    Amlodipine, Omeprazole          Comments       Bring inhaler, Dynahex soap, and incentive spirometer back to the hospital.          Please do not bring home medications with you on the day of surgery unless otherwise directed by your nurse.  If you are instructed to bring home medications, please give them to your nurse as they will be administered by the nursing staff.     If you have any questions, please call [facility] [phone].     A copy of this note was provided to the patient for reference.

## 2024-01-08 NOTE — PERIOP NOTE
Patient verified name and .    Order for consent found in EHR and matches case posting; patient verified.     Type 3 surgery, joint assessment complete.    Labs per surgeon: CBC,BMP, PT/INR, Nicotine, HgbA1c, Albumin ; results pending.  Labs per anesthesia protocol: no additional  EKG: Completed today; results within anesthesia guidelines.     Pt's BP elevated during PAT appointment, patient takes 40 mg Benazepril daily and, 5 mg Amlodipine daily. Pt monitors BP at home and states it is usually \"normal.\" Pt states she is in pain and anxious in regards to sx. BP rechecked---161/89.  Pt encouraged to continue to monitor BP at home, if remains elevated call PCP. Pt instructed to call 911/go to ED if she becomes symptomatic. Pt verbalized understanding.     MRSA/MSSA swab collected; pharmacy to review and dose antibiotic as appropriate.     Hospital approved surgical skin cleanser and instructions to return bottle on DOS given per hospital policy.    Patient provided with handouts including Guide to Surgery, Pain Management, Hand Hygiene, Blood Transfusion Education, and [facility] Brochure.    Patient answered medical/surgical history questions at their best of ability. All prior to admission medications documented in [facility]. Original medication prescription bottle NOT visualized during patient appointment.     Patient instructed to hold all vitamins, supplements, herbals 3 weeks prior to surgery and NSAIDS 5 days prior to surgery.     Patient teach back successful and patient demonstrates knowledge of instruction.

## 2024-01-08 NOTE — PROGRESS NOTES
Glen Beltran  : 1960  Primary: [employer]  Secondary:  [facility] [place] [address]  [phone]      Physical Therapy Prehab Evaluation Summary:2024   Time In/Out   PT Charge Capture  Episode     MEDICAL/REFERRING DIAGNOSIS: Unilateral primary osteoarthritis, left hip [M16.12]  REFERRING PHYSICIAN: David Cooper MD    Treatment Diagnosis:   Pain in left hip (M25.552)  Stiffness of Left Hip, Not elsewhere classified (M25.652)    DATE OF SURGERY: 24  Assessment:   COMMENTS:  Ms. Beltran is present for a Prehab Physical Therapy Assessment for their upcoming left RACHEL . They are here alone. After discussing the surgical admission options and discharge plans, they are planning on discharging on day of surgery.    Independent with gait and ADLs. Has had L TSA. Reviewed hip precautions.  D/C plan: home with spouse SDD. Needs RW.    PROBLEM LIST:   (Impacting functional limitations):  Ms. Beltran presents with the following lower extremity(s) problems:  Gait  Strength  Range of Motion  Home Exercise Program  Precautions  Pain INTERVENTIONS PLANNED:   (Benefits and precautions of physical therapy have been discussed with the patient.)  Home Exercise Program  Educational Discussion       GOALS: (Goals have been discussed and agreed upon with patient.)  Discharge Goals: Time Frame: 1 Day  Patient will demonstrate independence with a home exercise program designed to increase functional technique and pain control to minimize functional deficits and optimize patient for total joint replacement.    Subjective:   Past Medical History/Comorbidities:   Ms. Beltran  has a past medical history of ADHD (attention deficit hyperactivity disorder), Arthritis, Chronic obstructive pulmonary disease (HCC), Degeneration of lumbar or lumbosacral intervertebral disc, Depression, Dermatophytosis of the body, Essential hypertension, benign, GERD (gastroesophageal reflux disease),  Rhofade Pregnancy And Lactation Text: This medication has not been assigned a Pregnancy Risk Category. It is unknown if the medication is excreted in breast milk.

## 2024-01-09 LAB
COMMENT:: ABNORMAL
COTININE UR QL SCN: POSITIVE NG/ML
MRSA DNA SPEC QL NAA+PROBE: NOT DETECTED
S AUREUS CPE NOSE QL NAA+PROBE: NOT DETECTED

## 2024-01-09 NOTE — PROGRESS NOTES
Total Joint Surgery Preoperative Chart Review      Patient ID:  Glen Beltran  [id number]  63 y.o.  1960  Surgeon: Dr. Cooper  Date of Surgery: 01/25/2024  Procedure: Total Left Hip Arthroplasty  Primary Care Physician: Genevieve Cano [phone]  Specialty Physician(s):      Subjective:   Glen Beltran is a 63 y.o. White (non-) female who presents for preoperative evaluation for Total Left Hip arthroplasty.    This is a preoperative chart review note based on data collected by the nurse at the surgical Pre-Assessment visit.    Past Medical History:   Diagnosis Date    ADHD (attention deficit hyperactivity disorder)     Arthritis     Chronic obstructive pulmonary disease (HCC)     Per pt \"Mild,\" PRN inhaler-last used 2021, followed by Dr. Cano     Degeneration of lumbar or lumbosacral intervertebral disc 2/27/2015    Depression 02/27/2015    managed with medication     Dermatophytosis of the body 2/27/2015    Essential hypertension, benign 2/27/2015    managed with med    GERD (gastroesophageal reflux disease) 2/27/2015    managed with med    H/O echocardiogram 06/27/2019    EF 55-60%, trivial aortic, mitral, and tricuspid regurgitation     Heart murmur     pt reports since birth; echo dated  6/27/19 states trivial aortic, mitral, and tricuspid regurgitation     History of hepatitis C     History of kidney stones     History of MRSA infection     History of seizure 06/2019    per pt secondary to Robaxin     Hypopotassemia 2/27/2015    Insomnia, unspecified 2/27/2015    Migraine, unspecified, without mention of intractable migraine without mention of status migrainosus 2/27/2015    Odontoid fracture with type II morphology (HCC)     Osteoporosis 1/23/2017    PAC (premature atrial contraction)     Polycythemia, secondary 2/27/2015      Past Surgical History:   Procedure Laterality Date    BACK SURGERY  01/2022    L4-5 DLIF W/PEEK CAGE, BMP, DBM AND PEDICLE SCREWS, C-ARM, NEUROMONITORING,

## 2024-01-10 NOTE — PROGRESS NOTES
NICOTINE AND METABOLITES, URINE  Order: [id number]  Status: Final result       Visible to patient: Yes (not seen)       Next appt: 03/26/2024 at 02:15 PM in Internal Medicine (Genevieve Cano MD)       Dx: Pre-op evaluation    0 Result Notes       Component  Ref Range & Units 1/8/24 1234 Resulting Agency   Cotinine Screen, Ur  Mnxyus=897 ng/mL Positive Abnormal  [facility]   Comment:    [facility]   Comment: (NOTE)  This analysis is performed by immunoassay. Positive findings are  unconfirmed analytical test results; if results do not support  expected clinical finding, confirmation by an alternate methodology  is recommended. Patient metabolic variables, specific drug chemistry,  and specimen characteristics can affect test outcome.  Technical consultation is available at [email or web address], or  call toll free [phone].  Performed At: [facility]  [address], [place] [id number]  Hitesh Yee MD Ph:[phone]  Performed At: [facility]  [address] [place], [place] [id number]  Bianca Eaton PhD Ph:[phone]              Specimen Collected: 01/08/24 12:34 EST Last Resulted: 01/09/24 15:39 EST

## 2024-01-15 DIAGNOSIS — G89.18 POSTOPERATIVE PAIN: Primary | ICD-10-CM

## 2024-01-15 RX ORDER — OMEPRAZOLE 40 MG/1
40 CAPSULE, DELAYED RELEASE ORAL DAILY
Qty: 90 CAPSULE | OUTPATIENT
Start: 2024-01-15

## 2024-01-15 RX ORDER — CYCLOBENZAPRINE HCL 5 MG
5 TABLET ORAL 3 TIMES DAILY PRN
Qty: 30 TABLET | Refills: 0 | Status: SHIPPED | OUTPATIENT
Start: 2024-01-15 | End: 2024-01-25

## 2024-01-15 RX ORDER — ONDANSETRON 4 MG/1
4 TABLET, FILM COATED ORAL 3 TIMES DAILY PRN
Qty: 30 TABLET | Refills: 1 | Status: SHIPPED | OUTPATIENT
Start: 2024-01-15

## 2024-01-15 RX ORDER — SENNOSIDES A AND B 8.6 MG/1
1 TABLET, FILM COATED ORAL 2 TIMES DAILY
Qty: 30 TABLET | Refills: 2 | Status: SHIPPED | OUTPATIENT
Start: 2024-01-15

## 2024-01-15 RX ORDER — ACETAMINOPHEN 500 MG
1000 TABLET ORAL EVERY 6 HOURS PRN
Qty: 180 TABLET | Refills: 2 | Status: SHIPPED | OUTPATIENT
Start: 2024-01-15

## 2024-01-15 RX ORDER — ZOLPIDEM TARTRATE 5 MG/1
5 TABLET ORAL NIGHTLY PRN
Qty: 60 TABLET | Refills: 0 | Status: SHIPPED | OUTPATIENT
Start: 2024-01-15 | End: 2024-03-15

## 2024-01-15 RX ORDER — OXYCODONE HYDROCHLORIDE 5 MG/1
10 TABLET ORAL EVERY 4 HOURS PRN
Qty: 60 TABLET | Refills: 0 | Status: SHIPPED | OUTPATIENT
Start: 2024-01-15 | End: 2024-01-22

## 2024-01-15 RX ORDER — GABAPENTIN 300 MG/1
300 CAPSULE ORAL 2 TIMES DAILY
Qty: 30 CAPSULE | Refills: 0 | Status: SHIPPED | OUTPATIENT
Start: 2024-01-15 | End: 2024-01-30

## 2024-01-15 RX ORDER — MELOXICAM 15 MG/1
15 TABLET ORAL DAILY
Qty: 30 TABLET | Refills: 2 | Status: SHIPPED | OUTPATIENT
Start: 2024-01-15

## 2024-01-15 RX ORDER — OMEPRAZOLE 40 MG/1
40 CAPSULE, DELAYED RELEASE ORAL DAILY
Qty: 30 CAPSULE | Refills: 0 | Status: SHIPPED | OUTPATIENT
Start: 2024-01-15

## 2024-01-15 RX ORDER — ASPIRIN 81 MG/1
81 TABLET ORAL 2 TIMES DAILY
Qty: 60 TABLET | Refills: 0 | Status: SHIPPED | OUTPATIENT
Start: 2024-01-15

## 2024-01-22 ENCOUNTER — ANESTHESIA EVENT (OUTPATIENT)
Dept: SURGERY | Age: 64
End: 2024-01-22
Payer: COMMERCIAL

## 2024-01-22 RX ORDER — TIZANIDINE 4 MG/1
4 TABLET ORAL EVERY 8 HOURS PRN
Qty: 30 TABLET | Refills: 0 | Status: SHIPPED | OUTPATIENT
Start: 2024-01-22 | End: 2024-02-01

## 2024-01-23 ENCOUNTER — ANESTHESIA (OUTPATIENT)
Dept: SURGERY | Age: 64
End: 2024-01-23
Payer: COMMERCIAL

## 2024-01-23 ENCOUNTER — HOME HEALTH ADMISSION (OUTPATIENT)
Dept: HOME HEALTH SERVICES | Facility: HOME HEALTH | Age: 64
End: 2024-01-23
Payer: COMMERCIAL

## 2024-01-23 ENCOUNTER — HOSPITAL ENCOUNTER (OUTPATIENT)
Age: 64
Discharge: HOME | End: 2024-01-23
Attending: ORTHOPAEDIC SURGERY | Admitting: ORTHOPAEDIC SURGERY
Payer: COMMERCIAL

## 2024-01-23 ENCOUNTER — APPOINTMENT (OUTPATIENT)
Dept: GENERAL RADIOLOGY | Age: 64
End: 2024-01-23
Attending: ORTHOPAEDIC SURGERY
Payer: COMMERCIAL

## 2024-01-23 VITALS
HEART RATE: 82 BPM | DIASTOLIC BLOOD PRESSURE: 80 MMHG | OXYGEN SATURATION: 96 % | HEIGHT: 61 IN | WEIGHT: 152.9 LBS | BODY MASS INDEX: 28.87 KG/M2 | RESPIRATION RATE: 16 BRPM | TEMPERATURE: 97.6 F | SYSTOLIC BLOOD PRESSURE: 120 MMHG

## 2024-01-23 PROBLEM — M16.12 OSTEOARTHRITIS OF LEFT HIP, UNSPECIFIED OSTEOARTHRITIS TYPE: Status: ACTIVE | Noted: 2024-01-23

## 2024-01-23 PROCEDURE — 6370000000 HC RX 637 (ALT 250 FOR IP): Performed by: ANESTHESIOLOGY

## 2024-01-23 PROCEDURE — 97530 THERAPEUTIC ACTIVITIES: CPT

## 2024-01-23 PROCEDURE — 6360000002 HC RX W HCPCS: Performed by: ANESTHESIOLOGY

## 2024-01-23 PROCEDURE — 7100000001 HC PACU RECOVERY - ADDTL 15 MIN: Performed by: ORTHOPAEDIC SURGERY

## 2024-01-23 PROCEDURE — 2700000000 HC OXYGEN THERAPY PER DAY

## 2024-01-23 PROCEDURE — 3700000001 HC ADD 15 MINUTES (ANESTHESIA): Performed by: ORTHOPAEDIC SURGERY

## 2024-01-23 PROCEDURE — 2580000003 HC RX 258: Performed by: ANESTHESIOLOGY

## 2024-01-23 PROCEDURE — 6360000002 HC RX W HCPCS: Performed by: NURSE ANESTHETIST, CERTIFIED REGISTERED

## 2024-01-23 PROCEDURE — 2500000003 HC RX 250 WO HCPCS: Performed by: NURSE ANESTHETIST, CERTIFIED REGISTERED

## 2024-01-23 PROCEDURE — C1776 JOINT DEVICE (IMPLANTABLE): HCPCS | Performed by: ORTHOPAEDIC SURGERY

## 2024-01-23 PROCEDURE — C1713 ANCHOR/SCREW BN/BN,TIS/BN: HCPCS | Performed by: ORTHOPAEDIC SURGERY

## 2024-01-23 PROCEDURE — 94760 N-INVAS EAR/PLS OXIMETRY 1: CPT

## 2024-01-23 PROCEDURE — 6370000000 HC RX 637 (ALT 250 FOR IP): Performed by: NURSE PRACTITIONER

## 2024-01-23 PROCEDURE — 2500000003 HC RX 250 WO HCPCS: Performed by: ANESTHESIOLOGY

## 2024-01-23 PROCEDURE — 7100000000 HC PACU RECOVERY - FIRST 15 MIN: Performed by: ORTHOPAEDIC SURGERY

## 2024-01-23 PROCEDURE — 2709999900 HC NON-CHARGEABLE SUPPLY: Performed by: ORTHOPAEDIC SURGERY

## 2024-01-23 PROCEDURE — 3600000015 HC SURGERY LEVEL 5 ADDTL 15MIN: Performed by: ORTHOPAEDIC SURGERY

## 2024-01-23 PROCEDURE — 6360000002 HC RX W HCPCS: Performed by: ORTHOPAEDIC SURGERY

## 2024-01-23 PROCEDURE — 2720000010 HC SURG SUPPLY STERILE: Performed by: ORTHOPAEDIC SURGERY

## 2024-01-23 PROCEDURE — 6360000002 HC RX W HCPCS: Performed by: NURSE PRACTITIONER

## 2024-01-23 PROCEDURE — 72170 X-RAY EXAM OF PELVIS: CPT

## 2024-01-23 PROCEDURE — 3600000005 HC SURGERY LEVEL 5 BASE: Performed by: ORTHOPAEDIC SURGERY

## 2024-01-23 PROCEDURE — 2580000003 HC RX 258: Performed by: ORTHOPAEDIC SURGERY

## 2024-01-23 PROCEDURE — 97165 OT EVAL LOW COMPLEX 30 MIN: CPT

## 2024-01-23 PROCEDURE — 94761 N-INVAS EAR/PLS OXIMETRY MLT: CPT

## 2024-01-23 PROCEDURE — 97535 SELF CARE MNGMENT TRAINING: CPT

## 2024-01-23 PROCEDURE — 3700000000 HC ANESTHESIA ATTENDED CARE: Performed by: ORTHOPAEDIC SURGERY

## 2024-01-23 PROCEDURE — 97161 PT EVAL LOW COMPLEX 20 MIN: CPT

## 2024-01-23 DEVICE — SCREW BNE L35MM DIA65MM LO PROF HEX TRIDENT LL: Type: IMPLANTABLE DEVICE | Site: HIP | Status: FUNCTIONAL

## 2024-01-23 DEVICE — LFIT V40 FEMORAL HEAD
Type: IMPLANTABLE DEVICE | Site: HIP | Status: FUNCTIONAL
Brand: V40 HEAD

## 2024-01-23 DEVICE — POWDER SURG CELLERATE RX 1 GM HYDROL COLLEGEN: Type: IMPLANTABLE DEVICE | Site: HIP | Status: FUNCTIONAL

## 2024-01-23 DEVICE — LINER- CEMENTLESS
Type: IMPLANTABLE DEVICE | Site: HIP | Status: FUNCTIONAL
Brand: MDM

## 2024-01-23 DEVICE — TRIDENT X3 10 DEGREE POLYETHYLENE INSERT
Type: IMPLANTABLE DEVICE | Site: HIP | Status: FUNCTIONAL
Brand: TRIDENT X3 INSERT

## 2024-01-23 DEVICE — COMPONENT TOT HIP CAPPED BPLR UPLR CEM STEM H4STRYKER] STRYKER CORP]: Type: IMPLANTABLE DEVICE | Site: HIP | Status: FUNCTIONAL

## 2024-01-23 DEVICE — X3 INSERT FOR MDM LINER
Type: IMPLANTABLE DEVICE | Site: HIP | Status: FUNCTIONAL
Brand: MDM X3 INSERT

## 2024-01-23 DEVICE — 127 DEGREE NECK ANGLE HIP STEM
Type: IMPLANTABLE DEVICE | Site: HIP | Status: FUNCTIONAL
Brand: ACCOLADE

## 2024-01-23 DEVICE — TRIDENT II TRITANIUM CLUSTER 50D
Type: IMPLANTABLE DEVICE | Site: HIP | Status: FUNCTIONAL
Brand: TRIDENT II

## 2024-01-23 RX ORDER — CYCLOBENZAPRINE HCL 5 MG
5 TABLET ORAL 3 TIMES DAILY PRN
Status: DISCONTINUED | OUTPATIENT
Start: 2024-01-23 | End: 2024-01-23 | Stop reason: HOSPADM

## 2024-01-23 RX ORDER — MELOXICAM 7.5 MG/1
7.5 TABLET ORAL 2 TIMES DAILY
Status: DISCONTINUED | OUTPATIENT
Start: 2024-01-26 | End: 2024-01-23 | Stop reason: HOSPADM

## 2024-01-23 RX ORDER — SODIUM CHLORIDE 9 MG/ML
INJECTION, SOLUTION INTRAVENOUS PRN
Status: DISCONTINUED | OUTPATIENT
Start: 2024-01-23 | End: 2024-01-23 | Stop reason: HOSPADM

## 2024-01-23 RX ORDER — SODIUM CHLORIDE 0.9 % (FLUSH) 0.9 %
5-40 SYRINGE (ML) INJECTION EVERY 12 HOURS SCHEDULED
Status: DISCONTINUED | OUTPATIENT
Start: 2024-01-23 | End: 2024-01-23 | Stop reason: HOSPADM

## 2024-01-23 RX ORDER — VANCOMYCIN HYDROCHLORIDE 1 G/20ML
INJECTION, POWDER, LYOPHILIZED, FOR SOLUTION INTRAVENOUS PRN
Status: DISCONTINUED | OUTPATIENT
Start: 2024-01-23 | End: 2024-01-23 | Stop reason: ALTCHOICE

## 2024-01-23 RX ORDER — SODIUM CHLORIDE 0.9 % (FLUSH) 0.9 %
5-40 SYRINGE (ML) INJECTION PRN
Status: DISCONTINUED | OUTPATIENT
Start: 2024-01-23 | End: 2024-01-23 | Stop reason: HOSPADM

## 2024-01-23 RX ORDER — FENTANYL CITRATE 50 UG/ML
50 INJECTION, SOLUTION INTRAMUSCULAR; INTRAVENOUS EVERY 5 MIN PRN
Status: DISCONTINUED | OUTPATIENT
Start: 2024-01-23 | End: 2024-01-23 | Stop reason: HOSPADM

## 2024-01-23 RX ORDER — ONDANSETRON 4 MG/1
8 TABLET, ORALLY DISINTEGRATING ORAL EVERY 8 HOURS PRN
Status: DISCONTINUED | OUTPATIENT
Start: 2024-01-23 | End: 2024-01-23 | Stop reason: HOSPADM

## 2024-01-23 RX ORDER — ASPIRIN 81 MG/1
81 TABLET ORAL 2 TIMES DAILY
Status: DISCONTINUED | OUTPATIENT
Start: 2024-01-23 | End: 2024-01-23 | Stop reason: HOSPADM

## 2024-01-23 RX ORDER — ZOLPIDEM TARTRATE 5 MG/1
5 TABLET ORAL NIGHTLY PRN
Status: DISCONTINUED | OUTPATIENT
Start: 2024-01-23 | End: 2024-01-23 | Stop reason: HOSPADM

## 2024-01-23 RX ORDER — GLYCOPYRROLATE 0.2 MG/ML
INJECTION INTRAMUSCULAR; INTRAVENOUS PRN
Status: DISCONTINUED | OUTPATIENT
Start: 2024-01-23 | End: 2024-01-23 | Stop reason: SDUPTHER

## 2024-01-23 RX ORDER — OXYCODONE HYDROCHLORIDE 5 MG/1
5 TABLET ORAL EVERY 4 HOURS PRN
Status: DISCONTINUED | OUTPATIENT
Start: 2024-01-23 | End: 2024-01-23 | Stop reason: HOSPADM

## 2024-01-23 RX ORDER — KETOROLAC TROMETHAMINE 30 MG/ML
15 INJECTION, SOLUTION INTRAMUSCULAR; INTRAVENOUS ONCE
Status: COMPLETED | OUTPATIENT
Start: 2024-01-23 | End: 2024-01-23

## 2024-01-23 RX ORDER — OXYCODONE HYDROCHLORIDE 5 MG/1
5 TABLET ORAL
Status: COMPLETED | OUTPATIENT
Start: 2024-01-23 | End: 2024-01-23

## 2024-01-23 RX ORDER — DIPHENHYDRAMINE HCL 25 MG
25 CAPSULE ORAL EVERY 6 HOURS PRN
Status: DISCONTINUED | OUTPATIENT
Start: 2024-01-23 | End: 2024-01-23 | Stop reason: HOSPADM

## 2024-01-23 RX ORDER — HALOPERIDOL 5 MG/ML
1 INJECTION INTRAMUSCULAR
Status: DISCONTINUED | OUTPATIENT
Start: 2024-01-23 | End: 2024-01-23 | Stop reason: HOSPADM

## 2024-01-23 RX ORDER — ALBUTEROL SULFATE 2.5 MG/3ML
2.5 SOLUTION RESPIRATORY (INHALATION) EVERY 6 HOURS PRN
Status: DISCONTINUED | OUTPATIENT
Start: 2024-01-23 | End: 2024-01-23 | Stop reason: HOSPADM

## 2024-01-23 RX ORDER — ONDANSETRON 2 MG/ML
INJECTION INTRAMUSCULAR; INTRAVENOUS PRN
Status: DISCONTINUED | OUTPATIENT
Start: 2024-01-23 | End: 2024-01-23 | Stop reason: SDUPTHER

## 2024-01-23 RX ORDER — KETAMINE HYDROCHLORIDE 50 MG/ML
INJECTION, SOLUTION INTRAMUSCULAR; INTRAVENOUS PRN
Status: DISCONTINUED | OUTPATIENT
Start: 2024-01-23 | End: 2024-01-23 | Stop reason: SDUPTHER

## 2024-01-23 RX ORDER — PANTOPRAZOLE SODIUM 40 MG/1
40 TABLET, DELAYED RELEASE ORAL
Status: DISCONTINUED | OUTPATIENT
Start: 2024-01-24 | End: 2024-01-23 | Stop reason: HOSPADM

## 2024-01-23 RX ORDER — CITALOPRAM 20 MG/1
20 TABLET ORAL 2 TIMES DAILY
Status: DISCONTINUED | OUTPATIENT
Start: 2024-01-23 | End: 2024-01-23 | Stop reason: HOSPADM

## 2024-01-23 RX ORDER — MIDAZOLAM HYDROCHLORIDE 1 MG/ML
INJECTION INTRAMUSCULAR; INTRAVENOUS PRN
Status: DISCONTINUED | OUTPATIENT
Start: 2024-01-23 | End: 2024-01-23 | Stop reason: SDUPTHER

## 2024-01-23 RX ORDER — LIDOCAINE HYDROCHLORIDE 10 MG/ML
1 INJECTION, SOLUTION INFILTRATION; PERINEURAL
Status: COMPLETED | OUTPATIENT
Start: 2024-01-23 | End: 2024-01-23

## 2024-01-23 RX ORDER — ACETAMINOPHEN 500 MG
1000 TABLET ORAL ONCE
Status: COMPLETED | OUTPATIENT
Start: 2024-01-23 | End: 2024-01-23

## 2024-01-23 RX ORDER — AMLODIPINE BESYLATE 5 MG/1
5 TABLET ORAL DAILY
Status: DISCONTINUED | OUTPATIENT
Start: 2024-01-24 | End: 2024-01-23 | Stop reason: HOSPADM

## 2024-01-23 RX ORDER — ONDANSETRON 2 MG/ML
4 INJECTION INTRAMUSCULAR; INTRAVENOUS EVERY 6 HOURS PRN
Status: DISCONTINUED | OUTPATIENT
Start: 2024-01-23 | End: 2024-01-23 | Stop reason: HOSPADM

## 2024-01-23 RX ORDER — MIDAZOLAM HYDROCHLORIDE 2 MG/2ML
2 INJECTION, SOLUTION INTRAMUSCULAR; INTRAVENOUS
Status: DISCONTINUED | OUTPATIENT
Start: 2024-01-23 | End: 2024-01-23 | Stop reason: HOSPADM

## 2024-01-23 RX ORDER — IPRATROPIUM BROMIDE AND ALBUTEROL SULFATE 2.5; .5 MG/3ML; MG/3ML
1 SOLUTION RESPIRATORY (INHALATION)
Status: DISCONTINUED | OUTPATIENT
Start: 2024-01-23 | End: 2024-01-23 | Stop reason: HOSPADM

## 2024-01-23 RX ORDER — FENTANYL CITRATE 50 UG/ML
INJECTION, SOLUTION INTRAMUSCULAR; INTRAVENOUS PRN
Status: DISCONTINUED | OUTPATIENT
Start: 2024-01-23 | End: 2024-01-23 | Stop reason: SDUPTHER

## 2024-01-23 RX ORDER — NALOXONE HYDROCHLORIDE 0.4 MG/ML
0.4 INJECTION, SOLUTION INTRAMUSCULAR; INTRAVENOUS; SUBCUTANEOUS PRN
Status: DISCONTINUED | OUTPATIENT
Start: 2024-01-23 | End: 2024-01-23 | Stop reason: HOSPADM

## 2024-01-23 RX ORDER — SODIUM CHLORIDE, SODIUM LACTATE, POTASSIUM CHLORIDE, CALCIUM CHLORIDE 600; 310; 30; 20 MG/100ML; MG/100ML; MG/100ML; MG/100ML
INJECTION, SOLUTION INTRAVENOUS CONTINUOUS
Status: DISCONTINUED | OUTPATIENT
Start: 2024-01-23 | End: 2024-01-23 | Stop reason: HOSPADM

## 2024-01-23 RX ORDER — TOBRAMYCIN 1.2 G/30ML
INJECTION, POWDER, LYOPHILIZED, FOR SOLUTION INTRAVENOUS PRN
Status: DISCONTINUED | OUTPATIENT
Start: 2024-01-23 | End: 2024-01-23 | Stop reason: ALTCHOICE

## 2024-01-23 RX ORDER — SENNA AND DOCUSATE SODIUM 50; 8.6 MG/1; MG/1
1 TABLET, FILM COATED ORAL 2 TIMES DAILY
Status: DISCONTINUED | OUTPATIENT
Start: 2024-01-23 | End: 2024-01-23 | Stop reason: HOSPADM

## 2024-01-23 RX ORDER — OXYCODONE HYDROCHLORIDE 5 MG/1
10 TABLET ORAL EVERY 4 HOURS PRN
Status: DISCONTINUED | OUTPATIENT
Start: 2024-01-23 | End: 2024-01-23 | Stop reason: HOSPADM

## 2024-01-23 RX ORDER — KETOROLAC TROMETHAMINE 30 MG/ML
INJECTION, SOLUTION INTRAMUSCULAR; INTRAVENOUS PRN
Status: DISCONTINUED | OUTPATIENT
Start: 2024-01-23 | End: 2024-01-23 | Stop reason: ALTCHOICE

## 2024-01-23 RX ORDER — TRANEXAMIC ACID 100 MG/ML
INJECTION, SOLUTION INTRAVENOUS PRN
Status: DISCONTINUED | OUTPATIENT
Start: 2024-01-23 | End: 2024-01-23 | Stop reason: SDUPTHER

## 2024-01-23 RX ORDER — ONDANSETRON 4 MG/1
4 TABLET, ORALLY DISINTEGRATING ORAL EVERY 8 HOURS PRN
Status: DISCONTINUED | OUTPATIENT
Start: 2024-01-23 | End: 2024-01-23 | Stop reason: HOSPADM

## 2024-01-23 RX ORDER — SODIUM CHLORIDE 9 MG/ML
INJECTION, SOLUTION INTRAVENOUS CONTINUOUS
Status: DISCONTINUED | OUTPATIENT
Start: 2024-01-23 | End: 2024-01-23 | Stop reason: HOSPADM

## 2024-01-23 RX ORDER — NEOSTIGMINE METHYLSULFATE 1 MG/ML
INJECTION, SOLUTION INTRAVENOUS PRN
Status: DISCONTINUED | OUTPATIENT
Start: 2024-01-23 | End: 2024-01-23 | Stop reason: SDUPTHER

## 2024-01-23 RX ORDER — ACETAMINOPHEN 500 MG
1000 TABLET ORAL EVERY 6 HOURS
Status: DISCONTINUED | OUTPATIENT
Start: 2024-01-23 | End: 2024-01-23 | Stop reason: HOSPADM

## 2024-01-23 RX ORDER — KETOROLAC TROMETHAMINE 15 MG/ML
15 INJECTION, SOLUTION INTRAMUSCULAR; INTRAVENOUS EVERY 8 HOURS
Status: DISCONTINUED | OUTPATIENT
Start: 2024-01-23 | End: 2024-01-23 | Stop reason: HOSPADM

## 2024-01-23 RX ORDER — HYDROMORPHONE HYDROCHLORIDE 1 MG/ML
1 INJECTION, SOLUTION INTRAMUSCULAR; INTRAVENOUS; SUBCUTANEOUS
Status: DISCONTINUED | OUTPATIENT
Start: 2024-01-23 | End: 2024-01-23 | Stop reason: HOSPADM

## 2024-01-23 RX ORDER — ONDANSETRON 2 MG/ML
4 INJECTION INTRAMUSCULAR; INTRAVENOUS
Status: DISCONTINUED | OUTPATIENT
Start: 2024-01-23 | End: 2024-01-23 | Stop reason: HOSPADM

## 2024-01-23 RX ORDER — PROPOFOL 10 MG/ML
INJECTION, EMULSION INTRAVENOUS PRN
Status: DISCONTINUED | OUTPATIENT
Start: 2024-01-23 | End: 2024-01-23 | Stop reason: SDUPTHER

## 2024-01-23 RX ORDER — DEXAMETHASONE SODIUM PHOSPHATE 10 MG/ML
10 INJECTION INTRAMUSCULAR; INTRAVENOUS ONCE
Status: COMPLETED | OUTPATIENT
Start: 2024-01-23 | End: 2024-01-23

## 2024-01-23 RX ORDER — OLANZAPINE 5 MG/1
30 TABLET ORAL NIGHTLY
Status: DISCONTINUED | OUTPATIENT
Start: 2024-01-23 | End: 2024-01-23 | Stop reason: HOSPADM

## 2024-01-23 RX ORDER — ALBUTEROL SULFATE 90 UG/1
2 AEROSOL, METERED RESPIRATORY (INHALATION) EVERY 6 HOURS PRN
Status: DISCONTINUED | OUTPATIENT
Start: 2024-01-23 | End: 2024-01-23 | Stop reason: HOSPADM

## 2024-01-23 RX ORDER — TRANEXAMIC ACID 650 MG/1
1300 TABLET ORAL
Status: DISCONTINUED | OUTPATIENT
Start: 2024-01-23 | End: 2024-01-23 | Stop reason: HOSPADM

## 2024-01-23 RX ORDER — DIPHENHYDRAMINE HYDROCHLORIDE 50 MG/ML
25 INJECTION INTRAMUSCULAR; INTRAVENOUS EVERY 6 HOURS PRN
Status: DISCONTINUED | OUTPATIENT
Start: 2024-01-23 | End: 2024-01-23 | Stop reason: HOSPADM

## 2024-01-23 RX ORDER — ROCURONIUM BROMIDE 10 MG/ML
INJECTION, SOLUTION INTRAVENOUS PRN
Status: DISCONTINUED | OUTPATIENT
Start: 2024-01-23 | End: 2024-01-23 | Stop reason: SDUPTHER

## 2024-01-23 RX ORDER — ROPIVACAINE HYDROCHLORIDE 2 MG/ML
INJECTION, SOLUTION EPIDURAL; INFILTRATION; PERINEURAL PRN
Status: DISCONTINUED | OUTPATIENT
Start: 2024-01-23 | End: 2024-01-23 | Stop reason: ALTCHOICE

## 2024-01-23 RX ORDER — PRAVASTATIN SODIUM 80 MG/1
80 TABLET ORAL NIGHTLY
Status: DISCONTINUED | OUTPATIENT
Start: 2024-01-23 | End: 2024-01-23 | Stop reason: HOSPADM

## 2024-01-23 RX ORDER — LISINOPRIL 20 MG/1
40 TABLET ORAL DAILY
Status: DISCONTINUED | OUTPATIENT
Start: 2024-01-23 | End: 2024-01-23 | Stop reason: HOSPADM

## 2024-01-23 RX ORDER — GABAPENTIN 300 MG/1
300 CAPSULE ORAL 2 TIMES DAILY
Status: DISCONTINUED | OUTPATIENT
Start: 2024-01-23 | End: 2024-01-23 | Stop reason: HOSPADM

## 2024-01-23 RX ADMIN — DEXAMETHASONE SODIUM PHOSPHATE 10 MG: 10 INJECTION INTRAMUSCULAR; INTRAVENOUS at 11:10

## 2024-01-23 RX ADMIN — GLYCOPYRROLATE 0.4 MG: 0.2 INJECTION INTRAMUSCULAR; INTRAVENOUS at 08:25

## 2024-01-23 RX ADMIN — SODIUM CHLORIDE, SODIUM LACTATE, POTASSIUM CHLORIDE, AND CALCIUM CHLORIDE: 600; 310; 30; 20 INJECTION, SOLUTION INTRAVENOUS at 06:31

## 2024-01-23 RX ADMIN — PHENYLEPHRINE HYDROCHLORIDE 100 MCG: 0.1 INJECTION, SOLUTION INTRAVENOUS at 08:19

## 2024-01-23 RX ADMIN — TRANEXAMIC ACID 1300 MG: 650 TABLET ORAL at 11:10

## 2024-01-23 RX ADMIN — KETAMINE HYDROCHLORIDE 20 MG: 50 INJECTION, SOLUTION INTRAMUSCULAR; INTRAVENOUS at 07:04

## 2024-01-23 RX ADMIN — SODIUM CHLORIDE, SODIUM LACTATE, POTASSIUM CHLORIDE, AND CALCIUM CHLORIDE: 600; 310; 30; 20 INJECTION, SOLUTION INTRAVENOUS at 08:00

## 2024-01-23 RX ADMIN — PHENYLEPHRINE HYDROCHLORIDE 100 MCG: 0.1 INJECTION, SOLUTION INTRAVENOUS at 08:03

## 2024-01-23 RX ADMIN — PROPOFOL 150 MG: 10 INJECTION, EMULSION INTRAVENOUS at 07:04

## 2024-01-23 RX ADMIN — KETOROLAC TROMETHAMINE 15 MG: 30 INJECTION, SOLUTION INTRAMUSCULAR; INTRAVENOUS at 09:07

## 2024-01-23 RX ADMIN — OXYCODONE 5 MG: 5 TABLET ORAL at 09:16

## 2024-01-23 RX ADMIN — FENTANYL CITRATE 50 MCG: 50 INJECTION INTRAMUSCULAR; INTRAVENOUS at 09:04

## 2024-01-23 RX ADMIN — PHENYLEPHRINE HYDROCHLORIDE 100 MCG: 0.1 INJECTION, SOLUTION INTRAVENOUS at 08:14

## 2024-01-23 RX ADMIN — LIDOCAINE HYDROCHLORIDE 1 ML: 10 INJECTION, SOLUTION INFILTRATION; PERINEURAL at 06:30

## 2024-01-23 RX ADMIN — VANCOMYCIN HYDROCHLORIDE 1000 MG: 1 INJECTION, POWDER, LYOPHILIZED, FOR SOLUTION INTRAVENOUS at 06:31

## 2024-01-23 RX ADMIN — KETOROLAC TROMETHAMINE 15 MG: 15 INJECTION, SOLUTION INTRAMUSCULAR; INTRAVENOUS at 11:10

## 2024-01-23 RX ADMIN — Medication 2000 MG: at 06:59

## 2024-01-23 RX ADMIN — ONDANSETRON 4 MG: 2 INJECTION INTRAMUSCULAR; INTRAVENOUS at 08:15

## 2024-01-23 RX ADMIN — Medication 3 MG: at 08:25

## 2024-01-23 RX ADMIN — ACETAMINOPHEN 1000 MG: 500 TABLET, FILM COATED ORAL at 11:10

## 2024-01-23 RX ADMIN — ROCURONIUM BROMIDE 30 MG: 50 INJECTION, SOLUTION INTRAVENOUS at 07:04

## 2024-01-23 RX ADMIN — MIDAZOLAM 2 MG: 1 INJECTION INTRAMUSCULAR; INTRAVENOUS at 06:46

## 2024-01-23 RX ADMIN — TRANEXAMIC ACID 1000 MG: 100 INJECTION, SOLUTION INTRAVENOUS at 07:25

## 2024-01-23 RX ADMIN — FENTANYL CITRATE 100 MCG: 50 INJECTION, SOLUTION INTRAMUSCULAR; INTRAVENOUS at 07:04

## 2024-01-23 RX ADMIN — ACETAMINOPHEN 1000 MG: 500 TABLET, FILM COATED ORAL at 06:10

## 2024-01-23 RX ADMIN — ROCURONIUM BROMIDE 20 MG: 50 INJECTION, SOLUTION INTRAVENOUS at 07:40

## 2024-01-23 ASSESSMENT — PAIN DESCRIPTION - ORIENTATION: ORIENTATION: LEFT

## 2024-01-23 ASSESSMENT — PAIN SCALES - GENERAL
PAINLEVEL_OUTOF10: 7
PAINLEVEL_OUTOF10: 7

## 2024-01-23 ASSESSMENT — HOOS JR
SITTING: 0
HOOS JR RAW SCORE: 2
HOOS JR RAW SCORE: 2
LYING IN BED (TURNING OVER, MAINTAINING HIP POSITION): 0
BENDING TO THE FLOOR TO PICK UP OBJECT: 1
RISING FROM SITTING: 1
HOOS JR TOTAL INTERVAL SCORE: 85.257
WALKING ON UNEVEN SURFACE: 0
GOING UP OR DOWN STAIRS: 0

## 2024-01-23 ASSESSMENT — PAIN DESCRIPTION - LOCATION
LOCATION: HIP
LOCATION: HIP

## 2024-01-23 ASSESSMENT — PAIN - FUNCTIONAL ASSESSMENT: PAIN_FUNCTIONAL_ASSESSMENT: 0-10

## 2024-01-23 ASSESSMENT — COPD QUESTIONNAIRES: CAT_SEVERITY: MODERATE

## 2024-01-23 NOTE — PERIOP NOTE
TRANSFER - OUT REPORT:    Verbal report given to Mandy MICHEL on Glen Beltran  being transferred to [facility] for routine progression of patient care       Report consisted of patient's Situation, Background, Assessment and   Recommendations(SBAR).     Information from the following report(s) Nurse Handoff Report, Adult Overview, Surgery Report, MAR, and Cardiac Rhythm SR  was reviewed with the receiving nurse.           Lines:   Peripheral IV 01/23/24 Posterior;Right Wrist (Active)   Site Assessment Clean, dry & intact 01/23/24 0839   Line Status Infusing 01/23/24 0839   Line Care Connections checked and tightened 01/23/24 0839   Phlebitis Assessment No symptoms 01/23/24 0839   Infiltration Assessment 0 01/23/24 0839   Alcohol Cap Used No 01/23/24 0839   Dressing Status Clean, dry & intact 01/23/24 0839   Dressing Type Transparent 01/23/24 0839        Opportunity for questions and clarification was provided.      Patient transported with:  O2 @ 2lpm

## 2024-01-23 NOTE — H&P
Patient ID:  Glen Beltran  [id number]  63 y.o.  1960    Today: January 23, 2024       CC:  Left hip pain    HPI:   The patient has end stage arthritis of the left hip. The patient was evaluated and examined in the office prior to today and was found to have a history and physical exam consistent with intra-articular pathology of the left hip. Patient complains of anterior groin pain predominately. Pain occurs during activity. Patient has difficulty putting on socks/shoes and has notable pain when getting up from a chair and getting out of a car. Patient does not complain of significant lateral hip pain or radicular pain down the leg. There have been no changes to the patient's orthopedic condition since the last office visit    Past Medical History:  Past Medical History:   Diagnosis Date    ADHD (attention deficit hyperactivity disorder)     Arthritis     Chronic obstructive pulmonary disease (HCC)     Per pt \"Mild,\" PRN inhaler-last used 2021, followed by Dr. Cano     Degeneration of lumbar or lumbosacral intervertebral disc 2/27/2015    Depression 02/27/2015    managed with medication     Dermatophytosis of the body 2/27/2015    Essential hypertension, benign 2/27/2015    managed with med    GERD (gastroesophageal reflux disease) 2/27/2015    managed with med    H/O echocardiogram 06/27/2019    EF 55-60%, trivial aortic, mitral, and tricuspid regurgitation     Heart murmur     pt reports since birth; echo dated  6/27/19 states trivial aortic, mitral, and tricuspid regurgitation     History of hepatitis C     History of kidney stones     History of MRSA infection     History of seizure 06/2019    per pt secondary to Robaxin     Hypopotassemia 2/27/2015    Insomnia, unspecified 2/27/2015    Migraine, unspecified, without mention of intractable migraine without mention of status migrainosus 2/27/2015    Odontoid fracture with type II morphology (HCC)     Osteoporosis 1/23/2017    PAC (premature atrial

## 2024-01-23 NOTE — ANESTHESIA PRE PROCEDURE
Department of Anesthesiology  Preprocedure Note       Name:  Glen Beltran   Age:  63 y.o.  :  1960                                          MRN:  [id number]         Date:  2024      Surgeon: Surgeon(s):  David Cooper MD    Procedure: Procedure(s):  LEFT HIP TOTAL ARTHROPLASTY, Paula, Prevena/ SDD    Medications prior to admission:   Prior to Admission medications    Medication Sig Start Date End Date Taking? Authorizing Provider   tiZANidine (ZANAFLEX) 4 MG tablet Take 1 tablet by mouth every 8 hours as needed (Muscle spasms)  Patient not taking: Reported on 2024  Jeff Tucker APRN - CNP   acetaminophen (TYLENOL) 500 MG tablet Take 2 tablets by mouth every 6 hours as needed for Pain 1/15/24   David Cooper MD   zolpidem (AMBIEN) 5 MG tablet Take 1 tablet by mouth nightly as needed for Sleep for up to 60 days. Max Daily Amount: 10 mg  Patient not taking: Reported on 2024 1/15/24 3/15/24  David Cooper MD   aspirin (ASPIRIN 81) 81 MG EC tablet Take 1 tablet by mouth in the morning and at bedtime Take one tablet morning and evening  Patient not taking: Reported on 2024 1/15/24   David Cooper MD   gabapentin (NEURONTIN) 300 MG capsule Take 1 capsule by mouth 2 times daily for 15 days.  Patient not taking: Reported on 2024 1/15/24 1/30/24  David Cooper MD   meloxicam (MOBIC) 15 MG tablet Take 1 tablet by mouth daily  Patient not taking: Reported on 2024 1/15/24   David Cooper MD   omeprazole (PRILOSEC) 40 MG delayed release capsule Take 1 capsule by mouth daily 1/15/24   David Cooper MD   ondansetron (ZOFRAN) 4 MG tablet Take 1 tablet by mouth 3 times daily as needed for Nausea or Vomiting  Patient not taking: Reported on 2024 1/15/24   David Cooper MD   senna (SENOKOT) 8.6 MG tablet Take 1 tablet by mouth 2 times daily  Patient not taking: Reported on 2024 1/15/24   David Cooper MD   meloxicam (MOBIC) 15 MG tablet Take 1

## 2024-01-23 NOTE — ANESTHESIA POSTPROCEDURE EVALUATION
Department of Anesthesiology  Postprocedure Note    Patient: Glen Beltran  MRN: [id number]  YOB: 1960  Date of evaluation: 1/23/2024    Procedure Summary       Date: 01/23/24 Room / Location: [facility]IN OR 06 / [facility]    Anesthesia Start: 0659 Anesthesia Stop: 0839    Procedure: LEFT HIP TOTAL ARTHROPLASTY, [place], Prevena/ SDD (Left: Hip) Diagnosis:       Primary osteoarthritis of left hip      (Primary osteoarthritis of left hip [M16.12])    Surgeons: David Cooper MD Responsible Provider: Chris Oconnor MD    Anesthesia Type: general ASA Status: 3            Anesthesia Type: No value filed.    Sebas Phase I: Sebas Score: 8    Sebas Phase II:      Anesthesia Post Evaluation    Patient location during evaluation: PACU  Patient participation: complete - patient participated  Level of consciousness: awake and alert  Airway patency: patent  Nausea & Vomiting: no nausea and no vomiting  Cardiovascular status: hemodynamically stable  Respiratory status: acceptable, nonlabored ventilation and spontaneous ventilation  Hydration status: euvolemic  Comments: BP (!) 149/83   Pulse 81   Temp 97.2 °F (36.2 °C)   Resp 16   Ht 1.549 m (5' 0.98\")   Wt 69.4 kg (152 lb 14.4 oz)   SpO2 99%   BMI 28.91 kg/m²     Multimodal analgesia pain management approach  Pain management: adequate and satisfactory to patient    No notable events documented.

## 2024-01-23 NOTE — OP NOTE
Total Hip Procedure Note    Glen Beltran,  [id number],  1960    Pre-operative Diagnosis: Primary osteoarthritis of left hip [M16.12]    Post-operative Diagnosis: Same    Procedure: Left Total Hip Arthroplasty    BMI: Body mass index is 28.91 kg/m²..    Location: [facility]    Surgeon: David Cooper MD    Assistant: Trent Durant CFA    Anesthesia: Spinal     Complications: None    EBL: 200cc    Drains: None    Intra-op Findings: Pre-operatively leg lengths were assessed using preop Xrays and with the patient in the lateral decubitus position and operative leg was felt to be similar in length compared to the contralateral leg. The operative hip showed notable cartilage loss of both the femoral head and acetabulum. No intra-operative periprosthetic fracture was encountered. Sciatic nerve was noted to be intact at the end of the procedure. We measured the distance of our resected femoral head/neck from the cut surface to the center of the femoral head to be approximately 32mm. The overall length replaced with the implanted head/neck was 33mm.  Intra-operatively we felt that we restored the patients leg lengths to equal lengths using the method described later. Postop with the patient supine we assessed leg lengths and felt they were similar.      Patient condition at completion of Procedure: Stable    Implants:   Implant Name Type Inv. Item Serial No.  Lot No. LRB No. Used Action   SHELL ACET SZ D [id number] CLUS H TRITANIUM PRESSFIT LANETTE - [id number]  SHELL ACET SZ D [id number] 3 CLUS H TRITANIUM PRESSFIT LANETTE  [employer] [facility] [id number] Left 1 Implanted   INSERT ACET D 10 DEG 32 MM HIP X3 TRIDENT - [id number]  INSERT ACET D 10 DEG 32 MM HIP X3 TRIDENT  [employer] [facility] [id number] Left 1 Implanted   SCREW BNE L35MM [id number] LO PROF HEX TRIDENT LL - [id number]  SCREW BNE L35MM [id number] LO PROF HEX TRIDENT   [employer] [facility] [id number] Left 1 Implanted   POWDER SURG CELLERATE RX

## 2024-01-23 NOTE — PROGRESS NOTES
ACUTE PHYSICAL THERAPY GOALS:   (Developed with and agreed upon by patient and/or caregiver.)  GOALS (1-4 days):  (1.)Ms. Beltran will move from supine to sit and sit to supine  in bed with INDEPENDENT.    (2.)Ms. Beltran will transfer from bed to chair and chair to bed with INDEPENDENT using the least restrictive device.    (3.)Ms. Beltran will ambulate with INDEPENDENT for 222 feet with the least restrictive device.   (4.)Ms. Beltran will ambulate up/down 2 steps with no railing with CONTACT GUARD ASSIST.  (5.)Ms. Beltran will state/observe RACHEL precautions with 0 verbal cues.  ________________________________________________________________________________________________      PHYSICAL THERAPY: TOTAL HIP ARTHROPLASTY Initial Assessment, Daily Note, and PM  (Link to Caseload Tracking: PT Visit Days : 1  Acknowledge Orders  Time In/Out  PT Charge Capture  Rehab Caseload Tracker  Episode   Glen Beltran is a 63 y.o. female   PRIMARY DIAGNOSIS: Primary osteoarthritis of left hip  Primary osteoarthritis of left hip [M16.12]  Osteoarthritis of left hip, unspecified osteoarthritis type [M16.12]  Procedure(s) (LRB):  LEFT HIP TOTAL ARTHROPLASTY, [place], Prevena/ SDD (Left)  Day of Surgery  Reason for Referral: Pain in left hip (M25.552)  Stiffness of Left Hip, Not elsewhere classified (M25.652)  Difficulty in walking, Not elsewhere classified (R26.2)  Other abnormalities of gait and mobility (R26.89)  Outpatient in a bed: Payor: BC[place] / [place] / Product Type: *No Product type* /     REHAB RECOMMENDATIONS:   Recommendation to date pending progress:  Setting:  Home Health Therapy    Equipment:    None     GAIT: I Mod I S SBA CGA Min Mod Max Total  NT x2 Comments:   Level of Assistance [] [] [] [x] [] [] [] [] [] [] []    Weightbearing Status  Left Lower Extremity Weight Bearing: Weight Bearing As Tolerated    Distance  222 feet    Gait Quality Antalgic and Decreased step length    DME Rolling Walker     Stairs 
labs within anesthesia guidelines, no follow-up required. Labs automatically routed to ordering provider via Epic documentation.    
urinal)?: A Little  How much help is needed for putting on and taking off regular upper body clothing?: None  How much help is needed for taking care of personal grooming?: None  How much help for eating meals?: None  AM-PAC Inpatient Daily Activity Raw Score: 21  AM-PAC Inpatient ADL T-Scale Score : 44.27  ADL Inpatient CMS 0-100% Score: 32.79  ADL Inpatient CMS G-Code Modifier : CJ     SUBJECTIVE:     Ms. Beltran states, \"I want to go home.\"      Social/Functional   Lives With: Spouse  Type of Home: House  Home Layout: Multi-level, Able to Live on Main level with bedroom/bathroom  Home Access: Level entry  Bathroom Shower/Tub: Walk-in shower  Bathroom Equipment: Built-in shower seat  Home Equipment: None  ADL Assistance: Independent  Ambulation Assistance: Independent    OBJECTIVE:     LINES / DRAINS / AIRWAY: NA    RESTRICTIONS/PRECAUTIONS:  Restrictions/Precautions: Weight Bearing  Left Lower Extremity Weight Bearing: Weight Bearing As Tolerated    PAIN: VITALS / O2:   Pre Treatment: reported pain in left hip          Post Treatment: same, RN reports she already administered pain medication  Vitals          Oxygen   ROOM AIR      GROSS EVALUATION: INTACT IMPAIRED   (See Comments)   UE AROM [x] []   UE PROM [] []   Strength [x]       Posture / Balance []  Fair standing balance with rolling walker    Sensation [x]     Coordination [x]       Tone []       Edema []    Activity Tolerance []  Limited by pain and fatigue      Hand Dominance R [] L []      COGNITION/  PERCEPTION: INTACT IMPAIRED   (See Comments)   Orientation [x]     Vision [x]     Hearing [x]     Cognition  [x]     Perception [x]       MOBILITY: I Mod I S SBA CGA Min Mod Max Total  NT x2 Comments:   Bed Mobility    Rolling [] [] [] [] [] [] [] [] [] [] []    Supine to Sit [] [] [] [x] [] [] [] [] [] [] []    Scooting [] [] [] [x] [] [] [] [] [] [] []    Sit to Supine [] [] [] [] [] [] [] [] [] [] []    Transfers    Sit to Stand [] [] [] [x] [] [] [] []

## 2024-01-23 NOTE — PERIOP NOTE
Patient intermittently falling asleep- when patient aroused by touch patient to state pain is 7/10 then will fall back asleep right after.     MD Sedlak at bedside with patient. Verbal order for 5mg oxy and no more IV  pain meds while sleeping.  Pt VSS stable. Pain and Nausea controlled at this time. Verbal sign out per MD when pacu care is completed. Plan of care continues.

## 2024-01-23 NOTE — DISCHARGE INSTRUCTIONS
[facility]      Patient Discharge Instructions    Glen Beltran / [id number] : 1960    Admitted 2024 Discharged: 2024     IF YOU HAVE ANY PROBLEMS ONCE YOU ARE AT HOME CALL THE FOLLOWING NUMBERS:   Main office number: [phone]      Medications    The medications you are to continue on are listed on the medication reconciliation sheet.   Narcotic pain medications as well as supplemental iron can cause constipation. If this occurs try stopping the narcotic pain medication and/or the iron.   It is important that you take the medication exactly as they are prescribed.  Medications which increase your risk of blood clots are listed to stop for 5 weeks after surgery as well as medications or supplements which increase your risk of bleeding complications.   Keep your medication in the bottles provided by the pharmacist and keep a list of the medication names, dosages, and times to be taken in your wallet.   Do not take other medications without consulting your doctor.       Important Information    Do NOT smoke as this will greatly increase your risk of infection!    Resume your prehospital diet. If you have excessive nausea or vomitting call your doctor's office     Leg swelling and warmth is normal for 6 months after surgery. If you experience swelling in your leg elevate you leg while laying down with your toes above your heart. If you have sudden onset severe swelling with leg pain call our office. Use Eddi Hose stockings until we see you in the office for your follow up appointment.    The stitches deep inside take approximately 6 months to dissolve. There will be sharp shooting, stinging and burning pain. This is normal and will resolve between 3-6 months after surgery.     Difficulty sleeping is normal following total Knee and Hip replacement. You may try melatonin, an over-the-counter sleep aid or benadryl to help with sleep. Most patients will resume sleeping through the night 8 weeks

## 2024-01-23 NOTE — CARE COORDINATION
Patient is a 63 y.o. year old female admitted for Left RACHEL . Patient plans to return home on discharge. Order received to arrange home health. Patient without preference towards agency. Referral sent to [facility]. Patient denies any equipment needs as patient has a walker.  Will follow until discharge.      01/23/24 6252   Service Assessment   Patient Orientation Alert and Oriented   Cognition Alert   History Provided By Patient   Services At/After Discharge   Transition of Care Consult (CM Consult) Home Health   Internal Home Health Yes   Services At/After Discharge Home Health;PT   Mode of Transport at Discharge Self   Confirm Follow Up Transport Self   Condition of Participation: Discharge Planning   The Plan for Transition of Care is related to the following treatment goals: improve mobility   The Patient and/or Patient Representative was provided with a Choice of Provider? Patient   The Patient and/Or Patient Representative agree with the Discharge Plan? Yes   Freedom of Choice list was provided with basic dialogue that supports the patient's individualized plan of care/goals, treatment preferences, and shares the quality data associated with the providers?  Yes

## 2024-01-24 ENCOUNTER — HOME CARE VISIT (OUTPATIENT)
Dept: SCHEDULING | Facility: HOME HEALTH | Age: 64
End: 2024-01-24
Payer: COMMERCIAL

## 2024-01-24 ENCOUNTER — TELEPHONE (OUTPATIENT)
Dept: ORTHOPEDICS UNIT | Age: 64
End: 2024-01-24

## 2024-01-24 VITALS
HEART RATE: 90 BPM | SYSTOLIC BLOOD PRESSURE: 130 MMHG | DIASTOLIC BLOOD PRESSURE: 65 MMHG | TEMPERATURE: 98 F | OXYGEN SATURATION: 97 %

## 2024-01-24 PROCEDURE — G0151 HHCP-SERV OF PT,EA 15 MIN: HCPCS

## 2024-01-24 ASSESSMENT — ENCOUNTER SYMPTOMS: PAIN LOCATION - PAIN QUALITY: DULL, SHARP, ACHE

## 2024-01-24 NOTE — TELEPHONE ENCOUNTER
Called to follow up after RACHEL with SDD on 1/23/24.  Doing \" as good as can be expected.\"  Post op pain is managed.  Reviewed importance of ambulating at least every 45 minutes to an hour during the day.  Reviewed miralax protocol.  Hip dressing is dry and intact.  No questions or concerns.  Reviewed contact number for ortho navigator.

## 2024-01-26 ENCOUNTER — HOME CARE VISIT (OUTPATIENT)
Dept: SCHEDULING | Facility: HOME HEALTH | Age: 64
End: 2024-01-26
Payer: COMMERCIAL

## 2024-01-26 VITALS
HEART RATE: 71 BPM | TEMPERATURE: 98.9 F | OXYGEN SATURATION: 99 % | SYSTOLIC BLOOD PRESSURE: 130 MMHG | DIASTOLIC BLOOD PRESSURE: 64 MMHG

## 2024-01-26 PROCEDURE — G0151 HHCP-SERV OF PT,EA 15 MIN: HCPCS

## 2024-01-26 ASSESSMENT — ENCOUNTER SYMPTOMS: PAIN LOCATION - PAIN QUALITY: SHARP, ACHE

## 2024-01-30 ENCOUNTER — HOME CARE VISIT (OUTPATIENT)
Dept: SCHEDULING | Facility: HOME HEALTH | Age: 64
End: 2024-01-30
Payer: COMMERCIAL

## 2024-01-30 VITALS
HEART RATE: 80 BPM | TEMPERATURE: 97.6 F | OXYGEN SATURATION: 99 % | DIASTOLIC BLOOD PRESSURE: 70 MMHG | SYSTOLIC BLOOD PRESSURE: 110 MMHG

## 2024-01-30 PROCEDURE — G0151 HHCP-SERV OF PT,EA 15 MIN: HCPCS

## 2024-01-30 ASSESSMENT — ENCOUNTER SYMPTOMS: PAIN LOCATION - PAIN QUALITY: SORE, ACHE

## 2024-02-02 ENCOUNTER — HOME CARE VISIT (OUTPATIENT)
Dept: SCHEDULING | Facility: HOME HEALTH | Age: 64
End: 2024-02-02
Payer: COMMERCIAL

## 2024-02-02 VITALS
HEART RATE: 76 BPM | RESPIRATION RATE: 16 BRPM | TEMPERATURE: 97.7 F | OXYGEN SATURATION: 98 % | SYSTOLIC BLOOD PRESSURE: 120 MMHG | DIASTOLIC BLOOD PRESSURE: 70 MMHG

## 2024-02-02 PROCEDURE — G0151 HHCP-SERV OF PT,EA 15 MIN: HCPCS

## 2024-02-06 ENCOUNTER — HOME CARE VISIT (OUTPATIENT)
Dept: SCHEDULING | Facility: HOME HEALTH | Age: 64
End: 2024-02-06
Payer: COMMERCIAL

## 2024-02-06 VITALS
HEART RATE: 69 BPM | TEMPERATURE: 98 F | OXYGEN SATURATION: 97 % | SYSTOLIC BLOOD PRESSURE: 110 MMHG | DIASTOLIC BLOOD PRESSURE: 75 MMHG

## 2024-02-06 PROCEDURE — G0151 HHCP-SERV OF PT,EA 15 MIN: HCPCS

## 2024-02-06 ASSESSMENT — ENCOUNTER SYMPTOMS: PAIN LOCATION - PAIN QUALITY: SORE

## 2024-02-09 ENCOUNTER — HOME CARE VISIT (OUTPATIENT)
Dept: SCHEDULING | Facility: HOME HEALTH | Age: 64
End: 2024-02-09
Payer: COMMERCIAL

## 2024-02-09 VITALS
TEMPERATURE: 98 F | DIASTOLIC BLOOD PRESSURE: 80 MMHG | OXYGEN SATURATION: 98 % | HEART RATE: 65 BPM | SYSTOLIC BLOOD PRESSURE: 128 MMHG

## 2024-02-09 PROCEDURE — G0151 HHCP-SERV OF PT,EA 15 MIN: HCPCS

## 2024-02-09 RX ORDER — OMEPRAZOLE 40 MG/1
40 CAPSULE, DELAYED RELEASE ORAL DAILY
Qty: 30 CAPSULE | Refills: 0 | OUTPATIENT
Start: 2024-02-09

## 2024-02-09 ASSESSMENT — ENCOUNTER SYMPTOMS: PAIN LOCATION - PAIN QUALITY: SORE

## 2024-02-15 ENCOUNTER — OFFICE VISIT (OUTPATIENT)
Dept: ORTHOPEDIC SURGERY | Age: 64
End: 2024-02-15

## 2024-02-15 DIAGNOSIS — M25.552 LEFT HIP PAIN: Primary | ICD-10-CM

## 2024-02-15 DIAGNOSIS — Z96.642 STATUS POST LEFT HIP REPLACEMENT: ICD-10-CM

## 2024-02-15 PROCEDURE — 99024 POSTOP FOLLOW-UP VISIT: CPT | Performed by: NURSE PRACTITIONER

## 2024-02-15 RX ORDER — AMOXICILLIN 500 MG/1
TABLET, FILM COATED ORAL
Qty: 12 TABLET | Refills: 1 | Status: SHIPPED | OUTPATIENT
Start: 2024-02-15

## 2024-02-15 NOTE — PROGRESS NOTES
Patient ID:  Glen Beltran  [id number]  63 y.o.  1960    Today: February 15, 2024       CHIEF COMPLAINT:  Follow-up of left total hip replacement    HISTORY:  The patient presents today for 3-week follow-up after total hip replacement.  The patient continues to improve gradually.  Working with physical therapy on strengthening and stretching.  They are on aspirin for DVT prophylaxis.  Using assistive walking device appropriately.  Continues to take medication appropriately.      PE:  Incision is examined which is healing well.  No erythema, induration or drainage.  No significant fluid accumulation around the surgical site distally.   Distally able to grossly plantar and dorsiflex foot and ankle.  Sensation intact.  Limb is perfused.  No sign of DVT.    X-RAYS:    XR Pelvis 2/3 Views  Views Obtained: AP Pelvis and Frog leg lateral of left hip  Indication: Postop Left Total Hip Replacement  Findings:   X-rays are viewed which show total hip replacement in place on the left side.  All hardware appears to be stable compared to immediate postoperative films.  The acetabular component appears to be in good position, no evidence of loosening.  Anteversion and inclination angle appear to be within acceptable criteria.  The stem appears to be appropriately sized without any evidence of subsidence.  No evidence of proximal femoral periprosthetic fracture.  Hip is reduced.   Impression: Normal Xray after total hip replacement    SHERON MARTINEZ - CNP    ASSESSMENT:  3 Weeks S/P Left Total Hip Replacement    PLAN:  Continue activity and current weight bearing status.  Continue posterior hip precautions if applicable.  Continue to take the aspirin for another week for a full month of DVT prophylaxis.  They are given a refill on their pain medication if they feel they are going to run out.  The patient is given a script for antibiotics to be taken for dental prophylaxis.  I have asked the patient not to submerge the

## 2024-02-27 RX ORDER — GABAPENTIN 300 MG/1
CAPSULE ORAL
Qty: 30 CAPSULE | Refills: 0 | OUTPATIENT
Start: 2024-02-27

## 2024-04-01 ENCOUNTER — OFFICE VISIT (OUTPATIENT)
Dept: INTERNAL MEDICINE CLINIC | Facility: CLINIC | Age: 64
End: 2024-04-01
Payer: COMMERCIAL

## 2024-04-01 VITALS
HEIGHT: 61 IN | DIASTOLIC BLOOD PRESSURE: 90 MMHG | WEIGHT: 154 LBS | SYSTOLIC BLOOD PRESSURE: 124 MMHG | TEMPERATURE: 97.3 F | OXYGEN SATURATION: 97 % | HEART RATE: 76 BPM | BODY MASS INDEX: 29.07 KG/M2

## 2024-04-01 DIAGNOSIS — J43.2 CENTRILOBULAR EMPHYSEMA (HCC): ICD-10-CM

## 2024-04-01 DIAGNOSIS — M81.0 AGE-RELATED OSTEOPOROSIS WITHOUT CURRENT PATHOLOGICAL FRACTURE: ICD-10-CM

## 2024-04-01 DIAGNOSIS — I10 PRIMARY HYPERTENSION: Primary | ICD-10-CM

## 2024-04-01 DIAGNOSIS — R73.01 IFG (IMPAIRED FASTING GLUCOSE): ICD-10-CM

## 2024-04-01 DIAGNOSIS — F11.21 HISTORY OF NARCOTIC ADDICTION (HCC): ICD-10-CM

## 2024-04-01 PROCEDURE — 3074F SYST BP LT 130 MM HG: CPT | Performed by: INTERNAL MEDICINE

## 2024-04-01 PROCEDURE — 99213 OFFICE O/P EST LOW 20 MIN: CPT | Performed by: INTERNAL MEDICINE

## 2024-04-01 PROCEDURE — 3080F DIAST BP >= 90 MM HG: CPT | Performed by: INTERNAL MEDICINE

## 2024-04-01 ASSESSMENT — ENCOUNTER SYMPTOMS
DIARRHEA: 0
NAUSEA: 0
CONSTIPATION: 0
SHORTNESS OF BREATH: 0
VOMITING: 0
COUGH: 0
WHEEZING: 0

## 2024-04-01 NOTE — PROGRESS NOTES
4/1/2024 2:07 PM  Location:[facility] PHYSICIAN SERVICES  [facility] [place]  [place]  Patient #:  [id number]  YOB: 1960            History of Present Illness     Chief Complaint   Patient presents with    6 Month Follow-Up     6 month follow-up        Ms. Beltran is a 63 y.o. female  who presents for the above.   Not checking BP at home regularly.  Doing well after breast reduction and THR on the left.  Has been under stress related to her 17 year old son.           Allergies   Allergen Reactions    Diphenhydramine Other (See Comments)     hyperactivity    Duloxetine Other (See Comments)     Hallucinations    Hydrocodone-Acetaminophen Other (See Comments)     Hyperactivity    Hydromorphone Other (See Comments)     Hyperactivity    Meperidine Other (See Comments)     Hyperactivity    Morphine Other (See Comments)     Hallucinations    Tapentadol Other (See Comments)     Bad dreams    Nitrofurantoin Macrocrystal Nausea And Vomiting     Past Medical History:   Diagnosis Date    ADHD (attention deficit hyperactivity disorder)     Arthritis     Chronic obstructive pulmonary disease (HCC)     Per pt \"Mild,\" PRN inhaler-last used 2021, followed by Dr. Cano     Degeneration of lumbar or lumbosacral intervertebral disc 2/27/2015    Depression 02/27/2015    managed with medication     Dermatophytosis of the body 2/27/2015    Essential hypertension, benign 2/27/2015    managed with med    GERD (gastroesophageal reflux disease) 2/27/2015    managed with med    H/O echocardiogram 06/27/2019    EF 55-60%, trivial aortic, mitral, and tricuspid regurgitation     Heart murmur     pt reports since birth; echo dated  6/27/19 states trivial aortic, mitral, and tricuspid regurgitation     History of hepatitis C     History of kidney stones     History of MRSA infection     History of seizure 06/2019    per pt secondary to Robaxin     Hypopotassemia 2/27/2015    Insomnia, unspecified 2/27/2015    Migraine,

## 2024-04-22 RX ORDER — MELOXICAM 15 MG/1
15 TABLET ORAL DAILY
Qty: 30 TABLET | Refills: 2 | OUTPATIENT
Start: 2024-04-22

## 2024-04-23 ENCOUNTER — TELEPHONE (OUTPATIENT)
Dept: INTERNAL MEDICINE CLINIC | Facility: CLINIC | Age: 64
End: 2024-04-23

## 2024-04-23 NOTE — TELEPHONE ENCOUNTER
Patient called stated she may have a uti or passing kidney stone the next available appointment is 05/01/2024 offered this to patient.   She could not wait did state to go to urgent care or the ER in the mean time.  Patient wants to come in to give a sample, did let patient know that the provider may want to see her in person to be able to do that.    The patient stated when she urinates there is a lot of pain and this started last week. She stated she was bleeding and this morning it look like a clot of blood or a stone.    Patient is scheduled 05/01/2024

## 2024-04-29 ENCOUNTER — TELEPHONE (OUTPATIENT)
Dept: INTERNAL MEDICINE CLINIC | Facility: CLINIC | Age: 64
End: 2024-04-29

## 2024-04-29 NOTE — TELEPHONE ENCOUNTER
Regarding: BP  ----- Message from Genevieve Cano MD sent at 4/25/2024  5:59 PM EDT -----       ----- Message sent from Genevieve Cano MD to Glen Beltran at 4/15/2024  8:58 AM -----   Is your BP any better?  Thanks.  [facility]

## 2024-04-29 NOTE — TELEPHONE ENCOUNTER
I have talked with Ms. Beltran and her blood pressure is better. She has passed a kidney stone and is not having any problems.

## 2024-04-29 NOTE — TELEPHONE ENCOUNTER
Regarding: BP  ----- Message from Genevieve Cano MD sent at 4/25/2024  5:59 PM EDT -----       ----- Message sent from Genevieve Cano MD to Glen Beltran at 4/15/2024  8:58 AM -----   Is your BP any better?  Thanks.  [facility]

## 2024-04-29 NOTE — TELEPHONE ENCOUNTER
I have talked with Ms. Beltran and her blood pressure is better. She also has just passed a kidney stone. She has cancelled her appointment.

## 2024-05-16 ENCOUNTER — OFFICE VISIT (OUTPATIENT)
Dept: ORTHOPEDIC SURGERY | Age: 64
End: 2024-05-16
Payer: COMMERCIAL

## 2024-05-16 DIAGNOSIS — Z96.642 STATUS POST LEFT HIP REPLACEMENT: Primary | ICD-10-CM

## 2024-05-16 PROCEDURE — 99213 OFFICE O/P EST LOW 20 MIN: CPT | Performed by: ORTHOPAEDIC SURGERY

## 2024-05-16 NOTE — PROGRESS NOTES
Patient ID:  Glen Beltran  [id number]  63 y.o.  1960    Today: May 16, 2024       CHIEF COMPLAINT:  Follow-up of left total hip replacement    HISTORY:  The patient presents today for 4 months follow-up after total hip replacement.  The patient continues to improve gradually.  Patient is continuing to work on strengthening and stretching.  The patient has completed the full month of oral DVT prophylaxis. The patient continues to take medication appropriately.      Past Medical History:  Past Medical History:   Diagnosis Date    ADHD (attention deficit hyperactivity disorder)     Arthritis     Chronic obstructive pulmonary disease (HCC)     Per pt \"Mild,\" PRN inhaler-last used 2021, followed by Dr. Caon     Degeneration of lumbar or lumbosacral intervertebral disc 2/27/2015    Depression 02/27/2015    managed with medication     Dermatophytosis of the body 2/27/2015    Essential hypertension, benign 2/27/2015    managed with med    GERD (gastroesophageal reflux disease) 2/27/2015    managed with med    H/O echocardiogram 06/27/2019    EF 55-60%, trivial aortic, mitral, and tricuspid regurgitation     Heart murmur     pt reports since birth; echo dated  6/27/19 states trivial aortic, mitral, and tricuspid regurgitation     History of hepatitis C     History of kidney stones     History of MRSA infection     History of seizure 06/2019    per pt secondary to Robaxin     Hypopotassemia 2/27/2015    Insomnia, unspecified 2/27/2015    Migraine, unspecified, without mention of intractable migraine without mention of status migrainosus 2/27/2015    Odontoid fracture with type II morphology (HCC)     Osteoporosis 1/23/2017    PAC (premature atrial contraction)     Polycythemia, secondary 2/27/2015       Past Surgical History:  Past Surgical History:   Procedure Laterality Date    BACK SURGERY  01/2022    L4-5 DLIF W/PEEK CAGE, BMP, DBM AND PEDICLE SCREWS, C-ARM, NEUROMONITORING, MEDTRONIC(ROXIE),

## 2024-05-31 ENCOUNTER — TELEPHONE (OUTPATIENT)
Dept: ORTHOPEDIC SURGERY | Age: 64
End: 2024-05-31

## 2024-05-31 NOTE — TELEPHONE ENCOUNTER
She is asking for a refill on Meloxicam. She ran out and is aching all over. Please send to [facility] in [place] on [address]. She knows it will be Monday.

## 2024-06-03 RX ORDER — MELOXICAM 15 MG/1
15 TABLET ORAL DAILY
Qty: 30 TABLET | Refills: 2 | Status: SHIPPED | OUTPATIENT
Start: 2024-06-03

## 2024-07-15 ENCOUNTER — OFFICE VISIT (OUTPATIENT)
Dept: ORTHOPEDIC SURGERY | Age: 64
End: 2024-07-15
Payer: COMMERCIAL

## 2024-07-15 DIAGNOSIS — Z96.612 PRESENCE OF LEFT ARTIFICIAL SHOULDER JOINT: Primary | ICD-10-CM

## 2024-07-15 DIAGNOSIS — Z09 FOLLOW-UP EXAMINATION AFTER ORTHOPEDIC SURGERY: ICD-10-CM

## 2024-07-15 PROCEDURE — 99212 OFFICE O/P EST SF 10 MIN: CPT | Performed by: ORTHOPAEDIC SURGERY

## 2024-07-15 NOTE — PROGRESS NOTES
Name: Glen Beltran   YOB: 1960   Gender: female   MRN: [id number]     Date of encounter 7/15/2024         HPI: Glen Beltran is a 64 y.o.  right-hand-dominant female 2 years status post reverse left total shoulder arthroplasty with a delta xtend prosthesis biceps tenodesis.  She returns noting that she is doing much better.  She recently had a left total hip arthroplasty by Dr. Cooper      ROS/Meds/PSH/PMH/FH/SH: A ten system review of systems was performed and is negative other than what is in the HPI.    Tobacco:  reports that she quit smoking about 4 years ago. Her smoking use included cigarettes. She has a 10.00 pack-year smoking history. She has never used smokeless tobacco.   There were no vitals taken for this visit.       Physical Examination:   She is an awake alert pleasant female ambulating without difficulty   She has a slight restriction in cervical spine range of motion without radicular findings      Her right shoulder has a well-healed deltopectoral incision   The right shoulder has 0 to 170 degrees of active and 0 to 170 degrees passive forward elevation.    Internal rotation is to T8.   External rotation is to 60 degrees at the side.    In the 90 degree abducted position 90 degrees of external and 90 degrees internal rotation   The AC joint is non-tender   SC joint is non-tender.    Greater tuberosity is non-tender.   negative biceps   Negative O'Briens sign   negative lift-off sign   Negative belly press sign   Negative bear huggers sign   negative drop sign   negative hornblower's sign   No posterior glenohumeral joint line tenderness.    No evident excessive external rotation   Rotator cuff strength is 5/5.   negative external rotation stress test.    Negative empty can sign   There is no evident anterior or posterior apprehension with a negative sulcus sign.    No instability   negative external and internal Rotation lag sign   Neurovascularly intact.         The left

## 2024-07-18 ENCOUNTER — OFFICE VISIT (OUTPATIENT)
Dept: INTERNAL MEDICINE CLINIC | Facility: CLINIC | Age: 64
End: 2024-07-18
Payer: COMMERCIAL

## 2024-07-18 VITALS
WEIGHT: 141.4 LBS | HEART RATE: 93 BPM | BODY MASS INDEX: 26.7 KG/M2 | DIASTOLIC BLOOD PRESSURE: 68 MMHG | SYSTOLIC BLOOD PRESSURE: 144 MMHG | HEIGHT: 61 IN | OXYGEN SATURATION: 99 % | TEMPERATURE: 98.1 F

## 2024-07-18 DIAGNOSIS — J43.2 CENTRILOBULAR EMPHYSEMA (HCC): ICD-10-CM

## 2024-07-18 DIAGNOSIS — F41.9 ANXIETY: ICD-10-CM

## 2024-07-18 DIAGNOSIS — I10 PRIMARY HYPERTENSION: Primary | ICD-10-CM

## 2024-07-18 DIAGNOSIS — R00.2 PALPITATIONS: ICD-10-CM

## 2024-07-18 DIAGNOSIS — F11.21 HISTORY OF NARCOTIC ADDICTION (HCC): ICD-10-CM

## 2024-07-18 LAB
ALBUMIN SERPL-MCNC: 3.9 G/DL (ref 3.2–4.6)
ALBUMIN/GLOB SERPL: 1.1 (ref 1–1.9)
ALP SERPL-CCNC: 132 U/L (ref 35–104)
ALT SERPL-CCNC: 26 U/L (ref 12–65)
ANION GAP SERPL CALC-SCNC: 15 MMOL/L (ref 9–18)
AST SERPL-CCNC: 31 U/L (ref 15–37)
BASOPHILS # BLD: 0.1 K/UL (ref 0–0.2)
BASOPHILS NFR BLD: 1 % (ref 0–2)
BILIRUB SERPL-MCNC: 0.3 MG/DL (ref 0–1.2)
BUN SERPL-MCNC: 11 MG/DL (ref 8–23)
CALCIUM SERPL-MCNC: 10.7 MG/DL (ref 8.8–10.2)
CHLORIDE SERPL-SCNC: 104 MMOL/L (ref 98–107)
CO2 SERPL-SCNC: 23 MMOL/L (ref 20–28)
CREAT SERPL-MCNC: 0.74 MG/DL (ref 0.6–1.1)
D DIMER PPP FEU-MCNC: 1.28 UG/ML(FEU)
DIFFERENTIAL METHOD BLD: NORMAL
EOSINOPHIL # BLD: 0 K/UL (ref 0–0.8)
EOSINOPHIL NFR BLD: 1 % (ref 0.5–7.8)
ERYTHROCYTE [DISTWIDTH] IN BLOOD BY AUTOMATED COUNT: 12.5 % (ref 11.9–14.6)
GLOBULIN SER CALC-MCNC: 3.7 G/DL (ref 2.3–3.5)
GLUCOSE SERPL-MCNC: 109 MG/DL (ref 70–99)
HCT VFR BLD AUTO: 44.9 % (ref 35.8–46.3)
HGB BLD-MCNC: 14.5 G/DL (ref 11.7–15.4)
IMM GRANULOCYTES # BLD AUTO: 0 K/UL (ref 0–0.5)
IMM GRANULOCYTES NFR BLD AUTO: 0 % (ref 0–5)
LYMPHOCYTES # BLD: 2 K/UL (ref 0.5–4.6)
LYMPHOCYTES NFR BLD: 23 % (ref 13–44)
MCH RBC QN AUTO: 29.1 PG (ref 26.1–32.9)
MCHC RBC AUTO-ENTMCNC: 32.3 G/DL (ref 31.4–35)
MCV RBC AUTO: 90.2 FL (ref 82–102)
MONOCYTES # BLD: 0.6 K/UL (ref 0.1–1.3)
MONOCYTES NFR BLD: 8 % (ref 4–12)
NEUTS SEG # BLD: 5.8 K/UL (ref 1.7–8.2)
NEUTS SEG NFR BLD: 67 % (ref 43–78)
NRBC # BLD: 0 K/UL (ref 0–0.2)
PLATELET # BLD AUTO: 322 K/UL (ref 150–450)
PMV BLD AUTO: 10.5 FL (ref 9.4–12.3)
POTASSIUM SERPL-SCNC: 3.7 MMOL/L (ref 3.5–5.1)
PROT SERPL-MCNC: 7.6 G/DL (ref 6.3–8.2)
RBC # BLD AUTO: 4.98 M/UL (ref 4.05–5.2)
SODIUM SERPL-SCNC: 141 MMOL/L (ref 136–145)
TSH W FREE THYROID IF ABNORMAL: 1.36 UIU/ML (ref 0.27–4.2)
WBC # BLD AUTO: 8.5 K/UL (ref 4.3–11.1)

## 2024-07-18 PROCEDURE — 3078F DIAST BP <80 MM HG: CPT | Performed by: INTERNAL MEDICINE

## 2024-07-18 PROCEDURE — 99214 OFFICE O/P EST MOD 30 MIN: CPT | Performed by: INTERNAL MEDICINE

## 2024-07-18 PROCEDURE — 3077F SYST BP >= 140 MM HG: CPT | Performed by: INTERNAL MEDICINE

## 2024-07-18 PROCEDURE — 93000 ELECTROCARDIOGRAM COMPLETE: CPT | Performed by: INTERNAL MEDICINE

## 2024-07-18 RX ORDER — AMLODIPINE BESYLATE AND BENAZEPRIL HYDROCHLORIDE 2.5; 1 MG/1; MG/1
CAPSULE ORAL
Qty: 60 CAPSULE | Refills: 5 | Status: SHIPPED | OUTPATIENT
Start: 2024-07-18 | End: 2024-07-18 | Stop reason: DRUGHIGH

## 2024-07-18 RX ORDER — AMLODIPINE AND BENAZEPRIL HYDROCHLORIDE 5; 40 MG/1; MG/1
1 CAPSULE ORAL DAILY
Qty: 30 CAPSULE | Refills: 3 | Status: SHIPPED | OUTPATIENT
Start: 2024-07-18

## 2024-07-18 ASSESSMENT — ENCOUNTER SYMPTOMS
SHORTNESS OF BREATH: 1
BLOOD IN STOOL: 0
NAUSEA: 0
COUGH: 0
VOMITING: 0
WHEEZING: 0
CONSTIPATION: 0
DIARRHEA: 0

## 2024-07-18 NOTE — PROGRESS NOTES
7/18/2024 5:07 PM  Location:[facility] PHYSICIAN SERVICES  [facility] [place]  [place]  Patient #:  [id number]  YOB: 1960            History of Present Illness     Chief Complaint   Patient presents with    Hypertension     Pt reports BP was high yesterday and she has not been feeling well. Pt reports she has not taken amlodipine in 2 months.        Ms. Beltran is a 64 y.o. female  who presents for the above.   Has been out of medication since it had no refills.  Is having palpitations.  Has been weaning off of her psych meds through the help of her psychiatrist.             Allergies   Allergen Reactions    Diphenhydramine Other (See Comments)     hyperactivity    Duloxetine Other (See Comments)     Hallucinations    Hydrocodone-Acetaminophen Other (See Comments)     Hyperactivity    Hydromorphone Other (See Comments)     Hyperactivity    Meperidine Other (See Comments)     Hyperactivity    Morphine Other (See Comments)     Hallucinations    Tapentadol Other (See Comments)     Bad dreams    Nitrofurantoin Macrocrystal Nausea And Vomiting     Past Medical History:   Diagnosis Date    ADHD (attention deficit hyperactivity disorder)     Arthritis     Chronic obstructive pulmonary disease (HCC)     Per pt \"Mild,\" PRN inhaler-last used 2021, followed by Dr. Cano     Degeneration of lumbar or lumbosacral intervertebral disc 2/27/2015    Depression 02/27/2015    managed with medication     Dermatophytosis of the body 2/27/2015    Essential hypertension, benign 2/27/2015    managed with med    GERD (gastroesophageal reflux disease) 2/27/2015    managed with med    H/O echocardiogram 06/27/2019    EF 55-60%, trivial aortic, mitral, and tricuspid regurgitation     Heart murmur     pt reports since birth; echo dated  6/27/19 states trivial aortic, mitral, and tricuspid regurgitation     History of hepatitis C     History of kidney stones     History of MRSA infection     History of seizure 06/2019

## 2024-07-19 ENCOUNTER — HOSPITAL ENCOUNTER (OUTPATIENT)
Dept: CT IMAGING | Age: 64
Discharge: HOME | End: 2024-07-19
Payer: COMMERCIAL

## 2024-07-19 ENCOUNTER — TELEPHONE (OUTPATIENT)
Dept: INTERNAL MEDICINE CLINIC | Facility: CLINIC | Age: 64
End: 2024-07-19

## 2024-07-19 DIAGNOSIS — R06.02 SHORTNESS OF BREATH: Primary | ICD-10-CM

## 2024-07-19 DIAGNOSIS — R79.89 POSITIVE D DIMER: ICD-10-CM

## 2024-07-19 DIAGNOSIS — R06.02 SHORTNESS OF BREATH: ICD-10-CM

## 2024-07-19 PROCEDURE — 6360000004 HC RX CONTRAST MEDICATION: Performed by: INTERNAL MEDICINE

## 2024-07-19 PROCEDURE — 71260 CT THORAX DX C+: CPT

## 2024-07-19 RX ADMIN — IOPAMIDOL 100 ML: 755 INJECTION, SOLUTION INTRAVENOUS at 16:52

## 2024-07-19 NOTE — TELEPHONE ENCOUNTER
----- Message from Genevieve Cano MD sent at 7/19/2024  3:13 PM EDT -----  Ordering stat CT chest to rule out a blood clot due to elevated d dimer. Other labs are stable.

## 2024-07-19 NOTE — TELEPHONE ENCOUNTER
Pt scheduled for today at [facility]. Pt's mailbox full so I called her . He said they would make today's appt.     Pt's  given results and relayed understanding.

## 2024-07-23 ENCOUNTER — TELEPHONE (OUTPATIENT)
Dept: INTERNAL MEDICINE CLINIC | Facility: CLINIC | Age: 64
End: 2024-07-23

## 2024-07-23 ENCOUNTER — OFFICE VISIT (OUTPATIENT)
Dept: INTERNAL MEDICINE CLINIC | Facility: CLINIC | Age: 64
End: 2024-07-23
Payer: COMMERCIAL

## 2024-07-23 VITALS
HEART RATE: 66 BPM | OXYGEN SATURATION: 100 % | BODY MASS INDEX: 26.85 KG/M2 | RESPIRATION RATE: 18 BRPM | TEMPERATURE: 97.5 F | DIASTOLIC BLOOD PRESSURE: 67 MMHG | HEIGHT: 61 IN | WEIGHT: 142.2 LBS | SYSTOLIC BLOOD PRESSURE: 131 MMHG

## 2024-07-23 DIAGNOSIS — I10 PRIMARY HYPERTENSION: Primary | ICD-10-CM

## 2024-07-23 PROCEDURE — 3075F SYST BP GE 130 - 139MM HG: CPT | Performed by: NURSE PRACTITIONER

## 2024-07-23 PROCEDURE — 99213 OFFICE O/P EST LOW 20 MIN: CPT | Performed by: NURSE PRACTITIONER

## 2024-07-23 PROCEDURE — 3078F DIAST BP <80 MM HG: CPT | Performed by: NURSE PRACTITIONER

## 2024-07-23 ASSESSMENT — ENCOUNTER SYMPTOMS
ABDOMINAL PAIN: 0
SHORTNESS OF BREATH: 0
COUGH: 0

## 2024-07-23 NOTE — PROGRESS NOTES
7/23/2024 2:11 PM  Location:[facility] PHYSICIAN SERVICES  [facility] [place]INE  [place]  Patient #:  [id number]  YOB: 1960      History of Present Illness     Chief Complaint   Patient presents with    Hypertension     1 week follow up on hypertension        Ms. Beltran is a 64 y.o. female  who presents for htn follow up, was put on amlodipine benazepril 5/40mg . Last week was c/o palpitation, cp, and sob and has hx of anxiety as well. Was referred to cardiology for palpitations but has not heard from referral yet. Had not taken amlodpine in some time /2 mos prior to the last ov and she was having higher bps . Labs including d dimer were done.   D dimer pos, L hip replacement in January. No travel or other surgery since then. Denies leg pain or swelling. Today does not have any cp or sob. CT chest done and was neg for PE. Cmp cbc mostly wnl. Tsh wnl. Denies palpitation today as well.         Allergies   Allergen Reactions    Diphenhydramine Other (See Comments)     hyperactivity    Duloxetine Other (See Comments)     Hallucinations    Hydrocodone-Acetaminophen Other (See Comments)     Hyperactivity    Hydromorphone Other (See Comments)     Hyperactivity    Meperidine Other (See Comments)     Hyperactivity    Morphine Other (See Comments)     Hallucinations    Tapentadol Other (See Comments)     Bad dreams    Nitrofurantoin Macrocrystal Nausea And Vomiting        Current Outpatient Medications   Medication Sig Dispense Refill    amLODIPine-benazepril (LOTREL) 5-40 MG per capsule Take 1 capsule by mouth daily 30 capsule 3    OLANZapine (ZYPREXA) 15 MG tablet Take 1 tablet by mouth in the morning and 1 tablet in the evening.      meloxicam (MOBIC) 15 MG tablet Take 1 tablet by mouth daily 30 tablet 2    omeprazole (PRILOSEC) 40 MG delayed release capsule Take 1 capsule by mouth daily 30 capsule 0    ondansetron (ZOFRAN) 4 MG tablet Take 1 tablet by mouth 3 times daily as needed for Nausea or

## 2024-07-24 ENCOUNTER — TELEPHONE (OUTPATIENT)
Dept: INTERNAL MEDICINE CLINIC | Facility: CLINIC | Age: 64
End: 2024-07-24

## 2024-08-05 NOTE — DISCHARGE INSTRUCTIONS
[address]  Department of Interventional Radiology  [phone] Office  [phone] Fax  MYELOGRAM DISCHARGE INSTRUCTIONS  General Information:  Myelogram:     A Myelogram involves a lumbar puncture, and instead of removing fluid, contrast will be injected into the sac surrounding the spinal column. It is done to visualize the spinal column, nerve roots, spinal canal, vertebral discs and disc space. It is usually done to diagnose back pain with unknown cause or in preparation for surgery. After the injection, a CT scan will be done, usually within two hours of the injection. Call If:     You should call your Physician and/or the Radiology Nurse if you develop a headache that is not relieved by Tylenol, and worsens when you stand and eases when you lie down, you need to call. You may have developed what is referred to as a spinal headache. Our physician's will probably advise you to be on strict bed rest for 24 hours, to drink lots of fluids and caffeine. If this does not help the head pain, call again the next day. You should call if you have bleeding other than a small spot on your bandage. You should call if you have any numbness, tingling, weakness, fever, chills, urinary retention, severe itching, rash, welts, swelling, or confusion. Follow-Up Instructions: See the doctor who ordered your procedure as he/she has instructed. If you had a Lumbar Puncture or Myelogram, your results should be available to your ordering doctor in 3-5 business days. You can remove your dressing in 24 hours and shower regularly. Do not bathe or swim for 72 hours. To Reach Us: If you have any questions about your procedure, please call the Interventional Radiology department at [phone]. After business hours (5pm) and weekends, call the answering service at [phone] and ask for the Radiologist on call to be paged.      Interventional Radiology General Nurse Discharge      * Please give a list of your current medications to your Primary Care Provider. * Please update this list whenever your medications are discontinued, doses are     changed, or new medications (including over-the-counter products) are added. * Please carry medication information at all times in case of emergency situations. These are general instructions for a healthy lifestyle:    No smoking/ No tobacco products/ Avoid exposure to second hand smoke  Surgeon General's Warning:  Quitting smoking now greatly reduces serious risk to your health. Obesity, smoking, and sedentary lifestyle greatly increases your risk for illness  A healthy diet, regular physical exercise & weight monitoring are important for maintaining a healthy lifestyle    You may be retaining fluid if you have a history of heart failure or if you experience any of the following symptoms:  Weight gain of 3 pounds or more overnight or 5 pounds in a week, increased swelling in our hands or feet or shortness of breath while lying flat in bed. Please call your doctor as soon as you notice any of these symptoms; do not wait until your next office visit. Recognize signs and symptoms of STROKE:  F-face looks uneven    A-arms unable to move or move unevenly    S-speech slurred or non-existent    T-time-call 911 as soon as signs and symptoms begin-DO NOT go       Back to bed or wait to see if you get better-TIME IS BRAIN.         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   - Pt endorses last fall 2023 he was hospitalized for toe amputation and during hospitalization he has infectious workup which included brain imaging revealing pituitary mass.  - He followed up outpatient and completed MRI Pituitary 9/29/23 @ [facility] with report of enlarged pituitary gland with suprasellar extension resulting in mass effect on the optic chiasm c/w a pituitary macroadenoma; chronic microvascular ischemic disease; exophthalmos bilaterally.  Presents today for evaluation. He endorses he has seen a few different neurosurgeons but none that manage pituitary adenomas.  Continues to have left vision loss since retinal detachment but denies worsening of vision.  Denies headaches, dizziness.

## 2024-10-04 RX ORDER — BENAZEPRIL HYDROCHLORIDE 10 MG/1
10 TABLET ORAL DAILY
Qty: 90 TABLET | Refills: 3 | OUTPATIENT
Start: 2024-10-04

## 2024-10-04 RX ORDER — MELOXICAM 15 MG/1
15 TABLET ORAL DAILY
Qty: 30 TABLET | Refills: 2 | OUTPATIENT
Start: 2024-10-04

## 2024-10-24 ENCOUNTER — OFFICE VISIT (OUTPATIENT)
Dept: INTERNAL MEDICINE CLINIC | Facility: CLINIC | Age: 64
End: 2024-10-24

## 2024-10-24 ENCOUNTER — TELEPHONE (OUTPATIENT)
Dept: INTERNAL MEDICINE CLINIC | Facility: CLINIC | Age: 64
End: 2024-10-24

## 2024-10-24 VITALS
HEART RATE: 89 BPM | WEIGHT: 141 LBS | HEIGHT: 61 IN | BODY MASS INDEX: 26.62 KG/M2 | SYSTOLIC BLOOD PRESSURE: 106 MMHG | DIASTOLIC BLOOD PRESSURE: 70 MMHG | RESPIRATION RATE: 18 BRPM

## 2024-10-24 DIAGNOSIS — M81.0 AGE-RELATED OSTEOPOROSIS WITHOUT CURRENT PATHOLOGICAL FRACTURE: ICD-10-CM

## 2024-10-24 DIAGNOSIS — J43.2 CENTRILOBULAR EMPHYSEMA (HCC): ICD-10-CM

## 2024-10-24 DIAGNOSIS — R35.0 URINARY FREQUENCY: ICD-10-CM

## 2024-10-24 DIAGNOSIS — I10 PRIMARY HYPERTENSION: ICD-10-CM

## 2024-10-24 DIAGNOSIS — Z00.00 ENCOUNTER FOR WELL ADULT EXAM WITHOUT ABNORMAL FINDINGS: ICD-10-CM

## 2024-10-24 DIAGNOSIS — Z12.31 ENCOUNTER FOR SCREENING MAMMOGRAM FOR MALIGNANT NEOPLASM OF BREAST: ICD-10-CM

## 2024-10-24 DIAGNOSIS — K21.9 GASTROESOPHAGEAL REFLUX DISEASE, UNSPECIFIED WHETHER ESOPHAGITIS PRESENT: ICD-10-CM

## 2024-10-24 DIAGNOSIS — R41.82 ALTERED MENTAL STATUS, UNSPECIFIED ALTERED MENTAL STATUS TYPE: Primary | ICD-10-CM

## 2024-10-24 DIAGNOSIS — Z12.4 SCREENING FOR CERVICAL CANCER: ICD-10-CM

## 2024-10-24 LAB
ALBUMIN SERPL-MCNC: 3.9 G/DL (ref 3.2–4.6)
ALBUMIN/GLOB SERPL: 1 (ref 1–1.9)
ALP SERPL-CCNC: 142 U/L (ref 35–104)
ALT SERPL-CCNC: 24 U/L (ref 8–45)
ANION GAP SERPL CALC-SCNC: 14 MMOL/L (ref 9–18)
APPEARANCE UR: CLEAR
AST SERPL-CCNC: 30 U/L (ref 15–37)
BACTERIA URNS QL MICRO: ABNORMAL /HPF
BILIRUB SERPL-MCNC: 0.3 MG/DL (ref 0–1.2)
BILIRUB UR QL: NEGATIVE
BUN SERPL-MCNC: 23 MG/DL (ref 8–23)
CALCIUM SERPL-MCNC: 11.4 MG/DL (ref 8.8–10.2)
CHLORIDE SERPL-SCNC: 101 MMOL/L (ref 98–107)
CHOLEST SERPL-MCNC: 217 MG/DL (ref 0–200)
CO2 SERPL-SCNC: 25 MMOL/L (ref 20–28)
COLOR UR: ABNORMAL
CREAT SERPL-MCNC: 0.9 MG/DL (ref 0.6–1.1)
EPI CELLS #/AREA URNS HPF: ABNORMAL /HPF
EST. AVERAGE GLUCOSE BLD GHB EST-MCNC: 132 MG/DL
GLOBULIN SER CALC-MCNC: 3.9 G/DL (ref 2.3–3.5)
GLUCOSE SERPL-MCNC: 101 MG/DL (ref 70–99)
GLUCOSE UR STRIP.AUTO-MCNC: NEGATIVE MG/DL
HBA1C MFR BLD: 6.2 % (ref 0–5.6)
HDLC SERPL-MCNC: 55 MG/DL (ref 40–60)
HDLC SERPL: 3.9 (ref 0–5)
HGB UR QL STRIP: NEGATIVE
KETONES UR QL STRIP.AUTO: NEGATIVE MG/DL
LDLC SERPL CALC-MCNC: 129 MG/DL (ref 0–100)
LEUKOCYTE ESTERASE UR QL STRIP.AUTO: ABNORMAL
NITRITE UR QL STRIP.AUTO: NEGATIVE
OTHER OBSERVATIONS: ABNORMAL
PH UR STRIP: 6.5 (ref 5–9)
POTASSIUM SERPL-SCNC: 4.2 MMOL/L (ref 3.5–5.1)
PROT SERPL-MCNC: 7.8 G/DL (ref 6.3–8.2)
PROT UR STRIP-MCNC: NEGATIVE MG/DL
RBC #/AREA URNS HPF: ABNORMAL /HPF
SODIUM SERPL-SCNC: 139 MMOL/L (ref 136–145)
SP GR UR REFRACTOMETRY: 1.01 (ref 1–1.02)
TRIGL SERPL-MCNC: 164 MG/DL (ref 0–150)
TSH W FREE THYROID IF ABNORMAL: 2.14 UIU/ML (ref 0.27–4.2)
UROBILINOGEN UR QL STRIP.AUTO: 0.2 EU/DL (ref 0.2–1)
VLDLC SERPL CALC-MCNC: 33 MG/DL (ref 6–23)
WBC URNS QL MICRO: ABNORMAL /HPF

## 2024-10-24 RX ORDER — MELOXICAM 15 MG/1
15 TABLET ORAL DAILY
Qty: 30 TABLET | Refills: 2 | Status: SHIPPED | OUTPATIENT
Start: 2024-10-24

## 2024-10-24 RX ORDER — AMLODIPINE AND BENAZEPRIL HYDROCHLORIDE 5; 40 MG/1; MG/1
1 CAPSULE ORAL DAILY
Qty: 30 CAPSULE | Refills: 3 | Status: CANCELLED | OUTPATIENT
Start: 2024-10-24

## 2024-10-24 RX ORDER — NORTRIPTYLINE HYDROCHLORIDE 25 MG/1
25-50 CAPSULE ORAL NIGHTLY
COMMUNITY
Start: 2024-10-20

## 2024-10-24 RX ORDER — PROMETHAZINE HYDROCHLORIDE 25 MG/1
25 TABLET ORAL EVERY 6 HOURS PRN
Qty: 30 TABLET | Refills: 1 | Status: SHIPPED | OUTPATIENT
Start: 2024-10-24

## 2024-10-24 RX ORDER — AMLODIPINE AND BENAZEPRIL HYDROCHLORIDE 5; 20 MG/1; MG/1
1 CAPSULE ORAL DAILY
Qty: 90 CAPSULE | Refills: 3 | Status: SHIPPED | OUTPATIENT
Start: 2024-10-24 | End: 2025-10-24

## 2024-10-24 ASSESSMENT — ENCOUNTER SYMPTOMS
CONSTIPATION: 0
WHEEZING: 0
VOMITING: 0
NAUSEA: 0
BLOOD IN STOOL: 0
COUGH: 0
SHORTNESS OF BREATH: 1
DIARRHEA: 0

## 2024-10-24 ASSESSMENT — PATIENT HEALTH QUESTIONNAIRE - PHQ9
6. FEELING BAD ABOUT YOURSELF - OR THAT YOU ARE A FAILURE OR HAVE LET YOURSELF OR YOUR FAMILY DOWN: NOT AT ALL
SUM OF ALL RESPONSES TO PHQ QUESTIONS 1-9: 0
10. IF YOU CHECKED OFF ANY PROBLEMS, HOW DIFFICULT HAVE THESE PROBLEMS MADE IT FOR YOU TO DO YOUR WORK, TAKE CARE OF THINGS AT HOME, OR GET ALONG WITH OTHER PEOPLE: NOT DIFFICULT AT ALL
3. TROUBLE FALLING OR STAYING ASLEEP: NOT AT ALL
SUM OF ALL RESPONSES TO PHQ QUESTIONS 1-9: 0
SUM OF ALL RESPONSES TO PHQ QUESTIONS 1-9: 0
4. FEELING TIRED OR HAVING LITTLE ENERGY: NOT AT ALL
SUM OF ALL RESPONSES TO PHQ QUESTIONS 1-9: 0
7. TROUBLE CONCENTRATING ON THINGS, SUCH AS READING THE NEWSPAPER OR WATCHING TELEVISION: NOT AT ALL
SUM OF ALL RESPONSES TO PHQ9 QUESTIONS 1 & 2: 0
9. THOUGHTS THAT YOU WOULD BE BETTER OFF DEAD, OR OF HURTING YOURSELF: NOT AT ALL
2. FEELING DOWN, DEPRESSED OR HOPELESS: NOT AT ALL
1. LITTLE INTEREST OR PLEASURE IN DOING THINGS: NOT AT ALL
5. POOR APPETITE OR OVEREATING: NOT AT ALL
8. MOVING OR SPEAKING SO SLOWLY THAT OTHER PEOPLE COULD HAVE NOTICED. OR THE OPPOSITE, BEING SO FIGETY OR RESTLESS THAT YOU HAVE BEEN MOVING AROUND A LOT MORE THAN USUAL: NOT AT ALL

## 2024-10-24 NOTE — PROGRESS NOTES
10/24/2024 11:05 AM  Location:[facility] PHYSICIAN SERVICES  [facility] [place] MEDICINE  [place]  Patient #:  [id number]  YOB: 1960            History of Present Illness     Chief Complaint   Patient presents with    Annual Exam     Patient here for her yearly exam.     Dysuria     Complains with dysuria and a foul urine smell x 2 weeks.     Altered Mental Status      here with pt today. He left to go on a golf trip and when he return he found pt to have some confusion and slurred speech.  They have tried adjusting her Nortriptyline to see if this would help her symptoms. She took one on Wednesday and they noticed some improvement - she did not take any last night and her symptoms did improve.     Gastroesophageal Reflux     Complains with reflux.        Ms. Beltran is a 64 y.o. female  who presents for the above.   Had an episode of altered mental status that frightened both her and her spouse.  Has had some urinary issues.  Wonders if some of it could be related to her medications that her psychiatrist prescribed.  Stopped the nortriptyline.  Is concerned about stopping it though.  Is here for yearly preventive exam as well as follow up on chronic medical issues.            Allergies   Allergen Reactions    Diphenhydramine Other (See Comments)     hyperactivity    Duloxetine Other (See Comments)     Hallucinations    Hydrocodone-Acetaminophen Other (See Comments)     Hyperactivity    Hydromorphone Other (See Comments)     Hyperactivity    Meperidine Other (See Comments)     Hyperactivity    Morphine Other (See Comments)     Hallucinations    Tapentadol Other (See Comments)     Bad dreams    Nitrofurantoin Macrocrystal Nausea And Vomiting     Past Medical History:   Diagnosis Date    ADHD (attention deficit hyperactivity disorder)     Arthritis     Chronic obstructive pulmonary disease (HCC)     Per pt \"Mild,\" PRN inhaler-last used 2021, followed by Dr. Cano     Degeneration of lumbar or

## 2024-10-24 NOTE — PATIENT INSTRUCTIONS
hands, brush your teeth twice a day, and wear a seat belt in the car.   Where can you learn more?  Go to https://www.[employer].net/patientEd and enter P072 to learn more about \"Well Visit, Ages 18 to 65: Care Instructions.\"  Current as of: August 6, 2023  Content Version: 14.2  © 2024 [employer].   Care instructions adapted under license by [employer]. If you have questions about a medical condition or this instruction, always ask your healthcare professional. [employer] disclaims any warranty or liability for your use of this information.

## 2024-10-24 NOTE — TELEPHONE ENCOUNTER
Pharmacist called needing directions clarified on  promethazine (PHENERGAN) 25 MG tablet Take 1 tablet by mouth every 6 hours as needed for Nausea or 1/2-1 po tid prn nausea call back [phone]

## 2024-10-25 ENCOUNTER — TELEPHONE (OUTPATIENT)
Dept: INTERNAL MEDICINE CLINIC | Facility: CLINIC | Age: 64
End: 2024-10-25

## 2024-10-25 NOTE — TELEPHONE ENCOUNTER
[facility] needs clarification on frequency of Promethazine 25 MG.    Call back # [phone]

## 2024-10-28 ENCOUNTER — TELEPHONE (OUTPATIENT)
Dept: INTERNAL MEDICINE CLINIC | Facility: CLINIC | Age: 64
End: 2024-10-28

## 2024-10-28 DIAGNOSIS — E83.52 HYPERCALCEMIA: Primary | ICD-10-CM

## 2024-10-28 RX ORDER — PROMETHAZINE HYDROCHLORIDE 25 MG/1
25 TABLET ORAL EVERY 6 HOURS PRN
Qty: 30 TABLET | Refills: 1 | Status: SHIPPED | OUTPATIENT
Start: 2024-10-28

## 2024-10-28 NOTE — TELEPHONE ENCOUNTER
Due to elevated calcium, I'd like for you to have labs again early next week.  Hope you are feeling better.   Other labs look fine except that your cholesterol is elevated.  Maintain a low fat high fiber diet and make sure you are getting 30 minutes of aerobic exercise at least 4-5 days weekly. Schedule labs as ordered please. Thanks.  [facility]

## 2024-10-28 NOTE — TELEPHONE ENCOUNTER
This has been fully explained to the patient, who indicates understanding. Scheduled labs for 11/05/2024 AM

## 2024-10-29 NOTE — TELEPHONE ENCOUNTER
[facility] called again regarding the medication Promethazine. They need a little clarification.    Call back # [phone]

## 2024-11-01 LAB
COLLECTION METHOD: NORMAL
CYTOLOGIST CVX/VAG CYTO: NORMAL
CYTOLOGY CVX/VAG DOC THIN PREP: NORMAL
HPV APTIMA: NEGATIVE
HPV GENOTYPE REFLEX: NORMAL
Lab: NORMAL
Lab: NORMAL
PAP SOURCE: NORMAL
PATH REPORT.FINAL DX SPEC: NORMAL
STAT OF ADQ CVX/VAG CYTO-IMP: NORMAL

## 2024-11-04 ENCOUNTER — OFFICE VISIT (OUTPATIENT)
Age: 64
End: 2024-11-04
Payer: COMMERCIAL

## 2024-11-04 VITALS
HEART RATE: 86 BPM | DIASTOLIC BLOOD PRESSURE: 80 MMHG | HEIGHT: 61 IN | WEIGHT: 145 LBS | SYSTOLIC BLOOD PRESSURE: 124 MMHG | BODY MASS INDEX: 27.38 KG/M2

## 2024-11-04 DIAGNOSIS — E78.2 MIXED HYPERLIPIDEMIA: ICD-10-CM

## 2024-11-04 DIAGNOSIS — R00.2 PALPITATIONS: Primary | ICD-10-CM

## 2024-11-04 DIAGNOSIS — I49.1 PAC (PREMATURE ATRIAL CONTRACTION): ICD-10-CM

## 2024-11-04 DIAGNOSIS — I10 ESSENTIAL HYPERTENSION: ICD-10-CM

## 2024-11-04 DIAGNOSIS — R06.09 DYSPNEA ON EXERTION: ICD-10-CM

## 2024-11-04 PROCEDURE — 99215 OFFICE O/P EST HI 40 MIN: CPT | Performed by: INTERNAL MEDICINE

## 2024-11-04 PROCEDURE — 3074F SYST BP LT 130 MM HG: CPT | Performed by: INTERNAL MEDICINE

## 2024-11-04 PROCEDURE — 3079F DIAST BP 80-89 MM HG: CPT | Performed by: INTERNAL MEDICINE

## 2024-11-04 RX ORDER — PRAVASTATIN SODIUM 40 MG
40 TABLET ORAL DAILY
Qty: 90 TABLET | Refills: 3 | Status: SHIPPED | OUTPATIENT
Start: 2024-11-04

## 2024-11-04 ASSESSMENT — ENCOUNTER SYMPTOMS
WHEEZING: 0
HEMOPTYSIS: 0
HOARSE VOICE: 0
EYE REDNESS: 0
STRIDOR: 0
HEMATOCHEZIA: 0
ABDOMINAL PAIN: 0
HEMATEMESIS: 0
DOUBLE VISION: 0

## 2024-11-04 NOTE — PROGRESS NOTES
[facility] CARDIOLOGY  [address] [place] [address], [address]  [place] [address]  PHONE: [phone]          24    NAME:  Glen Beltran  : 1960  MRN: [id number]         SUBJECTIVE:   Glen Beltran is a 64 y.o. female seen for a visit regarding the following:     Chief Complaint   Patient presents with    Consultation     palpitations           HPI:    Cardio problem list:   1.  Dyspnea on exertion   -Echo from 2022 shows an EF at 60 to 65% with no regional wall motion abnormalities, normal atrial dimensions and no significant valve disease, borderline voltage criteria for LVH, trace to mild aortic regurgitation   -Interstitial lung disease   -Echo from 2017 showed an EF of 65 to 70% with mild to moderate tricuspid regurgitation, mild mitral regurgitation   2.  Hypertension   3.  Hyperlipidemia   4.  Palpitations-PACs noted on EKG previously   5.  Nicotine addiction-addicted to nicotine gum-prior smoker-using nicotine patch to get off of this.      I saw Ms Beltran who is a pleasant 64-year-old woman in cardiovascular follow-up for palpitations, hypertension, hyperlipidemia  and had an abnormal EKG with PACs .    We last met with her about 2 years ago at which point we got her through an echocardiogram which showed no significant structural or valvular heart disease and we said it was all right for her to undergo shoulder replacement surgery with acceptable risk.     Palpitations/PACs-intermittent but has been more frequent lately.  No complaints of any significant palpitations-she occasionally feels an irregular heartbeat but it does not seem to bother her too much.  No formal diagnosis of any atrial fibrillation/flutter in the past.  No syncope or presyncope.  Irregular heartbeats felt intermittently.  Certainly noticed when she saw Dr. Cano.     Hypertension: Denies headaches or blurry vision and remains compliant on her current therapy.  Pressures have trended upwards at home but with the

## 2024-11-05 DIAGNOSIS — E83.52 HYPERCALCEMIA: ICD-10-CM

## 2024-11-05 LAB
ANION GAP SERPL CALC-SCNC: 13 MMOL/L (ref 7–16)
BASOPHILS # BLD: 0.1 K/UL (ref 0–0.2)
BASOPHILS NFR BLD: 1 % (ref 0–2)
BUN SERPL-MCNC: 15 MG/DL (ref 8–23)
CALCIUM SERPL-MCNC: 10.5 MG/DL (ref 8.8–10.2)
CALCIUM SERPL-MCNC: 10.5 MG/DL (ref 8.8–10.2)
CHLORIDE SERPL-SCNC: 104 MMOL/L (ref 98–107)
CO2 SERPL-SCNC: 25 MMOL/L (ref 20–29)
CREAT SERPL-MCNC: 0.83 MG/DL (ref 0.6–1.1)
DIFFERENTIAL METHOD BLD: NORMAL
EOSINOPHIL # BLD: 0.1 K/UL (ref 0–0.8)
EOSINOPHIL NFR BLD: 1 % (ref 0.5–7.8)
ERYTHROCYTE [DISTWIDTH] IN BLOOD BY AUTOMATED COUNT: 13.2 % (ref 11.9–14.6)
GLUCOSE SERPL-MCNC: 89 MG/DL (ref 70–99)
HCT VFR BLD AUTO: 45.9 % (ref 35.8–46.3)
HGB BLD-MCNC: 15.1 G/DL (ref 11.7–15.4)
IMM GRANULOCYTES # BLD AUTO: 0.1 K/UL (ref 0–0.5)
IMM GRANULOCYTES NFR BLD AUTO: 1 % (ref 0–5)
LYMPHOCYTES # BLD: 1.4 K/UL (ref 0.5–4.6)
LYMPHOCYTES NFR BLD: 16 % (ref 13–44)
MCH RBC QN AUTO: 30.7 PG (ref 26.1–32.9)
MCHC RBC AUTO-ENTMCNC: 32.9 G/DL (ref 31.4–35)
MCV RBC AUTO: 93.3 FL (ref 82–102)
MONOCYTES # BLD: 0.7 K/UL (ref 0.1–1.3)
MONOCYTES NFR BLD: 9 % (ref 4–12)
NEUTS SEG # BLD: 6.3 K/UL (ref 1.7–8.2)
NEUTS SEG NFR BLD: 72 % (ref 43–78)
NRBC # BLD: 0 K/UL (ref 0–0.2)
PLATELET # BLD AUTO: 296 K/UL (ref 150–450)
PMV BLD AUTO: 9.9 FL (ref 9.4–12.3)
POTASSIUM SERPL-SCNC: 4.2 MMOL/L (ref 3.5–5.1)
PTH-INTACT SERPL-MCNC: 32.6 PG/ML (ref 15–65)
RBC # BLD AUTO: 4.92 M/UL (ref 4.05–5.2)
SODIUM SERPL-SCNC: 142 MMOL/L (ref 136–145)
WBC # BLD AUTO: 8.7 K/UL (ref 4.3–11.1)

## 2024-11-06 ENCOUNTER — TELEPHONE (OUTPATIENT)
Dept: INTERNAL MEDICINE CLINIC | Facility: CLINIC | Age: 64
End: 2024-11-06

## 2024-11-06 NOTE — RESULT ENCOUNTER NOTE
Labs have have improved.  No evidence of an overactive parathyroid gland.  Continue your current doses of medications. Keep up the good work.  Hope you are feeling well.  Thanks.  [facility]

## 2024-11-06 NOTE — TELEPHONE ENCOUNTER
----- Message from Dr. Genevieve Cano MD sent at 11/6/2024  5:08 PM EST -----  Normal pap smear.  Hope you're feeling well.  Thanks.  [facility]

## 2024-11-12 ENCOUNTER — TELEPHONE (OUTPATIENT)
Dept: INTERNAL MEDICINE CLINIC | Facility: CLINIC | Age: 64
End: 2024-11-12

## 2024-11-12 NOTE — TELEPHONE ENCOUNTER
----- Message from Dr. Genevieve Cano MD sent at 11/11/2024  5:24 PM EST -----  Labs have have improved.  No evidence of an overactive parathyroid gland.  Continue your current doses of medications. Keep up the good work.  Hope you are feeling well.  Thanks.  [facility]

## 2024-11-25 ENCOUNTER — OFFICE VISIT (OUTPATIENT)
Dept: INTERNAL MEDICINE CLINIC | Facility: CLINIC | Age: 64
End: 2024-11-25
Payer: COMMERCIAL

## 2024-11-25 VITALS
DIASTOLIC BLOOD PRESSURE: 62 MMHG | WEIGHT: 146.2 LBS | RESPIRATION RATE: 16 BRPM | SYSTOLIC BLOOD PRESSURE: 126 MMHG | OXYGEN SATURATION: 99 % | TEMPERATURE: 97.2 F | HEIGHT: 61 IN | BODY MASS INDEX: 27.6 KG/M2 | HEART RATE: 79 BPM

## 2024-11-25 DIAGNOSIS — I49.9 IRREGULAR HEART RATE: ICD-10-CM

## 2024-11-25 DIAGNOSIS — I10 PRIMARY HYPERTENSION: Primary | ICD-10-CM

## 2024-11-25 LAB
ANION GAP SERPL CALC-SCNC: 12 MMOL/L (ref 7–16)
BUN SERPL-MCNC: 20 MG/DL (ref 8–23)
CALCIUM SERPL-MCNC: 10.2 MG/DL (ref 8.8–10.2)
CHLORIDE SERPL-SCNC: 105 MMOL/L (ref 98–107)
CO2 SERPL-SCNC: 24 MMOL/L (ref 20–29)
CREAT SERPL-MCNC: 0.77 MG/DL (ref 0.6–1.1)
GLUCOSE SERPL-MCNC: 110 MG/DL (ref 70–99)
MAGNESIUM SERPL-MCNC: 1.9 MG/DL (ref 1.8–2.4)
POTASSIUM SERPL-SCNC: 4 MMOL/L (ref 3.5–5.1)
SODIUM SERPL-SCNC: 141 MMOL/L (ref 136–145)

## 2024-11-25 PROCEDURE — 3078F DIAST BP <80 MM HG: CPT | Performed by: NURSE PRACTITIONER

## 2024-11-25 PROCEDURE — 93000 ELECTROCARDIOGRAM COMPLETE: CPT | Performed by: NURSE PRACTITIONER

## 2024-11-25 PROCEDURE — 3074F SYST BP LT 130 MM HG: CPT | Performed by: NURSE PRACTITIONER

## 2024-11-25 PROCEDURE — 99214 OFFICE O/P EST MOD 30 MIN: CPT | Performed by: NURSE PRACTITIONER

## 2024-11-25 ASSESSMENT — ENCOUNTER SYMPTOMS
VOMITING: 0
SHORTNESS OF BREATH: 0
NAUSEA: 0

## 2024-11-25 NOTE — PROGRESS NOTES
11/25/2024 11:19 AM  Location:[facility] PHYSICIAN SERVICES  [facility] [place] MEDICINE  [place]  Patient #:  [id number]  YOB: 1960          History of Present Illness     Chief Complaint   Patient presents with    1 Month Follow-Up     Pt presents to the office today for a 1 month follow-up       Ms. Beltran is a 64 y.o. female  who presents for 1 month follow up.  Ms. Beltran presents for 1 month follow-up.  Dr. Cano saw this patient 1 month ago and decreased her benazepril due to lower blood pressures.  She also recommended she stop her nortriptyline.  She was changed to a new medication for her depression, she cannot recall name. Does not like the way it makes her feel.     Notes that she feels better after stopping the Nortriptyline.   Denies any complaints today.              Allergies   Allergen Reactions    Diphenhydramine Other (See Comments)     hyperactivity    Duloxetine Other (See Comments)     Hallucinations    Hydrocodone-Acetaminophen Other (See Comments)     Hyperactivity    Hydromorphone Other (See Comments)     Hyperactivity    Meperidine Other (See Comments)     Hyperactivity    Morphine Other (See Comments)     Hallucinations    Tapentadol Other (See Comments)     Bad dreams    Nitrofurantoin Macrocrystal Nausea And Vomiting     Past Medical History:   Diagnosis Date    ADHD (attention deficit hyperactivity disorder)     Arthritis     Chronic obstructive pulmonary disease (HCC)     Per pt \"Mild,\" PRN inhaler-last used 2021, followed by Dr. Cano     Degeneration of lumbar or lumbosacral intervertebral disc 2/27/2015    Depression 02/27/2015    managed with medication     Dermatophytosis of the body 2/27/2015    Essential hypertension, benign 2/27/2015    managed with med    GERD (gastroesophageal reflux disease) 2/27/2015    managed with med    H/O echocardiogram 06/27/2019    EF 55-60%, trivial aortic, mitral, and tricuspid regurgitation     Heart murmur     pt reports since

## 2024-11-29 ENCOUNTER — TELEPHONE (OUTPATIENT)
Dept: INTERNAL MEDICINE CLINIC | Facility: CLINIC | Age: 64
End: 2024-11-29

## 2024-11-29 NOTE — TELEPHONE ENCOUNTER
I sent the message below via my chart, patient has not read the following message, please relay. Thanks!    Ms. Beltran,  The labs that we checked yesterday are all stable.   Please let us know if you develop  any new or concerning symptoms.   Happy thanksgiving!  Estrella Thomson, CLAU-C  [facility] Internal Medicine      [facility] [place]    MyChart User Last Read On   Jan W Beltran Not Read

## 2025-01-16 ENCOUNTER — HOSPITAL ENCOUNTER (OUTPATIENT)
Dept: MAMMOGRAPHY | Age: 65
Discharge: HOME | End: 2025-01-19
Attending: INTERNAL MEDICINE
Payer: COMMERCIAL

## 2025-01-16 DIAGNOSIS — M81.0 AGE-RELATED OSTEOPOROSIS WITHOUT CURRENT PATHOLOGICAL FRACTURE: ICD-10-CM

## 2025-01-16 DIAGNOSIS — Z12.31 ENCOUNTER FOR SCREENING MAMMOGRAM FOR MALIGNANT NEOPLASM OF BREAST: ICD-10-CM

## 2025-01-16 PROCEDURE — 77080 DXA BONE DENSITY AXIAL: CPT

## 2025-01-16 PROCEDURE — 77063 BREAST TOMOSYNTHESIS BI: CPT

## 2025-01-19 NOTE — RESULT ENCOUNTER NOTE
You have osteopenia but not osteoporosis.  Continue at least 600 mg of calcium plus 800 international units of vitamin D twice daily as well as weight bearing exercise.  Repeat in 2 years.  Hope you're feeling well.  Thanks.  [facility]

## 2025-02-04 ENCOUNTER — OFFICE VISIT (OUTPATIENT)
Dept: INTERNAL MEDICINE CLINIC | Facility: CLINIC | Age: 65
End: 2025-02-04
Payer: COMMERCIAL

## 2025-02-04 VITALS
SYSTOLIC BLOOD PRESSURE: 152 MMHG | DIASTOLIC BLOOD PRESSURE: 70 MMHG | TEMPERATURE: 98.8 F | OXYGEN SATURATION: 99 % | HEIGHT: 61 IN | WEIGHT: 140.8 LBS | HEART RATE: 81 BPM | BODY MASS INDEX: 26.58 KG/M2

## 2025-02-04 DIAGNOSIS — F41.9 ANXIETY: ICD-10-CM

## 2025-02-04 DIAGNOSIS — F19.939 WITHDRAWAL FROM OTHER PSYCHOACTIVE SUBSTANCE (HCC): Primary | ICD-10-CM

## 2025-02-04 DIAGNOSIS — R11.2 NAUSEA AND VOMITING, UNSPECIFIED VOMITING TYPE: ICD-10-CM

## 2025-02-04 PROCEDURE — 99214 OFFICE O/P EST MOD 30 MIN: CPT | Performed by: NURSE PRACTITIONER

## 2025-02-04 PROCEDURE — 3078F DIAST BP <80 MM HG: CPT | Performed by: NURSE PRACTITIONER

## 2025-02-04 PROCEDURE — 3077F SYST BP >= 140 MM HG: CPT | Performed by: NURSE PRACTITIONER

## 2025-02-04 RX ORDER — PROMETHAZINE HYDROCHLORIDE 12.5 MG/1
12.5 TABLET ORAL 3 TIMES DAILY PRN
Qty: 15 TABLET | Refills: 0 | Status: SHIPPED | OUTPATIENT
Start: 2025-02-04 | End: 2025-02-09

## 2025-02-04 RX ORDER — OLANZAPINE 10 MG/1
10 TABLET ORAL 2 TIMES DAILY
Qty: 28 TABLET | Refills: 0 | Status: SHIPPED | OUTPATIENT
Start: 2025-02-04

## 2025-02-04 RX ORDER — ESCITALOPRAM OXALATE 10 MG/1
10 TABLET ORAL DAILY
COMMUNITY
Start: 2025-01-21

## 2025-02-04 RX ORDER — OLANZAPINE 7.5 MG/1
7.5 TABLET, FILM COATED ORAL 2 TIMES DAILY
Status: CANCELLED | OUTPATIENT
Start: 2025-02-04

## 2025-02-04 SDOH — ECONOMIC STABILITY: FOOD INSECURITY: WITHIN THE PAST 12 MONTHS, YOU WORRIED THAT YOUR FOOD WOULD RUN OUT BEFORE YOU GOT MONEY TO BUY MORE.: NEVER TRUE

## 2025-02-04 SDOH — ECONOMIC STABILITY: FOOD INSECURITY: WITHIN THE PAST 12 MONTHS, THE FOOD YOU BOUGHT JUST DIDN'T LAST AND YOU DIDN'T HAVE MONEY TO GET MORE.: NEVER TRUE

## 2025-02-04 ASSESSMENT — PATIENT HEALTH QUESTIONNAIRE - PHQ9
SUM OF ALL RESPONSES TO PHQ QUESTIONS 1-9: 0
1. LITTLE INTEREST OR PLEASURE IN DOING THINGS: NOT AT ALL
2. FEELING DOWN, DEPRESSED OR HOPELESS: NOT AT ALL
SUM OF ALL RESPONSES TO PHQ QUESTIONS 1-9: 0
SUM OF ALL RESPONSES TO PHQ QUESTIONS 1-9: 0
SUM OF ALL RESPONSES TO PHQ9 QUESTIONS 1 & 2: 0
SUM OF ALL RESPONSES TO PHQ QUESTIONS 1-9: 0

## 2025-02-04 ASSESSMENT — ENCOUNTER SYMPTOMS
ABDOMINAL PAIN: 0
ABDOMINAL DISTENTION: 0
SHORTNESS OF BREATH: 0
DIARRHEA: 0
VOMITING: 1
NAUSEA: 1
CONSTIPATION: 0
CHEST TIGHTNESS: 0
COUGH: 0

## 2025-02-04 NOTE — PROGRESS NOTES
[facility] Internal Medicine  [address] [facility] [address]     Office Visit    Glen Beltran   1960 02/04/25       Subjective:     Chief Complaint   Patient presents with    Nausea     Pt reports nausea and vomiting x 2 weeks. She states she has taken zofran without relief.         History of Present illness:  Ms. Beltran is a 64 y.o. female  who presents for the above complaints.  She states that she is in recovery from drug addiction. She abruptly stopped taking olanzapine 18 days ago.  She states that she was abusing it  and was taking it at least 3 times a day and decided she could not keep doing this and stopped it.  She states that she told her psychiatrist that she stopped it. She has follow up with Dr. Doroteo López in 2 weeks.     She requests phenergan for the nausea and vomiting.     Objective:     Allergies:    Allergies   Allergen Reactions    Diphenhydramine Other (See Comments)     hyperactivity    Duloxetine Other (See Comments)     Hallucinations    Hydrocodone-Acetaminophen Other (See Comments)     Hyperactivity    Hydromorphone Other (See Comments)     Hyperactivity    Meperidine Other (See Comments)     Hyperactivity    Morphine Other (See Comments)     Hallucinations    Tapentadol Other (See Comments)     Bad dreams    Nitrofurantoin Macrocrystal Nausea And Vomiting        Medical History:    Past Medical History:   Diagnosis Date    ADHD (attention deficit hyperactivity disorder)     Arthritis     Chronic obstructive pulmonary disease (HCC)     Per pt \"Mild,\" PRN inhaler-last used 2021, followed by Dr. Cano     Degeneration of lumbar or lumbosacral intervertebral disc 2/27/2015    Depression 02/27/2015    managed with medication     Dermatophytosis of the body 2/27/2015    Essential hypertension, benign 2/27/2015    managed with med    GERD (gastroesophageal reflux disease) 2/27/2015    managed with med    H/O echocardiogram 06/27/2019    EF 55-60%, trivial aortic,

## 2025-02-25 NOTE — PROGRESS NOTES
BILLY RUSSELL  MRN-[id number]  Patient is a 74y old  Male who presents with a chief complaint of Lethargy (25 Feb 2025 13:31)    HPI:  73yo M pmhx HTN, HLD, nonischemic cardiomyopathy, chronic systolic heart failure s/p HM2 LVAD (6/2017), s/p heart transplant from Hep. 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 On the phone, the patient's godbrother mentioned that on 2/18, the patient was in the car with his godbrother and was disoriented asking to get off on the road 4 blocks before his house. In addition, a caretaker stated that when she spoke to Mr. Hameed on the phone at 11am on 2/18, he was doing well. However, when the caretaker visited 2 hours later at 1pm, the patient was disoriented and felt his legs were heavy. This prompted the patient to come to the hospital.     In the ED: Hypothermic 91.4, HR 80, /60, RA   Patient noted to be Hyperkalemic (6.2) with Mixed respiratory and metabolic acidosis pH 7.2. - Patient was given Calcium Gluconate 2g, D50/Insulin 5 unit, 40 mg IV lasix, Lokelma 10mg.   Vancomycin and Zosyn  (20 Feb 2025 07:21)      24 HOUR EVENTS:    REVIEW OF SYSTEMS:  CONSTITUTIONAL: No weakness, fevers or chills  EYES/ENT: No visual changes;  No vertigo or throat pain   NECK: No pain or stiffness  RESPIRATORY: No cough, wheezing, hemoptysis; No shortness of breath  CARDIOVASCULAR: No chest pain or palpitations  GASTROINTESTINAL: No abdominal or epigastric pain. No nausea, vomiting, or hematemesis; No diarrhea or constipation. No melena or hematochezia.  GENITOURINARY: No dysuria, frequency or hematuria  NEUROLOGICAL: No numbness or weakness  SKIN: No itching, rashes      ICU Vital Signs Last 24 Hrs  T(C): 36.8 (25 Feb 2025 19:00), Max: 37.1 (25 Feb 2025 15:00)  T(F): 98.2 (25 Feb 2025 19:00), Max: 98.8 (25 Feb 2025 15:00)  HR: 109 (25 Feb 2025 22:00) (109 - 126)  BP: 89/46 (25 Feb 2025 22:00) (89/46 - 161/74)  BP(mean): 65 (25 Feb 2025 22:00) (63 - 106)  ABP: --  ABP(mean): --  RR: 14 (25 Feb 2025 22:00) (14 - 38)  SpO2: 97% (25 Feb 2025 21:00) (93% - 100%)    O2 Parameters below as of 25 Feb 2025 21:00  Patient On (Oxygen Delivery Method): room air            CVP(mm Hg): --  CO: --  CI: --  PA: --  PA(mean): --  PA(direct): --  PCWP: --  LA: --  RA: --  SVR: --  SVRI: --  PVR: --  PVRI: --  I&O's Summary    24 Feb 2025 07:01  -  25 Feb 2025 07:00  --------------------------------------------------------  IN: 836.7 mL / OUT: 741 mL / NET: 95.7 mL    25 Feb 2025 07:01  -  25 Feb 2025 23:52  --------------------------------------------------------  IN: 150 mL / OUT: 98 mL / NET: 52 mL        CAPILLARY BLOOD GLUCOSE    CAPILLARY BLOOD GLUCOSE      POCT Blood Glucose.: 127 mg/dL (25 Feb 2025 17:07)      PHYSICAL EXAM:  GENERAL: No acute distress, well-developed  HEAD:  Atraumatic, Normocephalic  EYES: EOMI, PERRLA, conjunctiva and sclera clear  NECK: Supple, no lymphadenopathy, no JVD  CHEST/LUNG: CTAB; No wheezes, rales, or rhonchi  HEART: Regular rate and rhythm. Normal S1/S2. No murmurs, rubs, or gallops  ABDOMEN: Soft, non-tender, non-distended; normal bowel sounds, no organomegaly  EXTREMITIES:  2+ peripheral pulses b/l, No clubbing, cyanosis, or edema  NEUROLOGY: A&O x 3, no focal deficits  SKIN: No rashes or lesions    ============================I/O===========================   I&O's Detail    24 Feb 2025 07:01  -  25 Feb 2025 07:00  --------------------------------------------------------  IN:    IV PiggyBack: 75 mL    Norepinephrine: 1.7 mL    Oral Fluid: 760 mL  Total IN: 836.7 mL    OUT:    Indwelling Catheter - Urethral (mL): 15 mL    Nasogastric/Oral tube (mL): 105 mL    Other (mL): 621 mL  Total OUT: 741 mL    Total NET: 95.7 mL      25 Feb 2025 07:01  -  25 Feb 2025 23:52  --------------------------------------------------------  IN:    IV PiggyBack: 150 mL  Total IN: 150 mL    OUT:    Indwelling Catheter - Urethral (mL): 25 mL    Other (mL): 73 mL  Total OUT: 98 mL    Total NET: 52 mL        ============================ LABS =========================                        8.8    11.93 )-----------( 146      ( 25 Feb 2025 18:45 )             27.9     02-25    134[L]  |  100  |  29[H]  ----------------------------<  121[H]  4.6   |  24  |  2.06[H]    Ca    7.7[L]      25 Feb 2025 18:45  Phos  3.0     02-25  Mg     2.6     02-25    TPro  5.6[L]  /  Alb  2.7[L]  /  TBili  1.1  /  DBili  x   /  AST  35  /  ALT  16  /  AlkPhos  51  02-25                LIVER FUNCTIONS - ( 25 Feb 2025 18:45 )  Alb: 2.7 g/dL / Pro: 5.6 g/dL / ALK PHOS: 51 U/L / ALT: 16 U/L / AST: 35 U/L / GGT: x           PT/INR - ( 25 Feb 2025 00:22 )   PT: 11.1 sec;   INR: 0.97 ratio         PTT - ( 25 Feb 2025 00:22 )  PTT:29.7 sec    Blood Gas Venous - Lactate: 2.0 mmol/L (02-25-25 @ 23:25)  Blood Gas Venous - Lactate: 1.2 mmol/L (02-24-25 @ 11:49)  Blood Gas Venous - Lactate: 1.1 mmol/L (02-24-25 @ 03:32)  Blood Gas Venous - Lactate: 0.9 mmol/L (02-24-25 @ 00:27)  Blood Gas Venous - Lactate: 1.3 mmol/L (02-23-25 @ 22:43)  Blood Gas Venous - Lactate: 1.2 mmol/L (02-23-25 @ 18:06)  Blood Gas Venous - Lactate: 1.0 mmol/L (02-23-25 @ 12:58)    Urinalysis Basic - ( 25 Feb 2025 18:45 )    Color: x / Appearance: x / SG: x / pH: x  Gluc: 121 mg/dL / Ketone: x  / Bili: x / Urobili: x   Blood: x / Protein: x / Nitrite: x   Leuk Esterase: x / RBC: x / WBC x   Sq Epi: x / Non Sq Epi: x / Bacteria: x                Discharge instructions given and prescriptions reviewed and given to patient. Opportunity for questions and clarification provided. Patient verbalizes understanding. Patient discharged in stable condition to car via wheelchair. BILLY RUSSELL  MRN-[id number]  Patient is a 74y old  Male who presents with a chief complaint of Lethargy (25 Feb 2025 13:31)    HPI:  73yo M pmhx HTN, HLD, nonischemic cardiomyopathy, chronic systolic heart failure s/p HM2 LVAD (6/2017), s/p heart transplant from Hep. 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 On the phone, the patient's godbrother mentioned that on 2/18, the patient was in the car with his godbrother and was disoriented asking to get off on the road 4 blocks before his house. In addition, a caretaker stated that when she spoke to Mr. Hameed on the phone at 11am on 2/18, he was doing well. However, when the caretaker visited 2 hours later at 1pm, the patient was disoriented and felt his legs were heavy. This prompted the patient to come to the hospital.     In the ED: Hypothermic 91.4, HR 80, /60, RA   Patient noted to be Hyperkalemic (6.2) with Mixed respiratory and metabolic acidosis pH 7.2. - Patient was given Calcium Gluconate 2g, D50/Insulin 5 unit, 40 mg IV lasix, Lokelma 10mg.   Vancomycin and Zosyn  (20 Feb 2025 07:21)      24 HOUR EVENTS:  - Improving mental status,     REVIEW OF SYSTEMS:  CONSTITUTIONAL: No weakness, fevers or chills  EYES/ENT: No visual changes;  No vertigo or throat pain   NECK: No pain or stiffness  RESPIRATORY: No cough, wheezing, hemoptysis; No shortness of breath  CARDIOVASCULAR: No chest pain or palpitations  GASTROINTESTINAL: No abdominal or epigastric pain. No nausea, vomiting, or hematemesis; No diarrhea or constipation. No melena or hematochezia.  GENITOURINARY: No dysuria, frequency or hematuria  NEUROLOGICAL: No numbness or weakness  SKIN: No itching, rashes      ICU Vital Signs Last 24 Hrs  T(C): 36.8 (25 Feb 2025 19:00), Max: 37.1 (25 Feb 2025 15:00)  T(F): 98.2 (25 Feb 2025 19:00), Max: 98.8 (25 Feb 2025 15:00)  HR: 109 (25 Feb 2025 22:00) (109 - 126)  BP: 89/46 (25 Feb 2025 22:00) (89/46 - 161/74)  BP(mean): 65 (25 Feb 2025 22:00) (63 - 106)  ABP: --  ABP(mean): --  RR: 14 (25 Feb 2025 22:00) (14 - 38)  SpO2: 97% (25 Feb 2025 21:00) (93% - 100%)    O2 Parameters below as of 25 Feb 2025 21:00  Patient On (Oxygen Delivery Method): room air            CVP(mm Hg): --  CO: --  CI: --  PA: --  PA(mean): --  PA(direct): --  PCWP: --  LA: --  RA: --  SVR: --  SVRI: --  PVR: --  PVRI: --  I&O's Summary    24 Feb 2025 07:01  -  25 Feb 2025 07:00  --------------------------------------------------------  IN: 836.7 mL / OUT: 741 mL / NET: 95.7 mL    25 Feb 2025 07:01  -  25 Feb 2025 23:52  --------------------------------------------------------  IN: 150 mL / OUT: 98 mL / NET: 52 mL        CAPILLARY BLOOD GLUCOSE    CAPILLARY BLOOD GLUCOSE      POCT Blood Glucose.: 127 mg/dL (25 Feb 2025 17:07)      PHYSICAL EXAM:  GENERAL: No acute distress, well-developed  HEAD:  Atraumatic, Normocephalic  EYES: EOMI, PERRLA, conjunctiva and sclera clear  NECK: Supple, no lymphadenopathy, no JVD  CHEST/LUNG: CTAB; No wheezes, rales, or rhonchi  HEART: Regular rate and rhythm. Normal S1/S2. No murmurs, rubs, or gallops  ABDOMEN: Soft, non-tender, non-distended; normal bowel sounds, no organomegaly  EXTREMITIES:  2+ peripheral pulses b/l, No clubbing, cyanosis, or edema  NEUROLOGY: A&O x 3, no focal deficits  SKIN: No rashes or lesions    ============================I/O===========================   I&O's Detail    24 Feb 2025 07:01  -  25 Feb 2025 07:00  --------------------------------------------------------  IN:    IV PiggyBack: 75 mL    Norepinephrine: 1.7 mL    Oral Fluid: 760 mL  Total IN: 836.7 mL    OUT:    Indwelling Catheter - Urethral (mL): 15 mL    Nasogastric/Oral tube (mL): 105 mL    Other (mL): 621 mL  Total OUT: 741 mL    Total NET: 95.7 mL      25 Feb 2025 07:01  -  25 Feb 2025 23:52  --------------------------------------------------------  IN:    IV PiggyBack: 150 mL  Total IN: 150 mL    OUT:    Indwelling Catheter - Urethral (mL): 25 mL    Other (mL): 73 mL  Total OUT: 98 mL    Total NET: 52 mL        ============================ LABS =========================                        8.8    11.93 )-----------( 146      ( 25 Feb 2025 18:45 )             27.9     02-25    134[L]  |  100  |  29[H]  ----------------------------<  121[H]  4.6   |  24  |  2.06[H]    Ca    7.7[L]      25 Feb 2025 18:45  Phos  3.0     02-25  Mg     2.6     02-25    TPro  5.6[L]  /  Alb  2.7[L]  /  TBili  1.1  /  DBili  x   /  AST  35  /  ALT  16  /  AlkPhos  51  02-25                LIVER FUNCTIONS - ( 25 Feb 2025 18:45 )  Alb: 2.7 g/dL / Pro: 5.6 g/dL / ALK PHOS: 51 U/L / ALT: 16 U/L / AST: 35 U/L / GGT: x           PT/INR - ( 25 Feb 2025 00:22 )   PT: 11.1 sec;   INR: 0.97 ratio         PTT - ( 25 Feb 2025 00:22 )  PTT:29.7 sec    Blood Gas Venous - Lactate: 2.0 mmol/L (02-25-25 @ 23:25)  Blood Gas Venous - Lactate: 1.2 mmol/L (02-24-25 @ 11:49)  Blood Gas Venous - Lactate: 1.1 mmol/L (02-24-25 @ 03:32)  Blood Gas Venous - Lactate: 0.9 mmol/L (02-24-25 @ 00:27)  Blood Gas Venous - Lactate: 1.3 mmol/L (02-23-25 @ 22:43)  Blood Gas Venous - Lactate: 1.2 mmol/L (02-23-25 @ 18:06)  Blood Gas Venous - Lactate: 1.0 mmol/L (02-23-25 @ 12:58)    Urinalysis Basic - ( 25 Feb 2025 18:45 )    Color: x / Appearance: x / SG: x / pH: x  Gluc: 121 mg/dL / Ketone: x  / Bili: x / Urobili: x   Blood: x / Protein: x / Nitrite: x   Leuk Esterase: x / RBC: x / WBC x   Sq Epi: x / Non Sq Epi: x / Bacteria: x                BILLY RUSSELL  MRN-[id number]  Patient is a 74y old  Male who presents with a chief complaint of Lethargy (25 Feb 2025 13:31)    HPI:  75yo M pmhx HTN, HLD, nonischemic cardiomyopathy, chronic systolic heart failure s/p HM2 LVAD (6/2017), s/p heart transplant from Hep. 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 On the phone, the patient's godbrother mentioned that on 2/18, the patient was in the car with his godbrother and was disoriented asking to get off on the road 4 blocks before his house. In addition, a caretaker stated that when she spoke to Mr. Hameed on the phone at 11am on 2/18, he was doing well. However, when the caretaker visited 2 hours later at 1pm, the patient was disoriented and felt his legs were heavy. This prompted the patient to come to the hospital.     In the ED: Hypothermic 91.4, HR 80, /60, RA   Patient noted to be Hyperkalemic (6.2) with Mixed respiratory and metabolic acidosis pH 7.2. - Patient was given Calcium Gluconate 2g, D50/Insulin 5 unit, 40 mg IV lasix, Lokelma 10mg.   Vancomycin and Zosyn  (20 Feb 2025 07:21)      24 HOUR EVENTS:  - Improving mental status with continued CRRT, A&O x1  - LIJ TLC placed tonight per HF to monitor volume status  - Decreased tacro dosing to 0.8 BID  - Remains NPO for ileus    REVIEW OF SYSTEMS: Limited 2/2 mental status, Denies pain SOB      ICU Vital Signs Last 24 Hrs  T(C): 36.8 (25 Feb 2025 19:00), Max: 37.1 (25 Feb 2025 15:00)  T(F): 98.2 (25 Feb 2025 19:00), Max: 98.8 (25 Feb 2025 15:00)  HR: 109 (25 Feb 2025 22:00) (109 - 126)  BP: 89/46 (25 Feb 2025 22:00) (89/46 - 161/74)  BP(mean): 65 (25 Feb 2025 22:00) (63 - 106)  RR: 14 (25 Feb 2025 22:00) (14 - 38)  SpO2: 97% (25 Feb 2025 21:00) (93% - 100%)    O2 Parameters below as of 25 Feb 2025 21:00  Patient On (Oxygen Delivery Method): room air    I&O's Summary    24 Feb 2025 07:01  -  25 Feb 2025 07:00  --------------------------------------------------------  IN: 836.7 mL / OUT: 741 mL / NET: 95.7 mL    25 Feb 2025 07:01  -  25 Feb 2025 23:52  --------------------------------------------------------  IN: 150 mL / OUT: 98 mL / NET: 52 mL    POCT Blood Glucose.: 127 mg/dL (25 Feb 2025 17:07)      PHYSICAL EXAM:  GENERAL: No acute distress, well-developed  HEAD:  Atraumatic, Normocephalic  EYES: EOMI, PERRLA, conjunctiva and sclera clear  NECK: Supple, no lymphadenopathy, no JVD  CHEST/LUNG: CTAB; No wheezes, rales, or rhonchi  HEART: Regular rate and rhythm. Normal S1/S2. No murmurs, rubs, or gallops  ABDOMEN: Soft, non-tender, non-distended; normal bowel sounds, no organomegaly  EXTREMITIES:  2+ peripheral pulses b/l, No clubbing, cyanosis, or edema  NEUROLOGY: A&O x 1 (name), moves all extremities spontaneously  SKIN: No rashes or lesions    ============================I/O===========================   I&O's Detail    24 Feb 2025 07:01  -  25 Feb 2025 07:00  --------------------------------------------------------  IN:    IV PiggyBack: 75 mL    Norepinephrine: 1.7 mL    Oral Fluid: 760 mL  Total IN: 836.7 mL    OUT:    Indwelling Catheter - Urethral (mL): 15 mL    Nasogastric/Oral tube (mL): 105 mL    Other (mL): 621 mL  Total OUT: 741 mL    Total NET: 95.7 mL      25 Feb 2025 07:01  -  25 Feb 2025 23:52  --------------------------------------------------------  IN:    IV PiggyBack: 150 mL  Total IN: 150 mL    OUT:    Indwelling Catheter - Urethral (mL): 25 mL    Other (mL): 73 mL  Total OUT: 98 mL    Total NET: 52 mL        ============================ LABS =========================                        8.8    11.93 )-----------( 146      ( 25 Feb 2025 18:45 )             27.9     02-25    134[L]  |  100  |  29[H]  ----------------------------<  121[H]  4.6   |  24  |  2.06[H]    Ca    7.7[L]      25 Feb 2025 18:45  Phos  3.0     02-25  Mg     2.6     02-25    TPro  5.6[L]  /  Alb  2.7[L]  /  TBili  1.1  /  DBili  x   /  AST  35  /  ALT  16  /  AlkPhos  51  02-25    LIVER FUNCTIONS - ( 25 Feb 2025 18:45 )  Alb: 2.7 g/dL / Pro: 5.6 g/dL / ALK PHOS: 51 U/L / ALT: 16 U/L / AST: 35 U/L / GGT: x           PT/INR - ( 25 Feb 2025 00:22 )   PT: 11.1 sec;   INR: 0.97 ratio         PTT - ( 25 Feb 2025 00:22 )  PTT:29.7 sec    Blood Gas Venous - Lactate: 2.0 mmol/L (02-25-25 @ 23:25)  Blood Gas Venous - Lactate: 1.2 mmol/L (02-24-25 @ 11:49)  Blood Gas Venous - Lactate: 1.1 mmol/L (02-24-25 @ 03:32)  Blood Gas Venous - Lactate: 0.9 mmol/L (02-24-25 @ 00:27)  Blood Gas Venous - Lactate: 1.3 mmol/L (02-23-25 @ 22:43)  Blood Gas Venous - Lactate: 1.2 mmol/L (02-23-25 @ 18:06)  Blood Gas Venous - Lactate: 1.0 mmol/L (02-23-25 @ 12:58)    Urinalysis Basic - ( 25 Feb 2025 18:45 )    Color: x / Appearance: x / SG: x / pH: x  Gluc: 121 mg/dL / Ketone: x  / Bili: x / Urobili: x   Blood: x / Protein: x / Nitrite: x   Leuk Esterase: x / RBC: x / WBC x   Sq Epi: x / Non Sq Epi: x / Bacteria: x

## 2025-03-25 ENCOUNTER — TELEPHONE (OUTPATIENT)
Dept: INTERNAL MEDICINE CLINIC | Facility: CLINIC | Age: 65
End: 2025-03-25

## 2025-03-25 ENCOUNTER — OFFICE VISIT (OUTPATIENT)
Dept: INTERNAL MEDICINE CLINIC | Facility: CLINIC | Age: 65
End: 2025-03-25
Payer: COMMERCIAL

## 2025-03-25 VITALS
TEMPERATURE: 97.2 F | DIASTOLIC BLOOD PRESSURE: 72 MMHG | HEIGHT: 61 IN | BODY MASS INDEX: 26.92 KG/M2 | WEIGHT: 142.6 LBS | SYSTOLIC BLOOD PRESSURE: 134 MMHG | HEART RATE: 80 BPM | OXYGEN SATURATION: 100 % | RESPIRATION RATE: 18 BRPM

## 2025-03-25 DIAGNOSIS — R39.15 URINARY URGENCY: ICD-10-CM

## 2025-03-25 DIAGNOSIS — R11.0 NAUSEA: ICD-10-CM

## 2025-03-25 DIAGNOSIS — K12.30 STOMATITIS AND MUCOSITIS: ICD-10-CM

## 2025-03-25 DIAGNOSIS — K12.1 STOMATITIS AND MUCOSITIS: ICD-10-CM

## 2025-03-25 DIAGNOSIS — N39.0 ACUTE UTI: Primary | ICD-10-CM

## 2025-03-25 LAB
APPEARANCE UR: ABNORMAL
BACTERIA URNS QL MICRO: ABNORMAL /HPF
BILIRUB UR QL: NEGATIVE
COLOR UR: ABNORMAL
GLUCOSE UR STRIP.AUTO-MCNC: NEGATIVE MG/DL
HGB UR QL STRIP: ABNORMAL
KETONES UR QL STRIP.AUTO: NEGATIVE MG/DL
LEUKOCYTE ESTERASE UR QL STRIP.AUTO: ABNORMAL
NITRITE UR QL STRIP.AUTO: NEGATIVE
OTHER OBSERVATIONS: ABNORMAL
PH UR STRIP: 7.5 (ref 5–9)
PROT UR STRIP-MCNC: NEGATIVE MG/DL
SP GR UR REFRACTOMETRY: 1.01 (ref 1–1.02)
UROBILINOGEN UR QL STRIP.AUTO: 0.2 EU/DL (ref 0.2–1)
WBC URNS QL MICRO: ABNORMAL /HPF

## 2025-03-25 PROCEDURE — 3075F SYST BP GE 130 - 139MM HG: CPT | Performed by: NURSE PRACTITIONER

## 2025-03-25 PROCEDURE — 3078F DIAST BP <80 MM HG: CPT | Performed by: NURSE PRACTITIONER

## 2025-03-25 PROCEDURE — 99214 OFFICE O/P EST MOD 30 MIN: CPT | Performed by: NURSE PRACTITIONER

## 2025-03-25 RX ORDER — CIPROFLOXACIN 500 MG/1
500 TABLET, FILM COATED ORAL 2 TIMES DAILY
Qty: 6 TABLET | Refills: 0 | Status: SHIPPED | OUTPATIENT
Start: 2025-03-25 | End: 2025-03-28

## 2025-03-25 RX ORDER — PROMETHAZINE HYDROCHLORIDE 12.5 MG/1
12.5 TABLET ORAL 4 TIMES DAILY PRN
Qty: 20 TABLET | Refills: 0 | Status: SHIPPED | OUTPATIENT
Start: 2025-03-25 | End: 2025-04-01

## 2025-03-25 RX ORDER — KETOCONAZOLE 20 MG/ML
SHAMPOO, SUSPENSION TOPICAL
COMMUNITY
Start: 2025-01-30

## 2025-03-25 ASSESSMENT — ENCOUNTER SYMPTOMS
COUGH: 0
VOMITING: 0
ABDOMINAL PAIN: 0
SHORTNESS OF BREATH: 0
NAUSEA: 1

## 2025-03-25 NOTE — PROGRESS NOTES
3/25/2025 1:50 PM  Location:[facility] PHYSICIAN SERVICES  [facility] MEDICINE  [place]  Patient #:  [id number]  YOB: 1960      History of Present Illness     Chief Complaint   Patient presents with    Nausea     Patient presents in the office today c/o nausea x 1.5 weeks     Urinary Urgency     Patient also c/o urinary urgency.  Patient was treated for a UTI on 3/13 with Macrobid.  Patient states she doesn't feel like the infection has completely cleared up.       Ms. Beltran is a 64 y.o. female  who presents for dysuria, frequency. Recently tx by urgent care for uti with macrobid, on her allergy list is macrobid but SE of n/v. She is also c/o nausea. Just finished macrobid, no cx in chart to review.  Urinary sx worsening/not improved, dysuria, frequency. Denies abd or back pain.    C/o Mouth dry and sore tongue and cracked lips/sore gums/buccal mucosa, nausea, worse in mornings. No abd pain. Recently went off olanzapine and had n/v at that point but it resolved after weaning the medication.  She is able to drink but states makes her sick to eat.  Previous nausea was from stopping onlazapine and it got better,  then 1.5 week ago started back. Bms regular, daily.          Allergies   Allergen Reactions    Diphenhydramine Other (See Comments)     hyperactivity    Duloxetine Other (See Comments)     Hallucinations    Hydrocodone-Acetaminophen Other (See Comments)     Hyperactivity    Hydromorphone Other (See Comments)     Hyperactivity    Meperidine Other (See Comments)     Hyperactivity    Morphine Other (See Comments)     Hallucinations    Tapentadol Other (See Comments)     Bad dreams    Nitrofurantoin Macrocrystal Nausea And Vomiting        Current Outpatient Medications   Medication Sig Dispense Refill    ketoconazole (NIZORAL) 2 % shampoo APPLY TOPICALLY 3 TIMES A WEEK      Magic Mouthwash (MIRACLE MOUTHWASH) Swish and spit 5 mLs 4 times daily as needed for Irritation 50 mL 0

## 2025-03-27 LAB
BACTERIA SPEC CULT: ABNORMAL
SERVICE CMNT-IMP: ABNORMAL

## 2025-03-28 ENCOUNTER — RESULTS FOLLOW-UP (OUTPATIENT)
Dept: INTERNAL MEDICINE CLINIC | Facility: CLINIC | Age: 65
End: 2025-03-28

## 2025-03-28 DIAGNOSIS — N39.0 ACUTE UTI: Primary | ICD-10-CM

## 2025-03-28 NOTE — TELEPHONE ENCOUNTER
Urine cx with ecoli, sensitive to antibiotics, cipro should have taken care of.   Let me know if sx dont improve and/or worsen.  Have her come back for repeat urine cx  Next week.

## 2025-04-07 ENCOUNTER — LAB (OUTPATIENT)
Dept: INTERNAL MEDICINE CLINIC | Facility: CLINIC | Age: 65
End: 2025-04-07

## 2025-04-07 ENCOUNTER — TELEPHONE (OUTPATIENT)
Dept: INTERNAL MEDICINE CLINIC | Facility: CLINIC | Age: 65
End: 2025-04-07

## 2025-04-07 DIAGNOSIS — R11.0 NAUSEA: Primary | ICD-10-CM

## 2025-04-07 DIAGNOSIS — J84.9 ILD (INTERSTITIAL LUNG DISEASE) (HCC): ICD-10-CM

## 2025-04-07 DIAGNOSIS — I10 PRIMARY HYPERTENSION: ICD-10-CM

## 2025-04-07 DIAGNOSIS — R73.01 IFG (IMPAIRED FASTING GLUCOSE): ICD-10-CM

## 2025-04-07 DIAGNOSIS — R11.0 NAUSEA: ICD-10-CM

## 2025-04-07 DIAGNOSIS — K21.9 GASTROESOPHAGEAL REFLUX DISEASE, UNSPECIFIED WHETHER ESOPHAGITIS PRESENT: ICD-10-CM

## 2025-04-07 DIAGNOSIS — E83.52 HYPERCALCEMIA: ICD-10-CM

## 2025-04-07 DIAGNOSIS — N39.0 ACUTE UTI: ICD-10-CM

## 2025-04-07 LAB
ALBUMIN SERPL-MCNC: 3.7 G/DL (ref 3.2–4.6)
ALBUMIN/GLOB SERPL: 1 (ref 1–1.9)
ALP SERPL-CCNC: 133 U/L (ref 35–104)
ALT SERPL-CCNC: 21 U/L (ref 8–45)
ANION GAP SERPL CALC-SCNC: 14 MMOL/L (ref 7–16)
APPEARANCE UR: CLEAR
AST SERPL-CCNC: 32 U/L (ref 15–37)
BACTERIA URNS QL MICRO: 0 /HPF
BASOPHILS # BLD: 0.05 K/UL (ref 0–0.2)
BASOPHILS NFR BLD: 0.6 % (ref 0–2)
BILIRUB SERPL-MCNC: 0.3 MG/DL (ref 0–1.2)
BILIRUB UR QL: NEGATIVE
BUN SERPL-MCNC: 7 MG/DL (ref 8–23)
CALCIUM SERPL-MCNC: 10.4 MG/DL (ref 8.8–10.2)
CHLORIDE SERPL-SCNC: 102 MMOL/L (ref 98–107)
CHOLEST SERPL-MCNC: 214 MG/DL (ref 0–200)
CO2 SERPL-SCNC: 26 MMOL/L (ref 20–29)
COLOR UR: ABNORMAL
CREAT SERPL-MCNC: 0.74 MG/DL (ref 0.6–1.1)
DIFFERENTIAL METHOD BLD: NORMAL
EOSINOPHIL # BLD: 0.04 K/UL (ref 0–0.8)
EOSINOPHIL NFR BLD: 0.5 % (ref 0.5–7.8)
EPI CELLS #/AREA URNS HPF: ABNORMAL /HPF
ERYTHROCYTE [DISTWIDTH] IN BLOOD BY AUTOMATED COUNT: 12.3 % (ref 11.9–14.6)
EST. AVERAGE GLUCOSE BLD GHB EST-MCNC: 121 MG/DL
GLOBULIN SER CALC-MCNC: 3.7 G/DL (ref 2.3–3.5)
GLUCOSE SERPL-MCNC: 100 MG/DL (ref 70–99)
GLUCOSE UR STRIP.AUTO-MCNC: NEGATIVE MG/DL
HBA1C MFR BLD: 5.9 % (ref 0–5.6)
HCT VFR BLD AUTO: 43.6 % (ref 35.8–46.3)
HDLC SERPL-MCNC: 56 MG/DL (ref 40–60)
HDLC SERPL: 3.8 (ref 0–5)
HGB BLD-MCNC: 15 G/DL (ref 11.7–15.4)
HGB UR QL STRIP: ABNORMAL
IMM GRANULOCYTES # BLD AUTO: 0.03 K/UL (ref 0–0.5)
IMM GRANULOCYTES NFR BLD AUTO: 0.4 % (ref 0–5)
KETONES UR QL STRIP.AUTO: NEGATIVE MG/DL
LDLC SERPL CALC-MCNC: 117 MG/DL (ref 0–100)
LEUKOCYTE ESTERASE UR QL STRIP.AUTO: ABNORMAL
LYMPHOCYTES # BLD: 1.98 K/UL (ref 0.5–4.6)
LYMPHOCYTES NFR BLD: 24.1 % (ref 13–44)
MCH RBC QN AUTO: 30.3 PG (ref 26.1–32.9)
MCHC RBC AUTO-ENTMCNC: 34.4 G/DL (ref 31.4–35)
MCV RBC AUTO: 88.1 FL (ref 82–102)
MONOCYTES # BLD: 0.58 K/UL (ref 0.1–1.3)
MONOCYTES NFR BLD: 7.1 % (ref 4–12)
NEUTS SEG # BLD: 5.53 K/UL (ref 1.7–8.2)
NEUTS SEG NFR BLD: 67.3 % (ref 43–78)
NITRITE UR QL STRIP.AUTO: NEGATIVE
NRBC # BLD: 0 K/UL (ref 0–0.2)
OTHER OBSERVATIONS: ABNORMAL
PH UR STRIP: 7.5 (ref 5–9)
PLATELET # BLD AUTO: 286 K/UL (ref 150–450)
PMV BLD AUTO: 10.2 FL (ref 9.4–12.3)
POTASSIUM SERPL-SCNC: 3.2 MMOL/L (ref 3.5–5.1)
PROT SERPL-MCNC: 7.4 G/DL (ref 6.3–8.2)
PROT UR STRIP-MCNC: NEGATIVE MG/DL
RBC # BLD AUTO: 4.95 M/UL (ref 4.05–5.2)
RBC #/AREA URNS HPF: ABNORMAL /HPF
SODIUM SERPL-SCNC: 142 MMOL/L (ref 136–145)
SP GR UR REFRACTOMETRY: <1.005 (ref 1–1.02)
TRIGL SERPL-MCNC: 203 MG/DL (ref 0–150)
TSH W FREE THYROID IF ABNORMAL: 1.5 UIU/ML (ref 0.27–4.2)
UROBILINOGEN UR QL STRIP.AUTO: 0.2 EU/DL (ref 0.2–1)
VLDLC SERPL CALC-MCNC: 41 MG/DL (ref 6–23)
WBC # BLD AUTO: 8.2 K/UL (ref 4.3–11.1)
WBC URNS QL MICRO: ABNORMAL /HPF

## 2025-04-07 RX ORDER — PROMETHAZINE HYDROCHLORIDE 25 MG/1
25 TABLET ORAL 4 TIMES DAILY PRN
Qty: 20 TABLET | Refills: 0 | Status: SHIPPED | OUTPATIENT
Start: 2025-04-07 | End: 2025-04-14

## 2025-04-07 NOTE — TELEPHONE ENCOUNTER
Pt states she has been sick at her stomach for the last 3 weeks. She has an appointment tomorrow to see Marcia about this issue. She would like some phenigren called in to her pharmacy ([facility] in [place] on [address]) if possible.        Call Back # [phone]

## 2025-04-08 ENCOUNTER — RESULTS FOLLOW-UP (OUTPATIENT)
Dept: INTERNAL MEDICINE CLINIC | Facility: CLINIC | Age: 65
End: 2025-04-08

## 2025-04-08 DIAGNOSIS — E87.6 HYPOKALEMIA: Primary | ICD-10-CM

## 2025-04-08 LAB
CALCIUM SERPL-MCNC: 10.4 MG/DL (ref 8.8–10.2)
PTH-INTACT SERPL-MCNC: ABNORMAL PG/ML (ref 15–65)

## 2025-04-08 RX ORDER — POTASSIUM CHLORIDE 1500 MG/1
20 TABLET, EXTENDED RELEASE ORAL DAILY
Qty: 30 TABLET | Refills: 5 | Status: SHIPPED | OUTPATIENT
Start: 2025-04-08 | End: 2025-04-27

## 2025-04-09 ENCOUNTER — TELEPHONE (OUTPATIENT)
Dept: INTERNAL MEDICINE CLINIC | Facility: CLINIC | Age: 65
End: 2025-04-09

## 2025-04-09 LAB
BACTERIA SPEC CULT: NORMAL
SERVICE CMNT-IMP: NORMAL

## 2025-04-09 NOTE — TELEPHONE ENCOUNTER
----- Message from Mabel WEINER sent at 4/8/2025  3:58 PM EDT -----  Regarding: Lab Recollection  The following lab test(s) were not completed.  Lab tests:  PTH Intact  Recollect reason: Wrong tube collected. Please disregard results, test was performed on lithium heparin which is no an acceptable specimen type for this test.     Please consult with the ordering physician/APC if the tests are needed.    If this test requires recollection, please re-enter order in Epic within 24 hours of this notice and respond to this message as Client Services will contact the patient.     If this test DOES NOT require recollection, document this in Epic documentation only encounter.    If you need additional information, respond to this message and/or call [phone].    [facility] Client Services

## 2025-04-09 NOTE — TELEPHONE ENCOUNTER
Urine cx is negative, still trace blood in urine.     Lets recheck urine at next appt to ensure blood is gone.

## 2025-04-11 ENCOUNTER — OFFICE VISIT (OUTPATIENT)
Dept: INTERNAL MEDICINE CLINIC | Facility: CLINIC | Age: 65
End: 2025-04-11
Payer: COMMERCIAL

## 2025-04-11 VITALS
RESPIRATION RATE: 18 BRPM | BODY MASS INDEX: 26.7 KG/M2 | DIASTOLIC BLOOD PRESSURE: 75 MMHG | HEART RATE: 83 BPM | WEIGHT: 141.4 LBS | SYSTOLIC BLOOD PRESSURE: 135 MMHG | HEIGHT: 61 IN | TEMPERATURE: 98 F | OXYGEN SATURATION: 99 %

## 2025-04-11 DIAGNOSIS — Z86.19 HISTORY OF HEPATITIS C VIRUS INFECTION: ICD-10-CM

## 2025-04-11 DIAGNOSIS — R11.2 NAUSEA AND VOMITING, UNSPECIFIED VOMITING TYPE: ICD-10-CM

## 2025-04-11 DIAGNOSIS — R31.9 HEMATURIA OF UNDIAGNOSED CAUSE: ICD-10-CM

## 2025-04-11 DIAGNOSIS — R68.2 DRY MOUTH: ICD-10-CM

## 2025-04-11 DIAGNOSIS — E87.6 HYPOKALEMIA: Primary | ICD-10-CM

## 2025-04-11 LAB
ERYTHROCYTE [SEDIMENTATION RATE] IN BLOOD: 12 MM/HR (ref 0–30)
HCV AB SER QL: REACTIVE

## 2025-04-11 PROCEDURE — 99214 OFFICE O/P EST MOD 30 MIN: CPT | Performed by: NURSE PRACTITIONER

## 2025-04-11 PROCEDURE — 3075F SYST BP GE 130 - 139MM HG: CPT | Performed by: NURSE PRACTITIONER

## 2025-04-11 PROCEDURE — 3078F DIAST BP <80 MM HG: CPT | Performed by: NURSE PRACTITIONER

## 2025-04-11 ASSESSMENT — ENCOUNTER SYMPTOMS
COUGH: 0
NAUSEA: 1
ABDOMINAL DISTENTION: 1
BLOOD IN STOOL: 0
ABDOMINAL PAIN: 0
SHORTNESS OF BREATH: 0
CONSTIPATION: 0
VOMITING: 1
DIARRHEA: 0

## 2025-04-11 NOTE — PROGRESS NOTES
extended release tablet Take 1 tablet by mouth daily 30 tablet 5    promethazine (PHENERGAN) 25 MG tablet Take 1 tablet by mouth 4 times daily as needed for Nausea 20 tablet 0    ketoconazole (NIZORAL) 2 % shampoo APPLY TOPICALLY 3 TIMES A WEEK      Magic Mouthwash (MIRACLE MOUTHWASH) Swish and spit 5 mLs 4 times daily as needed (mouth ulcer) Equal parts liquid diphenhydramine, viscous lidocaine, liquid nystatin. 250 mL 0    escitalopram (LEXAPRO) 10 MG tablet Take 1 tablet by mouth daily      pravastatin (PRAVACHOL) 40 MG tablet Take 1 tablet by mouth daily 90 tablet 3    amLODIPine-benazepril (LOTREL) 5-20 MG per capsule Take 1 capsule by mouth daily 90 capsule 3     No current facility-administered medications for this visit.        Past Medical History:   Diagnosis Date    ADHD (attention deficit hyperactivity disorder)     Arthritis     Chronic obstructive pulmonary disease (HCC)     Per pt \"Mild,\" PRN inhaler-last used 2021, followed by Dr. Cano     Degeneration of lumbar or lumbosacral intervertebral disc 2/27/2015    Depression 02/27/2015    managed with medication     Dermatophytosis of the body 2/27/2015    Essential hypertension, benign 2/27/2015    managed with med    GERD (gastroesophageal reflux disease) 2/27/2015    managed with med    H/O echocardiogram 06/27/2019    EF 55-60%, trivial aortic, mitral, and tricuspid regurgitation     Heart murmur     pt reports since birth; echo dated  6/27/19 states trivial aortic, mitral, and tricuspid regurgitation     History of hepatitis C     History of kidney stones     History of MRSA infection     History of seizure 06/2019    per pt secondary to Robaxin     Hypopotassemia 2/27/2015    Insomnia, unspecified 2/27/2015    Migraine, unspecified, without mention of intractable migraine without mention of status migrainosus 2/27/2015    Odontoid fracture with type II morphology (HCC)     Osteoporosis 1/23/2017    PAC (premature atrial contraction)

## 2025-04-13 LAB
ANA SER QL: NEGATIVE
ENA SS-A AB SER-ACNC: <0.2 AI (ref 0–0.9)
ENA SS-B AB SER-ACNC: <0.2 AI (ref 0–0.9)

## 2025-04-14 LAB
HCV GENTYP SERPL NAA+PROBE: NORMAL
HCV RNA SERPL NAA+PROBE-ACNC: NORMAL IU/ML
HCV RNA SERPL NAA+PROBE-LOG IU: NORMAL LOG10 IU/ML
LABORATORY COMMENT REPORT: NORMAL

## 2025-04-15 ENCOUNTER — RESULTS FOLLOW-UP (OUTPATIENT)
Dept: INTERNAL MEDICINE CLINIC | Facility: CLINIC | Age: 65
End: 2025-04-15

## 2025-04-15 DIAGNOSIS — K80.20 GALL STONES: ICD-10-CM

## 2025-04-15 DIAGNOSIS — R31.9 HEMATURIA OF UNDIAGNOSED CAUSE: Primary | ICD-10-CM

## 2025-04-15 NOTE — TELEPHONE ENCOUNTER
I dont know if her ct can be moved up. Will check with pcp about dry mouth.     Dr lundy- she is c/o dry mouth, we have done lab for sjogrens, hepatitis c , franklin, sed rate, unrevealing.   Has tried hard candy. Any other thoughts?     Sx started about 1 month ago after being taken off all psych meds/including zyprexa and having abx for uti.

## 2025-04-15 NOTE — TELEPHONE ENCOUNTER
----- Message from ANAYELI DORADO MA sent at 4/15/2025  4:08 PM EDT -----  Patient informed and verbalized understanding.   States she still feels bad, nausea/dry mouth have not improved.

## 2025-04-15 NOTE — TELEPHONE ENCOUNTER
Her results show no active hep c infection. How is her nausea?  Will see what CT shows.  How is the dry mouth?

## 2025-04-22 ENCOUNTER — HOSPITAL ENCOUNTER (OUTPATIENT)
Dept: CT IMAGING | Age: 65
Discharge: HOME | End: 2025-04-24
Payer: COMMERCIAL

## 2025-04-22 DIAGNOSIS — R11.2 NAUSEA AND VOMITING, UNSPECIFIED VOMITING TYPE: ICD-10-CM

## 2025-04-22 PROCEDURE — 6360000004 HC RX CONTRAST MEDICATION: Performed by: NURSE PRACTITIONER

## 2025-04-22 PROCEDURE — 74177 CT ABD & PELVIS W/CONTRAST: CPT

## 2025-04-22 RX ORDER — IOPAMIDOL 755 MG/ML
100 INJECTION, SOLUTION INTRAVASCULAR
Status: COMPLETED | OUTPATIENT
Start: 2025-04-22 | End: 2025-04-22

## 2025-04-22 RX ADMIN — IOPAMIDOL 100 ML: 755 INJECTION, SOLUTION INTRAVENOUS at 09:49

## 2025-04-23 ENCOUNTER — TELEPHONE (OUTPATIENT)
Dept: INTERNAL MEDICINE CLINIC | Facility: CLINIC | Age: 65
End: 2025-04-23

## 2025-04-23 DIAGNOSIS — R11.0 NAUSEA: ICD-10-CM

## 2025-04-23 DIAGNOSIS — E87.6 HYPOKALEMIA: ICD-10-CM

## 2025-04-23 DIAGNOSIS — R31.9 HEMATURIA OF UNDIAGNOSED CAUSE: ICD-10-CM

## 2025-04-23 LAB
ANION GAP SERPL CALC-SCNC: 15 MMOL/L (ref 7–16)
APPEARANCE UR: CLEAR
BACTERIA URNS QL MICRO: NEGATIVE /HPF
BILIRUB UR QL: NEGATIVE
BUN SERPL-MCNC: 7 MG/DL (ref 8–23)
CALCIUM SERPL-MCNC: 10.6 MG/DL (ref 8.8–10.2)
CHLORIDE SERPL-SCNC: 101 MMOL/L (ref 98–107)
CO2 SERPL-SCNC: 23 MMOL/L (ref 20–29)
COLOR UR: ABNORMAL
CREAT SERPL-MCNC: 0.72 MG/DL (ref 0.6–1.1)
EPI CELLS #/AREA URNS HPF: ABNORMAL /HPF (ref 0–5)
GLUCOSE SERPL-MCNC: 104 MG/DL (ref 70–99)
GLUCOSE UR STRIP.AUTO-MCNC: NEGATIVE MG/DL
HGB UR QL STRIP: ABNORMAL
HYALINE CASTS URNS QL MICRO: ABNORMAL /LPF
KETONES UR QL STRIP.AUTO: NEGATIVE MG/DL
LEUKOCYTE ESTERASE UR QL STRIP.AUTO: ABNORMAL
MAGNESIUM SERPL-MCNC: 1.8 MG/DL (ref 1.8–2.4)
NITRITE UR QL STRIP.AUTO: NEGATIVE
PH UR STRIP: 8 (ref 5–9)
POTASSIUM SERPL-SCNC: 3.4 MMOL/L (ref 3.5–5.1)
PROT UR STRIP-MCNC: NEGATIVE MG/DL
RBC #/AREA URNS HPF: ABNORMAL /HPF (ref 0–5)
SODIUM SERPL-SCNC: 139 MMOL/L (ref 136–145)
SP GR UR REFRACTOMETRY: 1.01 (ref 1–1.02)
UROBILINOGEN UR QL STRIP.AUTO: 0.2 EU/DL (ref 0.2–1)
WBC URNS QL MICRO: ABNORMAL /HPF (ref 0–4)

## 2025-04-23 RX ORDER — CEVIMELINE HYDROCHLORIDE 30 MG/1
30 CAPSULE ORAL 3 TIMES DAILY PRN
Qty: 90 CAPSULE | Refills: 3 | Status: SHIPPED | OUTPATIENT
Start: 2025-04-23

## 2025-04-23 RX ORDER — PROMETHAZINE HYDROCHLORIDE 12.5 MG/1
12.5 TABLET ORAL 4 TIMES DAILY PRN
Qty: 20 TABLET | Refills: 0 | Status: SHIPPED | OUTPATIENT
Start: 2025-04-23 | End: 2025-04-30

## 2025-04-23 NOTE — TELEPHONE ENCOUNTER
Pt would like a script for phenigren because she is still so sick to her stomach. Also, she would like a script for the medicine you said you could give her for dry mouth.    Pharmacy:  [facility] on [address] in [place]      Call Back #[phone]

## 2025-04-25 NOTE — RESULT ENCOUNTER NOTE
Patient informed and verbalized understanding.   Patient has HIDA scan scheduled for the 1st.  States she will wait on Urology and GI referral until after HIDA scan results.

## 2025-04-25 NOTE — TELEPHONE ENCOUNTER
Ct showed non obstructing L kidney stone, diverticulosis, gall stones.   She still has trace blood in urine.   It may be a good idea to see urology and we could order a hida scan to further eval the gall bladder. I will order these.   Could also consider GI referral for the nausea. Hida scan will tell us if gb is not functioning properly.

## 2025-04-27 ENCOUNTER — RESULTS FOLLOW-UP (OUTPATIENT)
Dept: INTERNAL MEDICINE CLINIC | Facility: CLINIC | Age: 65
End: 2025-04-27

## 2025-04-27 DIAGNOSIS — E87.6 HYPOKALEMIA: Primary | ICD-10-CM

## 2025-04-27 RX ORDER — POTASSIUM CHLORIDE 1500 MG/1
20 TABLET, EXTENDED RELEASE ORAL 2 TIMES DAILY
Qty: 60 TABLET | Refills: 5 | Status: SHIPPED | OUTPATIENT
Start: 2025-04-27

## 2025-05-01 ENCOUNTER — HOSPITAL ENCOUNTER (OUTPATIENT)
Dept: NUCLEAR MEDICINE | Age: 65
Discharge: HOME | End: 2025-05-03
Payer: COMMERCIAL

## 2025-05-01 VITALS — BODY MASS INDEX: 26.64 KG/M2 | WEIGHT: 141 LBS

## 2025-05-01 DIAGNOSIS — K80.20 GALL STONES: ICD-10-CM

## 2025-05-01 PROCEDURE — 6360000004 HC RX CONTRAST MEDICATION: Performed by: NURSE PRACTITIONER

## 2025-05-01 PROCEDURE — 78227 HEPATOBIL SYST IMAGE W/DRUG: CPT

## 2025-05-01 PROCEDURE — 3430000000 HC RX DIAGNOSTIC RADIOPHARMACEUTICAL: Performed by: NURSE PRACTITIONER

## 2025-05-01 PROCEDURE — A9537 TC99M MEBROFENIN: HCPCS | Performed by: NURSE PRACTITIONER

## 2025-05-01 RX ORDER — KIT FOR THE PREPARATION OF TECHNETIUM TC 99M MEBROFENIN 45 MG/10ML
6 INJECTION, POWDER, LYOPHILIZED, FOR SOLUTION INTRAVENOUS
Status: COMPLETED | OUTPATIENT
Start: 2025-05-01 | End: 2025-05-01

## 2025-05-01 RX ORDER — SINCALIDE 5 UG/5ML
0.02 INJECTION, POWDER, LYOPHILIZED, FOR SOLUTION INTRAVENOUS ONCE
Status: COMPLETED | OUTPATIENT
Start: 2025-05-01 | End: 2025-05-01

## 2025-05-01 RX ADMIN — MEBROFENIN 6 MILLICURIE: 45 INJECTION, POWDER, LYOPHILIZED, FOR SOLUTION INTRAVENOUS at 10:28

## 2025-05-01 RX ADMIN — SINCALIDE 1.28 MCG: 5 INJECTION, POWDER, LYOPHILIZED, FOR SOLUTION INTRAVENOUS at 11:27

## 2025-05-05 ENCOUNTER — OFFICE VISIT (OUTPATIENT)
Dept: INTERNAL MEDICINE CLINIC | Facility: CLINIC | Age: 65
End: 2025-05-05
Payer: COMMERCIAL

## 2025-05-05 VITALS
DIASTOLIC BLOOD PRESSURE: 69 MMHG | RESPIRATION RATE: 18 BRPM | BODY MASS INDEX: 25.86 KG/M2 | SYSTOLIC BLOOD PRESSURE: 107 MMHG | HEIGHT: 61 IN | WEIGHT: 137 LBS | HEART RATE: 91 BPM

## 2025-05-05 DIAGNOSIS — E87.6 HYPOKALEMIA: ICD-10-CM

## 2025-05-05 DIAGNOSIS — R11.0 CHRONIC NAUSEA: Primary | ICD-10-CM

## 2025-05-05 DIAGNOSIS — R31.29 MICROSCOPIC HEMATURIA: ICD-10-CM

## 2025-05-05 DIAGNOSIS — J43.2 CENTRILOBULAR EMPHYSEMA (HCC): ICD-10-CM

## 2025-05-05 DIAGNOSIS — E83.52 HYPERCALCEMIA: ICD-10-CM

## 2025-05-05 DIAGNOSIS — K80.20 GALL STONES: ICD-10-CM

## 2025-05-05 DIAGNOSIS — I10 PRIMARY HYPERTENSION: ICD-10-CM

## 2025-05-05 LAB
ANION GAP SERPL CALC-SCNC: 14 MMOL/L (ref 7–16)
APPEARANCE UR: CLEAR
BILIRUB UR QL: NEGATIVE
BUN SERPL-MCNC: 9 MG/DL (ref 8–23)
CALCIUM SERPL-MCNC: 10.9 MG/DL (ref 8.8–10.2)
CALCIUM SERPL-MCNC: 11 MG/DL (ref 8.8–10.2)
CHLORIDE SERPL-SCNC: 101 MMOL/L (ref 98–107)
CO2 SERPL-SCNC: 23 MMOL/L (ref 20–29)
COLOR UR: NORMAL
CREAT SERPL-MCNC: 0.89 MG/DL (ref 0.6–1.1)
GLUCOSE SERPL-MCNC: 90 MG/DL (ref 70–99)
GLUCOSE UR STRIP.AUTO-MCNC: NEGATIVE MG/DL
HGB UR QL STRIP: NEGATIVE
KETONES UR QL STRIP.AUTO: NEGATIVE MG/DL
LEUKOCYTE ESTERASE UR QL STRIP.AUTO: NEGATIVE
MAGNESIUM SERPL-MCNC: 1.7 MG/DL (ref 1.8–2.4)
NITRITE UR QL STRIP.AUTO: NEGATIVE
PH UR STRIP: 6.5 (ref 5–9)
POTASSIUM SERPL-SCNC: 4.3 MMOL/L (ref 3.5–5.1)
PROT UR STRIP-MCNC: NEGATIVE MG/DL
PTH-INTACT SERPL-MCNC: 34.3 PG/ML (ref 15–65)
SODIUM SERPL-SCNC: 139 MMOL/L (ref 136–145)
SP GR UR REFRACTOMETRY: <1.005 (ref 1–1.02)
UROBILINOGEN UR QL STRIP.AUTO: 0.2 EU/DL (ref 0.2–1)

## 2025-05-05 PROCEDURE — 3074F SYST BP LT 130 MM HG: CPT | Performed by: INTERNAL MEDICINE

## 2025-05-05 PROCEDURE — 99214 OFFICE O/P EST MOD 30 MIN: CPT | Performed by: INTERNAL MEDICINE

## 2025-05-05 PROCEDURE — 3078F DIAST BP <80 MM HG: CPT | Performed by: INTERNAL MEDICINE

## 2025-05-05 RX ORDER — SUCRALFATE 1 G/1
1 TABLET ORAL 4 TIMES DAILY
Qty: 120 TABLET | Refills: 3 | Status: SHIPPED | OUTPATIENT
Start: 2025-05-05 | End: 2025-05-08 | Stop reason: ALTCHOICE

## 2025-05-05 RX ORDER — PROMETHAZINE HYDROCHLORIDE 25 MG/1
25 TABLET ORAL 4 TIMES DAILY PRN
Qty: 20 TABLET | Refills: 0 | Status: SHIPPED | OUTPATIENT
Start: 2025-05-05 | End: 2025-05-12

## 2025-05-05 RX ORDER — PANTOPRAZOLE SODIUM 40 MG/1
40 TABLET, DELAYED RELEASE ORAL
Qty: 30 TABLET | Refills: 5 | Status: SHIPPED | OUTPATIENT
Start: 2025-05-05 | End: 2025-05-08

## 2025-05-05 ASSESSMENT — ENCOUNTER SYMPTOMS
WHEEZING: 0
NAUSEA: 1
CONSTIPATION: 0
BLOOD IN STOOL: 0
SHORTNESS OF BREATH: 0
COUGH: 0
DIARRHEA: 0
VOMITING: 0

## 2025-05-05 NOTE — PROGRESS NOTES
5/5/2025 1:28 PM  Location:[facility] PHYSICIAN SERVICES  [facility] [place]INE  [place]  Patient #:  [id number]  YOB: 1960              Chief Complaint   Patient presents with    6 Month Follow-Up     6 month f/u    Nausea     Complains with nausea. Is taking Phenergan for the nausea.        Ms. Beltran is a 64 y.o. female  who presents for the above.     History of Present Illness  The patient is a 64-year-old female who presents for a 6-month follow-up and has persistent nausea.    She reports persistent nausea, particularly in the mornings, which is exacerbated by food intake. She does not experience any associated pain. She recalls a two-day period without nausea during which she consumed spaghetti without issue. However, the following morning, after consuming cereal, she experienced significant stomach upset. She does not report any episodes of vomiting but describes a sensation of impending emesis accompanied by perspiration. She also reports a general feeling of malaise. She does not report any hematochezia or melena. She does not experience symptoms of heartburn or reflux and reports no dysphagia. She has been on Prilosec 20 mg over the counter. She did not experience any adverse effects during her HIDA scan. She has been taking Phenergan for a week. Her appetite varies, with some days being able to eat and others not, leading to weight loss. She often feels nauseous when eating. She does not experience nausea with exertion but reports that ambulation induces nausea. She does not engage in regular exercise due to her symptoms. She does not experience shortness of breath or chest pressure during physical activity such as walking her dog. She experiences hunger-induced nausea, which is sometimes alleviated by eating, although she cannot consume large quantities of food. She does not report early satiety. She has been on potassium supplements for the past 3 weeks, which occasionally induce

## 2025-05-06 ENCOUNTER — RESULTS FOLLOW-UP (OUTPATIENT)
Dept: INTERNAL MEDICINE CLINIC | Facility: CLINIC | Age: 65
End: 2025-05-06

## 2025-05-06 DIAGNOSIS — E83.42 HYPOMAGNESEMIA: Primary | ICD-10-CM

## 2025-05-06 RX ORDER — MAGNESIUM OXIDE 400 MG/1
400 TABLET ORAL DAILY
Qty: 90 TABLET | Refills: 1 | Status: SHIPPED | OUTPATIENT
Start: 2025-05-06 | End: 2025-06-03 | Stop reason: SDUPTHER

## 2025-05-07 ENCOUNTER — TELEPHONE (OUTPATIENT)
Dept: INTERNAL MEDICINE CLINIC | Facility: CLINIC | Age: 65
End: 2025-05-07

## 2025-05-07 NOTE — TELEPHONE ENCOUNTER
Pt states she spoke with [facility] and they can't get her in until June 16ish. States she can't wait that long. She would like to be referred somewhere else, please.    Call Back# [phone]   other

## 2025-05-08 ENCOUNTER — OFFICE VISIT (OUTPATIENT)
Dept: GASTROENTEROLOGY | Age: 65
End: 2025-05-08
Payer: COMMERCIAL

## 2025-05-08 ENCOUNTER — TELEPHONE (OUTPATIENT)
Dept: GASTROENTEROLOGY | Age: 65
End: 2025-05-08

## 2025-05-08 ENCOUNTER — PREP FOR PROCEDURE (OUTPATIENT)
Dept: GASTROENTEROLOGY | Age: 65
End: 2025-05-08

## 2025-05-08 VITALS
HEIGHT: 61 IN | DIASTOLIC BLOOD PRESSURE: 85 MMHG | TEMPERATURE: 98 F | WEIGHT: 137 LBS | SYSTOLIC BLOOD PRESSURE: 149 MMHG | RESPIRATION RATE: 15 BRPM | HEART RATE: 110 BPM | BODY MASS INDEX: 25.86 KG/M2 | OXYGEN SATURATION: 98 %

## 2025-05-08 DIAGNOSIS — R68.2 DRY MOUTH: ICD-10-CM

## 2025-05-08 DIAGNOSIS — R11.2 NAUSEA AND VOMITING, UNSPECIFIED VOMITING TYPE: Primary | ICD-10-CM

## 2025-05-08 PROCEDURE — 99204 OFFICE O/P NEW MOD 45 MIN: CPT

## 2025-05-08 PROCEDURE — 3077F SYST BP >= 140 MM HG: CPT

## 2025-05-08 PROCEDURE — 3079F DIAST BP 80-89 MM HG: CPT

## 2025-05-08 RX ORDER — PANTOPRAZOLE SODIUM 40 MG/1
40 TABLET, DELAYED RELEASE ORAL 2 TIMES DAILY
Qty: 60 TABLET | Refills: 5 | Status: SHIPPED | OUTPATIENT
Start: 2025-05-08

## 2025-05-08 NOTE — PATIENT INSTRUCTIONS
For natural remedies for your nausea, you can try karlene products (tea, candies, karlene root), chamomile tea, Ibguard (peppermint oil capsules). Anti-nausea wrist bands are also useful, especially if the nausea is associated with motion.     -Try to keep a food journal; when you have symptoms; write down what you just ate and this will help identify any triggers. The FODMAP diet is also a good option to help guide you; this gives a guide on foods with complex carbohydrates and that tend to cause GI discomfort. If you have not received a copy from us, let our office know.    -After eating, try to talk a short walk to help aid in digestion.

## 2025-05-08 NOTE — TELEPHONE ENCOUNTER
LVM for patient requesting a call back to move appointment to an earlier opening. Patient can be moved to any provider with a sooner opening.

## 2025-05-08 NOTE — PROGRESS NOTES
ROS:   Review of Systems - History obtained from the patient  General ROS: negative  Gastrointestinal ROS: positive for - gas/bloating and nausea/vomiting    Imaging:   NM HEPATOBILIARY SCAN W EJECTION FRACTION 05/01/2025  IMPRESSION:  Normal hepatobiliary scan. Normal gallbladder ejection fraction of  96% indicates no evidence of biliary dyskinesia.  CT ABDOMEN PELVIS W IV CONTRAST 04/22/2025  IMPRESSION:  Cholelithiasis without evidence of acute cholecystitis.     Punctate nonobstructing left-sided renal calculi.     Degenerated calcified fibroids.     Sigmoid diverticulosis without evidence of acute diverticulitis.    Endoscopy:   Colonoscopy 2022    Assessment/Plan:   Nausea and vomiting:  Ongoing nausea with some vomiting for 2 months now. Phenergan helps some. Has tried ginger ale as well as ginger candy. Unable to eat or drink a whole lot and has had a 6lb weight loss. Will increase her pantoprazole to BID and schedule her for an EGD.     Follow up after procedure.       On this date 5/8/2025 I have spent 45 minutes reviewing previous notes, test results and face to face with the patient discussing the diagnosis and importance of compliance with the treatment plan as well as documenting on the day of the visit.      Joanne Calzada, APRN - CNP  [facility]

## 2025-05-09 RX ORDER — SODIUM CHLORIDE 0.9 % (FLUSH) 0.9 %
5-40 SYRINGE (ML) INJECTION EVERY 12 HOURS SCHEDULED
Status: CANCELLED | OUTPATIENT
Start: 2025-05-09

## 2025-05-09 RX ORDER — SODIUM CHLORIDE 9 MG/ML
25 INJECTION, SOLUTION INTRAVENOUS PRN
Status: CANCELLED | OUTPATIENT
Start: 2025-05-09

## 2025-05-09 RX ORDER — SODIUM CHLORIDE 0.9 % (FLUSH) 0.9 %
5-40 SYRINGE (ML) INJECTION PRN
Status: CANCELLED | OUTPATIENT
Start: 2025-05-09

## 2025-05-09 NOTE — PERIOP NOTE
[employer] message sent with instructions.    Patient's name, , and procedure verified.    Type: 1a; abbreviated assessment per anesthesia guidelines    Labs per anesthesia:  none    Instructed that GI Lab with call the day before procedure with time of arrival if procedure is [place] and Pre-op will call for [place] cases. Arrival times should be called by 5 pm. If no phone is received the patient should contact their respective hospital. The GI lab telephone number is [phone] and ES Pre-op is [phone].     Must have adult present in building the entire time .     PLEASE CONTINUE TAKING ALL PRESCRIPTION MEDICATIONS UP TO THE DAY OF PROCEDURE UNLESS OTHERWISE DIRECTED BELOW. You may take Tylenol, allergy,  and/or indigestion medications.     TAKE ONLY THESE MEDICATIONS ON THE DAY OF PROCEDURE   Amlodipine-benazepril (listed for MAC anesthesia)  Lexapro  Pantoprazole  Pravastatin   If needed, may take:  Phenergan        DISCONTINUE all vitamins and supplements 7 days prior to surgery. DISCONTINUE Non-Steroidal Anti-Inflammatory (NSAIDS) such as Advil and Aleve 5 days prior to procedure.     Home Medications to Hold- please continue all other medications except these.            Comments              Instructed on the following:    > Nothing to eat or drink after midnight. No gum, mints, or ice chips. You may take medications as instructed above with a sip of water.  > Arrive at Main Entrance, time of arrival to be called the day before by 1700  > Responsible adult must drive patient to the hospital, stay during surgery, and patient will need supervision 24 hours after anesthesia  > Use non moisturizing, antibacterial soap in shower the night before surgery and on the morning of surgery  > Do not wear make-up, nail polish, lotions, cologne, perfumes, powders, or oil on skin. Artificial nails are not permitted.   > All piercings must be removed prior to arrival.    > Wear loose fitting comfortable, clean clothing.

## 2025-05-19 ENCOUNTER — TELEPHONE (OUTPATIENT)
Age: 65
End: 2025-05-19

## 2025-05-22 ENCOUNTER — TELEPHONE (OUTPATIENT)
Age: 65
End: 2025-05-22

## 2025-05-28 ENCOUNTER — TELEPHONE (OUTPATIENT)
Dept: INTERNAL MEDICINE CLINIC | Facility: CLINIC | Age: 65
End: 2025-05-28

## 2025-05-28 NOTE — TELEPHONE ENCOUNTER
Pt wanted to know why she needed to be seen next week.  She states she received a  call from out office to work her in - please advise.

## 2025-05-29 DIAGNOSIS — R11.2 NAUSEA AND VOMITING, UNSPECIFIED VOMITING TYPE: ICD-10-CM

## 2025-05-29 DIAGNOSIS — F19.939 WITHDRAWAL FROM OTHER PSYCHOACTIVE SUBSTANCE (HCC): ICD-10-CM

## 2025-05-29 RX ORDER — PROMETHAZINE HYDROCHLORIDE 12.5 MG/1
12.5 TABLET ORAL 3 TIMES DAILY PRN
Qty: 15 TABLET | Refills: 0 | Status: SHIPPED | OUTPATIENT
Start: 2025-05-29 | End: 2025-06-03

## 2025-05-29 NOTE — TELEPHONE ENCOUNTER
Pt would like a refill for promethazine (phenergan) 25 mg called into [facility] [address]    Next appt: 6/2/2025  Call back #[phone]

## 2025-06-02 ENCOUNTER — OFFICE VISIT (OUTPATIENT)
Dept: INTERNAL MEDICINE CLINIC | Facility: CLINIC | Age: 65
End: 2025-06-02
Payer: COMMERCIAL

## 2025-06-02 VITALS
HEIGHT: 61 IN | HEART RATE: 82 BPM | RESPIRATION RATE: 16 BRPM | BODY MASS INDEX: 26.06 KG/M2 | SYSTOLIC BLOOD PRESSURE: 101 MMHG | WEIGHT: 138 LBS | DIASTOLIC BLOOD PRESSURE: 58 MMHG

## 2025-06-02 DIAGNOSIS — R11.0 CHRONIC NAUSEA: Primary | ICD-10-CM

## 2025-06-02 DIAGNOSIS — K80.20 GALL STONES: ICD-10-CM

## 2025-06-02 DIAGNOSIS — E83.42 HYPOMAGNESEMIA: ICD-10-CM

## 2025-06-02 DIAGNOSIS — F34.1 DYSTHYMIA: ICD-10-CM

## 2025-06-02 LAB
ANION GAP SERPL CALC-SCNC: 16 MMOL/L (ref 7–16)
BUN SERPL-MCNC: 14 MG/DL (ref 8–23)
CALCIUM SERPL-MCNC: 10.6 MG/DL (ref 8.8–10.2)
CHLORIDE SERPL-SCNC: 99 MMOL/L (ref 98–107)
CO2 SERPL-SCNC: 22 MMOL/L (ref 20–29)
CREAT SERPL-MCNC: 0.85 MG/DL (ref 0.6–1.1)
GLUCOSE SERPL-MCNC: 114 MG/DL (ref 70–99)
MAGNESIUM SERPL-MCNC: 1.7 MG/DL (ref 1.8–2.4)
POTASSIUM SERPL-SCNC: 4.4 MMOL/L (ref 3.5–5.1)
SODIUM SERPL-SCNC: 137 MMOL/L (ref 136–145)

## 2025-06-02 PROCEDURE — 99214 OFFICE O/P EST MOD 30 MIN: CPT | Performed by: INTERNAL MEDICINE

## 2025-06-02 PROCEDURE — 3074F SYST BP LT 130 MM HG: CPT | Performed by: INTERNAL MEDICINE

## 2025-06-02 PROCEDURE — 3078F DIAST BP <80 MM HG: CPT | Performed by: INTERNAL MEDICINE

## 2025-06-02 RX ORDER — URSODIOL 300 MG/1
300 CAPSULE ORAL 2 TIMES DAILY
Qty: 60 CAPSULE | Refills: 3 | Status: SHIPPED | OUTPATIENT
Start: 2025-06-02

## 2025-06-02 ASSESSMENT — ENCOUNTER SYMPTOMS
NAUSEA: 1
VOMITING: 0
BLOOD IN STOOL: 0

## 2025-06-02 NOTE — PROGRESS NOTES
6/2/2025 2:50 PM  Location:[facility] PHYSICIAN SERVICES  [facility] [place]  [place]  Patient #:  [id number]  YOB: 1960              Chief Complaint   Patient presents with    Nausea     Still having issues with nausea.        Ms. Beltran is a 64 y.o. female  who presents for the above.     History of Present Illness  The patient is a 64-year-old female who presents for follow-up of chronic nausea.    She reports persistent nausea, particularly in the mornings, which necessitates forced feeding. Typically, she takes Phenergan to alleviate the nausea, but it was ineffective over the past weekend (Friday, Saturday, and Sunday). She experiences nausea without vomiting. She wonders if stress might be contributing to her symptoms. Physical activity is limited as she prefers to rest when feeling unwell.     She has a history of gallstones and gallbladder inflammation, although recent HIDA scans have shown normal results. She is scheduled for an endoscopy to further investigate her symptoms. Additionally, she reports an improvement in her dry mouth condition with the prescribed medication, though she still experiences some dryness.    She is currently taking potassium and magnesium, which she must take with food to avoid gastrointestinal discomfort. She is also on a 10 mg dose of Lexapro and has an upcoming appointment with Dr. Virk.       Allergies   Allergen Reactions    Diphenhydramine Other (See Comments)     hyperactivity    Duloxetine Other (See Comments)     Hallucinations    Hydrocodone-Acetaminophen Other (See Comments)     Hyperactivity    Hydromorphone Other (See Comments)     Hyperactivity    Meperidine Other (See Comments)     Hyperactivity    Morphine Other (See Comments)     Hallucinations    Robaxin [Methocarbamol] Seizure    Tapentadol Other (See Comments)     Bad dreams    Nitrofurantoin Macrocrystal Nausea And Vomiting     Past Medical History:   Diagnosis Date    ADHD

## 2025-06-03 ENCOUNTER — RESULTS FOLLOW-UP (OUTPATIENT)
Dept: INTERNAL MEDICINE CLINIC | Facility: CLINIC | Age: 65
End: 2025-06-03

## 2025-06-03 RX ORDER — MAGNESIUM OXIDE 400 MG/1
400 TABLET ORAL 2 TIMES DAILY
Qty: 180 TABLET | Refills: 1 | Status: SHIPPED | OUTPATIENT
Start: 2025-06-03

## 2025-06-09 ENCOUNTER — ANESTHESIA EVENT (OUTPATIENT)
Dept: ENDOSCOPY | Age: 65
End: 2025-06-09
Payer: COMMERCIAL

## 2025-06-09 DIAGNOSIS — R11.0 NAUSEA: ICD-10-CM

## 2025-06-09 RX ORDER — SODIUM CHLORIDE, SODIUM LACTATE, POTASSIUM CHLORIDE, CALCIUM CHLORIDE 600; 310; 30; 20 MG/100ML; MG/100ML; MG/100ML; MG/100ML
INJECTION, SOLUTION INTRAVENOUS CONTINUOUS
Status: CANCELLED | OUTPATIENT
Start: 2025-06-09

## 2025-06-09 RX ORDER — IPRATROPIUM BROMIDE AND ALBUTEROL SULFATE 2.5; .5 MG/3ML; MG/3ML
1 SOLUTION RESPIRATORY (INHALATION)
Status: CANCELLED | OUTPATIENT
Start: 2025-06-09

## 2025-06-09 RX ORDER — NALOXONE HYDROCHLORIDE 0.4 MG/ML
INJECTION, SOLUTION INTRAMUSCULAR; INTRAVENOUS; SUBCUTANEOUS PRN
Status: CANCELLED | OUTPATIENT
Start: 2025-06-09

## 2025-06-09 RX ORDER — HALOPERIDOL 5 MG/ML
1 INJECTION INTRAMUSCULAR
Status: CANCELLED | OUTPATIENT
Start: 2025-06-09

## 2025-06-09 RX ORDER — PROMETHAZINE HYDROCHLORIDE 12.5 MG/1
12.5 TABLET ORAL 4 TIMES DAILY PRN
Qty: 20 TABLET | Refills: 0 | Status: SHIPPED | OUTPATIENT
Start: 2025-06-09 | End: 2025-06-16

## 2025-06-09 RX ORDER — ONDANSETRON 2 MG/ML
4 INJECTION INTRAMUSCULAR; INTRAVENOUS
Status: CANCELLED | OUTPATIENT
Start: 2025-06-09

## 2025-06-09 NOTE — PROGRESS NOTES
RN called Pt to confirm appointment time of 0920, arrival time 0750, location,  requirement, and instructions for registration at the hospital.  Pt verbalized understanding.

## 2025-06-10 ENCOUNTER — ANESTHESIA (OUTPATIENT)
Dept: ENDOSCOPY | Age: 65
End: 2025-06-10
Payer: COMMERCIAL

## 2025-06-10 ENCOUNTER — HOSPITAL ENCOUNTER (OUTPATIENT)
Age: 65
Discharge: HOME | End: 2025-06-10
Attending: STUDENT IN AN ORGANIZED HEALTH CARE EDUCATION/TRAINING PROGRAM | Admitting: STUDENT IN AN ORGANIZED HEALTH CARE EDUCATION/TRAINING PROGRAM
Payer: COMMERCIAL

## 2025-06-10 VITALS
TEMPERATURE: 97.9 F | WEIGHT: 137 LBS | HEIGHT: 61 IN | DIASTOLIC BLOOD PRESSURE: 59 MMHG | RESPIRATION RATE: 21 BRPM | BODY MASS INDEX: 25.86 KG/M2 | SYSTOLIC BLOOD PRESSURE: 108 MMHG | OXYGEN SATURATION: 98 % | HEART RATE: 84 BPM

## 2025-06-10 DIAGNOSIS — K44.9 DIAPHRAGMATIC HERNIA WITHOUT OBSTRUCTION AND WITHOUT GANGRENE: Primary | ICD-10-CM

## 2025-06-10 DIAGNOSIS — K29.01 ACUTE GASTRITIS WITH HEMORRHAGE, UNSPECIFIED GASTRITIS TYPE: ICD-10-CM

## 2025-06-10 DIAGNOSIS — R11.2 N&V (NAUSEA AND VOMITING): ICD-10-CM

## 2025-06-10 PROCEDURE — 7100000010 HC PHASE II RECOVERY - FIRST 15 MIN: Performed by: STUDENT IN AN ORGANIZED HEALTH CARE EDUCATION/TRAINING PROGRAM

## 2025-06-10 PROCEDURE — 2580000003 HC RX 258: Performed by: ANESTHESIOLOGY

## 2025-06-10 PROCEDURE — 3609012400 HC EGD TRANSORAL BIOPSY SINGLE/MULTIPLE: Performed by: STUDENT IN AN ORGANIZED HEALTH CARE EDUCATION/TRAINING PROGRAM

## 2025-06-10 PROCEDURE — 2500000003 HC RX 250 WO HCPCS: Performed by: NURSE ANESTHETIST, CERTIFIED REGISTERED

## 2025-06-10 PROCEDURE — 6360000002 HC RX W HCPCS: Performed by: NURSE ANESTHETIST, CERTIFIED REGISTERED

## 2025-06-10 PROCEDURE — 3700000000 HC ANESTHESIA ATTENDED CARE: Performed by: STUDENT IN AN ORGANIZED HEALTH CARE EDUCATION/TRAINING PROGRAM

## 2025-06-10 PROCEDURE — 88305 TISSUE EXAM BY PATHOLOGIST: CPT

## 2025-06-10 PROCEDURE — 88342 IMHCHEM/IMCYTCHM 1ST ANTB: CPT

## 2025-06-10 PROCEDURE — 2709999900 HC NON-CHARGEABLE SUPPLY: Performed by: STUDENT IN AN ORGANIZED HEALTH CARE EDUCATION/TRAINING PROGRAM

## 2025-06-10 PROCEDURE — 7100000011 HC PHASE II RECOVERY - ADDTL 15 MIN: Performed by: STUDENT IN AN ORGANIZED HEALTH CARE EDUCATION/TRAINING PROGRAM

## 2025-06-10 RX ORDER — LIDOCAINE HYDROCHLORIDE 10 MG/ML
1 INJECTION, SOLUTION INFILTRATION; PERINEURAL
Status: DISCONTINUED | OUTPATIENT
Start: 2025-06-10 | End: 2025-06-10 | Stop reason: HOSPADM

## 2025-06-10 RX ORDER — SODIUM CHLORIDE, SODIUM LACTATE, POTASSIUM CHLORIDE, CALCIUM CHLORIDE 600; 310; 30; 20 MG/100ML; MG/100ML; MG/100ML; MG/100ML
INJECTION, SOLUTION INTRAVENOUS CONTINUOUS
Status: DISCONTINUED | OUTPATIENT
Start: 2025-06-10 | End: 2025-06-10 | Stop reason: HOSPADM

## 2025-06-10 RX ORDER — FAMOTIDINE 40 MG/1
40 TABLET, FILM COATED ORAL
Qty: 30 TABLET | Refills: 3 | Status: SHIPPED | OUTPATIENT
Start: 2025-06-10

## 2025-06-10 RX ORDER — SODIUM CHLORIDE 0.9 % (FLUSH) 0.9 %
5-40 SYRINGE (ML) INJECTION PRN
Status: DISCONTINUED | OUTPATIENT
Start: 2025-06-10 | End: 2025-06-10 | Stop reason: HOSPADM

## 2025-06-10 RX ORDER — EPHEDRINE SULFATE 5 MG/ML
INJECTION INTRAVENOUS
Status: DISCONTINUED | OUTPATIENT
Start: 2025-06-10 | End: 2025-06-10 | Stop reason: SDUPTHER

## 2025-06-10 RX ORDER — DEXMEDETOMIDINE HYDROCHLORIDE 100 UG/ML
INJECTION, SOLUTION INTRAVENOUS
Status: DISCONTINUED | OUTPATIENT
Start: 2025-06-10 | End: 2025-06-10 | Stop reason: SDUPTHER

## 2025-06-10 RX ORDER — IPRATROPIUM BROMIDE AND ALBUTEROL SULFATE 2.5; .5 MG/3ML; MG/3ML
1 SOLUTION RESPIRATORY (INHALATION)
Status: DISCONTINUED | OUTPATIENT
Start: 2025-06-10 | End: 2025-06-10 | Stop reason: HOSPADM

## 2025-06-10 RX ORDER — SODIUM CHLORIDE 0.9 % (FLUSH) 0.9 %
5-40 SYRINGE (ML) INJECTION EVERY 12 HOURS SCHEDULED
Status: DISCONTINUED | OUTPATIENT
Start: 2025-06-10 | End: 2025-06-10 | Stop reason: HOSPADM

## 2025-06-10 RX ORDER — SODIUM CHLORIDE 9 MG/ML
25 INJECTION, SOLUTION INTRAVENOUS PRN
Status: DISCONTINUED | OUTPATIENT
Start: 2025-06-10 | End: 2025-06-10 | Stop reason: HOSPADM

## 2025-06-10 RX ORDER — OMEPRAZOLE 40 MG/1
CAPSULE, DELAYED RELEASE ORAL
Qty: 180 CAPSULE | Refills: 0 | Status: SHIPPED | OUTPATIENT
Start: 2025-06-10 | End: 2025-06-12 | Stop reason: SDUPTHER

## 2025-06-10 RX ORDER — GLYCOPYRROLATE 0.2 MG/ML
INJECTION INTRAMUSCULAR; INTRAVENOUS
Status: DISCONTINUED | OUTPATIENT
Start: 2025-06-10 | End: 2025-06-10 | Stop reason: SDUPTHER

## 2025-06-10 RX ADMIN — GLYCOPYRROLATE 0.2 MG: 0.2 INJECTION INTRAMUSCULAR; INTRAVENOUS at 09:11

## 2025-06-10 RX ADMIN — DEXMEDETOMIDINE 10 MCG: 100 INJECTION, SOLUTION, CONCENTRATE INTRAVENOUS at 09:11

## 2025-06-10 RX ADMIN — SODIUM CHLORIDE, SODIUM LACTATE, POTASSIUM CHLORIDE, AND CALCIUM CHLORIDE: 600; 310; 30; 20 INJECTION, SOLUTION INTRAVENOUS at 09:10

## 2025-06-10 RX ADMIN — EPHEDRINE SULFATE 15 MG: 5 INJECTION INTRAVENOUS at 09:20

## 2025-06-10 ASSESSMENT — PAIN - FUNCTIONAL ASSESSMENT
PAIN_FUNCTIONAL_ASSESSMENT: NONE - DENIES PAIN
PAIN_FUNCTIONAL_ASSESSMENT: NONE - DENIES PAIN
PAIN_FUNCTIONAL_ASSESSMENT: 0-10

## 2025-06-10 ASSESSMENT — COPD QUESTIONNAIRES: CAT_SEVERITY: MODERATE

## 2025-06-10 NOTE — ANESTHESIA PRE PROCEDURE
Other Findings:       Anesthesia Plan      TIVA     ASA 3       Induction: intravenous.      Anesthetic plan and risks discussed with patient.                    Chris Oconnor MD   6/10/2025

## 2025-06-10 NOTE — ANESTHESIA POSTPROCEDURE EVALUATION
Department of Anesthesiology  Postprocedure Note    Patient: Glen Beltran  MRN: [id number]  YOB: 1960  Date of evaluation: 6/10/2025    Procedure Summary       Date: 06/10/25 Room / Location: [facility] ENDO 04 / [facility] ENDOSCOPY    Anesthesia Start: 0910 Anesthesia Stop: 0929    Procedure: ESOPHAGOGASTRODUODENOSCOPY BIOPSY (Upper GI Region) Diagnosis:       N&V (nausea and vomiting)      (N&V (nausea and vomiting) [R11.2])    Surgeons: Neida Odell MD Responsible Provider: Chris Oconnor MD    Anesthesia Type: TIVA ASA Status: 3            Anesthesia Type: No value filed.    Sebas Phase I: Sebas Score: 10    Sebas Phase II: Sebas Score: 10    Anesthesia Post Evaluation    Patient location during evaluation: PACU  Patient participation: complete - patient participated  Level of consciousness: awake and alert  Airway patency: patent  Nausea & Vomiting: no nausea and no vomiting  Cardiovascular status: hemodynamically stable  Respiratory status: acceptable, nonlabored ventilation and spontaneous ventilation  Hydration status: euvolemic  Comments: BP (!) 108/59   Pulse 84   Temp 97.9 °F (36.6 °C) (Oral)   Resp 21   Ht 1.549 m (5' 1\")   Wt 62.1 kg (137 lb)   SpO2 98%   BMI 25.89 kg/m²     Multimodal analgesia pain management approach  Pain management: adequate and satisfactory to patient    No notable events documented.

## 2025-06-10 NOTE — H&P
CC: Nausea and vomiting     HPI:   Glen Beltran is 64 y.o. y/o female new patient who presents to the office with ongoing nausea and some vomiting for 2 months now. She states she is typically just nauseated all the time but this morning she did vomit. She denies any abdominal pain, bloody or black stools, or bloating. She does have some gas and early satiety. She denies alcohol or tobacco use. She does occasionally take NSAID's. She has had a 6lb weight loss since this started. She denies loss of appetite though stating she is hungry but when she eats she immediately becomes nauseated. She denies any family history of stomach or colon cancer and denies any reflux or heartburn. Bowel movements are normal and everyday. The nausea is worse in the morning but will occur at all times during the day and she can hardly eat or drink anything. She states the phenergan does help some. She has tried ginger ale and ginger candy's as well.         PMH/PSH:   TIA, HTN, ILD, pulmonary edema, hepatitis C, GERD, osteoporosis, arthritis, migraines, ADHD, HLD     FMH:   Denies FMH gastric or colorectal cancer   Denies FMH autoimmune disease      SocHx:   Denies smoking  Denies alcohol  Denies illicit drug use     PE:  /85, Pulse 110, Temp 98, Resp 15, SpO2% 98     Physical Exam:   General appearance - alert, well appearing, and in no distress and oriented to person, place, and time  Mental status - alert, oriented to person, place, and time, normal mood, behavior, speech, dress, motor activity, and thought processes  Abdomen -abdomen is soft without significant tenderness, masses, organomegaly or guarding.  Rectal exam-: deferred, not clinically indicated  Neurologic-Grossly normal:     LABS:         Lab Results   Component Value Date     HGB 15.0 04/07/2025     WBC 8.2 04/07/2025      04/07/2025     MCV 88.1 04/07/2025     CREATININE 0.89 05/05/2025     ALT 21 04/07/2025     AST 32 04/07/2025         ROS:   Review of

## 2025-06-10 NOTE — DISCHARGE INSTRUCTIONS
Gastrointestinal Esophagogastroduodenoscopy (EGD)/ Endoscopic Ultrasound(EUS)- Upper Exam Discharge Instructions    1. Call Dr. Odell [phone]  for any problems or questions.  2. Contact the doctor's office for follow up appointment as directed.  3. Medication may cause drowsiness for several hours, therefore, do not drive or operate machinery for remainder of the day.  4. No alcohol today.  5. Do not make any important decisions such as signing legal paperwork.  6. Ordinarily, you may resume regular diet and activity after exam unless otherwise specified by your physician.  7. For mild soreness in your throat you may use Cepacol throat lozenges or warm  salt-water gargles as needed.    Any additional instructions:   ***        Instructions given to Glen Beltran and other family members.

## 2025-06-12 ENCOUNTER — TELEPHONE (OUTPATIENT)
Dept: GASTROENTEROLOGY | Age: 65
End: 2025-06-12

## 2025-06-12 DIAGNOSIS — K29.01 ACUTE GASTRITIS WITH HEMORRHAGE, UNSPECIFIED GASTRITIS TYPE: ICD-10-CM

## 2025-06-12 DIAGNOSIS — K44.9 DIAPHRAGMATIC HERNIA WITHOUT OBSTRUCTION AND WITHOUT GANGRENE: ICD-10-CM

## 2025-06-12 RX ORDER — OMEPRAZOLE 40 MG/1
40 CAPSULE, DELAYED RELEASE ORAL
Qty: 90 CAPSULE | Refills: 1 | Status: SHIPPED | OUTPATIENT
Start: 2025-06-12 | End: 2025-12-09

## 2025-06-12 NOTE — TELEPHONE ENCOUNTER
Called patient to advise her that per her insurance plan requirements she is only allowed omeprazole once daily. Resent to [place] as once daily and asked patient to  additional quantity OTC to cover the 2nd dose. Patient verbalized agreement to plan.

## 2025-06-12 NOTE — TELEPHONE ENCOUNTER
Patient had a procedure on 6/10/25 and was prescribed Omeprazole.  The pharmacy has requested a PA but patient hasn't heard anything yet.  Is there anything else she can take?  Thanks.

## 2025-06-20 ENCOUNTER — RESULTS FOLLOW-UP (OUTPATIENT)
Dept: GASTROENTEROLOGY | Age: 65
End: 2025-06-20

## 2025-07-01 ENCOUNTER — TELEPHONE (OUTPATIENT)
Dept: INTERNAL MEDICINE CLINIC | Facility: CLINIC | Age: 65
End: 2025-07-01

## 2025-07-01 DIAGNOSIS — R11.0 NAUSEA: ICD-10-CM

## 2025-07-01 RX ORDER — PROMETHAZINE HYDROCHLORIDE 12.5 MG/1
12.5 TABLET ORAL 4 TIMES DAILY PRN
Qty: 20 TABLET | Refills: 0 | Status: SHIPPED | OUTPATIENT
Start: 2025-07-01 | End: 2025-07-08

## 2025-07-01 NOTE — TELEPHONE ENCOUNTER
Pt would like a script for phenergan sent to her pharmacy, please.       Pharmacy:  [facility] [address] in [place]

## 2025-08-12 ENCOUNTER — OFFICE VISIT (OUTPATIENT)
Dept: GASTROENTEROLOGY | Age: 65
End: 2025-08-12
Payer: COMMERCIAL

## 2025-08-12 VITALS
WEIGHT: 143 LBS | DIASTOLIC BLOOD PRESSURE: 63 MMHG | HEIGHT: 61 IN | SYSTOLIC BLOOD PRESSURE: 128 MMHG | HEART RATE: 72 BPM | BODY MASS INDEX: 27 KG/M2

## 2025-08-12 DIAGNOSIS — K21.9 GASTROESOPHAGEAL REFLUX DISEASE, UNSPECIFIED WHETHER ESOPHAGITIS PRESENT: Primary | ICD-10-CM

## 2025-08-12 DIAGNOSIS — R11.2 NAUSEA AND VOMITING, UNSPECIFIED VOMITING TYPE: ICD-10-CM

## 2025-08-12 PROCEDURE — 1123F ACP DISCUSS/DSCN MKR DOCD: CPT

## 2025-08-12 PROCEDURE — 3074F SYST BP LT 130 MM HG: CPT

## 2025-08-12 PROCEDURE — 3078F DIAST BP <80 MM HG: CPT

## 2025-08-12 PROCEDURE — 99214 OFFICE O/P EST MOD 30 MIN: CPT

## 2025-08-12 RX ORDER — DEXLANSOPRAZOLE 60 MG/1
60 CAPSULE, DELAYED RELEASE ORAL DAILY
Qty: 90 CAPSULE | Refills: 1 | Status: SHIPPED | OUTPATIENT
Start: 2025-08-12

## 2025-08-12 RX ORDER — PROMETHAZINE HYDROCHLORIDE 25 MG/1
25 TABLET ORAL 4 TIMES DAILY PRN
Qty: 20 TABLET | Refills: 0 | Status: SHIPPED | OUTPATIENT
Start: 2025-08-12 | End: 2025-08-26

## 2025-08-18 RX ORDER — CEVIMELINE HYDROCHLORIDE 30 MG/1
30 CAPSULE ORAL 3 TIMES DAILY PRN
Qty: 90 CAPSULE | Refills: 3 | Status: SHIPPED | OUTPATIENT
Start: 2025-08-18

## 2025-08-19 DIAGNOSIS — K29.01 ACUTE GASTRITIS WITH HEMORRHAGE, UNSPECIFIED GASTRITIS TYPE: ICD-10-CM

## 2025-08-19 DIAGNOSIS — K44.9 DIAPHRAGMATIC HERNIA WITHOUT OBSTRUCTION AND WITHOUT GANGRENE: ICD-10-CM

## 2025-08-19 RX ORDER — FAMOTIDINE 40 MG/1
40 TABLET, FILM COATED ORAL NIGHTLY
Qty: 90 TABLET | Refills: 3 | Status: SHIPPED | OUTPATIENT
Start: 2025-08-19

## (undated) DEVICE — 2000CC GUARDIAN II: Brand: GUARDIAN

## (undated) DEVICE — STERILE SYNTHETIC POLYISOPRENE POWDER-FREE SURGICAL GLOVES WITH HYDROGEL COATING, SMOOTH FINISH, STRAIGHT FINGER: Brand: PROTEXIS

## (undated) DEVICE — BLOCK BITE AD 60FR W/ VELC STRP ADDRESSES MOST PT AND

## (undated) DEVICE — SUTURE MCRYL SZ 2-0 L27IN ABSRB UD CP-1 1 L36MM 1/2 CIR REV Y266H

## (undated) DEVICE — 1010 S-DRAPE TOWEL DRAPE 10/BX: Brand: STERI-DRAPE™

## (undated) DEVICE — AIRLIFE™ OXYGEN TUBING 7 FEET (2.1 M) CRUSH RESISTANT OXYGEN TUBING, VINYL TIPPED: Brand: AIRLIFE™

## (undated) DEVICE — SOLUTION IRRIG 1000ML STRL H2O USP PLAS POUR BTL

## (undated) DEVICE — PIN FIX TROCAR PT 1 END 9/64X9 IN 1 PT STYL SMOOTH PLN STRL
Type: IMPLANTABLE DEVICE | Site: HIP | Status: NON-FUNCTIONAL
Removed: 2024-01-23

## (undated) DEVICE — SUTURE N ABSRB BRAIDED 5-0 CTX 39 IN 48 MM WHT BLK XBRAID S 3910900052

## (undated) DEVICE — GLOVE SURG SZ 65 L12IN FNGR THK79MIL GRN LTX FREE

## (undated) DEVICE — 450 ML BOTTLE OF 0.05% CHLORHEXIDINE GLUCONATE IN 99.95% STERILE WATER FOR IRRIGATION, USP AND APPLICATOR.: Brand: IRRISEPT ANTIMICROBIAL WOUND LAVAGE

## (undated) DEVICE — MAYFIELD® DISPOSABLE ADULT SKULL PIN (PLASTIC BASE): Brand: MAYFIELD®

## (undated) DEVICE — TRAY CATH 16F DRN BG LTX -- CONVERT TO ITEM 363158

## (undated) DEVICE — GUIDEPIN ORTH L190MM DIA2.5MM METAGLENE CTRL FOR DELT XTEND

## (undated) DEVICE — SUTURE VCRL SZ 0 L18IN ABSRB VLT L26MM CT-2 1/2 CIR J727D

## (undated) DEVICE — FORCEPS BX L240CM JAW DIA2.8MM L CAP W/ NDL MIC MESH TOOTH

## (undated) DEVICE — SOLUTION IV 250ML 0.9% SOD CHL PH 5 INJ USP VIAFLX PLAS

## (undated) DEVICE — (D)PREP SKN CHLRAPRP APPL 26ML -- CONVERT TO ITEM 371833

## (undated) DEVICE — SURGIFOAM SPNG SZ 100

## (undated) DEVICE — HOOD: Brand: T7PLUS

## (undated) DEVICE — TOOL 14MH30 LEGEND 14CM 3MM: Brand: MIDAS REX ™

## (undated) DEVICE — INTENDED FOR TISSUE SEPARATION, AND OTHER PROCEDURES THAT REQUIRE A SHARP SURGICAL BLADE TO PUNCTURE OR CUT.: Brand: BARD-PARKER SAFETY BLADES SIZE 15, STERILE

## (undated) DEVICE — CONTAINER PREFIL FRMLN 40ML --

## (undated) DEVICE — BANDAGE,GAUZE,BULKEE II,4.5"X4.1YD,STRL: Brand: MEDLINE

## (undated) DEVICE — SYR 3ML LL TIP 1/10ML GRAD --

## (undated) DEVICE — SLING ARM SWTH UNIV FOAM 1 SZ FITS MOST

## (undated) DEVICE — TOTAL SHOULDER DR POSTA: Brand: MEDLINE INDUSTRIES, INC.

## (undated) DEVICE — OSCILLATING TIP SAW CARTRIDGE: Brand: STRYKER PRECISION

## (undated) DEVICE — SPONGE GZ W4XL4IN COT 12 PLY TYP VII WVN C FLD DSGN STERILE

## (undated) DEVICE — SUTURE VCRL SZ 0 L27IN ABSRB UD L36MM CP-1 1/2 CIR REV CUT J267H

## (undated) DEVICE — REM POLYHESIVE ADULT PATIENT RETURN ELECTRODE: Brand: VALLEYLAB

## (undated) DEVICE — NEEDLE HYPO 18GA L1.5IN PNK S STL HUB POLYPR SHLD REG BVL

## (undated) DEVICE — GUIDEPIN ORTH L300MM DIA1.5MM GLENOSPHERE FOR DELT XTEND

## (undated) DEVICE — GOWN,REINFORCED,POLY,AURORA,XXLARGE,STR: Brand: MEDLINE

## (undated) DEVICE — HOOD: Brand: FLYTE

## (undated) DEVICE — COVER,MAYO STAND,XL,STERILE: Brand: MEDLINE

## (undated) DEVICE — DRAPE SHT 3 QTR PROXIMA 53X77 --

## (undated) DEVICE — SUTURE VCRL SZ 2-0 L27IN ABSRB VLT SH L26MM 1/2 CIR J785G

## (undated) DEVICE — DRAPE TWL SURG 16X26IN BLU ORB04] ALLCARE INC]

## (undated) DEVICE — SOLUTION IRRIG 3000ML 0.9% SOD CHL USP UROMATIC PLAS CONT

## (undated) DEVICE — GAUZE,SPONGE,4"X4",12PLY,WOVEN,NS,LF: Brand: MEDLINE

## (undated) DEVICE — CONNECTOR TBNG OD5-7MM O2 END DISP

## (undated) DEVICE — INTENDED FOR TISSUE SEPARATION, AND OTHER PROCEDURES THAT REQUIRE A SHARP SURGICAL BLADE TO PUNCTURE OR CUT.: Brand: BARD-PARKER SAFETY BLADES SIZE 10, STERILE

## (undated) DEVICE — SUTURE VCRL SZ 3-0 L18IN ABSRB VLT L26MM SH 1/2 CIR J774D

## (undated) DEVICE — GARMENT,MEDLINE,DVT,INT,CALF,MED, GEN2: Brand: MEDLINE

## (undated) DEVICE — KENDALL RADIOLUCENT FOAM MONITORING ELECTRODE RECTANGULAR SHAPE: Brand: KENDALL

## (undated) DEVICE — TOTAL HIP DR WATSON: Brand: MEDLINE INDUSTRIES, INC.

## (undated) DEVICE — SOLUTION IRRIG 1000ML H2O PIC PLAS SHATTERPROOF CONTAINER

## (undated) DEVICE — ENDOSCOPIC KIT 1.1+ OP4 CA DE 2 GWN AAMI LEVEL 3

## (undated) DEVICE — SURGICAL PROCEDURE PACK MIN CV CDS

## (undated) DEVICE — GLOVE SURG SZ 8 L12IN FNGR THK79MIL GRN LTX FREE

## (undated) DEVICE — COVER,TABLE,HEAVY DUTY,79"X110",STRL: Brand: MEDLINE

## (undated) DEVICE — PACKING WND W1INXL6FT VAG WVN COT GZ RADPQ

## (undated) DEVICE — SOLUTION IRRIG 1000ML 0.9% SOD CHL USP POUR PLAS BTL

## (undated) DEVICE — DRAPE,TOP,102X53,STERILE: Brand: MEDLINE

## (undated) DEVICE — PREVENA INCISION MANAGEMENT SYSTEM- PEEL & PLACE DRESSING: Brand: PREVENA™ PEEL & PLACE™

## (undated) DEVICE — STAPLER SKIN REG CLSR N ABSRB S STL 35 COUNT FIX HD HND GRP

## (undated) DEVICE — NEEDLE SPNL 22GA L3.5IN BLK HUB S STL REG WALL FIT STYL W/

## (undated) DEVICE — SYRINGE MED 50ML LUERLOCK TIP

## (undated) DEVICE — KENDALL SCD EXPRESS SLEEVES, KNEE LENGTH, MEDIUM: Brand: KENDALL SCD

## (undated) DEVICE — CONTAINER FORMALIN PREFILLED 10% NBF 60ML

## (undated) DEVICE — CANNULA NSL ORAL AD FOR CAPNOFLEX CO2 O2 AIRLFE

## (undated) DEVICE — SUTURE N ABSRB BRAIDED 2-0 MO-6 39 IN 26 MM 1/2 CIR BLU BLK 3910900023

## (undated) DEVICE — DISPOSABLE BIOPSY VALVE MAJ-1555: Brand: SINGLE USE BIOPSY VALVE (STERILE)

## (undated) DEVICE — SUTURE FIBERWIRE SZ 2 W/ TAPERED NEEDLE BLUE L38IN NONABSORB BLU L26.5MM 1/2 CIRCLE AR7200

## (undated) DEVICE — GLOVE ORANGE PI 8   MSG9080

## (undated) DEVICE — GLOVE SURG SZ 65 THK91MIL LTX FREE SYN POLYISOPRENE

## (undated) DEVICE — STERILE PVP: Brand: MEDLINE INDUSTRIES, INC.

## (undated) DEVICE — MOUTHPIECE ENDOSCP L CTRL OPN AND SIDE PORTS DISP

## (undated) DEVICE — SUTURE STRATAFIX SZ 3-0 L30CM NONABSORBABLE UD L19MM FS-2 SXMP2B408

## (undated) DEVICE — PAD,ABDOMINAL,5"X9",ST,LF,25/BX: Brand: MEDLINE INDUSTRIES, INC.

## (undated) DEVICE — SUTURE ABSORBABLE MONOFILAMENT 1 CTX 36 CM 48 MM VIO PDS +

## (undated) DEVICE — NDL PRT INJ NSAF BLNT 18GX1.5 --

## (undated) DEVICE — SINGLE PORT MANIFOLD: Brand: NEPTUNE 2

## (undated) DEVICE — AMD ANTIMICROBIAL NON-ADHERENT PAD,0.2% POLYHEXAMETHYLENE BIGUANIDE HCI (PHMB): Brand: TELFA

## (undated) DEVICE — SYRINGE MEDICAL 3ML CLEAR PLASTIC STANDARD NON CONTROL LUERLOCK TIP DISPOSABLE

## (undated) DEVICE — Device

## (undated) DEVICE — SUTURE VCRL SZ 2-0 L18IN ABSRB VLT L26MM SH 1/2 CIR J775D

## (undated) DEVICE — SOLUTION IV 1000ML 0.9% SOD CHL

## (undated) DEVICE — SYR 5ML 1/5 GRAD LL NSAF LF --

## (undated) DEVICE — BUTTON SWITCH PENCIL BLADE ELECTRODE, HOLSTER: Brand: EDGE

## (undated) DEVICE — BIPOLAR SEALER 23-112-1 AQM 6.0: Brand: AQUAMANTYS ®